# Patient Record
Sex: FEMALE | Race: WHITE | Employment: OTHER | URBAN - METROPOLITAN AREA
[De-identification: names, ages, dates, MRNs, and addresses within clinical notes are randomized per-mention and may not be internally consistent; named-entity substitution may affect disease eponyms.]

---

## 2017-03-08 ENCOUNTER — APPOINTMENT (OUTPATIENT)
Dept: CT IMAGING | Age: 82
DRG: 641 | End: 2017-03-08
Attending: EMERGENCY MEDICINE
Payer: MEDICARE

## 2017-03-08 ENCOUNTER — APPOINTMENT (OUTPATIENT)
Dept: CT IMAGING | Age: 82
DRG: 641 | End: 2017-03-08
Attending: FAMILY MEDICINE
Payer: MEDICARE

## 2017-03-08 ENCOUNTER — HOSPITAL ENCOUNTER (INPATIENT)
Age: 82
LOS: 2 days | Discharge: HOME HEALTH | DRG: 641 | End: 2017-03-10
Attending: EMERGENCY MEDICINE | Admitting: FAMILY MEDICINE
Payer: MEDICARE

## 2017-03-08 ENCOUNTER — APPOINTMENT (OUTPATIENT)
Dept: GENERAL RADIOLOGY | Age: 82
DRG: 641 | End: 2017-03-08
Attending: EMERGENCY MEDICINE
Payer: MEDICARE

## 2017-03-08 DIAGNOSIS — E83.42 HYPOMAGNESEMIA: Primary | ICD-10-CM

## 2017-03-08 DIAGNOSIS — E83.51 HYPOCALCEMIA: ICD-10-CM

## 2017-03-08 PROBLEM — R20.2 NUMBNESS AND TINGLING: Status: ACTIVE | Noted: 2017-03-08

## 2017-03-08 PROBLEM — K92.2 GI BLEED: Status: ACTIVE | Noted: 2017-03-08

## 2017-03-08 PROBLEM — G45.9 TIA (TRANSIENT ISCHEMIC ATTACK): Status: ACTIVE | Noted: 2017-03-08

## 2017-03-08 PROBLEM — R20.0 NUMBNESS AND TINGLING: Status: ACTIVE | Noted: 2017-03-08

## 2017-03-08 LAB
ALBUMIN SERPL BCP-MCNC: 3 G/DL (ref 3.5–5)
ALBUMIN/GLOB SERPL: 1.1 {RATIO} (ref 1.1–2.2)
ALP SERPL-CCNC: 81 U/L (ref 45–117)
ALT SERPL-CCNC: 15 U/L (ref 12–78)
AMMONIA PLAS-SCNC: <10 UMOL/L
ANION GAP BLD CALC-SCNC: 10 MMOL/L (ref 5–15)
APPEARANCE UR: ABNORMAL
AST SERPL W P-5'-P-CCNC: 17 U/L (ref 15–37)
BASOPHILS # BLD AUTO: 0 K/UL (ref 0–0.1)
BASOPHILS # BLD: 0 % (ref 0–1)
BILIRUB SERPL-MCNC: 0.9 MG/DL (ref 0.2–1)
BILIRUB UR QL CFM: NEGATIVE
BUN SERPL-MCNC: 16 MG/DL (ref 6–20)
BUN/CREAT SERPL: 16 (ref 12–20)
CALCIUM SERPL-MCNC: 6 MG/DL (ref 8.5–10.1)
CHLORIDE SERPL-SCNC: 103 MMOL/L (ref 97–108)
CHOLEST SERPL-MCNC: 182 MG/DL
CO2 SERPL-SCNC: 24 MMOL/L (ref 21–32)
COLOR UR: ABNORMAL
CREAT SERPL-MCNC: 0.99 MG/DL (ref 0.55–1.02)
EOSINOPHIL # BLD: 0.1 K/UL (ref 0–0.4)
EOSINOPHIL NFR BLD: 1 % (ref 0–7)
ERYTHROCYTE [DISTWIDTH] IN BLOOD BY AUTOMATED COUNT: 12.8 % (ref 11.5–14.5)
FOLATE SERPL-MCNC: 5.8 NG/ML (ref 5–21)
GLOBULIN SER CALC-MCNC: 2.7 G/DL (ref 2–4)
GLUCOSE SERPL-MCNC: 97 MG/DL (ref 65–100)
GLUCOSE UR STRIP.AUTO-MCNC: NEGATIVE MG/DL
HCT VFR BLD AUTO: 36.8 % (ref 35–47)
HDLC SERPL-MCNC: 64 MG/DL
HDLC SERPL: 2.8 {RATIO} (ref 0–5)
HGB BLD-MCNC: 12.3 G/DL (ref 11.5–16)
HGB UR QL STRIP: NEGATIVE
KETONES UR QL STRIP.AUTO: NEGATIVE MG/DL
LDLC SERPL CALC-MCNC: 91.2 MG/DL (ref 0–100)
LEUKOCYTE ESTERASE UR QL STRIP.AUTO: NEGATIVE
LIPID PROFILE,FLP: NORMAL
LYMPHOCYTES # BLD AUTO: 19 % (ref 12–49)
LYMPHOCYTES # BLD: 1.7 K/UL (ref 0.8–3.5)
MAGNESIUM SERPL-MCNC: <0.2 MG/DL (ref 1.6–2.4)
MCH RBC QN AUTO: 33.3 PG (ref 26–34)
MCHC RBC AUTO-ENTMCNC: 33.4 G/DL (ref 30–36.5)
MCV RBC AUTO: 99.7 FL (ref 80–99)
MONOCYTES # BLD: 0.7 K/UL (ref 0–1)
MONOCYTES NFR BLD AUTO: 8 % (ref 5–13)
NEUTS SEG # BLD: 6.4 K/UL (ref 1.8–8)
NEUTS SEG NFR BLD AUTO: 72 % (ref 32–75)
NITRITE UR QL STRIP.AUTO: NEGATIVE
PH UR STRIP: 6 [PH] (ref 5–8)
PHOSPHATE SERPL-MCNC: 3.6 MG/DL (ref 2.6–4.7)
PLATELET # BLD AUTO: 198 K/UL (ref 150–400)
POTASSIUM SERPL-SCNC: 3.9 MMOL/L (ref 3.5–5.1)
PROT SERPL-MCNC: 5.7 G/DL (ref 6.4–8.2)
PROT UR STRIP-MCNC: NEGATIVE MG/DL
RBC # BLD AUTO: 3.69 M/UL (ref 3.8–5.2)
SODIUM SERPL-SCNC: 137 MMOL/L (ref 136–145)
SP GR UR REFRACTOMETRY: 1.03 (ref 1–1.03)
TRIGL SERPL-MCNC: 134 MG/DL (ref ?–150)
UROBILINOGEN UR QL STRIP.AUTO: 0.2 EU/DL (ref 0.2–1)
VIT B12 SERPL-MCNC: NORMAL PG/ML (ref 211–911)
VLDLC SERPL CALC-MCNC: 26.8 MG/DL
WBC # BLD AUTO: 8.8 K/UL (ref 3.6–11)

## 2017-03-08 PROCEDURE — 74011000258 HC RX REV CODE- 258: Performed by: FAMILY MEDICINE

## 2017-03-08 PROCEDURE — 71020 XR CHEST PA LAT: CPT

## 2017-03-08 PROCEDURE — 74176 CT ABD & PELVIS W/O CONTRAST: CPT

## 2017-03-08 PROCEDURE — 99285 EMERGENCY DEPT VISIT HI MDM: CPT

## 2017-03-08 PROCEDURE — 84100 ASSAY OF PHOSPHORUS: CPT | Performed by: FAMILY MEDICINE

## 2017-03-08 PROCEDURE — 82140 ASSAY OF AMMONIA: CPT | Performed by: FAMILY MEDICINE

## 2017-03-08 PROCEDURE — 74011250636 HC RX REV CODE- 250/636: Performed by: FAMILY MEDICINE

## 2017-03-08 PROCEDURE — 70450 CT HEAD/BRAIN W/O DYE: CPT

## 2017-03-08 PROCEDURE — 74011000250 HC RX REV CODE- 250: Performed by: FAMILY MEDICINE

## 2017-03-08 PROCEDURE — 83735 ASSAY OF MAGNESIUM: CPT | Performed by: EMERGENCY MEDICINE

## 2017-03-08 PROCEDURE — 81003 URINALYSIS AUTO W/O SCOPE: CPT | Performed by: EMERGENCY MEDICINE

## 2017-03-08 PROCEDURE — 82746 ASSAY OF FOLIC ACID SERUM: CPT | Performed by: FAMILY MEDICINE

## 2017-03-08 PROCEDURE — 85025 COMPLETE CBC W/AUTO DIFF WBC: CPT | Performed by: EMERGENCY MEDICINE

## 2017-03-08 PROCEDURE — 65620000000 HC RM CCU GENERAL

## 2017-03-08 PROCEDURE — 96365 THER/PROPH/DIAG IV INF INIT: CPT

## 2017-03-08 PROCEDURE — 74011250636 HC RX REV CODE- 250/636: Performed by: EMERGENCY MEDICINE

## 2017-03-08 PROCEDURE — 83970 ASSAY OF PARATHORMONE: CPT | Performed by: EMERGENCY MEDICINE

## 2017-03-08 PROCEDURE — 93005 ELECTROCARDIOGRAM TRACING: CPT

## 2017-03-08 PROCEDURE — 82652 VIT D 1 25-DIHYDROXY: CPT | Performed by: EMERGENCY MEDICINE

## 2017-03-08 PROCEDURE — 80053 COMPREHEN METABOLIC PANEL: CPT | Performed by: EMERGENCY MEDICINE

## 2017-03-08 PROCEDURE — 82607 VITAMIN B-12: CPT | Performed by: FAMILY MEDICINE

## 2017-03-08 PROCEDURE — 80061 LIPID PANEL: CPT | Performed by: FAMILY MEDICINE

## 2017-03-08 PROCEDURE — 74011250637 HC RX REV CODE- 250/637: Performed by: FAMILY MEDICINE

## 2017-03-08 PROCEDURE — 36415 COLL VENOUS BLD VENIPUNCTURE: CPT | Performed by: EMERGENCY MEDICINE

## 2017-03-08 RX ORDER — ACETAMINOPHEN 325 MG/1
650 TABLET ORAL
Status: DISCONTINUED | OUTPATIENT
Start: 2017-03-08 | End: 2017-03-10 | Stop reason: HOSPADM

## 2017-03-08 RX ORDER — SODIUM CHLORIDE 0.9 % (FLUSH) 0.9 %
5-10 SYRINGE (ML) INJECTION EVERY 8 HOURS
Status: DISCONTINUED | OUTPATIENT
Start: 2017-03-08 | End: 2017-03-10 | Stop reason: HOSPADM

## 2017-03-08 RX ORDER — CALCIUM GLUCONATE 94 MG/ML
1 INJECTION, SOLUTION INTRAVENOUS ONCE
Status: DISCONTINUED | OUTPATIENT
Start: 2017-03-08 | End: 2017-03-08

## 2017-03-08 RX ORDER — MAGNESIUM SULFATE HEPTAHYDRATE 40 MG/ML
2 INJECTION, SOLUTION INTRAVENOUS ONCE
Status: COMPLETED | OUTPATIENT
Start: 2017-03-08 | End: 2017-03-08

## 2017-03-08 RX ORDER — CALCIUM CARBONATE 500(1250)
500 TABLET ORAL
Status: DISCONTINUED | OUTPATIENT
Start: 2017-03-09 | End: 2017-03-10

## 2017-03-08 RX ORDER — CLONAZEPAM 0.5 MG/1
0.5 TABLET ORAL
Status: DISCONTINUED | OUTPATIENT
Start: 2017-03-08 | End: 2017-03-10 | Stop reason: HOSPADM

## 2017-03-08 RX ORDER — GUAIFENESIN 100 MG/5ML
81 LIQUID (ML) ORAL DAILY
Status: DISCONTINUED | OUTPATIENT
Start: 2017-03-09 | End: 2017-03-10 | Stop reason: HOSPADM

## 2017-03-08 RX ORDER — OMEPRAZOLE 20 MG/1
20 CAPSULE, DELAYED RELEASE ORAL DAILY
Status: CANCELLED | OUTPATIENT
Start: 2017-03-09

## 2017-03-08 RX ORDER — PRAVASTATIN SODIUM 20 MG/1
20 TABLET ORAL DAILY
Status: DISCONTINUED | OUTPATIENT
Start: 2017-03-09 | End: 2017-03-10 | Stop reason: HOSPADM

## 2017-03-08 RX ORDER — POTASSIUM CHLORIDE 750 MG/1
20 TABLET, FILM COATED, EXTENDED RELEASE ORAL DAILY
Status: DISCONTINUED | OUTPATIENT
Start: 2017-03-09 | End: 2017-03-10 | Stop reason: HOSPADM

## 2017-03-08 RX ORDER — SODIUM CHLORIDE 0.9 % (FLUSH) 0.9 %
5-10 SYRINGE (ML) INJECTION AS NEEDED
Status: DISCONTINUED | OUTPATIENT
Start: 2017-03-08 | End: 2017-03-10 | Stop reason: HOSPADM

## 2017-03-08 RX ORDER — CALCIUM GLUCONATE 94 MG/ML
2 INJECTION, SOLUTION INTRAVENOUS
Status: DISCONTINUED | OUTPATIENT
Start: 2017-03-08 | End: 2017-03-08 | Stop reason: CLARIF

## 2017-03-08 RX ORDER — CARVEDILOL 6.25 MG/1
6.25 TABLET ORAL 2 TIMES DAILY WITH MEALS
Status: DISCONTINUED | OUTPATIENT
Start: 2017-03-09 | End: 2017-03-10 | Stop reason: HOSPADM

## 2017-03-08 RX ADMIN — CLONAZEPAM 0.5 MG: 0.5 TABLET ORAL at 23:11

## 2017-03-08 RX ADMIN — MAGNESIUM SULFATE HEPTAHYDRATE 2 G: 40 INJECTION, SOLUTION INTRAVENOUS at 20:11

## 2017-03-08 RX ADMIN — Medication 10 ML: at 22:23

## 2017-03-08 RX ADMIN — CALCIUM GLUCONATE 2 G: 94 INJECTION, SOLUTION INTRAVENOUS at 22:28

## 2017-03-08 RX ADMIN — FOLIC ACID: 5 INJECTION, SOLUTION INTRAMUSCULAR; INTRAVENOUS; SUBCUTANEOUS at 21:30

## 2017-03-08 RX ADMIN — CALCIUM GLUCONATE 2 G: 94 INJECTION, SOLUTION INTRAVENOUS at 21:21

## 2017-03-08 RX ADMIN — MAGNESIUM SULFATE HEPTAHYDRATE 2 G: 40 INJECTION, SOLUTION INTRAVENOUS at 19:16

## 2017-03-08 NOTE — ED TRIAGE NOTES
Pt c/o numbness to both hands for 1.5 months, pt had labs done and she stated two things were low, Ca 6.9 and Mg 0.5, pt c/o abd pain and shakiness, mid abd pain for a couple of months, denies fever , denies n/v, denies urinary symptoms, +diarrhea x 2 today

## 2017-03-08 NOTE — ED PROVIDER NOTES
HPI Comments: 80 y.o. female with past medical history significant for NSTEMI, numbness who presents accompanied by relative with chief complaint of evaluation for abnormal lab result. Patient who had blood work done by PCP this morning presents referred by PCP secondary to finding \"dangerously low\" Calcium and Magnesium levels. In ED, patient complains of \"shakiness\", b/l hand/foot numbness and intermittent mid-abdominal pain for ~6 weeks. She reports associated fatigue and loss of appetite. Patient denies any weight loss and notes she usually has adequate PO intake of fluids. Patient notes abdominal pain is exacerbated by ETOH consumption and claims she has recently tried to [employer]" because of this. Patient notes she has had low Potassium and Calcium levels in the past with associated b/l hand cramping for which she has had to be hospitalized. Patient notes she has not taken her diuretic in ~1 week per instruction but reports taking her \"Potassium pills and heart medication\" regularly. Patient notes she uses walker to ambulate. She denies any recent difficulty with gait. She denies any PO of vitamins or herbal remedies. Patient denies any SOB, fever, cough, vomiting, diarrhea. There are no other acute medical concerns at this time. Social hx: +ETOH use; 4 ounces scotch per night; recently stopped due to associated abdominal pain. PCP: Glendon Leventhal, MD    Note written by Cristina Lorenzo. Lobito Deleon, as dictated by Em Capone MD 5:50 PM      The history is provided by the patient. No  was used. Past Medical History:   Diagnosis Date    History of non-ST elevation myocardial infarction (NSTEMI) 11/28/2016       History reviewed. No pertinent surgical history. History reviewed. No pertinent family history.     Social History     Social History    Marital status:      Spouse name: N/A    Number of children: N/A    Years of education: N/A     Occupational History    Not on file. Social History Main Topics    Smoking status: Not on file    Smokeless tobacco: Not on file    Alcohol use Not on file    Drug use: Not on file    Sexual activity: Not on file     Other Topics Concern    Not on file     Social History Narrative         ALLERGIES: Codeine    Review of Systems   Constitutional: Positive for appetite change (loss of appetite) and fatigue. Negative for chills, fever and unexpected weight change. HENT: Negative for rhinorrhea, sore throat and trouble swallowing. Eyes: Negative for photophobia. Respiratory: Negative for cough and shortness of breath. Cardiovascular: Negative for chest pain and palpitations. Gastrointestinal: Positive for abdominal pain (intermittent). Negative for diarrhea, nausea and vomiting. Genitourinary: Negative for dysuria, frequency and hematuria. Musculoskeletal: Negative for arthralgias and gait problem. Neurological: Positive for numbness (hands, feet). Negative for dizziness, syncope and weakness. Psychiatric/Behavioral: Negative for behavioral problems. The patient is not nervous/anxious. All other systems reviewed and are negative. Vitals:    03/08/17 1729 03/08/17 1900   BP: 124/81 136/80   Pulse: 93 81   Resp: 16 14   Temp: 98 °F (36.7 °C) 98.8 °F (37.1 °C)   SpO2: 97% 99%   Height: 5' (1.524 m)             Physical Exam   Constitutional: She appears well-developed and well-nourished. HENT:   Head: Normocephalic and atraumatic. Mouth/Throat: Oropharynx is clear and moist.   Eyes: EOM are normal. Pupils are equal, round, and reactive to light. Neck: Normal range of motion. Neck supple. Cardiovascular: Normal rate, regular rhythm, normal heart sounds and intact distal pulses. Exam reveals no gallop and no friction rub. No murmur heard. Pulmonary/Chest: Effort normal. No respiratory distress. She has no wheezes. She has no rales. Abdominal: Soft. There is no tenderness.  There is no rebound. Musculoskeletal: Normal range of motion. She exhibits no tenderness. Neurological: She is alert. No cranial nerve deficit. Motor; symmetric   Skin: No erythema. Psychiatric: She has a normal mood and affect. Her behavior is normal.   Nursing note and vitals reviewed. Note written by Sera Powell. Dean Torre, as dictated by Chelsea Adkins MD 5:50 PM        [facility]  ED Course       Procedures    PROGRESS NOTE:  7:30 PM  Updated patient and relative on results and plan. They are in agreement. CONSULT NOTE:  7:44 PM Chelsea Adkins MD spoke with Dr. Jo Ann Garner, Consult for Hospitalist.  Discussed available diagnostic tests and clinical findings. He is in agreement with care plans as outlined. Dr. Jo Ann Garner will admit.

## 2017-03-08 NOTE — IP AVS SNAPSHOT
Current Discharge Medication List  
  
Take these medications at their scheduled times Dose & Instructions Dispensing Information Comments Morning Noon Evening Bedtime ACETAMINOPHEN EXTRA STRENGTH 500 mg tablet Generic drug:  acetaminophen Your next dose is: Today, Tomorrow Other:  ____________ Dose:  1000 mg Take 1,000 mg by mouth nightly. Refills:  0  
     
   
   
   
  
 aspirin 81 mg chewable tablet Your next dose is: Today, Tomorrow Other:  ____________ Dose:  81 mg Take 1 Tab by mouth daily. Quantity:  30 Tab Refills:  0  
     
   
   
   
  
 calcium carbonate 500 mg calcium (1,250 mg) tablet Commonly known as:  OS-DAVIAN Your next dose is: Today, Tomorrow Other:  ____________ Dose:  1 Tab Take 1 Tab by mouth three (3) times daily (with meals). Indications: hypocalcemia Quantity:  60 Tab Refills:  0  
     
   
   
   
  
 carvedilol 6.25 mg tablet Commonly known as:  Ozie Bitter Your next dose is: Today, Tomorrow Other:  ____________ Dose:  6.25 mg Take 1 Tab by mouth two (2) times daily (with meals). Quantity:  60 Tab Refills:  0  
     
   
   
   
  
 cholecalciferol 1,000 unit tablet Commonly known as:  VITAMIN D3 Your next dose is: Today, Tomorrow Other:  ____________ Dose:  5000 Units Take 5 Tabs by mouth daily. Quantity:  30 Tab Refills:  1  
     
   
   
   
  
 magnesium oxide 400 mg tablet Commonly known as:  MAG-OX Your next dose is: Today, Tomorrow Other:  ____________ Dose:  800 mg Take 2 Tabs by mouth two (2) times a day. Indications: HYPOMAGNESEMIA Quantity:  60 Tab Refills:  1  
     
   
   
   
  
 pravastatin 20 mg tablet Commonly known as:  PRAVACHOL Your next dose is: Today, Tomorrow Other:  ____________ Dose:  20 mg Take 20 mg by mouth daily. Refills:  0 PriLOSEC 20 mg capsule Generic drug:  omeprazole Your next dose is: Today, Tomorrow Other:  ____________ Dose:  20 mg Take 20 mg by mouth daily. Refills:  0 Take these medications as needed Dose & Instructions Dispensing Information Comments Morning Noon Evening Bedtime KlonoPIN 0.5 mg tablet Generic drug:  clonazePAM  
   
Your next dose is: Today, Tomorrow Other:  ____________ Dose:  0.5 mg Take 0.5 mg by mouth nightly as needed. Refills:  0  
     
   
   
   
  
 TYLENOL PM PO Your next dose is: Today, Tomorrow Other:  ____________ Dose:  1 Tab Take 1 Tab by mouth nightly as needed. Refills:  0 Where to Get Your Medications Information about where to get these medications is not yet available ! Ask your nurse or doctor about these medications  
  calcium carbonate 500 mg calcium (1,250 mg) tablet  
 cholecalciferol 1,000 unit tablet  
 magnesium oxide 400 mg tablet

## 2017-03-08 NOTE — IP AVS SNAPSHOT
[address] 
[phone] Patient: Adam Morales MRN: [id number] :10/17/1927 You are allergic to the following Allergen Reactions Codeine Unknown (comments) Recent Documentation Height Weight Breastfeeding? BMI OB Status 1.524 m 55.4 kg No 23.85 kg/m2 Postmenopausal   
  
Unresulted Labs Order Current Status VITAMIN D, 1, 25 DIHYDROXY In process VITAMIN D, 25 HYROXY PANEL In process Emergency Contacts Name Discharge Info Relation Home Work Mobile Divine Linn DISCHARGE CAREGIVER [3]  [phone] Abhishek(Grandson)Cameron DISCHARGE CAREGIVER [3] Other Relative [6] [phone] About your hospitalization You were admitted on:  2017 You last received care in the:  [address] You were discharged on:  March 10, 2017 Unit phone number:  [phone] Why you were hospitalized Your primary diagnosis was:  Hypocalcemia Your diagnoses also included:  Hypomagnesemia, Tia (Transient Ischemic Attack), Gi Bleed, Numbness And Tingling, 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, History Of Non-St Elevation Myocardial Infarction (Nstemi) Providers Seen During Your Hospitalizations Provider Role Specialty Primary office phone Chelsea Adkins MD Attending Provider Emergency Medicine [phone] Kamlesh Franco MD Attending Provider Hospitalist [phone] Francisca Kramer MD Attending Provider Internal Medicine [phone] Your Primary Care Physician (PCP) Primary Care Physician Office Phone Office Fax Kimberlyn Rodrigez [phone] Follow-up Information Follow up With Details Comments Contact Info [address] [facility] [address] [phone] Beba Stubbs MD   [address] [facility] [id number] [phone] Milton Sanderson MD On 3/21/2017 at 9:20 AM. You will see the Nurse Practitioner Ramin Barrow. [facility] [address] 
[phone] Milton Sanderson MD On 3/17/2017 Labs to be drawn on 3/17/2017. Walk in hours. [facility] [address] 
[phone] Current Discharge Medication List  
  
START taking these medications Dose & Instructions Dispensing Information Comments Morning Noon Evening Bedtime  
 calcium carbonate 500 mg calcium (1,250 mg) tablet Commonly known as:  OS-DAVIAN Your next dose is: Today, Tomorrow Other:  _________ Dose:  1 Tab Take 1 Tab by mouth three (3) times daily (with meals). Indications: hypocalcemia Quantity:  60 Tab Refills:  0  
     
   
   
   
  
 cholecalciferol 1,000 unit tablet Commonly known as:  VITAMIN D3 Your next dose is: Today, Tomorrow Other:  _________ Dose:  5000 Units Take 5 Tabs by mouth daily. Quantity:  30 Tab Refills:  1  
     
   
   
   
  
 magnesium oxide 400 mg tablet Commonly known as:  MAG-OX Your next dose is: Today, Tomorrow Other:  _________ Dose:  800 mg Take 2 Tabs by mouth two (2) times a day. Indications: HYPOMAGNESEMIA Quantity:  60 Tab Refills:  1 CONTINUE these medications which have NOT CHANGED Dose & Instructions Dispensing Information Comments Morning Noon Evening Bedtime ACETAMINOPHEN EXTRA STRENGTH 500 mg tablet Generic drug:  acetaminophen Your next dose is: Today, Tomorrow Other:  _________ Dose:  1000 mg Take 1,000 mg by mouth nightly. Refills:  0  
     
   
   
   
  
 aspirin 81 mg chewable tablet Your next dose is: Today, Tomorrow Other:  _________ Dose:  81 mg Take 1 Tab by mouth daily. Quantity:  30 Tab Refills:  0 carvedilol 6.25 mg tablet Commonly known as:  Jaimie Spry Your next dose is: Today, Tomorrow Other:  _________ Dose:  6.25 mg Take 1 Tab by mouth two (2) times daily (with meals). Quantity:  60 Tab Refills:  0 KlonoPIN 0.5 mg tablet Generic drug:  clonazePAM  
   
Your next dose is: Today, Tomorrow Other:  _________ Dose:  0.5 mg Take 0.5 mg by mouth nightly as needed. Refills:  0  
     
   
   
   
  
 pravastatin 20 mg tablet Commonly known as:  PRAVACHOL Your next dose is: Today, Tomorrow Other:  _________ Dose:  20 mg Take 20 mg by mouth daily. Refills:  0 PriLOSEC 20 mg capsule Generic drug:  omeprazole Your next dose is: Today, Tomorrow Other:  _________ Dose:  20 mg Take 20 mg by mouth daily. Refills:  0  
     
   
   
   
  
 TYLENOL PM PO Your next dose is: Today, Tomorrow Other:  _________ Dose:  1 Tab Take 1 Tab by mouth nightly as needed. Refills:  0 STOP taking these medications LASIX 20 mg tablet Generic drug:  furosemide  
   
  
 potassium chloride SA 10 mEq capsule Commonly known as:  MICRO-K  
   
  
 trimethoprim-sulfamethoxazole 160-800 mg per tablet Commonly known as:  BACTRIM DS Where to Get Your Medications Information on where to get these meds will be given to you by the nurse or doctor. ! Ask your nurse or doctor about these medications  
  calcium carbonate 500 mg calcium (1,250 mg) tablet  
 cholecalciferol 1,000 unit tablet  
 magnesium oxide 400 mg tablet Discharge Instructions Discharge Instructions PATIENT ID: Dana Marx MRN: [id number] YOB: 1927 DATE OF ADMISSION: 3/8/2017  5:32 PM   
DATE OF DISCHARGE: 3/10/2017 PRIMARY CARE PROVIDER: Glendon Leventhal, MD  
 
 
 DISCHARGING PHYSICIAN: Veronica James MD   
To contact this individual call [phone] and ask the  to page. If unavailable ask to be transferred the Adult Hospitalist Department. DISCHARGE DIAGNOSES : Bilateral Upper extremity tingling/Numbness, hypocalcemia, Hypomagnesemia, Alcohol abuse CONSULTATIONS: IP CONSULT TO HOSPITALIST 
IP CONSULT TO NEPHROLOGY PROCEDURES/SURGERIES: * No surgery found * PENDING TEST RESULTS:  
At the time of discharge the following test results are still pending: Vitamin D levels FOLLOW UP APPOINTMENTS:  See above. ADDITIONAL CARE RECOMMENDATIONS:  CMP, magnesium, (3/17/2017) DIET: Regular Diet ACTIVITY: Activity as tolerated WOUND CARE: none EQUIPMENT needed:  
 
 
  
 SNF/Inpatient Rehab/LTAC Independent/assisted living Hospice Other: CDMP Checked: Yes X Signed:  
Veronica James MD 
3/10/2017 
2:16 PM 
 
Discharge Orders None [facility] Announcement We are excited to announce that we are making your provider's discharge notes available to you in [email or web address].   You will see these notes when they are completed and signed by the physician that discharged you from your recent hospital stay. If you have any questions or concerns about any information you see in Sviral, please call the Health Information Department where you were seen or reach out to your Primary Care Provider for more information about your plan of care. Introducing \[facility] [place] [facility]\"" & HEALTH SERVICES! [employer] introduces Sviral patient portal. Now you can access parts of your medical record, email your doctor's office, and request medication refills online. 1. In your internet browser, go to [email or web address] 2. Click on the First Time User? Click Here link in the Sign In box. You will see the New Member Sign Up page. 3. Enter your Sviral Access Code exactly as it appears below. You will not need to use this code after youve completed the sign-up process. If you do not sign up before the expiration date, you must request a new code. · Sviral Access Code: [email or web address] Expires: 6/6/2017  6:31 PM 
 
4. Enter the last four digits of your Social Security Number (xxxx) and Date of Birth (mm/dd/yyyy) as indicated and click Submit. You will be taken to the next sign-up page. 5. Create a Sviral ID. This will be your Sviral login ID and cannot be changed, so think of one that is secure and easy to remember. 6. Create a Sviral password. You can change your password at any time. 7. Enter your Password Reset Question and Answer. This can be used at a later time if you forget your password. 8. Enter your e-mail address. You will receive e-mail notification when new information is available in [address] 9. Click Sign Up. You can now view and download portions of your medical record. 10. Click the Download Summary menu link to download a portable copy of your medical information. If you have questions, please visit the Frequently Asked Questions section of the Sviral website. Remember, Sviral is NOT to be used for urgent needs. For medical emergencies, dial 911. Now available from your iPhone and Android! General Information Please provide this summary of care documentation to your next provider. Patient Signature:  ____________________________________________________________ Date:  ____________________________________________________________  
  
Soheila Moulds Provider Signature:  ____________________________________________________________ Date:  ____________________________________________________________

## 2017-03-09 ENCOUNTER — HOME HEALTH ADMISSION (OUTPATIENT)
Dept: HOME HEALTH SERVICES | Facility: HOME HEALTH | Age: 82
End: 2017-03-09

## 2017-03-09 LAB
ANION GAP BLD CALC-SCNC: 9 MMOL/L (ref 5–15)
ATRIAL RATE: 79 BPM
BUN SERPL-MCNC: 15 MG/DL (ref 6–20)
BUN/CREAT SERPL: 17 (ref 12–20)
CALCIUM SERPL-MCNC: 5.9 MG/DL (ref 8.5–10.1)
CALCIUM SERPL-MCNC: 7.5 MG/DL (ref 8.5–10.1)
CALCULATED P AXIS, ECG09: 16 DEGREES
CALCULATED R AXIS, ECG10: -10 DEGREES
CALCULATED T AXIS, ECG11: 16 DEGREES
CHLORIDE SERPL-SCNC: 101 MMOL/L (ref 97–108)
CK SERPL-CCNC: 79 U/L (ref 26–192)
CO2 SERPL-SCNC: 26 MMOL/L (ref 21–32)
CREAT SERPL-MCNC: 0.87 MG/DL (ref 0.55–1.02)
DIAGNOSIS, 93000: NORMAL
EST. AVERAGE GLUCOSE BLD GHB EST-MCNC: NORMAL MG/DL
GLUCOSE SERPL-MCNC: 105 MG/DL (ref 65–100)
HBA1C MFR BLD: 4.7 % (ref 4.2–6.3)
LIPASE SERPL-CCNC: 245 U/L (ref 73–393)
MAGNESIUM SERPL-MCNC: 2.5 MG/DL (ref 1.6–2.4)
P-R INTERVAL, ECG05: 164 MS
POTASSIUM SERPL-SCNC: 3.5 MMOL/L (ref 3.5–5.1)
PTH-INTACT SERPL-MCNC: 42.2 PG/ML (ref 14–72)
Q-T INTERVAL, ECG07: 430 MS
QRS DURATION, ECG06: 112 MS
QTC CALCULATION (BEZET), ECG08: 493 MS
SODIUM SERPL-SCNC: 136 MMOL/L (ref 136–145)
TROPONIN I SERPL-MCNC: <0.04 NG/ML
TSH SERPL DL<=0.05 MIU/L-ACNC: 0.42 UIU/ML (ref 0.36–3.74)
VENTRICULAR RATE, ECG03: 79 BPM

## 2017-03-09 PROCEDURE — 97161 PT EVAL LOW COMPLEX 20 MIN: CPT

## 2017-03-09 PROCEDURE — 36415 COLL VENOUS BLD VENIPUNCTURE: CPT | Performed by: FAMILY MEDICINE

## 2017-03-09 PROCEDURE — 84484 ASSAY OF TROPONIN QUANT: CPT | Performed by: FAMILY MEDICINE

## 2017-03-09 PROCEDURE — 97116 GAIT TRAINING THERAPY: CPT

## 2017-03-09 PROCEDURE — 82550 ASSAY OF CK (CPK): CPT | Performed by: FAMILY MEDICINE

## 2017-03-09 PROCEDURE — 83036 HEMOGLOBIN GLYCOSYLATED A1C: CPT | Performed by: FAMILY MEDICINE

## 2017-03-09 PROCEDURE — 83735 ASSAY OF MAGNESIUM: CPT | Performed by: FAMILY MEDICINE

## 2017-03-09 PROCEDURE — 84443 ASSAY THYROID STIM HORMONE: CPT | Performed by: FAMILY MEDICINE

## 2017-03-09 PROCEDURE — 83690 ASSAY OF LIPASE: CPT | Performed by: FAMILY MEDICINE

## 2017-03-09 PROCEDURE — 82306 VITAMIN D 25 HYDROXY: CPT | Performed by: FAMILY MEDICINE

## 2017-03-09 PROCEDURE — 74011250637 HC RX REV CODE- 250/637: Performed by: FAMILY MEDICINE

## 2017-03-09 PROCEDURE — 74011000250 HC RX REV CODE- 250: Performed by: HOSPITALIST

## 2017-03-09 PROCEDURE — 74011250637 HC RX REV CODE- 250/637: Performed by: INTERNAL MEDICINE

## 2017-03-09 PROCEDURE — 74011000250 HC RX REV CODE- 250: Performed by: FAMILY MEDICINE

## 2017-03-09 PROCEDURE — 74011250636 HC RX REV CODE- 250/636: Performed by: FAMILY MEDICINE

## 2017-03-09 PROCEDURE — 65660000000 HC RM CCU STEPDOWN

## 2017-03-09 PROCEDURE — 80048 BASIC METABOLIC PNL TOTAL CA: CPT | Performed by: FAMILY MEDICINE

## 2017-03-09 RX ORDER — LANOLIN ALCOHOL/MO/W.PET/CERES
800 CREAM (GRAM) TOPICAL 2 TIMES DAILY
Status: DISCONTINUED | OUTPATIENT
Start: 2017-03-09 | End: 2017-03-10 | Stop reason: HOSPADM

## 2017-03-09 RX ORDER — MELATONIN
5000 DAILY
Status: DISCONTINUED | OUTPATIENT
Start: 2017-03-09 | End: 2017-03-10 | Stop reason: HOSPADM

## 2017-03-09 RX ORDER — POLYVINYL ALCOHOL 14 MG/ML
1 SOLUTION/ DROPS OPHTHALMIC AS NEEDED
Status: DISCONTINUED | OUTPATIENT
Start: 2017-03-09 | End: 2017-03-10 | Stop reason: HOSPADM

## 2017-03-09 RX ADMIN — PRAVASTATIN SODIUM 20 MG: 20 TABLET ORAL at 10:11

## 2017-03-09 RX ADMIN — CALCIUM CARBONATE 500 MG: 1250 TABLET ORAL at 17:30

## 2017-03-09 RX ADMIN — ACETAMINOPHEN 650 MG: 325 TABLET, FILM COATED ORAL at 21:09

## 2017-03-09 RX ADMIN — CARVEDILOL 6.25 MG: 6.25 TABLET, FILM COATED ORAL at 10:10

## 2017-03-09 RX ADMIN — Medication 800 MG: at 10:11

## 2017-03-09 RX ADMIN — FOLIC ACID: 5 INJECTION, SOLUTION INTRAMUSCULAR; INTRAVENOUS; SUBCUTANEOUS at 21:09

## 2017-03-09 RX ADMIN — POLYVINYL ALCOHOL 1 DROP: 14 SOLUTION/ DROPS OPHTHALMIC at 12:01

## 2017-03-09 RX ADMIN — POTASSIUM CHLORIDE 20 MEQ: 750 TABLET, FILM COATED, EXTENDED RELEASE ORAL at 10:10

## 2017-03-09 RX ADMIN — Medication 10 ML: at 21:10

## 2017-03-09 RX ADMIN — CALCIUM CARBONATE 500 MG: 1250 TABLET ORAL at 12:01

## 2017-03-09 RX ADMIN — CALCIUM CARBONATE 500 MG: 1250 TABLET ORAL at 10:16

## 2017-03-09 RX ADMIN — VITAMIN D, TAB 1000IU (100/BT) 5000 UNITS: 25 TAB at 10:11

## 2017-03-09 RX ADMIN — CLONAZEPAM 0.5 MG: 0.5 TABLET ORAL at 20:40

## 2017-03-09 RX ADMIN — CARVEDILOL 6.25 MG: 6.25 TABLET, FILM COATED ORAL at 17:30

## 2017-03-09 RX ADMIN — ASPIRIN 81 MG: 81 TABLET, CHEWABLE ORAL at 10:10

## 2017-03-09 NOTE — INTERDISCIPLINARY ROUNDS
IDR/SLIDR Summary          Patient: Elizabeth Shahid MRN: [id number]    Age: 80 y.o. YOB: 1927 Room/Bed: [address] [place] [address]   Admit Diagnosis: Hypocalcemia  Principal Diagnosis: Hypocalcemia   Goals: Normalize electrolytes  Readmission: NO  Quality Measure: Not applicable  VTE Prophylaxis: Mechanical  Influenza Vaccine screening completed? YES  Pneumococcal Vaccine screening completed? YES  Mobility needs: Yes   Nutrition plan:Yes  Consults: P. T and O.T. Financial concerns:No  Escalated to CM? NO  RRAT Score: 24   Interventions:H2H  Testing due for pt today?  NO  LOS: 1 days Expected length of stay 3 days  Discharge plan: tbd   PCP: Courtney Cardona MD  Transportation needs: Yes    Days before discharge:two or more days before discharge   Discharge disposition: Home    Signed:     Hung Horn RN  3/9/2017  7:26 AM

## 2017-03-09 NOTE — H&P
[address] [facility] [address]   HISTORY AND PHYSICAL       Name:  Julian Simons   MR#:  [id number]   :  10/17/1927   Account #:  [de-identified]        Date of Adm:  2017       CHIEF COMPLAINT: Numbness and abnormal labs. HISTORY OF PRESENT ILLNESS: The patient is an 69-year-old   female with past medical history significant for non-ST elevation MI,   history of coronary artery disease, history of alcohol abuse, history of   hypokalemia, hypomagnesemia and hypocalcemia, who presents to   the hospital with the above mentioned symptoms. The patient reports   that she has been experiencing some numbness in both her arms as   well as legs. The patient reports that she went to her primary care   physician this morning and was called because of significant   abnormality in her labs with low calcium and low magnesium. The   patient on arrival to the ED, had a magnesium that was not able to be   measured, and calcium of 6. The patient complains of shakiness in   bilateral hands with numbness associated with mid abdominal pain that   has been going on for 6 weeks. The patient reports that she has a loss   appetite and fatigue. The patient does report that she drinks 4 ounces   of scotch every night, but yesterday did not drink because she had   some abdominal pain associated with that. The patient reports that she   has been taking Lasix on a regular basis. The patient denies any other   complaints or problems. The patient reports that she has slight   headache associated with her symptoms. She also reports that she   had 2 loose stools this morning, but had no stools since then. The   patient also denies any recent history of antibiotic. The patient denies   any recent history of starting any new medications.  The patient also   denies any headache, blurry vision, sore throat, trouble swallowing,   trouble with speech, any chest pain, shortness of breath, cough, fever, chills, urinary symptoms, focal or generalized neurological weakness   besides the symptoms mentioned above, recent travels or sick   contacts. The patient does report that she has some twitching in her   arms and legs, but that is \"sporadic\". The patient is alert x2 at this point   of time. The patient also reports that she has had hemorrhoids and has   had rectal bleeding off and on which is \"just a few spots on the toilet   paper\". The patient denies any melena or hematemesis. PAST MEDICAL HISTORY: See above. HOME MEDICATIONS   Currently the patient is on:   1. Tylenol as needed. 2. Pravachol 20 mg daily. 3. Potassium chloride 2 capsules daily. 4. Aspirin 81 mg daily. 5. Carvedilol 6.25 mg b.i.d.   6. Clonazepam 0.5 mg nightly as needed. 7. Lasix 40 mg daily. 8. Prilosec 20 mg daily. SOCIAL HISTORY: The patient denies tobacco abuse. Reports drinking   4 ounces of alcohol every night. Denies IV drug abuse. Lives at home. REVIEW OF SYSTEMS: A 10-point review of systems done, which   was essentially negative except for the symptoms mentioned above. ALLERGIES: CODEINE. VITAL SIGNS: Temperature 98, pulse 72, respiratory rate 17, blood   pressure 155/76, pulse oximetry 98% room air. FAMILY HISTORY: Was discussed, was found to be noncontributory. PHYSICAL EXAMINATION   GENERAL: Alert x3, awake, no acute distress, resting in bed, pleasant   female, appears to be stated age. HEENT: Pupils equal and reactive to light. Dry mucous membranes. Tympanic membranes clear. NECK: Supple. CHEST: Decreased basal breath sounds. CORONARY: S1, S2 were heard. ABDOMEN: Soft, nontender, nondistended. Bowel sounds physiologic. EXTREMITIES: No clubbing, no cyanosis, no edema. NEUROPSYCHIATRIC: Pleasant mood and affect. Sensory grossly   within normal limits. DTR 2+/4. Strength 5/5 bilateral upper extremities.    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 Cranial   nerves 2-12 grossly intact. SKIN: Warm. LABORATORY DATA: White count 8.8, hemoglobin 12.2, hematocrit   36.8, platelets 848. Urine shows no signs of infection. Sodium 137,   potassium 3.9, chloride , bicarbonate 24, BUN 16, creatinine 0.99,   calcium 6, magnesium less than 0.2, bilirubin total 0.9, albumin 3, ALT   15, AST 17, alkaline phosphatase 81. PTH been ordered. CT abdomen   and pelvis shows fat-containing right anterior abdominal wall hernia;   hepatic cyst; right renal cyst, atherosclerotic abdominal aorta without   aneurysm; status post hysterectomy; diverticulosis; thoracolumbar   spondylolisthesis. EKG shows right bundle branch block with   nonspecific ST changes. ASSESSMENT AND PLAN   1. Profound hypomagnesemia. We will replace magnesium. I spoke   with Dr. Gabriela Kidd who reports to giving the patient magnesium   sulfate a total of 4 g intravenous, that has been ordered. Will monitor   the patient on neurovascular checks and telemetry monitoring. Further   intervention will be per hospital course. Reassess as needed. Check   magnesium every 8 hours. 2. Profound hypocalcemia, appears to be mildly symptomatic. Will   replace calcium with calcium gluconate; again, nephrology   recommendations have been incorporated and will give total of 4   grams intravenous at this point of time. We will check calcium every 8   hours. Neurovascular checks, telemetry monitoring and further   intervention will be per hospital course. Ionized calcium has been   ordered. 3. Numbness and tingling, most likely secondary to electrolyte   abnormalities, but rule out transient ischemic attack. Will get MRI of   the brain. Will continue the patient on aspirin and statin, get a lipid   profile, physical therapy consult and continue to monitor. The patient   will be on neurovascular checks. Further intervention will be per   hospital course.  Reassess as needed. Will get troponin levels and   hemoglobin A1c.   4. History of alcohol abuse. The patient will be on CIWA protocol. Will   replace vitamins. Further intervention will be per hospital course. Will   check B12 level. 5. History of coronary artery disease. Continue home medications,   stable. 6. Gastrointestinal and deep venous thrombosis prophylaxis. The   patient will be on sequential compression devices.         Garett Sanderson MD MM / MARILEE   D:  03/08/2017   20:48   T:  03/08/2017   23:42   Job #:  [id number]

## 2017-03-09 NOTE — PROGRESS NOTES
TRANSFER - IN REPORT:    Verbal report received from Jolanta(lilian) on John Freeze  being received from ED(unit) for routine progression of care      Report consisted of patients Situation, Background, Assessment and   Recommendations(SBAR). Information from the following report(s) SBAR, Kardex, ED Summary, Procedure Summary, Intake/Output, MAR, Accordion, Recent Results and Cardiac Rhythm NSR, BBB was reviewed with the receiving nurse. Opportunity for questions and clarification was provided. Assessment completed upon patients arrival to unit and care assumed.      Primary Nurse Carley De La O RN and Kellie Colon RN performed a dual skin assessment on this patient No impairment noted  Judson score is 18

## 2017-03-09 NOTE — PROGRESS NOTES
Care Management Interventions  PCP Verified by CM:  (Dr Castle President)  [facility] (Criteria: CHF and RRAT>21): No  Reason for No Palliative Care Consult:  (Not needed as of this time)  Mode of Transport at Discharge:  (car friend Keith Baugh phone [phone])  Transition of Care Consult (CM Consult): Home Health (possbile home healht follow up visit )  [address] Yes  Discharge Durable Medical Equipment: No  Health Maintenance Reviewed: Yes  Occupational Therapy Consult: No  Speech Therapy Consult: No  Current Support Network: Lives Alone (has a paid aide Jeannie who visits daily )  Confirm Follow Up Transport: Friends (Cesaer or Jeannie)  Plan discussed with Pt/Family/Caregiver: Yes (patient and friend Keith Baugh )  Discharge Location  Discharge Placement: Home with home health    introduced herself to patient and her friend and helper Jeannie phone [phone]. Patient states that she has managed to stay out of the hospital for a few months and does have a supportive social network. Patient uses [facility] and resides in [place].  She has a daughter who lives in [place], [place] and she communicates via e-mails and [email or web address]. Patient has a couple Jeannie and Cesear who help her with groceries, appts and housecleaning. In the past patient has used [facility] and would be in favor of having a nurse visit and review meds and assessment. I have spoken with hospitalist Dr Ankit Davis and sent referral via [facility]. Patient has orders to be transferred to [facility] when bed available. Pt does have a wheelchair at home and her friend has another one she keeps for patient. Patient does have an advanced directive inplace. Case management will follow for transitions of care needs. I spoke with PT Cortez and on occasion patient does use a rollator prn at home.

## 2017-03-09 NOTE — PROGRESS NOTES
[facility]   [address] [facility], [address] [place] [address], [facility]  Phone: [phone]   [phone]       Nephrology Progress Note  Ashley Kamara     10/17/1927     [id number]  Date of Admission : 3/8/2017  03/09/17    CC: Follow up for Electrolyte Disarray      Assessment and Plan   Chronic Hypomagnesemia :  -Multifactorial as outlined in consult note. Not suspecting Barters/ Gitelmans  - Corrected faster than expected. Repeat labs this afternoon   - start MgOxide 800 mg BID  - No Lasix, No PPI use for now    Hypocalcemia w/ Elevated PTH :  - 2/2 Vitamin D def + Hypomagnesemia   - Vit D level pending   - continue Tums and started Vit D3    Hypokalemia : 2/2 Hypomagnesemia + Loops   - off lasix. Replete PRN     Chronic alcoholism : CIWA protocol    Ventral Hernia : off PPI. Can use H2 blockers if needed     Interval History:  Seen and examined   Eating breakfast   parasthesias are chronic and persistent     Review of Systems: Pertinent items are noted in HPI. Current Medications:   Current Facility-Administered Medications   Medication Dose Route Frequency    magnesium oxide (MAG-OX) tablet 800 mg  800 mg Oral BID    cholecalciferol (VITAMIN D3) tablet 5,000 Units  5,000 Units Oral DAILY    aspirin chewable tablet 81 mg  81 mg Oral DAILY    carvedilol (COREG) tablet 6.25 mg  6.25 mg Oral BID WITH MEALS    clonazePAM (KlonoPIN) tablet 0.5 mg  0.5 mg Oral QHS PRN    pravastatin (PRAVACHOL) tablet 20 mg  20 mg Oral DAILY    potassium chloride SR (KLOR-CON 10) tablet 20 mEq  20 mEq Oral DAILY    sodium chloride (NS) flush 5-10 mL  5-10 mL IntraVENous Q8H    sodium chloride (NS) flush 5-10 mL  5-10 mL IntraVENous PRN    acetaminophen (TYLENOL) tablet 650 mg  650 mg Oral Q4H PRN    0.9% sodium chloride 0,232 mL with folic acid 1 mg, thiamine 100 mg, mvi, adult no. 4 with vit K 10 mL infusion   IntraVENous Q24H    calcium carbonate (OS-DAVIAN) tablet 500 mg [elemental]  500 mg Oral TID WITH MEALS      Allergies   Allergen Reactions    Codeine Unknown (comments)       Objective:  Vitals:    Vitals:    03/09/17 0400 03/09/17 0500 03/09/17 0600 03/09/17 0700   BP: 101/46 106/88 (!) 115/93 102/62   Pulse: 74 74 78 78   Resp: 14 13 23 19   Temp: 98.1 °F (36.7 °C)      SpO2: 97% 100% 93% 100%   Weight:  56.1 kg (123 lb 10.9 oz)     Height:         Intake and Output:     03/07 1901 - 03/09 0700  In: 712.5 [I.V.:712.5]  Out: 40 [Urine:40]    Physical Examination:  General:elderly   HEENT: no pallor   The sclerae without icterus. .   Neck:Supple,no mass palpable  Lungs : Clears to auscultation Bilaterally, Normal respiratory effort  CVS: RRR, S1 S2 normal, No rub, no LE edema  Abdomen: Soft, Non tender, No hepatosplenomegaly, bowel sounds present  Extremities: no edema   Skin: No rash or lesions. Lymph nodes: No palpable nodes  MS: OA in both hands and feet   Neurologic: non focal, AAO x 3    []    High complexity decision making was performed  []    Patient is at high-risk of decompensation with multiple organ involvement    Lab Data Personally Reviewed: I have reviewed all the pertinent labs, microbiology data and radiology studies during assessment.     Recent Labs      03/09/17   0123  03/08/17   1800   NA  136  137   K  3.5  3.9   CL  101  103   CO2  26  24   GLU  105*  97   BUN  15  16   CREA  0.87  0.99   CA  7.5*  5.9*  6.0*   MG  2.5*  <0.2*   PHOS   --   3.6   ALB   --   3.0*   SGOT   --   17   ALT   --   15     Recent Labs      03/08/17   1800   WBC  8.8   HGB  12.3   HCT  36.8   PLT  198     No results found for: SDES  No results found for: CULT  Recent Results (from the past 24 hour(s))   CBC WITH AUTOMATED DIFF    Collection Time: 03/08/17  6:00 PM   Result Value Ref Range    WBC 8.8 3.6 - 11.0 K/uL    RBC 3.69 (L) 3.80 - 5.20 M/uL    HGB 12.3 11.5 - 16.0 g/dL    HCT 36.8 35.0 - 47.0 %    MCV 99.7 (H) 80.0 - 99.0 FL    MCH 33.3 26.0 - 34.0 PG    MCHC 33.4 30.0 - 36.5 g/dL    RDW 12.8 11.5 - 14.5 %    PLATELET 230 586 - 934 K/uL    NEUTROPHILS 72 32 - 75 %    LYMPHOCYTES 19 12 - 49 %    MONOCYTES 8 5 - 13 %    EOSINOPHILS 1 0 - 7 %    BASOPHILS 0 0 - 1 %    ABS. NEUTROPHILS 6.4 1.8 - 8.0 K/UL    ABS. LYMPHOCYTES 1.7 0.8 - 3.5 K/UL    ABS. MONOCYTES 0.7 0.0 - 1.0 K/UL    ABS. EOSINOPHILS 0.1 0.0 - 0.4 K/UL    ABS. BASOPHILS 0.0 0.0 - 0.1 K/UL   METABOLIC PANEL, COMPREHENSIVE    Collection Time: 03/08/17  6:00 PM   Result Value Ref Range    Sodium 137 136 - 145 mmol/L    Potassium 3.9 3.5 - 5.1 mmol/L    Chloride 103 97 - 108 mmol/L    CO2 24 21 - 32 mmol/L    Anion gap 10 5 - 15 mmol/L    Glucose 97 65 - 100 mg/dL    BUN 16 6 - 20 MG/DL    Creatinine 0.99 0.55 - 1.02 MG/DL    BUN/Creatinine ratio 16 12 - 20      GFR est AA >60 >60 ml/min/1.73m2    GFR est non-AA 53 (L) >60 ml/min/1.73m2    Calcium 6.0 (LL) 8.5 - 10.1 MG/DL    Bilirubin, total 0.9 0.2 - 1.0 MG/DL    ALT (SGPT) 15 12 - 78 U/L    AST (SGOT) 17 15 - 37 U/L    Alk.  phosphatase 81 45 - 117 U/L    Protein, total 5.7 (L) 6.4 - 8.2 g/dL    Albumin 3.0 (L) 3.5 - 5.0 g/dL    Globulin 2.7 2.0 - 4.0 g/dL    A-G Ratio 1.1 1.1 - 2.2     MAGNESIUM    Collection Time: 03/08/17  6:00 PM   Result Value Ref Range    Magnesium <0.2 (LL) 1.6 - 2.4 mg/dL   PTH INTACT    Collection Time: 03/08/17  6:00 PM   Result Value Ref Range    Calcium 5.9 (LL) 8.5 - 10.1 MG/DL    PTH, Intact 42.2 14.0 - 72.0 pg/mL   PHOSPHORUS    Collection Time: 03/08/17  6:00 PM   Result Value Ref Range    Phosphorus 3.6 2.6 - 4.7 MG/DL   FOLATE    Collection Time: 03/08/17  6:00 PM   Result Value Ref Range    Folate 5.8 5.0 - 21.0 ng/mL   LIPID PANEL    Collection Time: 03/08/17  6:00 PM   Result Value Ref Range    LIPID PROFILE          Cholesterol, total 182 <200 MG/DL    Triglyceride 134 <150 MG/DL    HDL Cholesterol 64 MG/DL    LDL, calculated 91.2 0 - 100 MG/DL    VLDL, calculated 26.8 MG/DL    CHOL/HDL Ratio 2.8 0 - 5.0     VITAMIN B12    Collection Time: 03/08/17  6:00 PM   Result Value Ref Range    Vitamin B12 PLEASE DISREGARD RESULTS 211 - 911 pg/mL   URINALYSIS W/ RFLX MICROSCOPIC    Collection Time: 03/08/17  6:25 PM   Result Value Ref Range    Color DARK YELLOW      Appearance HAZY (A) CLEAR      Specific gravity 1.030 1.003 - 1.030      pH (UA) 6.0 5.0 - 8.0      Protein NEGATIVE  NEG mg/dL    Glucose NEGATIVE  NEG mg/dL    Ketone NEGATIVE  NEG mg/dL    Blood NEGATIVE  NEG      Urobilinogen 0.2 0.2 - 1.0 EU/dL    Nitrites NEGATIVE  NEG      Leukocyte Esterase NEGATIVE  NEG      Bilirubin UA, confirm NEGATIVE  NEG     EKG, 12 LEAD, INITIAL    Collection Time: 03/08/17  7:15 PM   Result Value Ref Range    Ventricular Rate 79 BPM    Atrial Rate 79 BPM    P-R Interval 164 ms    QRS Duration 112 ms    Q-T Interval 430 ms    QTC Calculation (Bezet) 493 ms    Calculated P Axis 16 degrees    Calculated R Axis -10 degrees    Calculated T Axis 16 degrees    Diagnosis       Sinus rhythm with premature atrial complexes  Low voltage QRS  Right bundle branch block  When compared with ECG of 20-NOV-2016 01:31,  Sinus rhythm has replaced Atrial fibrillation  QRS duration has decreased  T wave inversion no longer evident in Lateral leads  QT has shortened     AMMONIA    Collection Time: 03/08/17  9:32 PM   Result Value Ref Range    Ammonia <10 <94 UMOL/L   METABOLIC PANEL, BASIC    Collection Time: 03/09/17  1:23 AM   Result Value Ref Range    Sodium 136 136 - 145 mmol/L    Potassium 3.5 3.5 - 5.1 mmol/L    Chloride 101 97 - 108 mmol/L    CO2 26 21 - 32 mmol/L    Anion gap 9 5 - 15 mmol/L    Glucose 105 (H) 65 - 100 mg/dL    BUN 15 6 - 20 MG/DL    Creatinine 0.87 0.55 - 1.02 MG/DL    BUN/Creatinine ratio 17 12 - 20      GFR est AA >60 >60 ml/min/1.73m2    GFR est non-AA >60 >60 ml/min/1.73m2    Calcium 7.5 (L) 8.5 - 10.1 MG/DL   MAGNESIUM    Collection Time: 03/09/17  1:23 AM   Result Value Ref Range    Magnesium 2.5 (H) 1.6 - 2.4 mg/dL   HEMOGLOBIN A1C WITH EAG    Collection Time: 03/09/17  1:23 AM Result Value Ref Range    Hemoglobin A1c 4.7 4.2 - 6.3 %    Est. average glucose Cannot be calulated mg/dL   TSH 3RD GENERATION    Collection Time: 03/09/17  1:23 AM   Result Value Ref Range    TSH 0.42 0.36 - 3.74 uIU/mL   TROPONIN I    Collection Time: 03/09/17  1:23 AM   Result Value Ref Range    Troponin-I, Qt. <0.04 <0.05 ng/mL   CK    Collection Time: 03/09/17  1:23 AM   Result Value Ref Range    CK 79 26 - 192 U/L   LIPASE    Collection Time: 03/09/17  1:23 AM   Result Value Ref Range    Lipase 245 73 - 393 U/L           I have reviewed the flowsheets. Chart and Pertinent Notes have been reviewed. No change in PMH ,family and social history from Consult note.       Bev Dunne MD

## 2017-03-09 NOTE — CONSULTS
Pt seen in ED last night.  Please refer to consult note       Tara Ardon MD, [place] [facility].   Office :[phone]  Fax: [phone]

## 2017-03-09 NOTE — CONSULTS
[facility]   [address] [facility], [address] [place] [address], [facility]  Phone: [phone] NOTE     Patient: Chhaya Finley MRN: [id number]  PCP: Thomas Maguire MD   :     10/17/1927  Age:   80 y.o. Sex:  female      Referring physician: Jesus Mohamud MD  Reason for consultation: 80 y.o. female with Hypocalcemia complicated by electrolyte disarray   Admission Date: 3/8/2017  5:32 PM  LOS: 0 days      ASSESSMENT and PLAN :   Severe Hypomagnesemia :  - This is an interesting case !!  - This is likely combination of 3 things --- Chronic PPI use compounded by chronic alcoholism and use of Loop diuretics   - firmly believe her severe Hypomagnesemia is driving her Hypocalcemia and Hypokalemia problems   - She needs Magnesium correction first and others issues should get better with small replacement   - will get fractional excretion of Calcium and Magnesium   - Replete w/ 4 gm of IV mag . She needs to remain on telemetry   - stop PPI, lasix   - she will need Magnesium supplements for months     Hypocalcemia :symptomatic   - PTH elevated, Phos Normal , Vitamin D pending   - This is likely going to be combination of Vit D def and PTH resistance from severe hypomagnesemia   - stop Loop diuretics   - replete w/ 4 gm IV calcium gluconate     Hypokalemia :  - 2/2 Severe Hypomagnesemia + Loop diuretic use  - certainly chronic alcoholism may be playing a role in all three electrolyte problems   - continue replacement + stop loop  - EKG reviewed     Chronic Alcoholism     OA     __________________________________________  Thank you for asking me to see ms Tsering Steven   Will follow   D/w Dr Erinn Hdez, Dr Kayley Morgan and pt          Subjective:   HPI: Chhaya Finley is a 80 y.o.   female who has been admitted to the hospital for severe hypomagnesemia , hypocalcemia that were symptomatic  She had labs drawn by PCP which showed undetectable Mg and referred to ER immediately Her hypocalcemia has been a chronic dating back Nov in our records   Hypomagnesemia has been a chronic problem too and was 0.8 in Nov when she was here   She has been on Lasix per Dr headley and has been on Omeprazole 20 mg dose for several years   She drinks 2 glasses of scotch every day and occasionally vodka  Not been drinking for 2-3 weeks   Stopped lasix a week ago   Has been on potassium supplements until a week ago   Came in with severe parasthesias of both hands and feet -0 ongoing for months   Treated for UTI in Nov   No hx of osteoporosis / Bisphosphonate use   No chronic diarrhea  No thyroid / Parathyroid issues  Does not remember having her Vit D levels checked any time recently       Past Medical Hx:   Past Medical History:   Diagnosis Date    History of non-ST elevation myocardial infarction (NSTEMI) 11/28/2016        Past Surgical Hx:   History reviewed. No pertinent surgical history. Medications:  Prior to Admission medications    Medication Sig Start Date End Date Taking? Authorizing Provider   trimethoprim-sulfamethoxazole (BACTRIM DS) 160-800 mg per tablet Take 1 Tab by mouth two (2) times a day. 11/30/16   Belinda Mcgraw MD   acetaminophen (ACETAMINOPHEN EXTRA STRENGTH) 500 mg tablet Take 1,000 mg by mouth nightly. Historical Provider   ACETAMINOPHEN/DIPHENHYDRAMINE (TYLENOL PM PO) Take 1 Tab by mouth nightly as needed. Historical Provider   pravastatin (PRAVACHOL) 20 mg tablet Take 20 mg by mouth daily. 11/26/16   Belinda Mcgraw MD   potassium chloride SA (MICRO-K) 10 mEq capsule Take 2 Caps by mouth daily. 11/22/16   Bj Thomas NP   aspirin 81 mg chewable tablet Take 1 Tab by mouth daily. 11/22/16   Bj Thomas NP   carvedilol (COREG) 6.25 mg tablet Take 1 Tab by mouth two (2) times daily (with meals). 11/22/16   Bj Thomas NP   clonazePAM (KLONOPIN) 0.5 mg tablet Take 0.5 mg by mouth nightly as needed.     Historical Provider   furosemide (LASIX) 20 mg tablet Take 20 mg by mouth daily. Historical Provider   omeprazole (PRILOSEC) 20 mg capsule Take 20 mg by mouth daily. Historical Provider       Allergies   Allergen Reactions    Codeine Unknown (comments)       Social Hx:       History reviewed. No pertinent family history. Review of Systems:  A twelve point review of system was performed today. Pertinent positives and negatives are mentioned in the HPI. The reminder of the ROS is negative and noncontributory. Objective:    Vitals:    Vitals:    03/08/17 2045 03/08/17 2115 03/08/17 2130 03/08/17 2145   BP: 120/63 119/62 111/68 123/73   Pulse: 75 81 83 80   Resp: 17 18 21 15   Temp:       SpO2: 97% 100% 100% 100%   Weight:       Height:         I&O's:     Visit Vitals    /73    Pulse 80    Temp 98 °F (36.7 °C)    Resp 15    Ht 5' (1.524 m)    Wt 59 kg (130 lb)    SpO2 100%    BMI 25.39 kg/m2       Physical Exam:  General:elderly   HEENT: no pallor   The sclerae without icterus. .   Neck:Supple,no mass palpable  Lungs : Clears to auscultation Bilaterally, Normal respiratory effort  CVS: RRR, S1 S2 normal, No rub, no LE edema  Abdomen: Soft, Non tender, No hepatosplenomegaly, bowel sounds present  Extremities: no edema   Skin: No rash or lesions.   Lymph nodes: No palpable nodes  MS: OA in both hands and feet   Neurologic: non focal, AAO x 3  Psych: normal affect    Laboratory Results:    Recent Labs      03/08/17   1800   NA  137   K  3.9   CL  103   CO2  24   GLU  97   BUN  16   CREA  0.99   CA  5.9*  6.0*   MG  <0.2*   PHOS  3.6   ALB  3.0*   SGOT  17   ALT  15     Recent Labs      03/08/17   1800   WBC  8.8   HGB  12.3   HCT  36.8   PLT  198     No results found for: SDES  No results found for: CULT  Recent Results (from the past 24 hour(s))   CBC WITH AUTOMATED DIFF    Collection Time: 03/08/17  6:00 PM   Result Value Ref Range    WBC 8.8 3.6 - 11.0 K/uL    RBC 3.69 (L) 3.80 - 5.20 M/uL    HGB 12.3 11.5 - 16.0 g/dL    HCT 36.8 35.0 - 47.0 % MCV 99.7 (H) 80.0 - 99.0 FL    MCH 33.3 26.0 - 34.0 PG    MCHC 33.4 30.0 - 36.5 g/dL    RDW 12.8 11.5 - 14.5 %    PLATELET 020 536 - 527 K/uL    NEUTROPHILS 72 32 - 75 %    LYMPHOCYTES 19 12 - 49 %    MONOCYTES 8 5 - 13 %    EOSINOPHILS 1 0 - 7 %    BASOPHILS 0 0 - 1 %    ABS. NEUTROPHILS 6.4 1.8 - 8.0 K/UL    ABS. LYMPHOCYTES 1.7 0.8 - 3.5 K/UL    ABS. MONOCYTES 0.7 0.0 - 1.0 K/UL    ABS. EOSINOPHILS 0.1 0.0 - 0.4 K/UL    ABS. BASOPHILS 0.0 0.0 - 0.1 K/UL   METABOLIC PANEL, COMPREHENSIVE    Collection Time: 03/08/17  6:00 PM   Result Value Ref Range    Sodium 137 136 - 145 mmol/L    Potassium 3.9 3.5 - 5.1 mmol/L    Chloride 103 97 - 108 mmol/L    CO2 24 21 - 32 mmol/L    Anion gap 10 5 - 15 mmol/L    Glucose 97 65 - 100 mg/dL    BUN 16 6 - 20 MG/DL    Creatinine 0.99 0.55 - 1.02 MG/DL    BUN/Creatinine ratio 16 12 - 20      GFR est AA >60 >60 ml/min/1.73m2    GFR est non-AA 53 (L) >60 ml/min/1.73m2    Calcium 6.0 (LL) 8.5 - 10.1 MG/DL    Bilirubin, total 0.9 0.2 - 1.0 MG/DL    ALT (SGPT) 15 12 - 78 U/L    AST (SGOT) 17 15 - 37 U/L    Alk.  phosphatase 81 45 - 117 U/L    Protein, total 5.7 (L) 6.4 - 8.2 g/dL    Albumin 3.0 (L) 3.5 - 5.0 g/dL    Globulin 2.7 2.0 - 4.0 g/dL    A-G Ratio 1.1 1.1 - 2.2     MAGNESIUM    Collection Time: 03/08/17  6:00 PM   Result Value Ref Range    Magnesium <0.2 (LL) 1.6 - 2.4 mg/dL   PTH INTACT    Collection Time: 03/08/17  6:00 PM   Result Value Ref Range    Calcium 5.9 (LL) 8.5 - 10.1 MG/DL    PTH, Intact 42.2 14.0 - 72.0 pg/mL   PHOSPHORUS    Collection Time: 03/08/17  6:00 PM   Result Value Ref Range    Phosphorus 3.6 2.6 - 4.7 MG/DL   LIPID PANEL    Collection Time: 03/08/17  6:00 PM   Result Value Ref Range    LIPID PROFILE          Cholesterol, total 182 <200 MG/DL    Triglyceride 134 <150 MG/DL    HDL Cholesterol 64 MG/DL    LDL, calculated 91.2 0 - 100 MG/DL    VLDL, calculated 26.8 MG/DL    CHOL/HDL Ratio 2.8 0 - 5.0     URINALYSIS W/ RFLX MICROSCOPIC    Collection Time: 03/08/17  6:25 PM   Result Value Ref Range    Color DARK YELLOW      Appearance HAZY (A) CLEAR      Specific gravity 1.030 1.003 - 1.030      pH (UA) 6.0 5.0 - 8.0      Protein NEGATIVE  NEG mg/dL    Glucose NEGATIVE  NEG mg/dL    Ketone NEGATIVE  NEG mg/dL    Blood NEGATIVE  NEG      Urobilinogen 0.2 0.2 - 1.0 EU/dL    Nitrites NEGATIVE  NEG      Leukocyte Esterase NEGATIVE  NEG      Bilirubin UA, confirm NEGATIVE  NEG     EKG, 12 LEAD, INITIAL    Collection Time: 03/08/17  7:15 PM   Result Value Ref Range    Ventricular Rate 79 BPM    Atrial Rate 79 BPM    P-R Interval 164 ms    QRS Duration 112 ms    Q-T Interval 430 ms    QTC Calculation (Bezet) 493 ms    Calculated P Axis 16 degrees    Calculated R Axis -10 degrees    Calculated T Axis 16 degrees    Diagnosis       Sinus rhythm with premature atrial complexes  Low voltage QRS  Right bundle branch block  When compared with ECG of 20-NOV-2016 01:31,  Sinus rhythm has replaced Atrial fibrillation  QRS duration has decreased  T wave inversion no longer evident in Lateral leads  QT has shortened     AMMONIA    Collection Time: 03/08/17  9:32 PM   Result Value Ref Range    Ammonia <10 <32 UMOL/L           Urine dipstick:   Lab Results   Component Value Date/Time    Color DARK YELLOW 03/08/2017 06:25 PM    Appearance HAZY 03/08/2017 06:25 PM    Specific gravity 1.030 03/08/2017 06:25 PM    pH (UA) 6.0 03/08/2017 06:25 PM    Protein NEGATIVE  03/08/2017 06:25 PM    Glucose NEGATIVE  03/08/2017 06:25 PM    Ketone NEGATIVE  03/08/2017 06:25 PM    Bilirubin Negative 11/28/2016 12:00 AM    Urobilinogen 0.2 03/08/2017 06:25 PM    Nitrites NEGATIVE  03/08/2017 06:25 PM    Leukocyte Esterase NEGATIVE  03/08/2017 06:25 PM    Epithelial cells FEW 11/19/2016 11:54 PM    Bacteria Few 11/28/2016 12:00 AM    WBC >30 11/28/2016 12:00 AM    RBC 11-30 11/28/2016 12:00 AM     Lorraine Le MD, [facility].   Office :[phone]  Fax: [phone]

## 2017-03-09 NOTE — PROGRESS NOTES
Hospitalist Progress Note  Hamida Rich MD  Office: [phone]  Cell: [phone]      Date of Service:  3/9/2017  NAME:  Penny Colvin  :  10/17/1927  MRN:  [id number]      Admission Summary:     The patient is an 29-year-old female with past medical history significant for non-ST elevation MI,   history of coronary artery disease, history of alcohol abuse, history of   hypokalemia, hypomagnesemia and hypocalcemia who presented to the hospital secondary to  Numbness bilateral upper and lower extremities. Interval history / Subjective:       F/u for electrolytes abnormalities and numbness. States she feels a little better but still have numbness in her hands. She feels weak all over but denies any focal weakness. No nausea or vomiting. Assessment & Plan:     Numbness and tingling in extremities( paresthesias): Likely secondary to electrolyte  Derangement from hypocalcemia and hypomagnesemia. CT head was negative. Less likely due to CVA or TIA. No focal findings. Will check brain MRI if numbness persist      Hypomagnesemia: S/p magnesium sulfate. Now resolved. Suspect secondary to alcohol abuse. Continue  Magnesium oxide to replete stores. Hypocalcemia: S/p calcium gluconate. Continue calcium carbonate. Calcium today 7.5  Nephrology input appreciated. Serum vitamin d levels pending  continue cholecalciferol. Alcohol abuse: States last alcohol intake was 2 weeks ago  Monitor for Dt's. Not in withdrawal. Can D/c banana bag.       Code status: full  DVT prophylaxis: Start Lovenox    Care Plan discussed with: Patient/Family and Nurse  Disposition: TBD     Hospital Problems  Date Reviewed: 3/8/2017          Codes Class Noted POA    TIA (transient ischemic attack) ICD-10-CM: G45.9  ICD-9-CM: 435.9  3/8/2017 Unknown        GI bleed ICD-10-CM: K92.2  ICD-9-CM: 578.9  3/8/2017 Unknown        Numbness and tingling ICD-10-CM: R20.0, R20. 2  ICD-9-CM: 782.0  3/8/2017 Unknown        CAD in native artery ICD-10-CM: I25.10  ICD-9-CM: 414.01  11/28/2016 Yes        History of non-ST elevation myocardial infarction (NSTEMI) ICD-10-CM: I25.2  ICD-9-CM: 412  11/28/2016 Yes        * (Principal)Hypocalcemia ICD-10-CM: E83.51  ICD-9-CM: 275.41  11/22/2016 Unknown        Hypomagnesemia ICD-10-CM: L75.35  ICD-9-CM: 275.2  11/22/2016 Yes                Review of Systems:   Pertinent items are noted in HPI. Vital Signs:    Last 24hrs VS reviewed since prior progress note. Most recent are:  Visit Vitals    /58    Pulse 73    Temp 97 °F (36.1 °C)    Resp 15    Ht 5' (1.524 m)    Wt 56.1 kg (123 lb 10.9 oz)    SpO2 100%    Breastfeeding No    BMI 24.15 kg/m2         Intake/Output Summary (Last 24 hours) at 03/09/17 1726  Last data filed at 03/09/17 1610   Gross per 24 hour   Intake           1187.5 ml   Output              115 ml   Net           1072.5 ml        Physical Examination:             Constitutional:  No acute distress, cooperative, pleasant    ENT:  Oral mucous moist, oropharynx benign. Neck supple,    Resp:  CTA bilaterally. No wheezing/rhonchi/rales. No accessory muscle use   CV:  Regular rhythm, normal rate, no murmurs, gallops, rubs    GI:  Soft, non distended, non tender. normoactive bowel sounds, no hepatosplenomegaly     Musculoskeletal:  No edema, warm, 2+ pulses throughout    Neurologic:  Moves all extremities.   AAOx3, CN II-XII reviewed            Data Review:          Labs:     Recent Labs      03/08/17   1800   WBC  8.8   HGB  12.3   HCT  36.8   PLT  198     Recent Labs      03/09/17   0123  03/08/17   1800   NA  136  137   K  3.5  3.9   CL  101  103   CO2  26  24   BUN  15  16   CREA  0.87  0.99   GLU  105*  97   CA  7.5*  5.9*  6.0*   MG  2.5*  <0.2*   PHOS   --   3.6     Recent Labs      03/09/17   0123  03/08/17   1800   SGOT   --   17   ALT   --   15   AP   --   81   TBILI   --   0.9   TP   --   5.7*   ALB   -- 3.0*   GLOB   --   2.7   LPSE  245   --      No results for input(s): INR, PTP, APTT in the last 72 hours. No lab exists for component: INREXT   No results for input(s): FE, TIBC, PSAT, FERR in the last 72 hours. Lab Results   Component Value Date/Time    Folate 5.8 03/08/2017 06:00 PM      No results for input(s): PH, PCO2, PO2 in the last 72 hours.   Recent Labs      03/09/17   0123   CPK  79   TROIQ  <0.04     Lab Results   Component Value Date/Time    Cholesterol, total 182 03/08/2017 06:00 PM    HDL Cholesterol 64 03/08/2017 06:00 PM    LDL, calculated 91.2 03/08/2017 06:00 PM    Triglyceride 134 03/08/2017 06:00 PM    CHOL/HDL Ratio 2.8 03/08/2017 06:00 PM     Lab Results   Component Value Date/Time    Glucose (POC) 94 11/20/2016 07:00 AM     Lab Results   Component Value Date/Time    Color DARK YELLOW 03/08/2017 06:25 PM    Appearance HAZY 03/08/2017 06:25 PM    Specific gravity 1.030 03/08/2017 06:25 PM    pH (UA) 6.0 03/08/2017 06:25 PM    Protein NEGATIVE  03/08/2017 06:25 PM    Glucose NEGATIVE  03/08/2017 06:25 PM    Ketone NEGATIVE  03/08/2017 06:25 PM    Bilirubin Negative 11/28/2016 12:00 AM    Urobilinogen 0.2 03/08/2017 06:25 PM    Nitrites NEGATIVE  03/08/2017 06:25 PM    Leukocyte Esterase NEGATIVE  03/08/2017 06:25 PM    Epithelial cells FEW 11/19/2016 11:54 PM    Bacteria Few 11/28/2016 12:00 AM    WBC >30 11/28/2016 12:00 AM    RBC 11-30 11/28/2016 12:00 AM         Medications Reviewed:     Current Facility-Administered Medications   Medication Dose Route Frequency    magnesium oxide (MAG-OX) tablet 800 mg  800 mg Oral BID    cholecalciferol (VITAMIN D3) tablet 5,000 Units  5,000 Units Oral DAILY    polyvinyl alcohol (LIQUIFILM TEARS) 1.4 % ophthalmic solution 1 Drop  1 Drop Both Eyes PRN    aspirin chewable tablet 81 mg  81 mg Oral DAILY    carvedilol (COREG) tablet 6.25 mg  6.25 mg Oral BID WITH MEALS    clonazePAM (KlonoPIN) tablet 0.5 mg  0.5 mg Oral QHS PRN    pravastatin (PRAVACHOL) tablet 20 mg  20 mg Oral DAILY    potassium chloride SR (KLOR-CON 10) tablet 20 mEq  20 mEq Oral DAILY    sodium chloride (NS) flush 5-10 mL  5-10 mL IntraVENous Q8H    sodium chloride (NS) flush 5-10 mL  5-10 mL IntraVENous PRN    acetaminophen (TYLENOL) tablet 650 mg  650 mg Oral Q4H PRN    0.9% sodium chloride 9,021 mL with folic acid 1 mg, thiamine 100 mg, mvi, adult no. 4 with vit K 10 mL infusion   IntraVENous Q24H    calcium carbonate (OS-DAVIAN) tablet 500 mg [elemental]  500 mg Oral TID WITH MEALS     ______________________________________________________________________  EXPECTED LENGTH OF STAY: 2d 16h  ACTUAL LENGTH OF STAY:          1                 Benjamin Garcia MD

## 2017-03-09 NOTE — ROUTINE PROCESS
TRANSFER - OUT REPORT:    Verbal report given to Theo Dixon RN(name) on Cassius Yeung  being transferred to -01(unit) for routine progression of care       Report consisted of patients Situation, Background, Assessment and   Recommendations(SBAR). Information from the following report(s) SBAR, Kardex and MAR was reviewed with the receiving nurse. Lines:   Peripheral IV 03/08/17 Right Antecubital (Active)       Peripheral IV 03/08/17 Left Forearm (Active)   Site Assessment Clean, dry, & intact 3/8/2017  9:35 PM   Phlebitis Assessment 0 3/8/2017  9:35 PM   Infiltration Assessment 0 3/8/2017  9:35 PM   Dressing Type Transparent 3/8/2017  9:35 PM   Hub Color/Line Status Pink 3/8/2017  9:35 PM   Alcohol Cap Used Yes 3/8/2017  9:35 PM        Opportunity for questions and clarification was provided.       Patient transported with:   Registered Nurse

## 2017-03-09 NOTE — PROGRESS NOTES
Problem: Pressure Ulcer - Risk of  Goal: *Prevention of pressure ulcer  Outcome: Progressing Towards Goal  Turns Self

## 2017-03-09 NOTE — PROGRESS NOTES
Problem: Mobility Impaired (Adult and Pediatric)  Goal: *Acute Goals and Plan of Care (Insert Text)  Physical Therapy Goals  Initiated 3/9/2017  1. Patient will move from supine to sit and sit to supine in bed with independence within 7 day(s). 2. Patient will transfer from bed to chair and chair to bed with modified independence using the least restrictive device within 7 day(s). 3. Patient will perform sit to stand with modified independence within 7 day(s). 4. Patient will ambulate with modified independence for 150 feet with the least restrictive device within 7 day(s). PHYSICAL THERAPY EVALUATION  Patient: Lula Lynch (80 y.o. female)  Date: 3/9/2017  Primary Diagnosis: Hypocalcemia        Precautions:          ASSESSMENT :  Based on the objective data described below, the patient presents with generally decreased strength, endurance, poor balance, and limited insight into deficits. Prior to admission this patient lived alone and utilized her rollator for modified independent mobility. During eval, she demonstrated a flexed posture, mildly ataxic gait, and considerable inconsistency with bilateral step length. She is resistant to PT following discharge but concern for safe mobility based on current presentation. She has (paid) friends that assist her intermittently but no one at home. Recommend a rehab disposition with her current level of function. Patient will benefit from skilled intervention to address the above impairments.   Patients rehabilitation potential is considered to be Good  Factors which may influence rehabilitation potential include:   [ ]         None noted  [ ]         Mental ability/status  [ ]         Medical condition  [ ]         Home/family situation and support systems  [ ]         Safety awareness  [ ]         Pain tolerance/management  [X]         Other: ETOH dependence       PLAN :  Recommendations and Planned Interventions:  [X]           Bed Mobility Training [ ]    Neuromuscular Re-Education  [X]           Transfer Training                   [ ]    Orthotic/Prosthetic Training  [X]           Gait Training                         [ ]    Modalities  [X]           Therapeutic Exercises           [ ]    Edema Management/Control  [X]           Therapeutic Activities            [ ]    Patient and Family Training/Education  [ ]           Other (comment):     Frequency/Duration: Patient will be followed by physical therapy  5 times a week to address goals. Discharge Recommendations: Rehab  Further Equipment Recommendations for Discharge: to be determined          SUBJECTIVE:   Patient stated I don't go anywhere without my rollator.       OBJECTIVE DATA SUMMARY:   HISTORY:    Past Medical History:   Diagnosis Date    History of non-ST elevation myocardial infarction (NSTEMI) 11/28/2016   History reviewed. No pertinent surgical history. Prior Level of Function/Home Situation: modified independent with rollator  Personal factors and/or comorbidities impacting plan of care:      Home Situation  Home Environment: Private residence  # Steps to Enter: 0  Wheelchair Ramp: No (\"elevator\" for stairs per patient)  One/Two Story Residence: Two story, live on 1st floor  Living Alone: Yes  Support Systems: Friends \ neighbors  Patient Expects to be Discharged to[de-identified] Private residence  Current DME Used/Available at Home: Walker, rollator (transport chair)     EXAMINATION/PRESENTATION/DECISION MAKING:   Critical Behavior:  Neurologic State: Alert           Hearing:   Auditory  Auditory Impairment: None  Range Of Motion:  AROM: Generally decreased, functional                       Strength:    Strength: Generally decreased, functional                    Functional Mobility:  Bed Mobility:     Supine to Sit: Minimum assistance     Scooting: Minimum assistance;Contact guard assistance  Transfers:  Sit to Stand: Minimum assistance (poor safety, pulling from RW)  Stand to Sit: Stand-by asssistance (uncontrolled descent)                       Balance:   Sitting: Intact  Standing: Impaired  Standing - Static: Fair  Standing - Dynamic : Poor  Ambulation/Gait Training:  Distance (ft): 100 Feet (ft)  Assistive Device: Gait belt;Walker, rolling  Ambulation - Level of Assistance: Moderate assistance        Gait Abnormalities: Path deviations;Lurching        Base of Support: Narrowed           Therapeutic Exercises:         Functional Measure:  Tinetti test:      Sitting Balance: 1  Arises: 1  Attempts to Rise: 2  Immediate Standing Balance: 1  Standing Balance: 1  Nudged: 2  Eyes Closed: 1  Turn 360 Degrees - Continuous/Discontinuous: 1  Turn 360 Degrees - Steady/Unsteady: 0  Sitting Down: 0  Balance Score: 10  Indication of Gait: 1  R Step Length/Height: 1  L Step Length/Height: 1  R Foot Clearance: 1  L Foot Clearance: 1  Step Symmetry: 1  Step Continuity: 0  Path: 1  Trunk: 1  Walking Time: 1  Gait Score: 9  Total Score: 19         Tinetti Test and G-code impairment scale:  Percentage of Impairment CH     0%    CI     1-19% CJ     20-39% CK     40-59% CL     60-79% CM     80-99% CN      100%   Tinetti  Score 0-28 28 23-27 17-22 12-16 6-11 1-5 0          Tinetti Tool Score Risk of Falls  <19 = High Fall Risk  19-24 = Moderate Fall Risk  25-28 = Low Fall Risk  Tinetti ME. Performance-Oriented Assessment of Mobility Problems in Elderly Patients. Rodney 66; [id number]. (Scoring Description: PT Bulletin Feb. 10, 1993)     Older adults: Irma Garcia et al, 2009; n = 1000 [facility] elderly evaluated with ABC, JADEN, ADL, and IADL)  · Mean JADEN score for males aged 69-68 years = 26.21(3.40)  · Mean JADEN score for females age 69-68 years = 25.16(4.30)  · Mean JADEN score for males over 80 years = 23.29(6.02)  · Mean JADEN score for females over 80 years = 17.20(8.32)                    G codes:   In compliance with CMSs Claims Based Outcome Reporting, the following G-code set was chosen for this patient based on their primary functional limitation being treated: The outcome measure chosen to determine the severity of the functional limitation was the Tinetti with a score of 19/28 which was correlated with the impairment scale. · Mobility - Walking and Moving Around:               - CURRENT STATUS:    CJ - 20%-39% impaired, limited or restricted               - GOAL STATUS:           CI - 1%-19% impaired, limited or restricted               - D/C STATUS:                       ---------------To be determined---------------      Physical Therapy Evaluation Charge Determination   History Examination Presentation Decision-Making   MEDIUM  Complexity : 1-2 comorbidities / personal factors will impact the outcome/ POC  MEDIUM Complexity : 3 Standardized tests and measures addressing body structure, function, activity limitation and / or participation in recreation  LOW Complexity : Stable, uncomplicated  MEDIUM Complexity : FOTO score of 26-74      Based on the above components, the patient evaluation is determined to be of the following complexity level: LOW      Pain:  Pain Scale 1: Numeric (0 - 10)  Pain Intensity 1: 0              Activity Tolerance:   VSS per monitor  Please refer to the flowsheet for vital signs taken during this treatment. After treatment:   [X]         Patient left in no apparent distress sitting up in chair  [ ]         Patient left in no apparent distress in bed  [X]         Call bell left within reach  [X]         Nursing notified  [ ]         Caregiver present  [ ]         Bed alarm activated      COMMUNICATION/EDUCATION:   The patients plan of care was discussed with: Registered Nurse.  [X]         Fall prevention education was provided and the patient/caregiver indicated understanding. [X]         Patient/family have participated as able in goal setting and plan of care. [X]         Patient/family agree to work toward stated goals and plan of care.   [ ]         Patient understands intent and goals of therapy, but is neutral about his/her participation. [ ]         Patient is unable to participate in goal setting and plan of care.      Thank you for this referral.  Amena Suazo, PT, DPT   Time Calculation: 29 mins

## 2017-03-10 VITALS
RESPIRATION RATE: 20 BRPM | TEMPERATURE: 97.5 F | OXYGEN SATURATION: 99 % | DIASTOLIC BLOOD PRESSURE: 69 MMHG | SYSTOLIC BLOOD PRESSURE: 143 MMHG | BODY MASS INDEX: 23.98 KG/M2 | WEIGHT: 122.14 LBS | HEART RATE: 77 BPM | HEIGHT: 60 IN

## 2017-03-10 LAB
1,25(OH)2D3 SERPL-MCNC: 39.8 PG/ML (ref 19.9–79.3)
ANION GAP BLD CALC-SCNC: 6 MMOL/L (ref 5–15)
BUN SERPL-MCNC: 13 MG/DL (ref 6–20)
BUN/CREAT SERPL: 16 (ref 12–20)
CALCIUM SERPL-MCNC: 6.6 MG/DL (ref 8.5–10.1)
CHLORIDE SERPL-SCNC: 105 MMOL/L (ref 97–108)
CO2 SERPL-SCNC: 27 MMOL/L (ref 21–32)
CREAT SERPL-MCNC: 0.79 MG/DL (ref 0.55–1.02)
GLUCOSE SERPL-MCNC: 86 MG/DL (ref 65–100)
MAGNESIUM SERPL-MCNC: 1.8 MG/DL (ref 1.6–2.4)
POTASSIUM SERPL-SCNC: 3.8 MMOL/L (ref 3.5–5.1)
SODIUM SERPL-SCNC: 138 MMOL/L (ref 136–145)
TROPONIN I SERPL-MCNC: <0.04 NG/ML

## 2017-03-10 PROCEDURE — 83735 ASSAY OF MAGNESIUM: CPT | Performed by: FAMILY MEDICINE

## 2017-03-10 PROCEDURE — 74011000258 HC RX REV CODE- 258: Performed by: INTERNAL MEDICINE

## 2017-03-10 PROCEDURE — 36415 COLL VENOUS BLD VENIPUNCTURE: CPT | Performed by: FAMILY MEDICINE

## 2017-03-10 PROCEDURE — 97116 GAIT TRAINING THERAPY: CPT

## 2017-03-10 PROCEDURE — 84484 ASSAY OF TROPONIN QUANT: CPT | Performed by: FAMILY MEDICINE

## 2017-03-10 PROCEDURE — 80048 BASIC METABOLIC PNL TOTAL CA: CPT | Performed by: FAMILY MEDICINE

## 2017-03-10 PROCEDURE — 74011250637 HC RX REV CODE- 250/637: Performed by: INTERNAL MEDICINE

## 2017-03-10 PROCEDURE — 74011250636 HC RX REV CODE- 250/636: Performed by: INTERNAL MEDICINE

## 2017-03-10 PROCEDURE — 74011250637 HC RX REV CODE- 250/637: Performed by: FAMILY MEDICINE

## 2017-03-10 RX ORDER — LANOLIN ALCOHOL/MO/W.PET/CERES
800 CREAM (GRAM) TOPICAL 2 TIMES DAILY
Qty: 60 TAB | Refills: 1 | Status: SHIPPED | OUTPATIENT
Start: 2017-03-10 | End: 2017-03-10

## 2017-03-10 RX ORDER — LANOLIN ALCOHOL/MO/W.PET/CERES
800 CREAM (GRAM) TOPICAL 2 TIMES DAILY
Qty: 60 TAB | Refills: 1 | Status: SHIPPED | OUTPATIENT
Start: 2017-03-10 | End: 2017-09-14

## 2017-03-10 RX ORDER — MELATONIN
5000 DAILY
Qty: 30 TAB | Refills: 1 | Status: SHIPPED | OUTPATIENT
Start: 2017-03-10 | End: 2017-10-07

## 2017-03-10 RX ORDER — MELATONIN
5000 DAILY
Qty: 30 TAB | Refills: 1 | Status: SHIPPED | OUTPATIENT
Start: 2017-03-10 | End: 2017-03-10

## 2017-03-10 RX ORDER — CALCIUM CARBONATE 500(1250)
1000 TABLET ORAL
Status: DISCONTINUED | OUTPATIENT
Start: 2017-03-10 | End: 2017-03-10 | Stop reason: HOSPADM

## 2017-03-10 RX ORDER — CALCIUM CARBONATE 500(1250)
1 TABLET ORAL
Qty: 60 TAB | Refills: 0 | Status: SHIPPED | OUTPATIENT
Start: 2017-03-10 | End: 2017-10-07

## 2017-03-10 RX ADMIN — CARVEDILOL 6.25 MG: 6.25 TABLET, FILM COATED ORAL at 08:59

## 2017-03-10 RX ADMIN — VITAMIN D, TAB 1000IU (100/BT) 5000 UNITS: 25 TAB at 08:59

## 2017-03-10 RX ADMIN — Medication 800 MG: at 08:59

## 2017-03-10 RX ADMIN — Medication 10 ML: at 06:44

## 2017-03-10 RX ADMIN — CALCIUM CARBONATE 1000 MG: 1250 TABLET ORAL at 08:59

## 2017-03-10 RX ADMIN — PRAVASTATIN SODIUM 20 MG: 20 TABLET ORAL at 08:59

## 2017-03-10 RX ADMIN — CALCIUM GLUCONATE 2 G: 94 INJECTION, SOLUTION INTRAVENOUS at 06:44

## 2017-03-10 RX ADMIN — POTASSIUM CHLORIDE 20 MEQ: 750 TABLET, FILM COATED, EXTENDED RELEASE ORAL at 08:59

## 2017-03-10 RX ADMIN — ASPIRIN 81 MG: 81 TABLET, CHEWABLE ORAL at 08:59

## 2017-03-10 NOTE — PROGRESS NOTES
Problem: Mobility Impaired (Adult and Pediatric)  Goal: *Acute Goals and Plan of Care (Insert Text)  Physical Therapy Goals  Initiated 3/9/2017  1. Patient will move from supine to sit and sit to supine in bed with independence within 7 day(s). 2. Patient will transfer from bed to chair and chair to bed with modified independence using the least restrictive device within 7 day(s). 3. Patient will perform sit to stand with modified independence within 7 day(s). 4. Patient will ambulate with modified independence for 150 feet with the least restrictive device within 7 day(s). PHYSICAL THERAPY TREATMENT  Patient: Fabien Centeno (80 y.o. female)  Date: 3/10/2017  Diagnosis: Hypocalcemia Hypocalcemia       Precautions:        ASSESSMENT:  Patient agreeable to tx but continues to demonstrate impaired safety awareness and limited balance. She is mildly to non-receptive to education regarding safety and states that most of her gait deficits are related to the hospital's RW vs her rollator. Recommended that she have additional in home assistance from her friends and home with HHPT to progress activity tolerance and safety within the home. Progression toward goals:  [X]    Improving appropriately and progressing toward goals  [ ]    Improving slowly and progressing toward goals  [ ]    Not making progress toward goals and plan of care will be adjusted       PLAN:  Patient continues to benefit from skilled intervention to address the above impairments. Continue treatment per established plan of care. Discharge Recommendations:  Home Health  Further Equipment Recommendations for Discharge:  none       SUBJECTIVE:   Patient stated I'll do my best.  \"scratch my back! \"      OBJECTIVE DATA SUMMARY:   Critical Behavior:  Neurologic State: Alert  Orientation Level: Oriented X4  Cognition: Follows commands, Appropriate safety awareness, Appropriate for age attention/concentration, Appropriate decision making, Recognition of people/places     Functional Mobility Training:  Bed Mobility:     Supine to Sit: Contact guard assistance     Scooting: Contact guard assistance        Transfers:  Sit to Stand: Contact guard assistance cued for appropriate hand placement but patient pushed and pulled from walker anyway  Stand to Sit: Stand-by asssistance                             Balance:  Sitting: Intact  Standing: Impaired  Standing - Static: Fair  Standing - Dynamic : Poor  Ambulation/Gait Training:  Distance (ft): 130 Feet (ft)  Assistive Device: Gait belt;Walker, rolling  Ambulation - Level of Assistance:  Moderate assistance        Gait Abnormalities: Path deviations        Base of Support: Narrowed                 Flexed posture with anterior walker position, cues to stand erect and for safe hand placement with activity     Pain:  Pain Scale 1: Numeric (0 - 10)  Pain Intensity 1: 0              Activity Tolerance:      After treatment:   [ ]    Patient left in no apparent distress sitting up in chair  [X]    Patient left in no apparent distress in bed  [X]    Call bell left within reach  [X]    Nursing notified  [ ]    Caregiver present  [ ]    Bed alarm activated      COMMUNICATION/COLLABORATION:   The patients plan of care was discussed with: Registered Nurse     Amandeep Mazariegos PT, DPT   Time Calculation: 25 mins

## 2017-03-10 NOTE — PROGRESS NOTES
continues to monitor for transitions of care. Patient has transfer orders for telemetry and is set up with [facility] upon discharge.

## 2017-03-10 NOTE — INTERDISCIPLINARY ROUNDS
IDR/SLIDR Summary          Patient: Kathe Lorenzo MRN: [id number]    Age: 80 y.o. YOB: 1927 Room/Bed: [address] [place] [address]   Admit Diagnosis: Hypocalcemia  Principal Diagnosis: Hypocalcemia   Goals: normalize electrolytes  Readmission: NO  Quality Measure: CIWA  VTE Prophylaxis: Mechanical  Influenza Vaccine screening completed? YES  Pneumococcal Vaccine screening completed? YES  Mobility needs: Yes   Nutrition plan:Yes  Consults:P.T, O.T. and Case Management    Financial concerns:No  Escalated to CM? YES  RRAT Score: 24   Interventions:H2H  Testing due for pt today? NO  LOS: 2 days Expected length of stay ?  days  Discharge plan: tbd   PCP: Celso Rodríguez MD  Transportation needs: Yes    Days before discharge:two or more days before discharge   Discharge disposition: Home    Signed:     Angelica Eddy RN  3/10/2017  12:36 AM

## 2017-03-10 NOTE — PROGRESS NOTES
TRANSFER - IN REPORT:    Verbal report received from [address] (name) on Kimberly Humphrey  being received from CCU (unit) for routine progression of care      Report consisted of patients Situation, Background, Assessment and   Recommendations(SBAR). Information from the following report(s) SBAR was reviewed with the receiving nurse. Opportunity for questions and clarification was provided. Assessment completed upon patients arrival to unit and care assumed.

## 2017-03-10 NOTE — DISCHARGE SUMMARY
Discharge Summary       PATIENT ID: Brit Laurent  MRN: [id number]   YOB: 1927    DATE OF ADMISSION: 3/8/2017  5:32 PM    DATE OF DISCHARGE: 3/10/2017   PRIMARY CARE PROVIDER: Phoenix Schroeder MD       DISCHARGING PHYSICIAN: Willow Yang MD    To contact this individual call [phone] and ask the  to page. If unavailable ask to be transferred the Adult Hospitalist Department. CONSULTATIONS: IP CONSULT TO HOSPITALIST  IP CONSULT TO NEPHROLOGY    PROCEDURES/SURGERIES: * No surgery found *    ADMITTING DIAGNOSES & HOSPITAL COURSE:     The patient is an 80-year-old female with past medical history significant for non-ST elevation MI,   history of coronary artery disease, history of alcohol abuse, history of   hypokalemia, hypomagnesemia and hypocalcemia who presented to the hospital secondary to  Numbness bilateral upper and lower extremities    DISCHARGE DIAGNOSES / PLAN:      Numbness and tingling in extremities( paresthesias): Likely secondary to electrolyte  Derangement from hypocalcemia and hypomagnesemia. CT head was negative. Less likely due to CVA or TIA. No focal findings. MRI not done.         Hypomagnesemia: S/p magnesium sulfate. Now resolved. Suspect secondary to alcohol abuse. Continue Magnesium oxide to replete stores.     Hypocalcemia: S/p calcium gluconate. Continue calcium carbonate. Calcium today 7.5  Nephrology input appreciated. Serum vitamin d levels pending  continue cholecalciferol. Follow up with nephrology as an OP     Alcohol abuse: States last alcohol intake was 2 weeks ago  Monitor for Dt's. Not in withdrawal. Can D/c banana bag. Counseled on alcohol cessation. H/o CAD; No chest pain. Continue aspirin, coreg          Care Plan discussed with: Patient/Family and Nurse  Disposition: Home with [place]. Follow up with Dr Cheryle Hearn in 1 week.           PENDING TEST RESULTS:   At the time of discharge the following test results are still pending: Vitamin D levels    FOLLOW UP APPOINTMENTS:    Follow-up Information     Follow up With Details Comments [facility]  [facility] [address]  Ozzie Heller MD   [address] [place] [address]  [phone]             ADDITIONAL CARE RECOMMENDATIONS:     DIET: Regular Diet    ACTIVITY: Activity as tolerated    WOUND CARE: none    EQUIPMENT needed:       DISCHARGE MEDICATIONS:  [unfilled]      NOTIFY YOUR PHYSICIAN FOR ANY OF THE FOLLOWING:   Fever over 101 degrees for 24 hours. Chest pain, shortness of breath, fever, chills, nausea, vomiting, diarrhea, change in mentation, falling, weakness, bleeding. Severe pain or pain not relieved by medications. Or, any other signs or symptoms that you may have questions about.     DISPOSITION:   x Home With:   OT  PT  HHx  RN       Long term SNF/Inpatient Rehab    Independent/assisted living    Hospice    Other:       PATIENT CONDITION AT DISCHARGE:     Functional status    Poor    x Deconditioned     Independent      Cognition    x Lucid     Forgetful     Dementia      Catheters/lines (plus indication)    Burris     PICC     PEG    x None      Code status    x Full code     DNR      PHYSICAL EXAMINATION AT DISCHARGE:   Refer to Progress Note      CHRONIC MEDICAL DIAGNOSES:  Problem List as of 3/10/2017  Date Reviewed: 3/8/2017          Codes Class Noted - Resolved    TIA (transient ischemic attack) ICD-10-CM: G45.9  ICD-9-CM: 435.9  3/8/2017 - Present        GI bleed ICD-10-CM: K92.2  ICD-9-CM: 578.9  3/8/2017 - Present        Numbness and tingling ICD-10-CM: R20.0, R20.2  ICD-9-CM: 782.0  3/8/2017 - Present        CAD in native artery ICD-10-CM: I25.10  ICD-9-CM: 414.01  11/28/2016 - Present        History of non-ST elevation myocardial infarction (NSTEMI) ICD-10-CM: I25.2  ICD-9-CM: 649  11/28/2016 - Present        NSTEMI (non-ST elevated myocardial infarction) ([facility] [address] ICD-10-CM: I21.4  ICD-9-CM: 410.70  11/22/2016 - Present        Diarrhea ICD-10-CM: R19.7  ICD-9-CM: 787.91  11/22/2016 - Present        * (Principal)Hypocalcemia ICD-10-CM: E83.51  ICD-9-CM: 275.41  11/22/2016 - Present        Hypomagnesemia ICD-10-CM: A19.02  ICD-9-CM: 275.2  11/22/2016 - Present        Hypokalemia ICD-10-CM: E87.6  ICD-9-CM: 276.8  11/20/2016 - Present              Greater than 30 minutes were spent with the patient on counseling and coordination of care    Signed:   Sonali Singh MD  3/10/2017  2:11 PM

## 2017-03-10 NOTE — PROGRESS NOTES
notes that patient has been discharged home by hospitalist and she is calling her friend Jees Baptiste to provide transport home. Patient is now set up with [facility] for a skilled nursing eval and visit early next week.

## 2017-03-10 NOTE — DISCHARGE INSTRUCTIONS
Discharge Instructions       PATIENT ID: Kiera Sanchez  MRN: [id number]   YOB: 1927    DATE OF ADMISSION: 3/8/2017  5:32 PM    DATE OF DISCHARGE: 3/10/2017    PRIMARY CARE PROVIDER: Selene Rosas MD       DISCHARGING PHYSICIAN: Maximo Escobar MD    To contact this individual call [phone] and ask the  to page. If unavailable ask to be transferred the Adult Hospitalist Department. DISCHARGE DIAGNOSES : Bilateral Upper extremity tingling/Numbness, hypocalcemia, Hypomagnesemia, Alcohol abuse    CONSULTATIONS: IP CONSULT TO HOSPITALIST  IP CONSULT TO NEPHROLOGY    PROCEDURES/SURGERIES: * No surgery found *    PENDING TEST RESULTS:   At the time of discharge the following test results are still pending: Vitamin D levels    FOLLOW UP APPOINTMENTS:  See above. ADDITIONAL CARE RECOMMENDATIONS:  CMP, magnesium, (3/17/2017)    DIET: Regular Diet    ACTIVITY: Activity as tolerated    WOUND CARE: none    EQUIPMENT needed:       DISCHARGE MEDICATIONS:   See Medication Reconciliation Form    · It is important that you take the medication exactly as they are prescribed. · Keep your medication in the bottles provided by the pharmacist and keep a list of the medication names, dosages, and times to be taken in your wallet. · Do not take other medications without consulting your doctor. NOTIFY YOUR PHYSICIAN FOR ANY OF THE FOLLOWING:   Fever over 101 degrees for 24 hours. Chest pain, shortness of breath, fever, chills, nausea, vomiting, diarrhea, change in mentation, falling, weakness, bleeding. Severe pain or pain not relieved by medications. Or, any other signs or symptoms that you may have questions about. DISPOSITION:  x  Home With:   OT  PT  HHx  RN       SNF/Inpatient Rehab/LTAC    Independent/assisted living    Hospice    Other:     CDMP Checked:    Yes X       Signed:   Maximo Escobar MD  3/10/2017  2:16 PM

## 2017-03-10 NOTE — PROGRESS NOTES
1945: received report from [address]. SBAR, medication, assessment, orders, and plan of care discussed. Patient alert and oriented and comfortable with stable vital signs. 2230: patients R arm IV is red and around site, would like to get new IV in patient. tried to get IV in patient and was not able to.  2315: IV was placed by CCU RN Tristan Allen. 2325: patient in bed with lights dimmed to go to sleep. 2335: Gave verbal report to RN. SBAR, medications, assessment, orders and plan of care discussed.

## 2017-03-10 NOTE — PROGRESS NOTES
65:  Received handover from [facility], [facility] care. Patient resting in bed, VSS. Will continue to monitor. 0900:  Therese Pereira assisted patient to bedside commode. VSS. 1000:  Patient back to bed, no complaints at this time. 1115: Nathanael Hodge RN not available for handover. RN float will be receiving patient. Assisted patient to bedside commode. 1145:  Patient working with PT.      1200:  Room ready on IMCU - waiting for receiving RN. 1350:  TRANSFER - OUT REPORT:    Verbal report given to Orly(name) on [facility]  being transferred to [facility]) for routine progression of care       Report consisted of patients Situation, Background, Assessment and   Recommendations(SBAR). Information from the following report(s) SBAR, Kardex, ED Summary, Procedure Summary, Intake/Output, MAR, Recent Results, Med Rec Status and Cardiac Rhythm NSR was reviewed with the receiving nurse. Lines:   Peripheral IV 03/09/17 Left Arm (Active)   Site Assessment Clean, dry, & intact 3/10/2017  8:00 AM   Phlebitis Assessment 0 3/10/2017  8:00 AM   Infiltration Assessment 0 3/10/2017  8:00 AM   Dressing Status Clean, dry, & intact 3/10/2017  8:00 AM   Dressing Type Transparent 3/10/2017  8:00 AM   Hub Color/Line Status Blue;Capped 3/10/2017 12:01 PM   Action Taken Open ports on tubing capped 3/9/2017 11:00 PM   Alcohol Cap Used Yes 3/10/2017  8:00 AM        Opportunity for questions and clarification was provided. Patient transported with:   Registered Nurse  Tech    [phone]:  Dr. Bonilla Crawford at bedside, gave brief update. Hold transfer, will discharge patient.

## 2017-03-10 NOTE — PROGRESS NOTES
[facility]   [address] [facility], [address] [place] [address] [address], [facility]  Phone: [phone]   [phone]       Nephrology Progress Note  Brit Laurent     10/17/1927     [id number]  Date of Admission : 3/8/2017  03/10/17    CC: Follow up for Electrolyte Disarray      Assessment and Plan   Chronic Hypomagnesemia :  - 2/2 Combination of chronic PPI use, Chronic alcoholism and Loop diuretics   - D/C home on MgOxide 800 mg BID  - No Lasix, No PPI on d/c   - weekly labs in my office and f/u with me in 1 week    Hypocalcemia w/ Elevated PTH :  - 2/2 Vitamin D def + Hypomagnesemia (PsuedoHypoparathyroidism)  - Vit D level pending   - 2 gm Andi gluconate now  - Increase Calcium carbonate to 1000 mg TID    Hypokalemia : 2/2 Hypomagnesemia + Loops   - off lasix. Continue p.o Kcl    Chronic alcoholism : per primary team     Ventral Hernia : off PPI. Can use H2 blockers if needed     Interval History:  Seen and examined . No new events / Sx  Had good night sleep   We discussed d/c plans and follow up   parasthesias are chronic and persistent     Review of Systems: Pertinent items are noted in HPI.     Current Medications:   Current Facility-Administered Medications   Medication Dose Route Frequency    calcium gluconate 2 g in 0.9% sodium chloride 100 mL IVPB  2 g IntraVENous ONCE    calcium carbonate (OS-ANDI) tablet 1,000 mg [elemental]  1,000 mg Oral TID WITH MEALS    magnesium oxide (MAG-OX) tablet 800 mg  800 mg Oral BID    cholecalciferol (VITAMIN D3) tablet 5,000 Units  5,000 Units Oral DAILY    polyvinyl alcohol (LIQUIFILM TEARS) 1.4 % ophthalmic solution 1 Drop  1 Drop Both Eyes PRN    aspirin chewable tablet 81 mg  81 mg Oral DAILY    carvedilol (COREG) tablet 6.25 mg  6.25 mg Oral BID WITH MEALS    clonazePAM (KlonoPIN) tablet 0.5 mg  0.5 mg Oral QHS PRN    pravastatin (PRAVACHOL) tablet 20 mg  20 mg Oral DAILY    potassium chloride SR (KLOR-CON 10) tablet 20 mEq  20 mEq Oral DAILY    sodium chloride (NS) flush 5-10 mL  5-10 mL IntraVENous Q8H    sodium chloride (NS) flush 5-10 mL  5-10 mL IntraVENous PRN    acetaminophen (TYLENOL) tablet 650 mg  650 mg Oral Q4H PRN    0.9% sodium chloride 5,337 mL with folic acid 1 mg, thiamine 100 mg, mvi, adult no. 4 with vit K 10 mL infusion   IntraVENous Q24H      Allergies   Allergen Reactions    Codeine Unknown (comments)       Objective:  Vitals:    Vitals:    03/10/17 0300 03/10/17 0400 03/10/17 0500 03/10/17 0600   BP: 115/64 103/40 114/59 104/83   Pulse: 74 68 72 75   Resp: 18 13 19 23   Temp:  97.8 °F (36.6 °C)     SpO2: 95% 96% 98% 97%   Weight:       Height:         Intake and Output:  03/09 1901 - 03/10 0700  In: 1350 [I.V.:1350]  Out: 500 [Urine:500]  03/08 0701 - 03/09 1900  In: 1307.5 [P.O.:220; I.V.:1087.5]  Out: 315 [Urine:315]    Physical Examination:  General:elderly   HEENT: no pallor   The sclerae without icterus. .   Neck:Supple,no mass palpable  Lungs : Clears to auscultation Bilaterally, Normal respiratory effort  CVS: RRR, S1 S2 normal, No rub, no LE edema  Abdomen: Soft, Non tender, No hepatosplenomegaly, bowel sounds present  Extremities: no edema   Skin: No rash or lesions. Lymph nodes: No palpable nodes  MS: OA in both hands and feet   Neurologic: non focal, AAO x 3    []    High complexity decision making was performed  []    Patient is at high-risk of decompensation with multiple organ involvement    Lab Data Personally Reviewed: I have reviewed all the pertinent labs, microbiology data and radiology studies during assessment.     Recent Labs      03/10/17   0310  03/09/17   0123  03/08/17   1800   NA  138  136  137   K  3.8  3.5  3.9   CL  105  101  103   CO2  27  26  24   GLU  86  105*  97   BUN  13  15  16   CREA  0.79  0.87  0.99   CA  6.6*  7.5*  5.9*  6.0*   MG  1.8  2.5*  <0.2*   PHOS   --    --   3.6   ALB   --    --   3.0*   SGOT   --    --   17   ALT   --    --   15     Recent Labs      03/08/17   1800   WBC  8.8   HGB 12.3   HCT  36.8   PLT  198     No results found for: SDES  No results found for: CULT  Recent Results (from the past 24 hour(s))   METABOLIC PANEL, BASIC    Collection Time: 03/10/17  3:10 AM   Result Value Ref Range    Sodium 138 136 - 145 mmol/L    Potassium 3.8 3.5 - 5.1 mmol/L    Chloride 105 97 - 108 mmol/L    CO2 27 21 - 32 mmol/L    Anion gap 6 5 - 15 mmol/L    Glucose 86 65 - 100 mg/dL    BUN 13 6 - 20 MG/DL    Creatinine 0.79 0.55 - 1.02 MG/DL    BUN/Creatinine ratio 16 12 - 20      GFR est AA >60 >60 ml/min/1.73m2    GFR est non-AA >60 >60 ml/min/1.73m2    Calcium 6.6 (L) 8.5 - 10.1 MG/DL   MAGNESIUM    Collection Time: 03/10/17  3:10 AM   Result Value Ref Range    Magnesium 1.8 1.6 - 2.4 mg/dL   TROPONIN I    Collection Time: 03/10/17  3:10 AM   Result Value Ref Range    Troponin-I, Qt. <0.04 <0.05 ng/mL           I have reviewed the flowsheets. Chart and Pertinent Notes have been reviewed. No change in PMH ,family and social history from Consult note.       Debra Cheng MD

## 2017-03-10 NOTE — PROGRESS NOTES
Bedside and Verbal shift change report given to ABHAY (oncoming nurse) by Darcy Bunn (offgoing nurse). Report included the following information SBAR, Kardex, Intake/Output, MAR, Accordion and Recent Results. 0000 - assumed pt care. Pt resting comfortably, VSS, no c/o, will monitor. 0300 - pt up to [facility] w/ assistance. 0500 - pt resting comfortably, VSS, no c/o, will monitor. 0600 - Dr. Dee Figueroa in to see pt, new orders received, 2g calcium gluconate intiated per orders. Bedside and Verbal shift change report given to Eileen (oncoming nurse) by ABHAY (offgoing nurse). Report included the following information SBAR, Kardex, Intake/Output, MAR, Accordion and Recent Results.

## 2017-03-13 LAB
25(OH)D2 SERPL-MCNC: 32 NG/ML
25(OH)D3 SERPL-MCNC: 4.9 NG/ML
25(OH)D3+25(OH)D2 SERPL-MCNC: 37 NG/ML

## 2017-03-14 ENCOUNTER — HOME CARE VISIT (OUTPATIENT)
Dept: HOME HEALTH SERVICES | Facility: HOME HEALTH | Age: 82
End: 2017-03-14

## 2017-03-16 ENCOUNTER — HOME CARE VISIT (OUTPATIENT)
Dept: HOME HEALTH SERVICES | Facility: HOME HEALTH | Age: 82
End: 2017-03-16

## 2017-03-20 ENCOUNTER — HOME CARE VISIT (OUTPATIENT)
Dept: HOME HEALTH SERVICES | Facility: HOME HEALTH | Age: 82
End: 2017-03-20

## 2017-03-24 ENCOUNTER — HOME CARE VISIT (OUTPATIENT)
Dept: HOME HEALTH SERVICES | Facility: HOME HEALTH | Age: 82
End: 2017-03-24

## 2017-07-10 ENCOUNTER — HOSPITAL ENCOUNTER (EMERGENCY)
Age: 82
Discharge: HOME | End: 2017-07-10
Attending: EMERGENCY MEDICINE
Payer: MEDICARE

## 2017-07-10 VITALS
OXYGEN SATURATION: 96 % | HEIGHT: 60 IN | BODY MASS INDEX: 25.32 KG/M2 | TEMPERATURE: 97.4 F | WEIGHT: 129 LBS | HEART RATE: 77 BPM | RESPIRATION RATE: 17 BRPM | SYSTOLIC BLOOD PRESSURE: 122 MMHG | DIASTOLIC BLOOD PRESSURE: 65 MMHG

## 2017-07-10 DIAGNOSIS — K59.00 CONSTIPATION, UNSPECIFIED CONSTIPATION TYPE: Primary | ICD-10-CM

## 2017-07-10 DIAGNOSIS — K62.5 RECTAL BLEEDING: ICD-10-CM

## 2017-07-10 LAB
ABO + RH BLD: NORMAL
ALBUMIN SERPL BCP-MCNC: 2.7 G/DL (ref 3.5–5)
ALBUMIN/GLOB SERPL: 0.9 {RATIO} (ref 1.1–2.2)
ALP SERPL-CCNC: 107 U/L (ref 45–117)
ALT SERPL-CCNC: 13 U/L (ref 12–78)
ANION GAP BLD CALC-SCNC: 15 MMOL/L (ref 5–15)
APTT PPP: 24.9 SEC (ref 22.1–32.5)
AST SERPL W P-5'-P-CCNC: 18 U/L (ref 15–37)
ATRIAL RATE: 75 BPM
BASOPHILS # BLD AUTO: 0 K/UL (ref 0–0.1)
BASOPHILS # BLD: 0 % (ref 0–1)
BILIRUB SERPL-MCNC: 1.1 MG/DL (ref 0.2–1)
BLOOD GROUP ANTIBODIES SERPL: NORMAL
BUN SERPL-MCNC: 8 MG/DL (ref 6–20)
BUN/CREAT SERPL: 11 (ref 12–20)
CALCIUM SERPL-MCNC: 8 MG/DL (ref 8.5–10.1)
CALCULATED P AXIS, ECG09: 89 DEGREES
CALCULATED R AXIS, ECG10: -21 DEGREES
CALCULATED T AXIS, ECG11: -27 DEGREES
CHLORIDE SERPL-SCNC: 94 MMOL/L (ref 97–108)
CO2 SERPL-SCNC: 29 MMOL/L (ref 21–32)
CREAT SERPL-MCNC: 0.71 MG/DL (ref 0.55–1.02)
DIAGNOSIS, 93000: NORMAL
EOSINOPHIL # BLD: 0.1 K/UL (ref 0–0.4)
EOSINOPHIL NFR BLD: 1 % (ref 0–7)
ERYTHROCYTE [DISTWIDTH] IN BLOOD BY AUTOMATED COUNT: 13 % (ref 11.5–14.5)
GLOBULIN SER CALC-MCNC: 3 G/DL (ref 2–4)
GLUCOSE SERPL-MCNC: 84 MG/DL (ref 65–100)
HCT VFR BLD AUTO: 35 % (ref 35–47)
HEMOCCULT STL QL: NEGATIVE
HGB BLD-MCNC: 11.6 G/DL (ref 11.5–16)
INR PPP: 1 (ref 0.9–1.1)
LYMPHOCYTES # BLD AUTO: 20 % (ref 12–49)
LYMPHOCYTES # BLD: 1.4 K/UL (ref 0.8–3.5)
MCH RBC QN AUTO: 35 PG (ref 26–34)
MCHC RBC AUTO-ENTMCNC: 33.1 G/DL (ref 30–36.5)
MCV RBC AUTO: 105.7 FL (ref 80–99)
MONOCYTES # BLD: 0.4 K/UL (ref 0–1)
MONOCYTES NFR BLD AUTO: 6 % (ref 5–13)
NEUTS SEG # BLD: 5.3 K/UL (ref 1.8–8)
NEUTS SEG NFR BLD AUTO: 73 % (ref 32–75)
P-R INTERVAL, ECG05: 192 MS
PLATELET # BLD AUTO: 226 K/UL (ref 150–400)
POTASSIUM SERPL-SCNC: 2.7 MMOL/L (ref 3.5–5.1)
PROT SERPL-MCNC: 5.7 G/DL (ref 6.4–8.2)
PROTHROMBIN TIME: 10.6 SEC (ref 9–11.1)
Q-T INTERVAL, ECG07: 412 MS
QRS DURATION, ECG06: 126 MS
QTC CALCULATION (BEZET), ECG08: 460 MS
RBC # BLD AUTO: 3.31 M/UL (ref 3.8–5.2)
SODIUM SERPL-SCNC: 138 MMOL/L (ref 136–145)
SPECIMEN EXP DATE BLD: NORMAL
THERAPEUTIC RANGE,PTTT: NORMAL SECS (ref 58–77)
VENTRICULAR RATE, ECG03: 75 BPM
WBC # BLD AUTO: 7.2 K/UL (ref 3.6–11)

## 2017-07-10 PROCEDURE — 96374 THER/PROPH/DIAG INJ IV PUSH: CPT

## 2017-07-10 PROCEDURE — 82272 OCCULT BLD FECES 1-3 TESTS: CPT | Performed by: EMERGENCY MEDICINE

## 2017-07-10 PROCEDURE — 74011250637 HC RX REV CODE- 250/637: Performed by: EMERGENCY MEDICINE

## 2017-07-10 PROCEDURE — 86900 BLOOD TYPING SEROLOGIC ABO: CPT | Performed by: EMERGENCY MEDICINE

## 2017-07-10 PROCEDURE — 36415 COLL VENOUS BLD VENIPUNCTURE: CPT | Performed by: EMERGENCY MEDICINE

## 2017-07-10 PROCEDURE — 85730 THROMBOPLASTIN TIME PARTIAL: CPT | Performed by: EMERGENCY MEDICINE

## 2017-07-10 PROCEDURE — 74011250636 HC RX REV CODE- 250/636: Performed by: EMERGENCY MEDICINE

## 2017-07-10 PROCEDURE — 93005 ELECTROCARDIOGRAM TRACING: CPT

## 2017-07-10 PROCEDURE — 80053 COMPREHEN METABOLIC PANEL: CPT | Performed by: EMERGENCY MEDICINE

## 2017-07-10 PROCEDURE — 85610 PROTHROMBIN TIME: CPT | Performed by: EMERGENCY MEDICINE

## 2017-07-10 PROCEDURE — 85025 COMPLETE CBC W/AUTO DIFF WBC: CPT | Performed by: EMERGENCY MEDICINE

## 2017-07-10 PROCEDURE — 99284 EMERGENCY DEPT VISIT MOD MDM: CPT

## 2017-07-10 RX ORDER — DOCUSATE SODIUM 100 MG/1
100 CAPSULE, LIQUID FILLED ORAL 2 TIMES DAILY
Qty: 60 CAP | Refills: 2 | Status: SHIPPED | OUTPATIENT
Start: 2017-07-10 | End: 2017-10-07

## 2017-07-10 RX ORDER — POTASSIUM CHLORIDE 750 MG/1
40 TABLET, FILM COATED, EXTENDED RELEASE ORAL
Status: COMPLETED | OUTPATIENT
Start: 2017-07-10 | End: 2017-07-10

## 2017-07-10 RX ORDER — POLYETHYLENE GLYCOL 3350 17 G/17G
17 POWDER, FOR SOLUTION ORAL DAILY
Qty: 119 G | Refills: 0 | Status: SHIPPED | OUTPATIENT
Start: 2017-07-10 | End: 2017-10-07

## 2017-07-10 RX ORDER — MORPHINE SULFATE 2 MG/ML
2 INJECTION, SOLUTION INTRAMUSCULAR; INTRAVENOUS ONCE
Status: COMPLETED | OUTPATIENT
Start: 2017-07-10 | End: 2017-07-10

## 2017-07-10 RX ADMIN — POTASSIUM CHLORIDE 40 MEQ: 750 TABLET, FILM COATED, EXTENDED RELEASE ORAL at 06:23

## 2017-07-10 RX ADMIN — Medication 2 MG: at 05:54

## 2017-07-10 NOTE — ED NOTES
Patient was waiting for ride in the room and started with nausea, dizziness and upper abdominal pain. Patient placed back on monitor x 2 and notified Dr. Petra Ball.

## 2017-07-10 NOTE — DISCHARGE INSTRUCTIONS
Constipation: Care Instructions  Your Care Instructions  Constipation means that you have a hard time passing stools (bowel movements). People pass stools from 3 times a day to once every 3 days. What is normal for you may be different. Constipation may occur with pain in the rectum and cramping. The pain may get worse when you try to pass stools. Sometimes there are small amounts of bright red blood on toilet paper or the surface of stools. This is because of enlarged veins near the rectum (hemorrhoids). A few changes in your diet and lifestyle may help you avoid ongoing constipation. Your doctor may also prescribe medicine to help loosen your stool. Some medicines can cause constipation. These include pain medicines and antidepressants. Tell your doctor about all the medicines you take. Your doctor may want to make a medicine change to ease your symptoms. Follow-up care is a key part of your treatment and safety. Be sure to make and go to all appointments, and call your doctor if you are having problems. It's also a good idea to know your test results and keep a list of the medicines you take. How can you care for yourself at home? · Drink plenty of fluids, enough so that your urine is light yellow or clear like water. If you have kidney, heart, or liver disease and have to limit fluids, talk with your doctor before you increase the amount of fluids you drink. · Include high-fiber foods in your diet each day. These include fruits, vegetables, beans, and whole grains. · Get at least 30 minutes of exercise on most days of the week. Walking is a good choice. You also may want to do other activities, such as running, swimming, cycling, or playing tennis or team sports. · Take a fiber supplement, such as Citrucel or Metamucil, every day. Read and follow all instructions on the label. · Schedule time each day for a bowel movement. A daily routine may help.  Take your time having your bowel movement. · Support your feet with a small step stool when you sit on the toilet. This helps flex your hips and places your pelvis in a squatting position. · Your doctor may recommend an over-the-counter laxative to relieve your constipation. Examples are Milk of Magnesia and MiraLax. Read and follow all instructions on the label. Do not use laxatives on a long-term basis. When should you call for help? Call your doctor now or seek immediate medical care if:  · You have new or worse belly pain. · You have new or worse nausea or vomiting. · You have blood in your stools. Watch closely for changes in your health, and be sure to contact your doctor if:  · Your constipation is getting worse. · You do not get better as expected. Where can you learn more? Go to http://nicanor-alma.info/. Enter [phone] in the search box to learn more about \"Constipation: Care Instructions. \"  Current as of: March 20, 2017  Content Version: 11.3  © 1933-4773 [employer]. Care instructions adapted under license by [employer] (which disclaims liability or warranty for this information). If you have questions about a medical condition or this instruction, always ask your healthcare professional. [address] any warranty or liability for your use of this information. We hope that we have addressed all of your medical concerns. The examination and treatment you received in the Emergency Department were for an emergent problem and were not intended as complete care. It is important that you follow up with your healthcare provider(s) for ongoing care. If your symptoms worsen or do not improve as expected, and you are unable to reach your usual health care provider(s), you should return to the Emergency Department.       Today's healthcare is undergoing tremendous change, and patient satisfaction surveys are one of the many tools to assess the quality of medical care.  You may receive a survey from the [employer] regarding your experience in the Emergency Department. I hope that your experience has been completely positive, particularly the medical care that I provided. As such, please participate in the survey; anything less than excellent does not meet my expectations or intentions. [address] and [address] participate in nationally recognized quality of care measures. If your blood pressure is greater than 120/80, as reported below, we urge that you seek medical care to address the potential of high blood pressure, commonly known as hypertension. Hypertension can be hereditary or can be caused by certain medical conditions, pain, stress, or \"white coat syndrome. \"       Please make an appointment with your health care provider(s) for follow up of your Emergency Department visit. VITALS:   Patient Vitals for the past 8 hrs:   Temp Pulse Resp BP SpO2   07/10/17 0508 97.8 °F (36.6 °C) 88 22 (!) 116/92 95 %          Thank you for allowing us to provide you with medical care today. We realize that you have many choices for your emergency care needs. Please choose us in the future for any continued health care needs. Nicolas Montgomery MD    [facility]   Office: [phone]            Recent Results (from the past 24 hour(s))   CBC WITH AUTOMATED DIFF    Collection Time: 07/10/17  5:11 AM   Result Value Ref Range    WBC 7.2 3.6 - 11.0 K/uL    RBC 3.31 (L) 3.80 - 5.20 M/uL    HGB 11.6 11.5 - 16.0 g/dL    HCT 35.0 35.0 - 47.0 %    .7 (H) 80.0 - 99.0 FL    MCH 35.0 (H) 26.0 - 34.0 PG    MCHC 33.1 30.0 - 36.5 g/dL    RDW 13.0 11.5 - 14.5 %    PLATELET 439 319 - 448 K/uL    NEUTROPHILS 73 32 - 75 %    LYMPHOCYTES 20 12 - 49 %    MONOCYTES 6 5 - 13 %    EOSINOPHILS 1 0 - 7 %    BASOPHILS 0 0 - 1 %    ABS. NEUTROPHILS 5.3 1.8 - 8.0 K/UL    ABS.  LYMPHOCYTES 1.4 0.8 - 3.5 K/UL    ABS. MONOCYTES 0.4 0.0 - 1.0 K/UL    ABS. EOSINOPHILS 0.1 0.0 - 0.4 K/UL    ABS. BASOPHILS 0.0 0.0 - 0.1 K/UL   METABOLIC PANEL, COMPREHENSIVE    Collection Time: 07/10/17  5:11 AM   Result Value Ref Range    Sodium 138 136 - 145 mmol/L    Potassium 2.7 (LL) 3.5 - 5.1 mmol/L    Chloride 94 (L) 97 - 108 mmol/L    CO2 29 21 - 32 mmol/L    Anion gap 15 5 - 15 mmol/L    Glucose 84 65 - 100 mg/dL    BUN 8 6 - 20 MG/DL    Creatinine 0.71 0.55 - 1.02 MG/DL    BUN/Creatinine ratio 11 (L) 12 - 20      GFR est AA >60 >60 ml/min/1.73m2    GFR est non-AA >60 >60 ml/min/1.73m2    Calcium 8.0 (L) 8.5 - 10.1 MG/DL    Bilirubin, total 1.1 (H) 0.2 - 1.0 MG/DL    ALT (SGPT) 13 12 - 78 U/L    AST (SGOT) 18 15 - 37 U/L    Alk.  phosphatase 107 45 - 117 U/L    Protein, total 5.7 (L) 6.4 - 8.2 g/dL    Albumin 2.7 (L) 3.5 - 5.0 g/dL    Globulin 3.0 2.0 - 4.0 g/dL    A-G Ratio 0.9 (L) 1.1 - 2.2     TYPE & SCREEN    Collection Time: 07/10/17  5:11 AM   Result Value Ref Range    Crossmatch Expiration 07/13/2017     ABO/Rh(D) AB POSITIVE     Antibody screen NEG    PROTHROMBIN TIME + INR    Collection Time: 07/10/17  5:11 AM   Result Value Ref Range    INR 1.0 0.9 - 1.1      Prothrombin time 10.6 9.0 - 11.1 sec   PTT    Collection Time: 07/10/17  5:11 AM   Result Value Ref Range    aPTT 24.9 22.1 - 32.5 sec    aPTT, therapeutic range     58.0 - 77.0 SECS   EKG, 12 LEAD, INITIAL    Collection Time: 07/10/17  5:11 AM   Result Value Ref Range    Ventricular Rate 75 BPM    Atrial Rate 75 BPM    P-R Interval 192 ms    QRS Duration 126 ms    Q-T Interval 412 ms    QTC Calculation (Bezet) 460 ms    Calculated P Axis 89 degrees    Calculated R Axis -21 degrees    Calculated T Axis -27 degrees    Diagnosis       Normal sinus rhythm  Right bundle branch block  T wave abnormality, consider inferior ischemia  When compared with ECG of 08-MAR-2017 19:15,  premature atrial complexes are no longer present  T wave inversion more evident in Inferior leads  Nonspecific T wave abnormality now evident in Lateral leads     OCCULT BLOOD, STOOL    Collection Time: 07/10/17  5:38 AM   Result Value Ref Range    Occult blood, stool NEGATIVE  NEG         No results found.

## 2017-07-12 NOTE — SENIOR SERVICES NOTE
Home Care Face to Face Encounter    Patients Name: Reyes Doheny    YOB: 1927    Primary Diagnosis: rectal bleed, Constipation    Date of Face to Face:   07/10/2017                                  Face to Face Encounter findings are related to primary reason for home care:   yes. 1. I certify that the patient needs intermittent care as follows: skilled nursing care:  skilled observation/assessment, patient education  occupational therapy:  ADL safety (ie. cooking, bathing, dressing)   Aide  SW    2. I certify that this patient is homebound, that is: 1) patient requires the use of a  device, special transportation, or assistance of another to leave the home; or 2) patient's condition makes leaving the home medically contraindicated; and 3) patient has a normal inability to leave the home and leaving the home requires considerable and taxing effort. Patient may leave the home for infrequent and short duration for medical reasons, and occasional absences for non-medical reasons. Homebound status is due to the following functional limitations: requires assistance to leave home    3. I certify that this patient is under my care and that I, or a nurse practitioner or  [id number], or clinical nurse specialist, or certified nurse midwife, working with me, had a Face-to-Face Encounter that meets the physician Face-to-Face Encounter requirements.   The following are the clinical findings from the [address] [place] [address] encounter that support the need for skilled services and is a summary of the encounter: Patient lives alone and having difficulty with ADLs    See attached progess note      Fariha Orlando NP  7/12/2017

## 2017-07-12 NOTE — CALL BACK NOTE
spoke with patient and she reports that she is having too many BMs now. She is taking the Miralax daily. Instructed her to cut back to every other day. She states that she has a friend that comes in once/day to help out - bring food, cook food, clean patient up if needed. When asked if she was able to clean herself after BMs she reports that she is having some difficulty. Pt is open to [facility] coming out. I will initiate order with [facility]. PCP verified and is correct on chart.   Has appointment on 7/18/17

## 2017-07-14 NOTE — SENIOR SERVICES NOTE
HH order received on 7/12/17. Currently [facility] is on delay. Alternative HH agency located. Referral sent to [facility] on 7/12/17 and case accepted. CM placed call to  - attempted to introduced self , patient asked \"what do you need? \" Cm informed of f/u call regarding [facility] [place] set up. Patient verbalized \" I haven't heard from anyone and I need to talk w/ you another time. I have some one important about to visit. \" CM asked preferred time? Patient stated tomorrow - informed this writer to return on Monday. Patient verbalized \" they are coming in now I have to go. Thank you. \" Call ended. CM placed call to [facility] [phone] intake - informed agency had attempted on 7/12 and 7/13 spoke w/ friend/neighbor  Myesha Gresham (contact) who informed patient home is unsafe w/ renovations and plan is to wait for daughter to return back from [facility] to discuss long term plans. Requesting [facility] [place] in 2 weeks when daughter returns. Call placed to emergency contact Myesha TIWARI left w/ CM contact regarding f/u.

## 2017-07-17 NOTE — SENIOR SERVICES NOTE
Call placed to neighbor Alexander Doing - VM left. CM attempted patient - introduced self.  states she is feeling tired and has PCP appointment on Tuesday. Discussed HH ordered - patient verbalized willing to have service on Wednesday which will be \"possibly better day. \" Contact made w/ [facility] spoke w/ Marshall Robledo - agency will communicate w/ patient regarding Wednesday visit and inform CM of status. Fozia Giselle acknowledges contact previously w/ neighbor how verbalized home concern for safety and  is in need of repair. Daughter will return home from [facility] in 2 weeks to talk w/ patient about LTC options.

## 2017-07-19 NOTE — SENIOR SERVICES NOTE
Call received at [address] [facility], Intake - informed attempted patient today (no answer) spoke w/ neighbor Ms. Linn. Patient seen by PCP on 7/18/17 and recommendation to move forward w/ AL placement - [facility] service declined. Daughter/POA to arrive in [place] next week.

## 2017-09-12 ENCOUNTER — APPOINTMENT (OUTPATIENT)
Dept: CT IMAGING | Age: 82
DRG: 252 | End: 2017-09-12
Attending: STUDENT IN AN ORGANIZED HEALTH CARE EDUCATION/TRAINING PROGRAM
Payer: MEDICARE

## 2017-09-12 ENCOUNTER — APPOINTMENT (OUTPATIENT)
Dept: GENERAL RADIOLOGY | Age: 82
DRG: 252 | End: 2017-09-12
Attending: STUDENT IN AN ORGANIZED HEALTH CARE EDUCATION/TRAINING PROGRAM
Payer: MEDICARE

## 2017-09-12 ENCOUNTER — HOSPITAL ENCOUNTER (INPATIENT)
Age: 82
LOS: 2 days | Discharge: NURSING, REHAB OR LONG-TERM CARE | DRG: 252 | End: 2017-09-14
Attending: STUDENT IN AN ORGANIZED HEALTH CARE EDUCATION/TRAINING PROGRAM | Admitting: INTERNAL MEDICINE
Payer: MEDICARE

## 2017-09-12 DIAGNOSIS — I62.00 SUBDURAL HEMORRHAGE (HCC): Primary | ICD-10-CM

## 2017-09-12 PROBLEM — R07.9 CHEST PAIN: Status: ACTIVE | Noted: 2017-09-12

## 2017-09-12 LAB
ALBUMIN SERPL-MCNC: 2.3 G/DL (ref 3.5–5)
ALBUMIN/GLOB SERPL: 0.7 {RATIO} (ref 1.1–2.2)
ALP SERPL-CCNC: 182 U/L (ref 45–117)
ALT SERPL-CCNC: 10 U/L (ref 12–78)
ANION GAP SERPL CALC-SCNC: 8 MMOL/L (ref 5–15)
AST SERPL-CCNC: 20 U/L (ref 15–37)
BASOPHILS # BLD: 0 K/UL (ref 0–0.1)
BASOPHILS NFR BLD: 0 % (ref 0–1)
BILIRUB SERPL-MCNC: 0.4 MG/DL (ref 0.2–1)
BUN SERPL-MCNC: 9 MG/DL (ref 6–20)
BUN/CREAT SERPL: 11 (ref 12–20)
CALCIUM SERPL-MCNC: 8.5 MG/DL (ref 8.5–10.1)
CHLORIDE SERPL-SCNC: 106 MMOL/L (ref 97–108)
CK SERPL-CCNC: 31 U/L (ref 26–192)
CO2 SERPL-SCNC: 21 MMOL/L (ref 21–32)
CREAT SERPL-MCNC: 0.85 MG/DL (ref 0.55–1.02)
EOSINOPHIL # BLD: 0.2 K/UL (ref 0–0.4)
EOSINOPHIL NFR BLD: 2 % (ref 0–7)
ERYTHROCYTE [DISTWIDTH] IN BLOOD BY AUTOMATED COUNT: 15 % (ref 11.5–14.5)
GLOBULIN SER CALC-MCNC: 3.4 G/DL (ref 2–4)
GLUCOSE SERPL-MCNC: 88 MG/DL (ref 65–100)
HCT VFR BLD AUTO: 30.9 % (ref 35–47)
HGB BLD-MCNC: 10.2 G/DL (ref 11.5–16)
LYMPHOCYTES # BLD: 1.6 K/UL (ref 0.8–3.5)
LYMPHOCYTES NFR BLD: 18 % (ref 12–49)
MCH RBC QN AUTO: 34.7 PG (ref 26–34)
MCHC RBC AUTO-ENTMCNC: 33 G/DL (ref 30–36.5)
MCV RBC AUTO: 105.1 FL (ref 80–99)
MONOCYTES # BLD: 1.3 K/UL (ref 0–1)
MONOCYTES NFR BLD: 15 % (ref 5–13)
NEUTS SEG # BLD: 6 K/UL (ref 1.8–8)
NEUTS SEG NFR BLD: 65 % (ref 32–75)
PLATELET # BLD AUTO: 262 K/UL (ref 150–400)
POTASSIUM SERPL-SCNC: 4.7 MMOL/L (ref 3.5–5.1)
PROT SERPL-MCNC: 5.7 G/DL (ref 6.4–8.2)
RBC # BLD AUTO: 2.94 M/UL (ref 3.8–5.2)
SODIUM SERPL-SCNC: 135 MMOL/L (ref 136–145)
TROPONIN I SERPL-MCNC: <0.04 NG/ML
WBC # BLD AUTO: 9.2 K/UL (ref 3.6–11)

## 2017-09-12 PROCEDURE — 36415 COLL VENOUS BLD VENIPUNCTURE: CPT | Performed by: STUDENT IN AN ORGANIZED HEALTH CARE EDUCATION/TRAINING PROGRAM

## 2017-09-12 PROCEDURE — 93005 ELECTROCARDIOGRAM TRACING: CPT

## 2017-09-12 PROCEDURE — 70450 CT HEAD/BRAIN W/O DYE: CPT

## 2017-09-12 PROCEDURE — 80053 COMPREHEN METABOLIC PANEL: CPT | Performed by: STUDENT IN AN ORGANIZED HEALTH CARE EDUCATION/TRAINING PROGRAM

## 2017-09-12 PROCEDURE — 82550 ASSAY OF CK (CPK): CPT | Performed by: STUDENT IN AN ORGANIZED HEALTH CARE EDUCATION/TRAINING PROGRAM

## 2017-09-12 PROCEDURE — 74011000250 HC RX REV CODE- 250: Performed by: INTERNAL MEDICINE

## 2017-09-12 PROCEDURE — 74011250636 HC RX REV CODE- 250/636: Performed by: INTERNAL MEDICINE

## 2017-09-12 PROCEDURE — 85025 COMPLETE CBC W/AUTO DIFF WBC: CPT | Performed by: STUDENT IN AN ORGANIZED HEALTH CARE EDUCATION/TRAINING PROGRAM

## 2017-09-12 PROCEDURE — 84484 ASSAY OF TROPONIN QUANT: CPT | Performed by: STUDENT IN AN ORGANIZED HEALTH CARE EDUCATION/TRAINING PROGRAM

## 2017-09-12 PROCEDURE — 71010 XR CHEST PORT: CPT

## 2017-09-12 PROCEDURE — 65660000000 HC RM CCU STEPDOWN

## 2017-09-12 PROCEDURE — 99285 EMERGENCY DEPT VISIT HI MDM: CPT

## 2017-09-12 PROCEDURE — 74011250637 HC RX REV CODE- 250/637: Performed by: INTERNAL MEDICINE

## 2017-09-12 RX ORDER — ONDANSETRON 2 MG/ML
4 INJECTION INTRAMUSCULAR; INTRAVENOUS
Status: DISCONTINUED | OUTPATIENT
Start: 2017-09-12 | End: 2017-09-14 | Stop reason: HOSPADM

## 2017-09-12 RX ORDER — LATANOPROST 50 UG/ML
1 SOLUTION/ DROPS OPHTHALMIC
Status: DISCONTINUED | OUTPATIENT
Start: 2017-09-12 | End: 2017-09-14 | Stop reason: HOSPADM

## 2017-09-12 RX ORDER — ACETAMINOPHEN 325 MG/1
650 TABLET ORAL
Status: DISCONTINUED | OUTPATIENT
Start: 2017-09-12 | End: 2017-09-14 | Stop reason: HOSPADM

## 2017-09-12 RX ORDER — CLONAZEPAM 0.5 MG/1
0.5 TABLET ORAL
Status: DISCONTINUED | OUTPATIENT
Start: 2017-09-12 | End: 2017-09-14 | Stop reason: HOSPADM

## 2017-09-12 RX ORDER — SODIUM CHLORIDE 9 MG/ML
75 INJECTION, SOLUTION INTRAVENOUS CONTINUOUS
Status: DISCONTINUED | OUTPATIENT
Start: 2017-09-12 | End: 2017-09-14 | Stop reason: HOSPADM

## 2017-09-12 RX ORDER — FUROSEMIDE 20 MG/1
20 TABLET ORAL DAILY
COMMUNITY
End: 2017-09-14

## 2017-09-12 RX ORDER — CALCIUM CARBONATE 500(1250)
500 TABLET ORAL
Status: DISCONTINUED | OUTPATIENT
Start: 2017-09-13 | End: 2017-09-14 | Stop reason: HOSPADM

## 2017-09-12 RX ORDER — PRAVASTATIN SODIUM 20 MG/1
20 TABLET ORAL DAILY
Status: DISCONTINUED | OUTPATIENT
Start: 2017-09-13 | End: 2017-09-14 | Stop reason: HOSPADM

## 2017-09-12 RX ORDER — LATANOPROST 50 UG/ML
1 SOLUTION/ DROPS OPHTHALMIC
COMMUNITY

## 2017-09-12 RX ORDER — POLYETHYLENE GLYCOL 3350 17 G/17G
17 POWDER, FOR SOLUTION ORAL DAILY
Status: DISCONTINUED | OUTPATIENT
Start: 2017-09-13 | End: 2017-09-14 | Stop reason: HOSPADM

## 2017-09-12 RX ORDER — PANTOPRAZOLE SODIUM 40 MG/1
40 TABLET, DELAYED RELEASE ORAL DAILY
Status: DISCONTINUED | OUTPATIENT
Start: 2017-09-13 | End: 2017-09-14 | Stop reason: HOSPADM

## 2017-09-12 RX ORDER — DOCUSATE SODIUM 100 MG/1
100 CAPSULE, LIQUID FILLED ORAL 2 TIMES DAILY
Status: DISCONTINUED | OUTPATIENT
Start: 2017-09-12 | End: 2017-09-14 | Stop reason: HOSPADM

## 2017-09-12 RX ORDER — CARVEDILOL 6.25 MG/1
6.25 TABLET ORAL 2 TIMES DAILY WITH MEALS
Status: DISCONTINUED | OUTPATIENT
Start: 2017-09-13 | End: 2017-09-14 | Stop reason: HOSPADM

## 2017-09-12 RX ADMIN — DOCUSATE SODIUM 100 MG: 100 CAPSULE, LIQUID FILLED ORAL at 22:40

## 2017-09-12 RX ADMIN — LATANOPROST 1 DROP: 50 SOLUTION OPHTHALMIC at 22:43

## 2017-09-12 RX ADMIN — SODIUM CHLORIDE 75 ML/HR: 900 INJECTION, SOLUTION INTRAVENOUS at 22:40

## 2017-09-12 RX ADMIN — CLONAZEPAM 0.5 MG: 0.5 TABLET ORAL at 22:40

## 2017-09-12 NOTE — CONSULTS
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 This is not clinically significant and would not cause symptoms. If there is an acute component it is very tiny and entirely incidental.  Recommend medical management of chest pain. No neurosurgical intervention needed at this time.   thx

## 2017-09-12 NOTE — IP AVS SNAPSHOT
[address] [place] [address] 
[phone] Patient: Joseph Pollockr MRN: [id number] :10/17/1927 Current Discharge Medication List  
  
CONTINUE these medications which have NOT CHANGED Dose & Instructions Dispensing Information Comments Morning Noon Evening Bedtime ACETAMINOPHEN EXTRA STRENGTH 500 mg tablet Generic drug:  acetaminophen Your last dose was: Your next dose is:    
   
   
 Dose:  1000 mg Take 1,000 mg by mouth nightly. Refills:  0  
     
   
   
   
  
 calcium carbonate 500 mg calcium (1,250 mg) tablet Commonly known as:  OS-DAVIAN Your last dose was: Your next dose is:    
   
   
 Dose:  1 Tab Take 1 Tab by mouth three (3) times daily (with meals). Indications: hypocalcemia Quantity:  60 Tab Refills:  0  
     
   
   
   
  
 carvedilol 6.25 mg tablet Commonly known as:  William Diaz Your last dose was: Your next dose is:    
   
   
 Dose:  6.25 mg Take 1 Tab by mouth two (2) times daily (with meals). Quantity:  60 Tab Refills:  0  
     
   
   
   
  
 cholecalciferol 1,000 unit tablet Commonly known as:  VITAMIN D3 Your last dose was: Your next dose is:    
   
   
 Dose:  5000 Units Take 5 Tabs by mouth daily. Quantity:  30 Tab Refills:  1  
     
   
   
   
  
 docusate sodium 100 mg capsule Commonly known as:  [place] Your last dose was: Your next dose is:    
   
   
 Dose:  100 mg Take 1 Cap by mouth two (2) times a day for 90 days. Quantity:  60 Cap Refills:  2 KlonoPIN 0.5 mg tablet Generic drug:  clonazePAM  
   
Your last dose was: Your next dose is:    
   
   
 Dose:  0.5 mg Take 0.5 mg by mouth nightly as needed for Other (anxiety). Refills:  0  
     
   
   
   
  
 latanoprost 0.005 % ophthalmic solution Commonly known as:  Asim Scanlon Your last dose was: Your next dose is:    
   
   
 Dose:  1 Drop Administer 1 Drop to both eyes nightly. Refills:  0  
     
   
   
   
  
 polyethylene glycol 17 gram/dose powder Commonly known as:  [place] Your last dose was: Your next dose is:    
   
   
 Dose:  17 g Take 17 g by mouth daily. 1 tablespoon with 8 oz of water daily Quantity:  119 g Refills:  0  
     
   
   
   
  
 pravastatin 20 mg tablet Commonly known as:  PRAVACHOL Your last dose was: Your next dose is:    
   
   
 Dose:  20 mg Take 20 mg by mouth daily. Refills:  0 PriLOSEC 20 mg capsule Generic drug:  omeprazole Your last dose was: Your next dose is:    
   
   
 Dose:  20 mg Take 20 mg by mouth daily. Refills:  0 STOP taking these medications   
 aspirin 81 mg chewable tablet  
   
  
 furosemide 20 mg tablet Commonly known as:  LASIX  
   
  
 magnesium oxide 400 mg tablet Commonly known as:  MAG-OX

## 2017-09-12 NOTE — IP AVS SNAPSHOT
[address] 
[phone] Patient: Kathy Welch MRN: [id number] :10/17/1927 You are allergic to the following Allergen Reactions Codeine Unknown (comments) Prednisone Unknown (comments) Recent Documentation Height Weight BMI OB Status Smoking Status 1.524 m 55.2 kg 23.77 kg/m2 Postmenopausal Never Smoker Emergency Contacts Name Discharge Info Relation Home Work Mobile [place] CAREGIVER [3] Daughter [21] [phone] About your hospitalization You were admitted on:  2017 You last received care in the:  [facility] 4 IMCU You were discharged on:  2017 Unit phone number:  [phone] Why you were hospitalized Your primary diagnosis was:  Chest Pain Providers Seen During Your Hospitalizations Provider Role Specialty Primary office phone Carley Del Toro MD Attending Provider Emergency Medicine [phone] Wendy Calles MD Attending Provider Internal Medicine [phone] Jessy Horowitz MD Attending Provider Internal Medicine [phone] Aníbal Mei MD Attending Provider Internal Medicine [phone] Your Primary Care Physician (PCP) Primary Care Physician Office Phone Office Fax Katelynn Moya [phone] Follow-up Information Follow up With Details Comments Contact Info AT [address] [facility] and PT  [address] [facility] [address] [phone] Dk Charles MD   [address] [place] [address] 
[phone] Wally Dao MD In 2 weeks  [address] [place] [address] 
[phone] Current Discharge Medication List  
  
CONTINUE these medications which have NOT CHANGED Dose & Instructions Dispensing Information Comments Morning Noon Evening Bedtime ACETAMINOPHEN EXTRA STRENGTH 500 mg tablet Generic drug:  acetaminophen Your last dose was: Your next dose is:    
   
   
 Dose:  1000 mg Take 1,000 mg by mouth nightly. Refills:  0  
     
   
   
   
  
 calcium carbonate 500 mg calcium (1,250 mg) tablet Commonly known as:  OS-DAVIAN Your last dose was: Your next dose is:    
   
   
 Dose:  1 Tab Take 1 Tab by mouth three (3) times daily (with meals). Indications: hypocalcemia Quantity:  60 Tab Refills:  0  
     
   
   
   
  
 carvedilol 6.25 mg tablet Commonly known as:  Sathish Castillo Your last dose was: Your next dose is:    
   
   
 Dose:  6.25 mg Take 1 Tab by mouth two (2) times daily (with meals). Quantity:  60 Tab Refills:  0  
     
   
   
   
  
 cholecalciferol 1,000 unit tablet Commonly known as:  VITAMIN D3 Your last dose was: Your next dose is:    
   
   
 Dose:  5000 Units Take 5 Tabs by mouth daily. Quantity:  30 Tab Refills:  1  
     
   
   
   
  
 docusate sodium 100 mg capsule Commonly known as:  Jesus Hickey Your last dose was: Your next dose is:    
   
   
 Dose:  100 mg Take 1 Cap by mouth two (2) times a day for 90 days. Quantity:  60 Cap Refills:  2 KlonoPIN 0.5 mg tablet Generic drug:  clonazePAM  
   
Your last dose was: Your next dose is:    
   
   
 Dose:  0.5 mg Take 0.5 mg by mouth nightly as needed for Other (anxiety). Refills:  0  
     
   
   
   
  
 latanoprost 0.005 % ophthalmic solution Commonly known as:  Vicente Rase Your last dose was: Your next dose is:    
   
   
 Dose:  1 Drop Administer 1 Drop to both eyes nightly. Refills:  0  
     
   
   
   
  
 polyethylene glycol 17 gram/dose powder Commonly known as:  Nixon Desir Your last dose was: Your next dose is:    
   
   
 Dose:  17 g Take 17 g by mouth daily. 1 tablespoon with 8 oz of water daily Quantity:  119 g Refills:  0  
     
   
   
   
  
 pravastatin 20 mg tablet Commonly known as:  PRAVACHOL Your last dose was: Your next dose is:    
   
   
 Dose:  20 mg Take 20 mg by mouth daily. Refills:  0 PriLOSEC 20 mg capsule Generic drug:  omeprazole Your last dose was: Your next dose is:    
   
   
 Dose:  20 mg Take 20 mg by mouth daily. Refills:  0 STOP taking these medications   
 aspirin 81 mg chewable tablet  
   
  
 furosemide 20 mg tablet Commonly known as:  LASIX  
   
  
 magnesium oxide 400 mg tablet Commonly known as:  MAG-OX Discharge Instructions Discharge Instructions PATIENT ID: Roberto Trujillo MRN: [id number] YOB: 1927 DATE OF ADMISSION: 9/12/2017  4:45 PM   
DATE OF DISCHARGE: 9/14/2017 PRIMARY CARE PROVIDER: Lisa Hilario MD  
 
ATTENDING PHYSICIAN: Esvin Darby MD 
DISCHARGING PROVIDER: Esvin Darby MD   
To contact this individual call [phone] and ask the  to page. If unavailable ask to be transferred the Adult Hospitalist Department. DISCHARGE DIAGNOSES  
CP Tiny Subdural hematoma Acute DVT right leg CONSULTATIONS: IP CONSULT TO NEUROSURGERY 
IP CONSULT TO CARDIOLOGY PROCEDURES/SURGERIES: * No surgery found * PENDING TEST RESULTS:  
At the time of discharge the following test results are still pending: none FOLLOW UP APPOINTMENTS:  
Follow-up Information Follow up With Details Comments Contact Info AT [address] [place] [address] and PT  [address] [place], [place] [id number] 
[phone] Lisa Hilario MD   [address] [place] [address] 
[phone] ADDITIONAL CARE RECOMMENDATIONS:  
 
DIET: Cardiac Diet ACTIVITY: Activity as tolerated WOUND CARE: none EQUIPMENT needed: none DISCHARGE MEDICATIONS: 
 See Medication Reconciliation Form · It is important that you take the medication exactly as they are prescribed. · Keep your medication in the bottles provided by the pharmacist and keep a list of the medication names, dosages, and times to be taken in your wallet. · Do not take other medications without consulting your doctor. NOTIFY YOUR PHYSICIAN FOR ANY OF THE FOLLOWING:  
Fever over 101 degrees for 24 hours. Chest pain, shortness of breath, fever, chills, nausea, vomiting, diarrhea, change in mentation, falling, weakness, bleeding. Severe pain or pain not relieved by medications. Or, any other signs or symptoms that you may have questions about. DISPOSITION: 
x  Home With: 
 OT  PT x HH  RN  
  
 SNF/Inpatient Rehab/LTAC Independent/assisted living Hospice Other: CDMP Checked:  
Yes x PROBLEM LIST Updated: 
Yes x Signed:  
Linda Askew MD 
9/14/2017 
4:08 PM 
 
 
 Discharge SNF/Rehab Instructions/LTAC  
 
 
PATIENT ID: Greyson Goldsmith MRN: [id number] YOB: 1927 DATE OF ADMISSION: 9/12/2017  4:45 PM   
DATE OF DISCHARGE: 9/14/2017 PRIMARY CARE PROVIDER: Yoan Marcano MD  
 
 
ATTENDING PHYSICIAN: Linda Askew MD 
DISCHARGING PROVIDER: Linda Askew MD    
To contact this individual call [phone] and ask the  to page. If unavailable ask to be transferred the Adult Hospitalist Department. CONSULTATIONS: IP CONSULT TO NEUROSURGERY 
IP CONSULT TO CARDIOLOGY PROCEDURES/SURGERIES: * No surgery found * [address] COURSE:  
70-year-old woman with a past medical history significant for coronary artery disease, dyslipidemia, hypertension, glaucoma, anxiety disorder, who was in her usual state of health until the day of presentation at the emergency  
room when the patient developed chest pain.  Pt admitted to [facility] with tele monitoring. She was seen by cardio and no evidence for cardiac injury. Pt also had a tiny SDH on CT head. Seen by NSx and given small size, the pt did not require any intervention as per NSx. It is rec that Aspirin be held for 2 wks. The pt was found to have acute DVT RLE and since not a candidate for anticoag, pt had IVC filter placed. She is now hemodynamically stable for d/c today. DISCHARGE DIAGNOSES / PLAN:   
 
Chest pain, Non cardiac 
- trop x 2 negative 
-pt seen by cardio, no evidence for cardiac origin 
-had CST 11/16 whoch wa neg 
-as per cardio, cont asa(after 2 wks and when ok with PCP) and statin. No further w/u needed. F/u dr. Grace Magaña o/pt 
-does not appear to be GI related either. possibly musculoskeletal   
   
Subdural hemorrhage  
-CT head 9/12 very small L SDH 
-per NSx, appears to be chronic. If acute then very tiny component. No intervention needed. NSx  rec med mgmt only. NSx s/o as of 9/12 
-no asa for at least 2 weeks 
   
RLE Acute DVT 
-BLE U/S 9/13acute thrombosis popliteal, PT, peroneal 
-s/p IVCF placed 9/13 
-not candidate for a/c given small SDH 
   
Dyslipidemia 
-Pravastatin 
   
Hypertension.  
-Coreg 
   
Glaucoma. -cont eye drops 
   
Anxiety disorder.  
-cont prn klonopin 0/5mg qhs., may need to hold or even decr dose if having confusion at night PENDING TEST RESULTS:  
At the time of discharge the following test results are still pending: none FOLLOW UP APPOINTMENTS:   
Follow-up Information Follow up With Details Comments Contact Info AT [address] and PT  [address] [place] [address] [phone] Mack Judge MD   [address] [facility] [address] 
[phone] Anahi Vazquez MD In 2 weeks  [place] [address] [address] [place] [address] 
[phone] ADDITIONAL CARE RECOMMENDATIONS:  
  
DIET: Cardiac Diet 
  
  
ACTIVITY: Activity as tolerated 
  
WOUND CARE: none 
  
 EQUIPMENT needed: none DISCHARGE MEDICATIONS: 
 See Medication Reconciliation Form NOTIFY YOUR PHYSICIAN FOR ANY OF THE FOLLOWING:  
Fever over 101 degrees for 24 hours. Chest pain, shortness of breath, fever, chills, nausea, vomiting, diarrhea, change in mentation, falling, weakness, bleeding. Severe pain or pain not relieved by medications. Or, any other signs or symptoms that you may have questions about. DISPOSITION: 
  Home With: 
 OT  PT  HH  RN  
  
 SNF/Inpatient Rehab/LTAC  
x Independent/assisted living Hospice Other:  
 
 
PATIENT CONDITION AT DISCHARGE:  
 
Functional status Poor   
x Deconditioned Independent Cognition  
x  Lucid Forgetful Dementia Catheters/lines (plus indication) Burris PICC   
 PEG   
x None Code status  
x  Full code DNR   
 
PHYSICAL EXAMINATION AT DISCHARGE: 
 Refer to Progress Note CHRONIC MEDICAL DIAGNOSES: 
Problem List as of 9/14/2017  Date Reviewed: 9/12/2017 Codes Class Noted - Resolved * (Principal)Chest pain ICD-10-CM: R07.9 ICD-9-CM: 786.50  9/12/2017 - Present  
   
 TIA (transient ischemic attack) ICD-10-CM: G45.9 ICD-9-CM: 435.9  3/8/2017 - Present GI bleed ICD-10-CM: K92.2 ICD-9-CM: 578.9  3/8/2017 - Present Numbness and tingling ICD-10-CM: R20.0, R20.2 ICD-9-CM: 782.0  3/8/2017 - Present CAD in native artery ICD-10-CM: I25.10 ICD-9-CM: 414.01  11/28/2016 - Present History of non-ST elevation myocardial infarction (NSTEMI) ICD-10-CM: I25.2 ICD-9-CM: 976  11/28/2016 - Present NSTEMI (non-ST elevated myocardial infarction) ([facility] [place] [address] ICD-10-CM: I21.4 ICD-9-CM: 410.70  11/22/2016 - Present Diarrhea ICD-10-CM: R19.7 ICD-9-CM: 787.91  11/22/2016 - Present Hypocalcemia ICD-10-CM: E83.51 
ICD-9-CM: 275.41  11/22/2016 - Present Hypomagnesemia ICD-10-CM: V29.64 
ICD-9-CM: 275.2  11/22/2016 - Present Hypokalemia ICD-10-CM: E87.6 ICD-9-CM: 276.8  11/20/2016 - Present CDMP Checked:  
Yes x PROBLEM LIST Updated: 
Yes x Signed:  
Billy Soriano MD 
9/14/2017 4:19 PM 
 
  
 
 
Discharge Orders None [employer] Announcement We are excited to announce that we are making your provider's discharge notes available to you in [employer]. You will see these notes when they are completed and signed by the physician that discharged you from your recent hospital stay. If you have any questions or concerns about any information you see in [employer], please call the Health Information Department where you were seen or reach out to your Primary Care Provider for more information about your plan of care. Introducing [facility] [place] [facility]! [facility] introduces [employer] patient portal. Now you can access parts of your medical record, email your doctor's office, and request medication refills online. 1. In your internet browser, go to [email or web address] 2. Click on the First Time User? Click Here link in the Sign In box. You will see the New Member Sign Up page. 3. Enter your [employer] Access Code exactly as it appears below. You will not need to use this code after youve completed the sign-up process. If you do not sign up before the expiration date, you must request a new code. · [employer] Access Code: [email or web address] Expires: 10/8/2017  6:32 AM 
 
4. Enter the last four digits of your Social Security Number (xxxx) and Date of Birth (mm/dd/yyyy) as indicated and click Submit. You will be taken to the next sign-up page. 5. Create a [employer] ID. This will be your [employer] login ID and cannot be changed, so think of one that is secure and easy to remember. 6. Create a [employer] password. You can change your password at any time. 7. Enter your Password Reset Question and Answer. This can be used at a later time if you forget your password. 8. Enter your e-mail address. You will receive e-mail notification when new information is available in [address] 9. Click Sign Up. You can now view and download portions of your medical record. 10. Click the Download Summary menu link to download a portable copy of your medical information. If you have questions, please visit the Frequently Asked Questions section of the [email or web address] website. Remember, [email or web address] is NOT to be used for urgent needs. For medical emergencies, dial 911. Now available from your iPhone and Android! General Information Please provide this summary of care documentation to your next provider. Patient Signature:  ____________________________________________________________ Date:  ____________________________________________________________  
  
Yoly Pane Provider Signature:  ____________________________________________________________ Date:  ____________________________________________________________

## 2017-09-12 NOTE — ED PROVIDER NOTES
HPI Comments: 80 y.o. female with past medical history significant for NSTEMI who [place] from [facility] [place] accompanied by family with chief complaint of chest pain. Patient was moved into an assisted living facility 6 weeks ago as decided by a neighbor. Family says patient has been unhappy and nervouse since moving. This morning, patient texted her daughter saying she was [place] a heart attack. \" Daughter later got a text with patient complaining of palpitations. She went over to see the patient who was then complaining of left lateral rib pain. Patient was evaluated by the doctor at the facility who told daughter patient had irregular heart sounds and should go to the ED. Daughter thinks patient is just having anxiety. Patient denies any chest pain now. Upon examining the patient, she developed word jumble. Family says the patient was talking normally prior to MD entering the room. Family also denies any other recent complaints by patient. There are no other acute medical concerns at this time. Social hx: Never smoker. + Alcohol use. No illicit drug use. PCP: Nelsy Bonds MD    Note written by Markus Peña, as dictated by Prasanth Chun MD 5:45 PM      The history is provided by the patient and a relative (daughter and granddaughter). Past Medical History:   Diagnosis Date    Arthritis     History of non-ST elevation myocardial infarction (NSTEMI) 11/28/2016       Past Surgical History:   Procedure Laterality Date    HX CHOLECYSTECTOMY           History reviewed. No pertinent family history. Social History     Social History    Marital status:      Spouse name: N/A    Number of children: N/A    Years of education: N/A     Occupational History    Not on file.      Social History Main Topics    Smoking status: Never Smoker    Smokeless tobacco: Never Used    Alcohol use Yes      Comment: a couple of scotch drinks daily    Drug use: No    Sexual activity: Not on file     Other Topics Concern    Not on file     Social History Narrative         ALLERGIES: Codeine and Prednisone    Review of Systems   Constitutional: Negative for activity change, diaphoresis, fatigue and fever. HENT: Negative for congestion and sore throat. Eyes: Negative for photophobia and visual disturbance. Respiratory: Negative for chest tightness and shortness of breath. Cardiovascular: Positive for chest pain (resolved). Negative for palpitations and leg swelling. Gastrointestinal: Negative for abdominal pain, blood in stool, constipation, diarrhea, nausea and vomiting. Genitourinary: Negative for difficulty urinating, dysuria, flank pain, frequency and hematuria. Musculoskeletal: Negative for back pain. Neurological: Positive for speech difficulty (word jumble). Negative for dizziness, syncope, numbness and headaches. All other systems reviewed and are negative. Vitals:    09/12/17 1653 09/12/17 1700 09/12/17 1730 09/12/17 1800   BP: 131/65 116/69 106/66 108/66   Pulse: 82 81 83 80   Resp: 16 13 15 18   Temp: 97.8 °F (36.6 °C)      SpO2: 99% 98% 98% 97%   Weight: 56.9 kg (125 lb 7.1 oz)      Height: 5' (1.524 m)               Physical Exam   Constitutional: She appears well-developed and well-nourished. No distress. HENT:   Head: Normocephalic and atraumatic. Nose: Nose normal.   Mouth/Throat: Oropharynx is clear and moist. No oropharyngeal exudate. Eyes: Conjunctivae and EOM are normal. Right eye exhibits no discharge. Left eye exhibits no discharge. No scleral icterus. Neck: Normal range of motion. Neck supple. No JVD present. No tracheal deviation present. No thyromegaly present. Cardiovascular: Normal rate, regular rhythm, normal heart sounds and intact distal pulses. Exam reveals no gallop and no friction rub. No murmur heard. Pulmonary/Chest: Effort normal and breath sounds normal. No stridor. No respiratory distress. She has no wheezes. She has no rales. She exhibits no tenderness. Abdominal: Bowel sounds are normal. She exhibits no distension and no mass. There is no tenderness. There is no rebound. Musculoskeletal: Normal range of motion. She exhibits no edema or tenderness. Lymphadenopathy:     She has no cervical adenopathy. Neurological: She is alert. No cranial nerve deficit. Coordination normal.   Word salad when asked about place. Skin: Skin is warm and dry. No rash noted. She is not diaphoretic. No erythema. No pallor. Psychiatric: She has a normal mood and affect. Her behavior is normal. Judgment and thought content normal.      Note written by Markus Diez, as dictated by Faviola Baltazar MD 5:45 PM    MDM  Number of Diagnoses or Management Options  Subdural hemorrhage [facility]):   Diagnosis management comments: ACS, chest pain, TIA, CVA, depression. Denies ear O. female presenting to the emergency department for chest pain patient states that \"she'll have a blockage\". More concerning on exam this patient has word salad on trying to explain where she is daughter and granddaughter her bedside and stated this is new for her sister she's recently had a fall a few weeks ago that she was seen at outside hospital unclear if imaging was done at that time. Plan: We'll obtain CBC, CMP, PT, PTT, EKG, CT head without contrast.    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        Amount and/or Complexity of Data Reviewed  Clinical lab tests: ordered and reviewed  Tests in the radiology section of CPT®: ordered and reviewed  Obtain history from someone other than the patient: yes  Review and summarize past medical records: yes  Discuss the patient with other providers: yes  Independent visualization of images, tracings, or specimens: yes    Risk of Complications, Morbidity, and/or Mortality  Presenting problems: high  Diagnostic procedures: moderate  Management options: moderate    Critical Care  Total time providing critical care: 30-74 minutes (Total critical care time spend exclusive of procedures:  35 min)    Patient Progress  Patient progress: stable    ED Course       Procedures    Total critical care time spend exclusive of procedures:  35 minutes. CONSULT NOTE:  7:08 PM Kirit Drew MD spoke with Dr. Ben River, Consult for Hospitalist.  Discussed available diagnostic tests and clinical findings. He is in agreement with care plans as outlined. Dr. Ben River will see and admit. CONSULT NOTE:  7:16 PM Kirit Drew MD spoke with Dr. Diallo Liu, Consult for Neurology. Discussed available diagnostic tests and clinical findings. He is in agreement with care plans as outlined. Dr. Diallo Liu will see the patient in the morning. Recent Results (from the past 24 hour(s))   EKG, 12 LEAD, INITIAL    Collection Time: 09/12/17  4:54 PM   Result Value Ref Range    Ventricular Rate 79 BPM    Atrial Rate 79 BPM    P-R Interval 176 ms    QRS Duration 108 ms    Q-T Interval 424 ms    QTC Calculation (Bezet) 486 ms    Calculated P Axis 25 degrees    Calculated R Axis -14 degrees    Calculated T Axis -13 degrees    Diagnosis       ** Poor data quality, interpretation may be adversely affected  Normal sinus rhythm with sinus arrhythmia  Right bundle branch block  When compared with ECG of 10-JUL-2017 05:11,  Questionable change in QRS duration     CBC WITH AUTOMATED DIFF    Collection Time: 09/12/17  4:55 PM   Result Value Ref Range    WBC 9.2 3.6 - 11.0 K/uL    RBC 2.94 (L) 3.80 - 5.20 M/uL    HGB 10.2 (L) 11.5 - 16.0 g/dL    HCT 30.9 (L) 35.0 - 47.0 %    .1 (H) 80.0 - 99.0 FL    MCH 34.7 (H) 26.0 - 34.0 PG    MCHC 33.0 30.0 - 36.5 g/dL    RDW 15.0 (H) 11.5 - 14.5 %    PLATELET 156 825 - 689 K/uL    NEUTROPHILS 65 32 - 75 %    LYMPHOCYTES 18 12 - 49 %    MONOCYTES 15 (H) 5 - 13 %    EOSINOPHILS 2 0 - 7 %    BASOPHILS 0 0 - 1 %    ABS. NEUTROPHILS 6.0 1.8 - 8.0 K/UL    ABS. LYMPHOCYTES 1.6 0.8 - 3.5 K/UL    ABS. MONOCYTES 1.3 (H) 0.0 - 1.0 K/UL    ABS. EOSINOPHILS 0.2 0.0 - 0.4 K/UL    ABS. BASOPHILS 0.0 0.0 - 0.1 K/UL   METABOLIC PANEL, COMPREHENSIVE    Collection Time: 09/12/17  4:55 PM   Result Value Ref Range    Sodium 135 (L) 136 - 145 mmol/L    Potassium 4.7 3.5 - 5.1 mmol/L    Chloride 106 97 - 108 mmol/L    CO2 21 21 - 32 mmol/L    Anion gap 8 5 - 15 mmol/L    Glucose 88 65 - 100 mg/dL    BUN 9 6 - 20 MG/DL    Creatinine 0.85 0.55 - 1.02 MG/DL    BUN/Creatinine ratio 11 (L) 12 - 20      GFR est AA >60 >60 ml/min/1.73m2    GFR est non-AA >60 >60 ml/min/1.73m2    Calcium 8.5 8.5 - 10.1 MG/DL    Bilirubin, total 0.4 0.2 - 1.0 MG/DL    ALT (SGPT) 10 (L) 12 - 78 U/L    AST (SGOT) 20 15 - 37 U/L    Alk. phosphatase 182 (H) 45 - 117 U/L    Protein, total 5.7 (L) 6.4 - 8.2 g/dL    Albumin 2.3 (L) 3.5 - 5.0 g/dL    Globulin 3.4 2.0 - 4.0 g/dL    A-G Ratio 0.7 (L) 1.1 - 2.2     TROPONIN I    Collection Time: 09/12/17  4:55 PM   Result Value Ref Range    Troponin-I, Qt. <0.04 <0.05 ng/mL   CK W/ REFLX CKMB    Collection Time: 09/12/17  4:55 PM   Result Value Ref Range    CK 31 26 - 192 U/L       Ct Head Wo Cont    Result Date: 9/12/2017  INDICATION: verbal per dr. Klarissa Hurt. Study chest pain. Exam: Noncontrast CT of the brain is performed with 5 mm collimation. CT dose reduction was achieved with the use of the standardized protocol tailored for this examination and automatic exposure control for dose modulation. Adaptive statistical iterative reconstruction (ASIR) was utilized. Direct comparison is made to prior CT dated March 2017. FINDINGS: There is left convexity extra-axial subdural fluid which is attenuation, but predominantly hypointense; left convexity subdural fluid is increased and the hyperdense component is new. Left convexity extra-axial fluid measures 8 mm in greatest transverse dimension.  There is age-appropriate diffuse cortical atrophy. No intracranial mass is visualized. Ventricular system is normal. There is no herniation. There are stable periventricular white matter hypodensities consistent with chronic microvascular ischemic changes. There is no evidence of acute territorial infarct. The mastoid air cells are well pneumatized. The visualized paranasal sinuses are normal.     IMPRESSION: Very small acute on chronic left convexity subdural hemorrhage. Xr Chest Port    Result Date: 9/12/2017  INDICATION: Left-sided chest pain. Portable AP semiupright view of the chest. Direct comparison made to prior chest x-ray dated March 8, 2017. Cardiomediastinal silhouette is stable. Lungs are clear bilaterally. Pleural spaces are normal. Osseous structures are diffusely demineralized, but intact. IMPRESSION: No acute cardiopulmonary disease. 11:43 AM  Patient is being admitted to the hospital.  The results of their tests and reasons for their admission have been discussed with them and/or available family. They convey agreement and understanding for the need to be admitted and for their admission diagnosis. Consultation has been made with the inpatient physician specialist for hospitalization. LABORATORY TESTS:  Recent Results (from the past 12 hour(s))   METABOLIC PANEL, COMPREHENSIVE    Collection Time: 09/13/17  6:20 AM   Result Value Ref Range    Sodium 139 136 - 145 mmol/L    Potassium 4.5 3.5 - 5.1 mmol/L    Chloride 108 97 - 108 mmol/L    CO2 22 21 - 32 mmol/L    Anion gap 9 5 - 15 mmol/L    Glucose 83 65 - 100 mg/dL    BUN 9 6 - 20 MG/DL    Creatinine 0.75 0.55 - 1.02 MG/DL    BUN/Creatinine ratio 12 12 - 20      GFR est AA >60 >60 ml/min/1.73m2    GFR est non-AA >60 >60 ml/min/1.73m2    Calcium 8.5 8.5 - 10.1 MG/DL    Bilirubin, total 0.7 0.2 - 1.0 MG/DL    ALT (SGPT) 10 (L) 12 - 78 U/L    AST (SGOT) 12 (L) 15 - 37 U/L    Alk.  phosphatase 177 (H) 45 - 117 U/L    Protein, total 4.9 (L) 6.4 - 8.2 g/dL    Albumin 2.2 (L) 3.5 - 5.0 g/dL    Globulin 2.7 2.0 - 4.0 g/dL    A-G Ratio 0.8 (L) 1.1 - 2.2     CBC WITH AUTOMATED DIFF    Collection Time: 09/13/17  6:20 AM   Result Value Ref Range    WBC 6.1 3.6 - 11.0 K/uL    RBC 2.95 (L) 3.80 - 5.20 M/uL    HGB 10.0 (L) 11.5 - 16.0 g/dL    HCT 31.0 (L) 35.0 - 47.0 %    .1 (H) 80.0 - 99.0 FL    MCH 33.9 26.0 - 34.0 PG    MCHC 32.3 30.0 - 36.5 g/dL    RDW 15.0 (H) 11.5 - 14.5 %    PLATELET 532 885 - 372 K/uL    NEUTROPHILS 63 32 - 75 %    LYMPHOCYTES 19 12 - 49 %    MONOCYTES 15 (H) 5 - 13 %    EOSINOPHILS 3 0 - 7 %    BASOPHILS 0 0 - 1 %    ABS. NEUTROPHILS 3.9 1.8 - 8.0 K/UL    ABS. LYMPHOCYTES 1.2 0.8 - 3.5 K/UL    ABS. MONOCYTES 0.9 0.0 - 1.0 K/UL    ABS. EOSINOPHILS 0.2 0.0 - 0.4 K/UL    ABS. BASOPHILS 0.0 0.0 - 0.1 K/UL   PHOSPHORUS    Collection Time: 09/13/17  6:20 AM   Result Value Ref Range    Phosphorus 3.8 2.6 - 4.7 MG/DL   MAGNESIUM    Collection Time: 09/13/17  6:20 AM   Result Value Ref Range    Magnesium 1.7 1.6 - 2.4 mg/dL   TSH 3RD GENERATION    Collection Time: 09/13/17  6:20 AM   Result Value Ref Range    TSH 0.42 0.36 - 3.74 uIU/mL   AMYLASE    Collection Time: 09/13/17  6:20 AM   Result Value Ref Range    Amylase 19 (L) 25 - 115 U/L   LIPASE    Collection Time: 09/13/17  6:20 AM   Result Value Ref Range    Lipase 111 73 - 393 U/L   TROPONIN I    Collection Time: 09/13/17  6:20 AM   Result Value Ref Range    Troponin-I, Qt. <0.04 <0.05 ng/mL       IMAGING RESULTS:  DUPLEX LOWER EXT VENOUS RIGHT         CT HEAD WO CONT   Final Result      XR CHEST PORT   Final Result      IR PLC IVC FILTER    (Results Pending)     Ct Head Wo Cont    Result Date: 9/12/2017  INDICATION: verbal per dr. Ingris Coyle. Study chest pain. Exam: Noncontrast CT of the brain is performed with 5 mm collimation. CT dose reduction was achieved with the use of the standardized protocol tailored for this examination and automatic exposure control for dose modulation.  Adaptive statistical iterative reconstruction (ASIR) was utilized. Direct comparison is made to prior CT dated March 2017. FINDINGS: There is left convexity extra-axial subdural fluid which is attenuation, but predominantly hypointense; left convexity subdural fluid is increased and the hyperdense component is new. Left convexity extra-axial fluid measures 8 mm in greatest transverse dimension. There is age-appropriate diffuse cortical atrophy. No intracranial mass is visualized. Ventricular system is normal. There is no herniation. There are stable periventricular white matter hypodensities consistent with chronic microvascular ischemic changes. There is no evidence of acute territorial infarct. The mastoid air cells are well pneumatized. The visualized paranasal sinuses are normal.     IMPRESSION: Very small acute on chronic left convexity subdural hemorrhage. Xr Chest Port    Result Date: 9/12/2017  INDICATION: Left-sided chest pain. Portable AP semiupright view of the chest. Direct comparison made to prior chest x-ray dated March 8, 2017. Cardiomediastinal silhouette is stable. Lungs are clear bilaterally. Pleural spaces are normal. Osseous structures are diffusely demineralized, but intact. IMPRESSION: No acute cardiopulmonary disease.        MEDICATIONS GIVEN:  Medications   acetaminophen (TYLENOL) tablet 650 mg (650 mg Oral Given 9/13/17 0856)   calcium carbonate (OS-DAVIAN) tablet 500 mg [elemental] (500 mg Oral Missed 9/13/17 1200)   carvedilol (COREG) tablet 6.25 mg (6.25 mg Oral Given 9/13/17 0856)   clonazePAM (KlonoPIN) tablet 0.5 mg (0.5 mg Oral Given 9/12/17 2240)   docusate sodium (COLACE) capsule 100 mg (100 mg Oral Given 9/13/17 0856)   latanoprost (XALATAN) 0.005 % ophthalmic solution 1 Drop (1 Drop Both Eyes Given 9/12/17 2243)   pantoprazole (PROTONIX) tablet 40 mg (40 mg Oral Given 9/13/17 0857)   polyethylene glycol (MIRALAX) packet 17 g (17 g Oral Given 9/13/17 0855)   pravastatin (PRAVACHOL) tablet 20 mg (20 mg Oral Given 9/13/17 0856)   0.9% sodium chloride infusion (75 mL/hr IntraVENous Rate Verify 9/13/17 0857)   ondansetron (ZOFRAN) injection 4 mg (not administered)   lidocaine (XYLOCAINE) 20 mg/mL (2 %) injection 400 mg (not administered)   iopamidol (ISOVUE 300) 61 % contrast injection 50 mL (not administered)       IMPRESSION:  1. Subdural hemorrhage (HCC)        PLAN:  1.  Admit to Hospitalist    Total critical care time spent exclusive of procedures:  35 minutes      Eric Velazquez MD

## 2017-09-12 NOTE — ED NOTES
Bedside and Verbal shift change report given to [employer] (oncoming nurse) by Madison Gan RN (offgoing nurse). Report included the following information SBAR, ED Summary, MAR and Recent Results.

## 2017-09-12 NOTE — PROGRESS NOTES
Admission Medication Reconciliation:    Information obtained from: [facility], Rx Query    Significant PMH/Disease States:   Past Medical History:   Diagnosis Date    Arthritis     History of non-ST elevation myocardial infarction (NSTEMI) 11/28/2016       Chief Complaint for this Admission:  chest pain    Allergies:  Codeine and Prednisone    Prior to Admission Medications:   Prior to Admission Medications   Prescriptions Last Dose Informant Patient Reported? Taking?   acetaminophen (ACETAMINOPHEN EXTRA STRENGTH) 500 mg tablet 9/12/2017  Yes Yes   Sig: Take 1,000 mg by mouth nightly. aspirin 81 mg chewable tablet 9/12/2017  No Yes   Sig: Take 1 Tab by mouth daily. calcium carbonate (OS-DAVIAN) 500 mg calcium (1,250 mg) tablet 9/12/2017  No Yes   Sig: Take 1 Tab by mouth three (3) times daily (with meals). Indications: hypocalcemia   carvedilol (COREG) 6.25 mg tablet 9/12/2017  No Yes   Sig: Take 1 Tab by mouth two (2) times daily (with meals). cholecalciferol (VITAMIN D3) 1,000 unit tablet 9/12/2017  No Yes   Sig: Take 5 Tabs by mouth daily. clonazePAM (KLONOPIN) 0.5 mg tablet 9/12/2017  Yes Yes   Sig: Take 0.5 mg by mouth nightly as needed for Other (anxiety). docusate sodium (COLACE) 100 mg capsule 9/12/2017  No Yes   Sig: Take 1 Cap by mouth two (2) times a day for 90 days. furosemide (LASIX) 20 mg tablet 9/12/2017  Yes Yes   Sig: Take 20 mg by mouth daily. latanoprost (XALATAN) 0.005 % ophthalmic solution   Yes Yes   Sig: Administer 1 Drop to both eyes nightly.   magnesium oxide (MAG-OX) 400 mg tablet 9/12/2017  No Yes   Sig: Take 2 Tabs by mouth two (2) times a day. Indications: HYPOMAGNESEMIA   omeprazole (PRILOSEC) 20 mg capsule 9/12/2017  Yes Yes   Sig: Take 20 mg by mouth daily. polyethylene glycol (MIRALAX) 17 gram/dose powder 9/12/2017  No Yes   Sig: Take 17 g by mouth daily.  1 tablespoon with 8 oz of water daily   pravastatin (PRAVACHOL) 20 mg tablet 9/12/2017  Yes Yes Sig: Take 20 mg by mouth daily. Facility-Administered Medications: None         Comments/Recommendations: Added furosemide to the above medication list as well as latanoprost. [facility] manages medications. Patient unaware of what she takes.

## 2017-09-12 NOTE — ED TRIAGE NOTES
Pt arrives via EMS from [facility] c/o left sided chest pain onset yesterday which has since resolved. Denies sob, N/V, diaphoresis.

## 2017-09-13 ENCOUNTER — APPOINTMENT (OUTPATIENT)
Dept: INTERVENTIONAL RADIOLOGY/VASCULAR | Age: 82
DRG: 252 | End: 2017-09-13
Attending: HOSPITALIST
Payer: MEDICARE

## 2017-09-13 LAB
ALBUMIN SERPL-MCNC: 2.2 G/DL (ref 3.5–5)
ALBUMIN/GLOB SERPL: 0.8 {RATIO} (ref 1.1–2.2)
ALP SERPL-CCNC: 177 U/L (ref 45–117)
ALT SERPL-CCNC: 10 U/L (ref 12–78)
AMYLASE SERPL-CCNC: 19 U/L (ref 25–115)
ANION GAP SERPL CALC-SCNC: 9 MMOL/L (ref 5–15)
AST SERPL-CCNC: 12 U/L (ref 15–37)
ATRIAL RATE: 79 BPM
BASOPHILS # BLD: 0 K/UL (ref 0–0.1)
BASOPHILS NFR BLD: 0 % (ref 0–1)
BILIRUB SERPL-MCNC: 0.7 MG/DL (ref 0.2–1)
BUN SERPL-MCNC: 9 MG/DL (ref 6–20)
BUN/CREAT SERPL: 12 (ref 12–20)
CALCIUM SERPL-MCNC: 8.5 MG/DL (ref 8.5–10.1)
CALCULATED P AXIS, ECG09: 25 DEGREES
CALCULATED R AXIS, ECG10: -14 DEGREES
CALCULATED T AXIS, ECG11: -13 DEGREES
CHLORIDE SERPL-SCNC: 108 MMOL/L (ref 97–108)
CO2 SERPL-SCNC: 22 MMOL/L (ref 21–32)
CREAT SERPL-MCNC: 0.75 MG/DL (ref 0.55–1.02)
DIAGNOSIS, 93000: NORMAL
EOSINOPHIL # BLD: 0.2 K/UL (ref 0–0.4)
EOSINOPHIL NFR BLD: 3 % (ref 0–7)
ERYTHROCYTE [DISTWIDTH] IN BLOOD BY AUTOMATED COUNT: 15 % (ref 11.5–14.5)
GLOBULIN SER CALC-MCNC: 2.7 G/DL (ref 2–4)
GLUCOSE SERPL-MCNC: 83 MG/DL (ref 65–100)
HCT VFR BLD AUTO: 31 % (ref 35–47)
HGB BLD-MCNC: 10 G/DL (ref 11.5–16)
LIPASE SERPL-CCNC: 111 U/L (ref 73–393)
LYMPHOCYTES # BLD: 1.2 K/UL (ref 0.8–3.5)
LYMPHOCYTES NFR BLD: 19 % (ref 12–49)
MAGNESIUM SERPL-MCNC: 1.7 MG/DL (ref 1.6–2.4)
MCH RBC QN AUTO: 33.9 PG (ref 26–34)
MCHC RBC AUTO-ENTMCNC: 32.3 G/DL (ref 30–36.5)
MCV RBC AUTO: 105.1 FL (ref 80–99)
MONOCYTES # BLD: 0.9 K/UL (ref 0–1)
MONOCYTES NFR BLD: 15 % (ref 5–13)
NEUTS SEG # BLD: 3.9 K/UL (ref 1.8–8)
NEUTS SEG NFR BLD: 63 % (ref 32–75)
P-R INTERVAL, ECG05: 176 MS
PHOSPHATE SERPL-MCNC: 3.8 MG/DL (ref 2.6–4.7)
PLATELET # BLD AUTO: 243 K/UL (ref 150–400)
POTASSIUM SERPL-SCNC: 4.5 MMOL/L (ref 3.5–5.1)
PROT SERPL-MCNC: 4.9 G/DL (ref 6.4–8.2)
Q-T INTERVAL, ECG07: 424 MS
QRS DURATION, ECG06: 108 MS
QTC CALCULATION (BEZET), ECG08: 486 MS
RBC # BLD AUTO: 2.95 M/UL (ref 3.8–5.2)
SODIUM SERPL-SCNC: 139 MMOL/L (ref 136–145)
TROPONIN I SERPL-MCNC: <0.04 NG/ML
TSH SERPL DL<=0.05 MIU/L-ACNC: 0.42 UIU/ML (ref 0.36–3.74)
VENTRICULAR RATE, ECG03: 79 BPM
WBC # BLD AUTO: 6.1 K/UL (ref 3.6–11)

## 2017-09-13 PROCEDURE — 85025 COMPLETE CBC W/AUTO DIFF WBC: CPT | Performed by: INTERNAL MEDICINE

## 2017-09-13 PROCEDURE — 84484 ASSAY OF TROPONIN QUANT: CPT | Performed by: INTERNAL MEDICINE

## 2017-09-13 PROCEDURE — 84100 ASSAY OF PHOSPHORUS: CPT | Performed by: INTERNAL MEDICINE

## 2017-09-13 PROCEDURE — C1892 INTRO/SHEATH,FIXED,PEEL-AWAY: HCPCS

## 2017-09-13 PROCEDURE — 36415 COLL VENOUS BLD VENIPUNCTURE: CPT | Performed by: INTERNAL MEDICINE

## 2017-09-13 PROCEDURE — 80053 COMPREHEN METABOLIC PANEL: CPT | Performed by: INTERNAL MEDICINE

## 2017-09-13 PROCEDURE — 74011000250 HC RX REV CODE- 250: Performed by: RADIOLOGY

## 2017-09-13 PROCEDURE — C1769 GUIDE WIRE: HCPCS

## 2017-09-13 PROCEDURE — 83735 ASSAY OF MAGNESIUM: CPT | Performed by: INTERNAL MEDICINE

## 2017-09-13 PROCEDURE — 77030011229 HC DIL VESL COON COOK -A

## 2017-09-13 PROCEDURE — C1880 VENA CAVA FILTER: HCPCS

## 2017-09-13 PROCEDURE — 93971 EXTREMITY STUDY: CPT

## 2017-09-13 PROCEDURE — 82150 ASSAY OF AMYLASE: CPT | Performed by: INTERNAL MEDICINE

## 2017-09-13 PROCEDURE — 65660000000 HC RM CCU STEPDOWN

## 2017-09-13 PROCEDURE — 83690 ASSAY OF LIPASE: CPT | Performed by: INTERNAL MEDICINE

## 2017-09-13 PROCEDURE — 06H03DZ INSERTION OF INTRALUMINAL DEVICE INTO INFERIOR VENA CAVA, PERCUTANEOUS APPROACH: ICD-10-PCS | Performed by: RADIOLOGY

## 2017-09-13 PROCEDURE — 74011636320 HC RX REV CODE- 636/320: Performed by: RADIOLOGY

## 2017-09-13 PROCEDURE — 84443 ASSAY THYROID STIM HORMONE: CPT | Performed by: INTERNAL MEDICINE

## 2017-09-13 PROCEDURE — 74011250637 HC RX REV CODE- 250/637: Performed by: INTERNAL MEDICINE

## 2017-09-13 PROCEDURE — 74011250636 HC RX REV CODE- 250/636: Performed by: INTERNAL MEDICINE

## 2017-09-13 RX ORDER — LIDOCAINE HYDROCHLORIDE 20 MG/ML
20 INJECTION, SOLUTION INFILTRATION; PERINEURAL
Status: COMPLETED | OUTPATIENT
Start: 2017-09-13 | End: 2017-09-13

## 2017-09-13 RX ADMIN — CARVEDILOL 6.25 MG: 6.25 TABLET, FILM COATED ORAL at 08:56

## 2017-09-13 RX ADMIN — PANTOPRAZOLE SODIUM 40 MG: 40 TABLET, DELAYED RELEASE ORAL at 08:57

## 2017-09-13 RX ADMIN — IOPAMIDOL 20 ML: 612 INJECTION, SOLUTION INTRAVENOUS at 12:45

## 2017-09-13 RX ADMIN — ACETAMINOPHEN 650 MG: 325 TABLET, FILM COATED ORAL at 08:56

## 2017-09-13 RX ADMIN — CALCIUM 500 MG: 500 TABLET ORAL at 16:42

## 2017-09-13 RX ADMIN — LIDOCAINE HYDROCHLORIDE 10 ML: 20 INJECTION, SOLUTION INFILTRATION; PERINEURAL at 12:38

## 2017-09-13 RX ADMIN — CLONAZEPAM 0.5 MG: 0.5 TABLET ORAL at 22:00

## 2017-09-13 RX ADMIN — CARVEDILOL 6.25 MG: 6.25 TABLET, FILM COATED ORAL at 16:41

## 2017-09-13 RX ADMIN — SODIUM CHLORIDE 75 ML/HR: 900 INJECTION, SOLUTION INTRAVENOUS at 21:52

## 2017-09-13 RX ADMIN — ACETAMINOPHEN 650 MG: 325 TABLET, FILM COATED ORAL at 19:21

## 2017-09-13 RX ADMIN — CALCIUM 500 MG: 500 TABLET ORAL at 08:56

## 2017-09-13 RX ADMIN — PRAVASTATIN SODIUM 20 MG: 20 TABLET ORAL at 08:56

## 2017-09-13 RX ADMIN — DOCUSATE SODIUM 100 MG: 100 CAPSULE, LIQUID FILLED ORAL at 08:56

## 2017-09-13 RX ADMIN — POLYETHYLENE GLYCOL 3350 17 G: 17 POWDER, FOR SOLUTION ORAL at 08:55

## 2017-09-13 RX ADMIN — LATANOPROST 1 DROP: 50 SOLUTION OPHTHALMIC at 21:47

## 2017-09-13 NOTE — H&P
[address] [facility], [address]   HISTORY AND PHYSICAL       Name:  Cathy Huston   MR#:  [id number]   :  10/17/1927   Account #:  [de-identified]        Date of Adm:  2017       PRIMARY CARE PHYSICIAN: Maureen Juarez MD    SOURCE OF INFORMATION: The patient. CHIEF COMPLAINT: Chest pain. HISTORY OF PRESENT ILLNESS: This is an 66-year-old woman with   a past medical history significant for coronary artery disease,   dyslipidemia, hypertension, glaucoma, anxiety disorder, who was in   her usual state of health until the day of presentation at the emergency   room when the patient developed chest pain. The chest pain is located   at the left side of the chest without radiation. The chest pain   is intermittent, 6/10 in severity. Chest pain was prescribed as a sharp   pain associated with palpitations, but no shortness of breath. No fever,   no rigors, and no chills. The patient was evaluated by the doctor at   [facility]. It was stated that the patient has irregular   sounds and the patient was sent to the emergency room for further   evaluation. The patient was moving to the assisted living facility about   6 weeks ago. According to the patient's daughter, the patient is not   happy about this move. The daughter thought that the chest pain is   due to anxiety and the fact that the patient is not happy at the assisted   living facility. When the patient arrived at the emergency room, it was   reported that the patient has jumbled words and because of this CT   scan of the head was obtained. The CT scan shows a small acute on   chronic subdural hemorrhage. According to the patient, she fell in a   closet shortly after moving to the assisted living facility about a month   ago. She stated that she sustained an injury to the back of her head. She also stated that she was taken to the hospital and a CAT scan of   the head was obtained.  This was negative. The emergency room   physician consulted the neurosurgeon. The patient was seen by the   neurosurgeon while she was still in the emergency room. Conservative   therapy was advised for the subdural hemorrhage. The patient was   subsequently referred to the hospitalist service for evaluation for   admission. PAST MEDICAL HISTORY: Coronary artery disease, dyslipidemia,   hypertension, glaucoma, and anxiety disorder. ALLERGIES: THE PATIENT IS ALLERGIC TO:    1. PREDNISONE. 2. CODEINE. MEDICATIONS:     1. Tylenol Extra Strength 1000 mg daily at bedtime. 2. Aspirin 81 mg daily. 3. Calcium carbonate 1 tablet 3 times daily. 4. Coreg 6.25 mg twice daily. 5. Klonopin 0.5 mg daily at bedtime as needed for anxiety. 6. Colace 100 mg twice daily. 7. Lasix 20 mg daily. 8. Xalatan 0.005% ophthalmic solution 1 drop into each eye daily at   bedtime. 9. Magnesium oxide 400 mg 2 tablets twice daily. 10. Prilosec 20 mg daily. 11. MiraLax 17 grams daily. 12. Pravachol 20 mg daily. FAMILY HISTORY: This was reviewed, is not pertinent to the present   illness. PAST SURGICAL HISTORY: This is significant for cholecystectomy. SOCIAL HISTORY: No history of tobacco abuse. The patient admits to   social consumption of the alcohol. REVIEW OF SYSTEMS   HEAD, EYES, EARS, NOSE AND THROAT: No headache, no   dizziness, no blurring of vision, no photophobia. RESPIRATORY: No cough, no shortness of breath, no hemoptysis. CARDIOVASCULAR: This is positive for chest pain, palpitations. No   orthopnea. GASTROINTESTINAL: No nausea, vomiting, no diarrhea, no   constipation. GENITOURINARY: No dysuria, no urgency, no frequency. All other systems are reviewed and they are negative. PHYSICAL EXAMINATION   GENERAL APPEARANCE: The patient appeared ill, in moderate   distress. VITAL SIGNS: On arrival at the emergency room, temperature 97.8,   pulse rate 82, respiratory rate 16.  Blood pressure 131/65, oxygen   saturation 99% on room air. HEAD: Normocephalic, atraumatic. EYES: Normal eye movement. No redness, no drainage, no discharge. EARS: Normal external ears with no obvious drainage. NOSE: No deformity, no drainage. MOUTH AND THROAT: No visible oral lesion. Dry oral mucosa. NECK: Supple. No JVD. No thyromegaly. CHEST: Clear breath sounds. No wheezing, no crackles. HEART: Normal S1 and S2, regular. No clinically appreciable murmur. ABDOMEN: Soft, nontender, normal bowel sounds. CENTRAL NERVOUS SYSTEM: Alert, oriented x3. No gross focal   neurological deficits. EXTREMITIES: No edema. Pulses 2+ bilaterally. MUSCULOSKELETAL: No obvious joint deformity. Right leg swelling   noted. SKIN: No active skin lesions seen in the exposed parts of the body. PSYCHIATRY: Normal mood and affect. LYMPHATIC: No cervical lymphadenopathy. DIAGNOSTIC DATA: CHEST X-RAY: No acute pathology. EKG shows sinus rhythm, right bundle branch block and nonspecific   ST and T-wave abnormalities. CT scan of the head shows a very small   acute on chronic left subdural hemorrhage. LABORATORY DATA: Chemistry -Sodium 135, potassium 4.7,   chloride 106, CO2 of 21, glucose 88, BUN 9, creatinine 0.85, calcium   8.5, bilirubin total 0.4, ALT 10, AST 20, alkaline phosphatase 182, total   protein 5.7, albumin level 2.3, globulin 3.4. Cardiac profile: Troponin   less than 0.04. Hematology: WBC 9.2, hemoglobin 10.2, hematocrit   30.9, platelet at 724. ASSESSMENT:   1. Chest pain. 2. Subdural hemorrhage. 3. Dyslipidemia. 4. Hypertension. 5. Glaucoma. 6. Anxiety disorder. 7. Right Leg swelling. PLAN:   1. Chest pain. Will admit the patient for further evaluation and   treatment. We will  hold aspirin because of the suspected subdural   hemorrhage. Will check cardiac markers to rule out acute myocardial   infarction as a possible cause of the chest pain.  Will also evaluate the patient for other atypical causes of chest pain by checking an amylase   and lipase level. Will also check D-dimer to evaluate the patient for   thromboembolism as a possible cause of her chest pain. We will   monitor the patient closely. 2. Subdural hemorrhage. The patient has already been seen by the   neurosurgeon and will continue conservative therapy and await further   recommendation from the neurosurgeon. 3. Dyslipidemia. We will resume home medications. 4. Hypertension. Will continue with her preadmission medication. 5. Glaucoma. We will resume home medications. 6. Anxiety disorder. Will continue with home medications and monitor. 7. Right leg swelling. We will obtain an ultrasound of the right leg for   deep vein thrombosis. OTHER ISSUES:    CODE STATUS - THE PATIENT IS A FULL CODE. Will request SCDs   for DVT prophylaxis.          MD GREY Hammond / Chely Mchugh   D:  09/12/2017   20:19   T:  09/12/2017   21:33   Job #:  [id number]

## 2017-09-13 NOTE — PROGRESS NOTES
Problem: Falls - Risk of  Goal: *Absence of Falls  Document Marli Fall Risk and appropriate interventions in the flowsheet.    Fall Risk Interventions:  Mobility Interventions: PT Consult for mobility concerns, Patient to call before getting OOB     Mentation Interventions: Bed/chair exit alarm, More frequent rounding, Room close to nurse's station, Reorient patient, Self-releasing belt     Medication Interventions: Teach patient to arise slowly, Patient to call before getting OOB, Evaluate medications/consider consulting pharmacy     Elimination Interventions: Call light in reach, Toilet paper/wipes in reach, Toileting schedule/hourly rounds     History of Falls Interventions: Room close to nurse's station, Evaluate medications/consider consulting pharmacy, Door open when patient unattended, Investigate reason for fall

## 2017-09-13 NOTE — PROGRESS NOTES
Primary Nurse Lito Bowens RN and Sabina Cary RN performed a dual skin assessment on this patient Impairment noted- see wound doc flow sheet    Skin tear to right knee with steristrips, scattered ecchymosis on arms and legs

## 2017-09-13 NOTE — PROGRESS NOTES
Hospitalist Progress Note  Harrison Virk MD  Office: [phone]        Date of Service:  2017  NAME:  Jeannie Castaneda  :  10/17/1927  MRN:  [id number]      Admission Summary: This is an 80-year-old woman with a past medical history significant for coronary artery disease,   dyslipidemia, hypertension, glaucoma, anxiety disorder, who was in   her usual state of health until the day of presentation at the emergency   room when the patient developed chest pain. Interval history / Subjective:   Doing well , non cardiac point chest tenderness on left 5-6 intercostal space      Assessment & Plan:     1. Chest pain. Non cardiac CP , trop x 2 negative, does not pint towards Epigastric region either possibly musculoskeletal      2. Subdural hemorrhage small and NS signed off - no asa for at least 2 weeks . The patient continue conservative therapy    3. Dyslipidemia. We will resume home medications. 4. Hypertension. Will continue with her preadmission medication. 5. Glaucoma. We will resume home medications. 6. Anxiety disorder. Will continue with home medications and monitor. 7. Right leg swelling. -POSITIVE FOR DVT she has a SDH so need an IVC filter    Code status: Full  DVT prophylaxis: SCD    Care Plan discussed with: Patient/Family and Nurse  Disposition: TBD     Hospital Problems  Date Reviewed: 2017          Codes Class Noted POA    * (Principal)Chest pain ICD-10-CM: R07.9  ICD-9-CM: 786.50  2017 Yes                Review of Systems:   A comprehensive review of systems was negative. Vital Signs:    Last 24hrs VS reviewed since prior progress note.  Most recent are:  Visit Vitals    BP (!) 135/93 (BP 1 Location: Left arm, BP Patient Position: At rest)    Pulse 99    Temp 98.6 °F (37 °C)    Resp 19    Ht 5' (1.524 m)    Wt 55.2 kg (121 lb 11.1 oz)    SpO2 100%    BMI 23.77 kg/m2         Intake/Output Summary (Last 24 hours) at 09/13/17 1046  Last data filed at 09/13/17 0538   Gross per 24 hour   Intake                0 ml   Output                1 ml   Net               -1 ml        Physical Examination:             Constitutional:  No acute distress, cooperative, pleasant    ENT:  Oral mucous moist, oropharynx benign. Neck supple,    Resp:  CTA bilaterally. No wheezing/rhonchi/rales. No accessory muscle use   CV:  Regular rhythm, normal rate, no murmurs, gallops, rubs    GI:  Soft, non distended, non tender. normoactive bowel sounds, no hepatosplenomegaly     Musculoskeletal:  No edema, warm, 2+ pulses throughout    Neurologic:  Moves all extremities. AAOx3, CN II-XII reviewed     Psych:  Good insight, Not anxious nor agitated. Data Review:    I personally reviewed  Image and LABS      Labs:     Recent Labs      09/13/17 0620 09/12/17   1655   WBC  6.1  9.2   HGB  10.0*  10.2*   HCT  31.0*  30.9*   PLT  243  262     Recent Labs      09/13/17 0620 09/12/17   1655   NA  139  135*   K  4.5  4.7   CL  108  106   CO2  22  21   BUN  9  9   CREA  0.75  0.85   GLU  83  88   CA  8.5  8.5   MG  1.7   --    PHOS  3.8   --      Recent Labs      09/13/17 0620 09/12/17   1655   SGOT  12*  20   ALT  10*  10*   AP  177*  182*   TBILI  0.7  0.4   TP  4.9*  5.7*   ALB  2.2*  2.3*   GLOB  2.7  3.4   AML  19*   --    LPSE  111   --      No results for input(s): INR, PTP, APTT in the last 72 hours. No lab exists for component: INREXT   No results for input(s): FE, TIBC, PSAT, FERR in the last 72 hours. Lab Results   Component Value Date/Time    Folate 5.8 03/08/2017 06:00 PM      No results for input(s): PH, PCO2, PO2 in the last 72 hours.   Recent Labs      09/13/17 0620 09/12/17   1655   TROIQ  <0.04  <0.04     Lab Results   Component Value Date/Time    Cholesterol, total 182 03/08/2017 06:00 PM    HDL Cholesterol 64 03/08/2017 06:00 PM    LDL, calculated 91.2 03/08/2017 06:00 PM    Triglyceride 134 03/08/2017 06:00 PM    CHOL/HDL Ratio 2.8 03/08/2017 06:00 PM     Lab Results   Component Value Date/Time    Glucose (POC) 94 11/20/2016 07:00 AM     Lab Results   Component Value Date/Time    Color DARK YELLOW 03/08/2017 06:25 PM    Appearance HAZY 03/08/2017 06:25 PM    Specific gravity 1.030 03/08/2017 06:25 PM    pH (UA) 6.0 03/08/2017 06:25 PM    Protein NEGATIVE  03/08/2017 06:25 PM    Glucose NEGATIVE  03/08/2017 06:25 PM    Ketone NEGATIVE  03/08/2017 06:25 PM    Bilirubin Negative 11/28/2016 12:00 AM    Urobilinogen 0.2 03/08/2017 06:25 PM    Nitrites NEGATIVE  03/08/2017 06:25 PM    Leukocyte Esterase NEGATIVE  03/08/2017 06:25 PM    Epithelial cells FEW 11/19/2016 11:54 PM    Bacteria Few 11/28/2016 12:00 AM    WBC >30 11/28/2016 12:00 AM    RBC 11-30 11/28/2016 12:00 AM         Medications Reviewed:     Current Facility-Administered Medications   Medication Dose Route Frequency    acetaminophen (TYLENOL) tablet 650 mg  650 mg Oral Q4H PRN    calcium carbonate (OS-DAVIAN) tablet 500 mg [elemental]  500 mg Oral TID WITH MEALS    carvedilol (COREG) tablet 6.25 mg  6.25 mg Oral BID WITH MEALS    clonazePAM (KlonoPIN) tablet 0.5 mg  0.5 mg Oral QHS PRN    docusate sodium (COLACE) capsule 100 mg  100 mg Oral BID    latanoprost (XALATAN) 0.005 % ophthalmic solution 1 Drop  1 Drop Both Eyes QHS    pantoprazole (PROTONIX) tablet 40 mg  40 mg Oral DAILY    polyethylene glycol (MIRALAX) packet 17 g  17 g Oral DAILY    pravastatin (PRAVACHOL) tablet 20 mg  20 mg Oral DAILY    0.9% sodium chloride infusion  75 mL/hr IntraVENous CONTINUOUS    ondansetron (ZOFRAN) injection 4 mg  4 mg IntraVENous Q4H PRN     ______________________________________________________________________  EXPECTED LENGTH OF STAY: 2d 7h  ACTUAL LENGTH OF STAY:          1                 Jay Pearl MD

## 2017-09-13 NOTE — PROGRESS NOTES
Bedside, Verbal and Written shift change report given to Pito Terry (oncoming nurse) by Cherelle Vang (offgoing nurse). Report included the following information SBAR, Kardex, Accordion, Recent Results and Alarm Parameters    Cherelle Vang.

## 2017-09-13 NOTE — PROGRESS NOTES
1155: pt to angio holding for ivc filter placement. Received verbal telephone report from Zain Guerrero 429  [phone] 14: procedure start  1249: procedure complete. Pt tolerated well- see implant log for filter  1255: TRANSFER - OUT REPORT:    Verbal report given to Zain Guerrero 429 on Joseph [place]  being transferred to [facility] for routine progression of care       Report consisted of patients Situation, Background, Assessment and   Recommendations(SBAR). Information from the following report(s) SBAR and Procedure Summary was reviewed with the receiving nurse. Lines:   Peripheral IV 09/13/17 Right Forearm (Active)   Site Assessment Clean, dry, & intact 9/13/2017 11:00 AM   Phlebitis Assessment 0 9/13/2017 11:00 AM   Infiltration Assessment 0 9/13/2017 11:00 AM   Dressing Status Clean, dry, & intact 9/13/2017 11:00 AM   Dressing Type Transparent 9/13/2017 11:00 AM   Hub Color/Line Status Capped 9/13/2017 12:00 PM   Action Taken Open ports on tubing capped 9/13/2017 11:00 AM   Alcohol Cap Used Yes 9/13/2017 11:00 AM        Opportunity for questions and clarification was provided.       Patient transported with:   [employer]

## 2017-09-13 NOTE — PROGRESS NOTES
Bedside, Verbal and Written shift change report given to Mickey Garcia (oncoming nurse) by Jarett Bryant (offgoing nurse). Report included the following information SBAR, Kardex, [facility], Med Rec Status and Alarm Parameters    Jarett Bryant.

## 2017-09-13 NOTE — CONSULTS
Cardiology Consult Note      Patient Name: Balbina Avelar  : 10/17/1927 MRN: [id number]  Date: 2017  Time: 10:00 AM    Admit Diagnosis: Chest pain    Primary Cardiologist: Dr. Alicia Mathew MD    Consulting Cardiologist: Sherrell Millan III, M.D. Collier Cora for Consult:  Chest pain  Requesting MD:Dr. Cristal Guzman,     HPI:  Balbina Avelar is a 80 y.o. female admitted on 2017  for Chest pain. She has PMH significant for CAD, dyslipidemia, hypertension, glaucoma, anxiety disorder. Developed left sided chest pain at home, no radiation. Intermittent, 6/10 in severity. Sharp pain, associated with palpitations, no SOB. No fever, no rigors, and no chills. The patient was evaluated by the doctor at assisted living facility. It was stated that the patient has irregular sounds and the patient was sent to the emergency room for further evaluation. The patient was moving to the assisted living facility about 6 weeks ago. According to the patient's daughter, the patient is not happy about this move. Daughter thought chest pain is due to anxiety and the fact that the patient is not happy at the assisted living facility. When the patient arrived at the emergency room, it was reported that the patient has jumbled words and because of this CT scan of the head was obtained and found to have chronic SDH. Apparently she fell in a   closet shortly after moving to the assisted living facility about a month ago. She stated that she sustained an injury to the back of her head. She also stated that she was taken to the hospital and a CAT scan of the head was negative. . Conservative   therapy was advised for the subdural hemorrhage. Cardiology consulted for chest pain evaluation      Subjective: States she developed sharp, left sided chest pain yesterday- isn't sure what she was doing when it developed. Had associated SOB and palpitations.   States pain has been intermittent since yesterday. Walking did not make it worse. No identifiable alleviating factors. States pain is worse with deep inspiration. Normally walks around  On her own without chest pain or SOB. No nausea, vomiting, fevers, chills, cough. Denies any recent chest trauma and states that she did not fall on chest when she fell last month at [facility]. States she has notice RLE edema for years. Assessment and Plan     1. Chest pain:  Troponin <0.04.  12 lead EKG: NSR with sinus arrhythmia- no change from previous EKG. 11/2016 Nuc stress: No ischemia. + left chest wall ttp. EF nL on 11/2017 on TTE  -Do no suspect cardiac origin of chest pain. No further troponins or testing needed. 2. Hx of suspected CAD:  NSTEMI 11/2017 . Lexiscan stress test with no ischemia at that time. - medical management only. -Continue ASA, statin  -Follows with Dr. Megan Sapp.    3. RLE edema:  US RLE pending. Pt with Left sided chest pain, worse with inspiration and left chest wall ttp. Cardiac etiology not suspected. No further testing needed. Cardiology attending: seen and examined. Agree with assess and plan  Pain and symptoms do not appear to be cardiac at this point. Cardiac testing  TTE 09/19/19: LV Systolic function NL.  OD56 % to 60 %. No RWMA.    11/2016: Nile Fogo with no ischemia    Review of Symptoms:  Pertinent items are noted in HPI. and subjective    Previous treatment/evaluation includes echocardiogram and stress test .  Cardiac risk factors: sedentary life style, post-menopausal.    Past Medical History:   Diagnosis Date    Arthritis     History of non-ST elevation myocardial infarction (NSTEMI) 11/28/2016     Past Surgical History:   Procedure Laterality Date    HX CHOLECYSTECTOMY       Current Facility-Administered Medications   Medication Dose Route Frequency    acetaminophen (TYLENOL) tablet 650 mg  650 mg Oral Q4H PRN    calcium carbonate (OS-DAVIAN) tablet 500 mg [elemental]  500 mg Oral TID WITH MEALS    carvedilol (COREG) tablet 6.25 mg  6.25 mg Oral BID WITH MEALS    clonazePAM (KlonoPIN) tablet 0.5 mg  0.5 mg Oral QHS PRN    docusate sodium (COLACE) capsule 100 mg  100 mg Oral BID    latanoprost (XALATAN) 0.005 % ophthalmic solution 1 Drop  1 Drop Both Eyes QHS    pantoprazole (PROTONIX) tablet 40 mg  40 mg Oral DAILY    polyethylene glycol (MIRALAX) packet 17 g  17 g Oral DAILY    pravastatin (PRAVACHOL) tablet 20 mg  20 mg Oral DAILY    0.9% sodium chloride infusion  75 mL/hr IntraVENous CONTINUOUS    ondansetron (ZOFRAN) injection 4 mg  4 mg IntraVENous Q4H PRN       Allergies   Allergen Reactions    Codeine Unknown (comments)    Prednisone Unknown (comments)      History reviewed. No pertinent family history. Social History     Social History    Marital status:      Spouse name: N/A    Number of children: N/A    Years of education: N/A     Social History Main Topics    Smoking status: Never Smoker    Smokeless tobacco: Never Used    Alcohol use Yes      Comment: a couple of scotch drinks daily    Drug use: No    Sexual activity: Not Asked     Other Topics Concern    None     Social History Narrative       Objective:    Physical Exam    Vitals:   Vitals:    09/12/17 2115 09/12/17 2322 09/13/17 0332 09/13/17 0714   BP: 139/71 126/74 131/63 (!) 135/93   Pulse: 86 81 98 99   Resp:  18 18 19   Temp: 97.7 °F (36.5 °C) 97.4 °F (36.3 °C) 98.2 °F (36.8 °C) 98.6 °F (37 °C)   SpO2: 100% 96% 99% 100%   Weight:   55.2 kg (121 lb 11.1 oz)    Height:           General:    Alert, cooperative, no distress, appears stated age. Neck:   Supple, no carotid bruit and no JVD. Back:     Symmetric   Lungs:     Clear to auscultation bilaterally. Heart[de-identified]    Regular rate and rhythm, S1, S2 normal, no murmur, click, rub or gallop. Chest wall:  Left chest wall TTP     Abdomen:     Soft, non-tender. Bowel sounds normal. No masses,  No      organomegaly.    Extremities:   RLE edema Vascular:   Pulses - 2+   Skin:   Skin color normal. No rashes or lesions   Neurologic:   [facility]IS[facility]. Telemetry: NSR    ECG: Normal sinus rhythm with sinus arrhythmia   Right bundle branch block   When compared with ECG of 10-JUL-2017 05:11,   No significant change   Confirmed by Corrine Robertson M.D., Bryce Harrison ([id number]) on 9/13/2017 8:33:53 AM     Data Review:     Radiology:       Recent Labs      09/13/17 0620 09/12/17   1655   TROIQ  <0.04  <0.04     Recent Labs      09/13/17   0620  09/12/17   1655   NA  139  135*   K  4.5  4.7   CL  108  106   CO2  22  21   BUN  9  9   CREA  0.75  0.85   GLU  83  88   PHOS  3.8   --    CA  8.5  8.5     Recent Labs      09/13/17 0620 09/12/17   1655   WBC  6.1  9.2   HGB  10.0*  10.2*   HCT  31.0*  30.9*   PLT  243  262     Recent Labs      09/13/17 0620 09/12/17   1655   SGOT  12*  20   AP  177*  182*     No results for input(s): CHOL, LDLC in the last 72 hours. No lab exists for component: TGL, HDLC,  HBA1C  Recent Labs      09/13/17   0620   TSH  0.42       Thank you very much for this referral. I appreciate the opportunity to participate in this patient's care. I will follow along with above stated plan. Britt German.  GRACY Spann MD           [facility] [address] [place] [address] [address] [facility] [facility] [address]     [facility]     [phone]    Justo Osorio MD

## 2017-09-13 NOTE — ROUTINE PROCESS
TRANSFER - OUT REPORT:    Verbal report given to RN (name) on Kathy Welch  being transferred to  (unit) for routine progression of care       Report consisted of patients Situation, Background, Assessment and   Recommendations(SBAR). Information from the following report(s) SBAR, ED Summary, [facility] and Recent Results was reviewed with the receiving nurse. Lines:   Peripheral IV 09/12/17 Left Antecubital (Active)   Site Assessment Clean, dry, & intact 9/12/2017  4:59 PM   Phlebitis Assessment 0 9/12/2017  4:59 PM   Infiltration Assessment 0 9/12/2017  4:59 PM   Dressing Status Clean, dry, & intact 9/12/2017  4:59 PM   Dressing Type Transparent 9/12/2017  4:59 PM   Hub Color/Line Status Pink;Flushed;Patent 9/12/2017  4:59 PM   Action Taken Blood drawn 9/12/2017  4:59 PM        Opportunity for questions and clarification was provided.       Patient transported with:   Monitor

## 2017-09-13 NOTE — PROCEDURES
[facility]  *** FINAL REPORT ***    Name: Lyla Naqvi  MRN: [id number]    Inpatient  : 17 Oct 1927  HIS Order #: [id number]  [id number] [facility] Visit #: [id number]  Date: 13 Sep 2017    TYPE OF TEST: Peripheral Venous Testing    REASON FOR TEST  Pain in limb, Limb swelling    Right Leg:-  Deep venous thrombosis:           Yes  Proximal extent of thrombus:      Popliteal Above The Knee  Superficial venous thrombosis:    No  Deep venous insufficiency:        Not examined  Superficial venous insufficiency: Not examined      INTERPRETATION/FINDINGS  PROCEDURE:  Color duplex ultrasound imaging of lower extremity veins. FINDINGS:       Right: Consistent with thrombosis involving the popliteal,  posterior tibial and peroneal veins. as demonstrated by vein  non-compressibility, and by a narrowing or occlusion of the flow  channel on color Doppler imaging. The common femoral, deep femoral,  femoral and great saphenous are patent and without evidence of  thrombus; each is is fully compressible and there is no narrowing of  the flow channel on color Doppler imaging. Phasic flow is observed in   the common femoral vein. Left:   The common femoral vein is patent and without evidence of   thrombus. Phasic flow is observed. This extremity was not otherwise   evaluated. IMPRESSION: There is evidence of vein thrombosis, as described above. The ultrasound appearance is more consistent with an acute than a  chronic process. ADDITIONAL COMMENTS    I have personally reviewed the data relevant to the interpretation of  this  study. TECHNOLOGIST: Alayna Lucas  Signed: 2017 10:27 AM    PHYSICIAN: Rosamaria Mayes MD  Signed: 2017 06:57 AM

## 2017-09-13 NOTE — PROGRESS NOTES
Pt family left shortly after admission, pt made several repeated simple requests and it almost seemed like she forgot I had responded multiple times. After family left pt starting responding in [address] and had a very difficult time answering questions for admission. I would suggest admission questions wait for adult daughter to be at bedside, unsure if pt can articulate what she is trying to say or if she is a poor historian.   Pari Fontanez

## 2017-09-14 VITALS
WEIGHT: 121.69 LBS | TEMPERATURE: 98.1 F | OXYGEN SATURATION: 99 % | HEIGHT: 60 IN | SYSTOLIC BLOOD PRESSURE: 129 MMHG | BODY MASS INDEX: 23.89 KG/M2 | RESPIRATION RATE: 18 BRPM | DIASTOLIC BLOOD PRESSURE: 66 MMHG | HEART RATE: 94 BPM

## 2017-09-14 PROCEDURE — 97165 OT EVAL LOW COMPLEX 30 MIN: CPT | Performed by: OCCUPATIONAL THERAPIST

## 2017-09-14 PROCEDURE — 97535 SELF CARE MNGMENT TRAINING: CPT | Performed by: OCCUPATIONAL THERAPIST

## 2017-09-14 PROCEDURE — 74011250637 HC RX REV CODE- 250/637: Performed by: INTERNAL MEDICINE

## 2017-09-14 PROCEDURE — 97161 PT EVAL LOW COMPLEX 20 MIN: CPT

## 2017-09-14 PROCEDURE — 97116 GAIT TRAINING THERAPY: CPT

## 2017-09-14 RX ADMIN — POLYETHYLENE GLYCOL 3350 17 G: 17 POWDER, FOR SOLUTION ORAL at 09:06

## 2017-09-14 RX ADMIN — PRAVASTATIN SODIUM 20 MG: 20 TABLET ORAL at 09:06

## 2017-09-14 RX ADMIN — CALCIUM 500 MG: 500 TABLET ORAL at 16:47

## 2017-09-14 RX ADMIN — CARVEDILOL 6.25 MG: 6.25 TABLET, FILM COATED ORAL at 09:06

## 2017-09-14 RX ADMIN — CARVEDILOL 6.25 MG: 6.25 TABLET, FILM COATED ORAL at 16:47

## 2017-09-14 RX ADMIN — CALCIUM 500 MG: 500 TABLET ORAL at 09:06

## 2017-09-14 RX ADMIN — PANTOPRAZOLE SODIUM 40 MG: 40 TABLET, DELAYED RELEASE ORAL at 09:06

## 2017-09-14 RX ADMIN — DOCUSATE SODIUM 100 MG: 100 CAPSULE, LIQUID FILLED ORAL at 09:06

## 2017-09-14 RX ADMIN — CALCIUM 500 MG: 500 TABLET ORAL at 12:00

## 2017-09-14 NOTE — PROGRESS NOTES
In report as well as in my own assessment pt is AO4 during the day while family is present, in the evening speech is garbled, pt has difficulty choosing the right words and has word salad as well. Seems to be a significant difference between patients ability during the day verses night.    Evi Odell

## 2017-09-14 NOTE — DISCHARGE INSTRUCTIONS
Discharge Instructions       PATIENT ID: Oksana Sr  MRN: [id number]   YOB: 1927    DATE OF ADMISSION: 9/12/2017  4:45 PM    DATE OF DISCHARGE: 9/14/2017    PRIMARY CARE PROVIDER: Ej Cowart MD     ATTENDING PHYSICIAN: Johanny Courtney MD  DISCHARGING PROVIDER: Johanny Courtney MD    To contact this individual call [phone] and ask the  to page. If unavailable ask to be transferred the Adult Hospitalist Department. DISCHARGE DIAGNOSES   CP  Tiny Subdural hematoma  Acute DVT right leg    CONSULTATIONS: IP CONSULT TO NEUROSURGERY  IP CONSULT TO CARDIOLOGY    PROCEDURES/SURGERIES: * No surgery found *    PENDING TEST RESULTS:   At the time of discharge the following test results are still pending: none    FOLLOW UP APPOINTMENTS:   Follow-up Information     Follow up With Details Comments Contact Info    AT [address] [place] [address] and PT  [address] [facility] [address] [id number] Perez Khan MD   [address] [place] [address]  [phone]             ADDITIONAL CARE RECOMMENDATIONS:     DIET: Cardiac Diet      ACTIVITY: Activity as tolerated    WOUND CARE: none    EQUIPMENT needed: none      DISCHARGE MEDICATIONS:   See Medication Reconciliation Form    · It is important that you take the medication exactly as they are prescribed. · Keep your medication in the bottles provided by the pharmacist and keep a list of the medication names, dosages, and times to be taken in your wallet. · Do not take other medications without consulting your doctor. NOTIFY YOUR PHYSICIAN FOR ANY OF THE FOLLOWING:   Fever over 101 degrees for 24 hours. Chest pain, shortness of breath, fever, chills, nausea, vomiting, diarrhea, change in mentation, falling, weakness, bleeding. Severe pain or pain not relieved by medications. Or, any other signs or symptoms that you may have questions about.       DISPOSITION:  x  Home With:   OT  PT x HH  RN SNF/Inpatient Rehab/LTAC    Independent/assisted living    Hospice    Other:     CDMP Checked:   Yes x     PROBLEM LIST Updated:  Yes x       Signed:   Charles Jha MD  9/14/2017  4:08 PM       Discharge SNF/Rehab Instructions/LTAC       PATIENT ID: Vince Mederos  MRN: [id number]   YOB: 1927    DATE OF ADMISSION: 9/12/2017  4:45 PM    DATE OF DISCHARGE: 9/14/2017    PRIMARY CARE PROVIDER: Yue Maguire MD       ATTENDING PHYSICIAN: Charles Jha MD  DISCHARGING PROVIDER: Charles Jha MD     To contact this individual call [phone] and ask the  to page. If unavailable ask to be transferred the Adult Hospitalist Department. CONSULTATIONS: IP CONSULT TO NEUROSURGERY  IP CONSULT TO CARDIOLOGY    PROCEDURES/SURGERIES: * No surgery found *    ADMITTING DIAGNOSES & HOSPITAL COURSE:   80-year-old woman with a past medical history significant for coronary artery disease, dyslipidemia, hypertension, glaucoma, anxiety disorder, who was in her usual state of health until the day of presentation at the emergency   room when the patient developed chest pain. Pt admitted to [facility] with tele monitoring. She was seen by cardio and no evidence for cardiac injury. Pt also had a tiny SDH on CT head. Seen by NSx and given small size, the pt did not require any intervention as per NSx. It is rec that Aspirin be held for 2 wks. The pt was found to have acute DVT RLE and since not a candidate for anticoag, pt had IVC filter placed. She is now hemodynamically stable for d/c today. DISCHARGE DIAGNOSES / PLAN:      Chest pain, Non cardiac  - trop x 2 negative  -pt seen by cardio, no evidence for cardiac origin  -had CST 11/16 whoch wa neg  -as per cardio, cont asa(after 2 wks and when ok with PCP) and statin. No further w/u needed.  F/u dr. Beryle Sprout o/pt  -does not appear to be GI related either. possibly musculoskeletal        Subdural hemorrhage   -CT head 9/12 very small L SDH  -per NSx, appears to be chronic. If acute then very tiny component. No intervention needed. NSx  rec med mgmt only. NSx s/o as of 9/12  -no asa for at least 2 weeks      RLE Acute DVT  -BLE U/S 9/13acute thrombosis popliteal, PT, peroneal  -s/p IVCF placed 9/13  -not candidate for a/c given small SDH      Dyslipidemia  -Pravastatin      Hypertension.   -Coreg      Glaucoma. -cont eye drops      Anxiety disorder.   -cont prn klonopin 0/5mg qhs., may need to hold or even decr dose if having confusion at night         PENDING TEST RESULTS:   At the time of discharge the following test results are still pending: none    FOLLOW UP APPOINTMENTS:    Follow-up Information     Follow up With Details Comments Contact Info    AT HOME CARE  Skilled nursing and PT  [address] [place] [address] [address], MD   [address] [place] [address]  [phone]      Navi Silverman MD In 2 weeks  [place] [address]  [address] [place] [address] [phone]                ADDITIONAL CARE RECOMMENDATIONS:      DIET: Cardiac Diet        ACTIVITY: Activity as tolerated     WOUND CARE: none     EQUIPMENT needed: none        DISCHARGE MEDICATIONS:   See Medication Reconciliation Form      NOTIFY YOUR PHYSICIAN FOR ANY OF THE FOLLOWING:   Fever over 101 degrees for 24 hours. Chest pain, shortness of breath, fever, chills, nausea, vomiting, diarrhea, change in mentation, falling, weakness, bleeding. Severe pain or pain not relieved by medications. Or, any other signs or symptoms that you may have questions about.     DISPOSITION:    Home With:   OT  PT  HH  RN       SNF/Inpatient Rehab/LTAC   x Independent/assisted living    Hospice    Other:       PATIENT CONDITION AT DISCHARGE:     Functional status    Poor    x Deconditioned     Independent      Cognition   x  Lucid     Forgetful     Dementia      Catheters/lines (plus indication)    Burris     PICC     PEG    x None      Code status x  Full code     DNR      PHYSICAL EXAMINATION AT DISCHARGE:   Refer to Progress Note      CHRONIC MEDICAL DIAGNOSES:  Problem List as of 9/14/2017  Date Reviewed: 9/12/2017          Codes Class Noted - Resolved    * (Principal)Chest pain ICD-10-CM: R07.9  ICD-9-CM: 786.50  9/12/2017 - Present        TIA (transient ischemic attack) ICD-10-CM: G45.9  ICD-9-CM: 435.9  3/8/2017 - Present        GI bleed ICD-10-CM: K92.2  ICD-9-CM: 578.9  3/8/2017 - Present        Numbness and tingling ICD-10-CM: R20.0, R20.2  ICD-9-CM: 782.0  3/8/2017 - Present        CAD in native artery ICD-10-CM: I25.10  ICD-9-CM: 414.01  11/28/2016 - Present        History of non-ST elevation myocardial infarction (NSTEMI) ICD-10-CM: I25.2  ICD-9-CM: 047  11/28/2016 - Present        NSTEMI (non-ST elevated myocardial infarction) ([facility] [address] ICD-10-CM: I21.4  ICD-9-CM: 410.70  11/22/2016 - Present        Diarrhea ICD-10-CM: R19.7  ICD-9-CM: 787.91  11/22/2016 - Present        Hypocalcemia ICD-10-CM: E83.51  ICD-9-CM: 275.41  11/22/2016 - Present        Hypomagnesemia ICD-10-CM: B04.76  ICD-9-CM: 275.2  11/22/2016 - Present        Hypokalemia ICD-10-CM: E87.6  ICD-9-CM: 276.8  11/20/2016 - Present                CDMP Checked:   Yes x     PROBLEM LIST Updated:  Yes x         Signed:   Jay Jerez MD  9/14/2017  4:19 PM

## 2017-09-14 NOTE — PROGRESS NOTES
Problem: Mobility Impaired (Adult and Pediatric)  Goal: *Acute Goals and Plan of Care (Insert Text)  Physical Therapy Goals  Initiated 9/14/2017  1. Patient will move from supine to sit and sit to supine , scoot up and down and roll side to side in bed with independence within 7 day(s). 2. Patient will transfer from bed to chair and chair to bed with modified independence using the least restrictive device within 7 day(s). 3. Patient will perform sit to stand with modified independence within 7 day(s). 4. Patient will ambulate with modified independence for 150 feet with the least restrictive device within 7 day(s). 5. Patient will verbalize and demonstrate rollator safety by locking brakes prior to standing and sitting within 7 days. PHYSICAL THERAPY EVALUATION  Patient: Roberto Trujillo (80 y.o. female)  Date: 9/14/2017  Primary Diagnosis: Chest pain        Precautions:   Fall, Skin, Other (comment) (confusion)      ASSESSMENT :  Based on the objective data described below, the patient presents with independence with mobility near her baseline level of function prior to admission. She is limited at this time by mild balance impairments, impulsivity, and decreased safety awareness. She has a history of falls and dense brusing noted on R knee from most recent. She uses a rollator at baseline and did so during eval. Able to stand, ambulate and return to bed with supervision and verbal cues for safety. Patient would benefit from HHPT for safety upon returning to [facility]. Patient family report she has PT services there before and did quite well. Patient will benefit from skilled intervention to address the above impairments.   Patients rehabilitation potential is considered to be Good  Factors which may influence rehabilitation potential include:   [ ]         None noted  [X]         Mental ability/status  [ ]         Medical condition  [X]         Home/family situation and support systems  [X]         Safety awareness  [ ]         Pain tolerance/management  [ ]         Other:        PLAN :  Recommendations and Planned Interventions:  [X]           Bed Mobility Training             [ ]    Neuromuscular Re-Education  [X]           Transfer Training                   [ ]    Orthotic/Prosthetic Training  [X]           Gait Training                         [ ]    Modalities  [X]           Therapeutic Exercises           [ ]    Edema Management/Control  [X]           Therapeutic Activities            [ ]    Patient and Family Training/Education  [ ]           Other (comment):     Frequency/Duration: Patient will be followed by physical therapy  3 times a week to address goals. Discharge Recommendations: Home Health  Further Equipment Recommendations for Discharge: has rollator       SUBJECTIVE:   Patient stated MOVE.      OBJECTIVE DATA SUMMARY:   HISTORY:    Past Medical History:   Diagnosis Date    Arthritis      History of non-ST elevation myocardial infarction (NSTEMI) 11/28/2016     Past Surgical History:   Procedure Laterality Date    HX CHOLECYSTECTOMY         Prior Level of Function/Home Situation: Assisted living. History of falls. Personal factors and/or comorbidities impacting plan of care:      Home Situation  Home Environment: Assisted living  Living Alone: No  Support Systems: Home care staff, Family member(s)  Patient Expects to be Discharged to[de-identified] Assisted living  Current DME Used/Available at Home: odell Fernandes     EXAMINATION/PRESENTATION/DECISION MAKING:   Critical Behavior:  Neurologic State: Alert  Orientation Level: Disoriented to time, Disoriented to situation, Disoriented to place, Oriented to person  Cognition: Follows commands, Appropriate for age attention/concentration, Memory loss  Safety/Judgement: Awareness of environment, Fall prevention, Home safety, Decreased insight into deficits  Hearing:   Auditory  Auditory Impairment: None     Range Of Motion:  AROM: Within functional limits Strength:    Strength: Generally decreased, functional                    Tone & Sensation:                                  Coordination:     Vision:   Corrective Lenses: Glasses  Functional Mobility:  Bed Mobility:  Rolling: Supervision  Supine to Sit: Supervision  Sit to Supine: Supervision  Scooting: Supervision  Transfers:  Sit to Stand: Supervision (cues for rollator safety)  Stand to Sit: Supervision        Bed to Chair: Minimum assistance; Other (comment) (per RN with rollator)              Balance:   Sitting: Intact  Standing: Intact; With support  Ambulation/Gait Training:  Distance (ft): 100 Feet (ft)  Assistive Device: Gait belt;Walker, rollator  Ambulation - Level of Assistance: Supervision     Gait Description (WDL): Exceptions to WDL  Gait Abnormalities: Path deviations (forward flexed posture, leans on rollator)        Base of Support: Narrowed     Speed/Oralia: Accelerated                 Functional Measure:  Tinetti test:      Sitting Balance: 1  Arises: 2  Attempts to Rise: 2  Immediate Standing Balance: 1  Standing Balance: 1  Nudged: 1  Eyes Closed: 1  Turn 360 Degrees - Continuous/Discontinuous: 1  Turn 360 Degrees - Steady/Unsteady: 1  Sitting Down: 1  Balance Score: 12  Indication of Gait: 1  R Step Length/Height: 1  L Step Length/Height: 1  R Foot Clearance: 1  L Foot Clearance: 1  Step Symmetry: 1  Step Continuity: 1  Path: 1  Trunk: 0  Walking Time: 1  Gait Score: 9  Total Score: 21         Tinetti Test and G-code impairment scale:  Percentage of Impairment CH     0%    CI     1-19% CJ     20-39% CK     40-59% CL     60-79% CM     80-99% CN      100%   Tinetti  Score 0-28 28 23-27 17-22 12-16 6-11 1-5 0          Tinetti Tool Score Risk of Falls  <19 = High Fall Risk  19-24 = Moderate Fall Risk  25-28 = Low Fall Risk  Tinetti ME. Performance-Oriented Assessment of Mobility Problems in Elderly Patients. Rodney 66; [id number].  (Scoring Description: PT Bulletin Feb. 10, 1993)     Older adults: (Jose Roberto et al, 2009; n = 1000 [facility] elderly evaluated with ABC, JADEN, ADL, and IADL)  · Mean JADEN score for males aged 69-68 years = 26.21(3.40)  · Mean JADEN score for females age 69-68 years = 25.16(4.30)  · Mean JADEN score for males over 80 years = 23.29(6.02)  · Mean JADEN score for females over 80 years = 17.20(8.32)            G codes: In compliance with CMSs Claims Based Outcome Reporting, the following G-code set was chosen for this patient based on their primary functional limitation being treated: The outcome measure chosen to determine the severity of the functional limitation was the Tinetti with a score of 21/28 which was correlated with the impairment scale. · Mobility - Walking and Moving Around:               - CURRENT STATUS:    CJ - 20%-39% impaired, limited or restricted               - GOAL STATUS:           CI - 1%-19% impaired, limited or restricted               - D/C STATUS:                       ---------------To be determined---------------      Physical Therapy Evaluation Charge Determination   History Examination Presentation Decision-Making   HIGH Complexity :3+ comorbidities / personal factors will impact the outcome/ POC  LOW Complexity : 1-2 Standardized tests and measures addressing body structure, function, activity limitation and / or participation in recreation  LOW Complexity : Stable, uncomplicated  Other outcome measures Tinetti  LOW       Based on the above components, the patient evaluation is determined to be of the following complexity level: LOW      Pain:  Pain Scale 1: Numeric (0 - 10)  Pain Intensity 1: 0              Activity Tolerance:   No apparent distress  Please refer to the flowsheet for vital signs taken during this treatment.   After treatment:   [ ]         Patient left in no apparent distress sitting up in chair  [X]         Patient left in no apparent distress in bed  [X]         Call bell left within reach  [X] Nursing notified  [X]         Caregiver present  [ ]         Bed alarm activated      COMMUNICATION/EDUCATION:   The patients plan of care was discussed with: Occupational Therapist and Registered Nurse.  [X]         Fall prevention education was provided and the patient/caregiver indicated understanding. [X]         Patient/family have participated as able in goal setting and plan of care. [X]         Patient/family agree to work toward stated goals and plan of care. [ ]         Patient understands intent and goals of therapy, but is neutral about his/her participation. [ ]         Patient is unable to participate in goal setting and plan of care.      Thank you for this referral.  Pedro Gooden, PT , DPT   Time Calculation: 18 mins

## 2017-09-14 NOTE — PROGRESS NOTES
Met with patient, emmanuel John Rodriguez [phone] at bedside to introduce role and to assess for any needs/concerns regarding possible discharge today. Patient admitted post a fall at the assisted living facility. Found to have a DVT and now is post insertion of IVC filter. Patient open to [facility] for skilled nursing and PT. Daughter will transport back to facility once discharged. CM will send resumption or home health services to agency. Care Management Interventions  PCP Verified by CM: Yes (Dr. Medina Ba)  [facility] (Criteria: CHF and RRAT>21): No  Reason for No Palliative Care Consult:  (Family declines at this time)  Mode of Transport at Discharge:  (Family car)  Transition of Care Consult (CM Consult): Discharge Planning, [facility] [address] [address]: No  Reason Outside [place]: Patient already serviced by other home care/hospice agency (Patient open to [facility])  MyChart Signup: Yes  Physical Therapy Consult: Yes  Current Support Network: Assisted Living  Confirm Follow Up Transport: Family (Spoke with daughter and granddaughter and they will transport patient to facility at discharge.)  Plan discussed with Pt/Family/Caregiver: Yes  Freedom of Choice Offered: Yes  Discharge Location  Discharge Placement: [facility] [address] [place], RN CRM      1532-Spoke to staff at [facility] [phone] that  Patient may be discharged back to the facility today. Emmanuel John Rodriguez [phone] will transport. Nursing aware to call report to facility. Nursing to also send copy of AVS, MARs and Kardex in envelope provided.

## 2017-09-14 NOTE — DISCHARGE SUMMARY
Discharge Summary       PATIENT ID: Dottie Dyer  MRN: [id number]   YOB: 1927    DATE OF ADMISSION: 9/12/2017  4:45 PM    DATE OF DISCHARGE: 9/14/2017   PRIMARY CARE PROVIDER: Nelsy Bonds MD     ATTENDING PHYSICIAN: Lily Berger MD  DISCHARGING PROVIDER: Lily Berger MD    To contact this individual call [phone] and ask the  to page. If unavailable ask to be transferred the Adult Hospitalist Department. CONSULTATIONS: IP CONSULT TO NEUROSURGERY  IP CONSULT TO CARDIOLOGY    PROCEDURES/SURGERIES: * No surgery found *    ADMITTING DIAGNOSES & HOSPITAL COURSE:   40-year-old woman with a past medical history significant for coronary artery disease, dyslipidemia, hypertension, glaucoma, anxiety disorder, who was in her usual state of health until the day of presentation at the emergency   room when the patient developed chest pain. Pt admitted to [facility] with tele monitoring. She was seen by cardio and no evidence for cardiac injury. Pt also had a tiny SDH on CT head. Seen by NSx and given small size, the pt did not require any intervention as per NSx. It is rec that Aspirin be held for 2 wks. The pt was found to have acute DVT RLE and since not a candidate for anticoag, pt had IVC filter placed. She is now hemodynamically stable for d/c today. DISCHARGE DIAGNOSES / PLAN:      Chest pain, Non cardiac  - trop x 2 negative  -pt seen by cardio, no evidence for cardiac origin  -had CST 11/16 whoch wa neg  -as per cardio, cont asa(after 2 wks and when ok with PCP) and statin. No further w/u needed. F/u dr. Georgette Burns o/pt  -does not appear to be GI related either. possibly musculoskeletal       Subdural hemorrhage   -CT head 9/12 very small L SDH  -per NSx, appears to be chronic. If acute then very tiny component. No intervention needed. NSx  rec med mgmt only.  NSx s/o as of 9/12  -no asa for at least 2 weeks     RLE Acute DVT  -BLE U/S 9/13acute thrombosis popliteal, PT, peroneal  -s/p IVCF placed 9/13  -not candidate for a/c given small SDH     Dyslipidemia  -Pravastatin     Hypertension.   -Coreg     Glaucoma. -cont eye drops     Anxiety disorder.   -cont prn klonopin 0/5mg qhs., may need to hold or even decr dose if having confusion at night  -can also consider poss prn seroquel if any issues with agitation          PENDING TEST RESULTS:   At the time of discharge the following test results are still pending: none    FOLLOW UP APPOINTMENTS:    Follow-up Information     Follow up With Details Comments Contact Info    AT [address] and PT  [address] [facility] [place] [id number] Perez Khan MD   [address] [facility]  [phone]           ADDITIONAL CARE RECOMMENDATIONS:     DIET: Cardiac Diet      ACTIVITY: Activity as tolerated    WOUND CARE: none    EQUIPMENT needed: none        DISCHARGE MEDICATIONS:  Current Discharge Medication List      CONTINUE these medications which have NOT CHANGED    Details   latanoprost (XALATAN) 0.005 % ophthalmic solution Administer 1 Drop to both eyes nightly. polyethylene glycol (MIRALAX) 17 gram/dose powder Take 17 g by mouth daily. 1 tablespoon with 8 oz of water daily  Qty: 119 g, Refills: 0      docusate sodium (COLACE) 100 mg capsule Take 1 Cap by mouth two (2) times a day for 90 days. Qty: 60 Cap, Refills: 2      calcium carbonate (OS-DAVIAN) 500 mg calcium (1,250 mg) tablet Take 1 Tab by mouth three (3) times daily (with meals). Indications: hypocalcemia  Qty: 60 Tab, Refills: 0      cholecalciferol (VITAMIN D3) 1,000 unit tablet Take 5 Tabs by mouth daily. Qty: 30 Tab, Refills: 1      acetaminophen (ACETAMINOPHEN EXTRA STRENGTH) 500 mg tablet Take 1,000 mg by mouth nightly. pravastatin (PRAVACHOL) 20 mg tablet Take 20 mg by mouth daily. carvedilol (COREG) 6.25 mg tablet Take 1 Tab by mouth two (2) times daily (with meals).   Qty: 60 Tab, Refills: 0 clonazePAM (KLONOPIN) 0.5 mg tablet Take 0.5 mg by mouth nightly as needed for Other (anxiety). omeprazole (PRILOSEC) 20 mg capsule Take 20 mg by mouth daily. STOP taking these medications       furosemide (LASIX) 20 mg tablet Comments:   Reason for Stopping:         magnesium oxide (MAG-OX) 400 mg tablet Comments:   Reason for Stopping:         aspirin 81 mg chewable tablet Comments:   Reason for Stopping:                 NOTIFY YOUR PHYSICIAN FOR ANY OF THE FOLLOWING:   Fever over 101 degrees for 24 hours. Chest pain, shortness of breath, fever, chills, nausea, vomiting, diarrhea, change in mentation, falling, weakness, bleeding. Severe pain or pain not relieved by medications. Or, any other signs or symptoms that you may have questions about.     DISPOSITION:  x  Home With:   OT  PT  HH  RN       Long term SNF/Inpatient Rehab    Independent/assisted living    Hospice    Other: assisted living       PATIENT CONDITION AT DISCHARGE:     Functional status    Poor    x Deconditioned     Independent      Cognition   x  Lucid     Forgetful     Dementia      Catheters/lines (plus indication)    Burris     PICC     PEG    x None      Code status   x  Full code     DNR      PHYSICAL EXAMINATION AT DISCHARGE:   Refer to Progress Note      CHRONIC MEDICAL DIAGNOSES:  Problem List as of 9/14/2017  Date Reviewed: 9/12/2017          Codes Class Noted - Resolved    * (Principal)Chest pain ICD-10-CM: R07.9  ICD-9-CM: 786.50  9/12/2017 - Present        TIA (transient ischemic attack) ICD-10-CM: G45.9  ICD-9-CM: 435.9  3/8/2017 - Present        GI bleed ICD-10-CM: K92.2  ICD-9-CM: 578.9  3/8/2017 - Present        Numbness and tingling ICD-10-CM: R20.0, R20.2  ICD-9-CM: 782.0  3/8/2017 - Present        CAD in native artery ICD-10-CM: I25.10  ICD-9-CM: 414.01  11/28/2016 - Present        History of non-ST elevation myocardial infarction (NSTEMI) ICD-10-CM: I25.2  ICD-9-CM: 438  11/28/2016 - Present        NSTEMI (non-ST elevated myocardial infarction) [facility]) ICD-10-CM: I21.4  ICD-9-CM: 410.70  11/22/2016 - Present        Diarrhea ICD-10-CM: R19.7  ICD-9-CM: 787.91  11/22/2016 - Present        Hypocalcemia ICD-10-CM: E83.51  ICD-9-CM: 275.41  11/22/2016 - Present        Hypomagnesemia ICD-10-CM: W51.32  ICD-9-CM: 275.2  11/22/2016 - Present        Hypokalemia ICD-10-CM: E87.6  ICD-9-CM: 276.8  11/20/2016 - Present              Greater than 35 minutes were spent with the patient on counseling and coordination of care    Signed:   Esvin Darby MD  9/14/2017  4:10 PM

## 2017-09-14 NOTE — PROGRESS NOTES
Problem: Patient Education: Go to Patient Education Activity  Goal: Patient/Family Education  Occupational Therapy Goals  Initiated 9/14/2017  1. Patient will perform grooming and sponge bathing with supervision/set-up standing at the sink within 7 day(s). 2. Patient will perform upper body dressing and lower body dressing with modified independence within 7 day(s). 3. Patient will perform toilet transfers with modified independence within 7 day(s). 4. Patient will perform all aspects of toileting with modified independence within 7 day(s). 5. Patient will perform pursed lip breathing prn with 1-2 cues within 7 day(s). OCCUPATIONAL THERAPY EVALUATION  Patient: Junior Streeter (80 y.o. female)  Date: 9/14/2017  Primary Diagnosis: Chest pain        Precautions:   Fall, Skin, Other (comment) (confusion)      ASSESSMENT :  Based on the objective data described below, the patient presents with decreased motivation for out of bed, mod encouragement to sit edge of bed and refused to attempt to stand. Bed mobility and scooting to head of bed seated at edge with mod I. RN reporting she has ambulated with her to and from the bathroom using rollator in room. Patient with continued complaint of chest pain, noted negative workup. Patient incontinent bladder x's 2, however declines problem with incontinence. Will follow, likely will not need follow up OT. Next visit recommend toilet transfer to bathroom with rollator,   MOCA or further cognitive assessment given small left subdural hematoma 9/12. Recommend with nursing patient to complete as able in order to maintain strength, endurance and independence: ADLs with supervision/setup, OOB to chair 3x/day and mobilizing to the bathroom for toileting with CGA assist with rollator. Thank you for your assistance. Patient will benefit from skilled intervention to address the above impairments.   Patients rehabilitation potential is considered to be Fair  Factors which may influence rehabilitation potential include:   [ ]             None noted  [X]             Mental ability/status  [ ]             Medical condition  [ ]             Home/family situation and support systems  [ ]             Safety awareness  [ ]             Pain tolerance/management  [ ]             Other:        PLAN :  Recommendations and Planned Interventions:  [X]               Self Care Training                  [X]        Therapeutic Activities  [X]               Functional Mobility Training    [X]        Cognitive Retraining  [X]               Therapeutic Exercises           [X]        Endurance Activities  [X]               Balance Training                   [ ]        Neuromuscular Re-Education  [ ]               Visual/Perceptual Training     [X]   Home Safety Training  [X]               Patient Education                 [X]        Family Training/Education  [ ]               Other (comment):     Frequency/Duration: Patient will be followed by occupational therapy 3 times a week to address goals. Discharge Recommendations: None  Further Equipment Recommendations for Discharge: none       SUBJECTIVE:   Patient stated No, I'm not standing.       OBJECTIVE DATA SUMMARY:   HISTORY:   Past Medical History:   Diagnosis Date    Arthritis      History of non-ST elevation myocardial infarction (NSTEMI) 11/28/2016     Past Surgical History:   Procedure Laterality Date    HX CHOLECYSTECTOMY            Prior Level of Function/Home Situation: lives in assisted living, per patient ambulates with rollator to and from dining uriostegui at home and dresses self, ?  If assist with bathing, likely has assist  Expanded or extensive additional review of patient history:      Home Situation  Home Environment: Assisted living  Living Alone: No  Support Systems: Home care staff, Family member(s)  Patient Expects to be Discharged to[de-identified] Assisted living  Current DME Used/Available at Home: odell Oreilly  [ ]  Right hand dominant   [ ]  Left hand dominant     EXAMINATION OF PERFORMANCE DEFICITS:  Cognitive/Behavioral Status:  Neurologic State: Alert  Orientation Level: Disoriented to time;Disoriented to situation;Disoriented to place;Oriented to person  Cognition: Follows commands; Appropriate for age attention/concentration;Memory loss  Perception: Appears intact  Perseveration: No perseveration noted  Safety/Judgement: Awareness of environment; Fall prevention;Home safety;Decreased insight into deficits     Skin: noted bruise right knee and 2nd toe on right foot     Edema: none     Hearing: Auditory  Auditory Impairment: None     Vision/Perceptual:                                Corrective Lenses: Glasses     Range of Motion:  AROM: Generally decreased, functional                          Strength:  Strength: Generally decreased, functional     Balance:  Sitting: Intact; Without support  Standing:  (unable to assess, refused)     Functional Mobility and Transfers for ADLs:  Bed Mobility:  Rolling: Independent  Supine to Sit: Modified independent  Sit to Supine: Modified independent  Scooting: Modified independent     Transfers:  Sit to Stand: Other (comment) (refused)  Bed to Chair: Minimum assistance; Other (comment) (per RN with rollator)  Toilet Transfer : Minimum assistance; Other (comment) (per RN with rollator)     ADL Assessment:  Feeding: Modified independent     Oral Facial Hygiene/Grooming: Setup     Bathing: Minimum assistance     Upper Body Dressing: Setup     Lower Body Dressing: Minimum assistance     Toileting: Maximum assistance; Other (comment) (incontinent bladder)                 ADL Intervention and task modifications:     Educated on role of OT, need to call for assist to go to bathroom and benefit of increased mobility/out of bed, instructed on use of call bell     Patient instructed and indicated understanding the benefits of maintaining activity tolerance, functional mobility, and independence with self care tasks during acute stay  to ensure safe return home and to baseline. Encouraged patient to increase frequency and duration OOB, be out of bed for all meals, perform daily ADLs (as approved by RN/MD regarding bathing etc), and performing functional mobility to/from bathroom. Cognitive Retraining  Safety/Judgement: Awareness of environment; Fall prevention;Home safety;Decreased insight into deficits        Functional Measure:  Barthel Index:      Bathin  Bladder: 0  Bowels: 10  Groomin  Dressin  Feeding: 10  Mobility: 0  Stairs: 0  Toilet Use: 5  Transfer (Bed to Chair and Back): 10  Total: 45         Barthel and G-code impairment scale:  Percentage of impairment CH  0% CI  1-19% CJ  20-39% CK  40-59% CL  60-79% CM  80-99% CN  100%   Barthel Score 0-100 100 99-80 79-60 59-40 20-39 1-19    0   Barthel Score 0-20 20 17-19 13-16 9-12 5-8 1-4 0      The Barthel ADL Index: Guidelines  1. The index should be used as a record of what a patient does, not as a record of what a patient could do. 2. The main aim is to establish degree of independence from any help, physical or verbal, however minor and for whatever reason. 3. The need for supervision renders the patient not independent. 4. A patient's performance should be established using the best available evidence. Asking the patient, friends/relatives and nurses are the usual sources, but direct observation and common sense are also important. However direct testing is not needed. 5. Usually the patient's performance over the preceding 24-48 hours is important, but occasionally longer periods will be relevant. 6. Middle categories imply that the patient supplies over 50 per cent of the effort. 7. Use of aids to be independent is allowed. Kandi Rachel., Barthel, D.W. ([id number]). Functional evaluation: the Barthel Index. [address] [facility] (14)2. Lorie Roche danny IRISH Bain, Rose Hurst.Mikaela.[place], [address] ().  Measuring the change indisability after inpatient rehabilitation; comparison of the responsiveness of the Barthel Index and Functional [place] Measure. Journal of Neurology, Neurosurgery, and Psychiatry, 66(4), 151-264. ALEJANDRO Saenz.TATYANA, MELIA Post, & Binu Hicks M.A. (2004.) Assessment of post-stroke quality of life in cost-effectiveness studies: The usefulness of the Barthel Index and the EuroQoL-5D. Quality of Life Research, 13, 407-31            G codes: In compliance with CMSs Claims Based Outcome Reporting, the following G-code set was chosen for this patient based on their primary functional limitation being treated: The outcome measure chosen to determine the severity of the functional limitation was the Barthel Index with a score of 45/100 which was correlated with the impairment scale. · Self Care:               - CURRENT STATUS:    CK - 40%-59% impaired, limited or restricted               - GOAL STATUS:           CJ - 20%-39% impaired, limited or restricted               - D/C STATUS:                       ---------------To be determined---------------      Occupational Therapy Evaluation Charge Determination   History Examination Decision-Making   LOW Complexity : Brief history review  LOW Complexity : 1-3 performance deficits relating to physical, cognitive , or psychosocial skils that result in activity limitations and / or participation restrictions  LOW Complexity : No comorbidities that affect functional and no verbal or physical assistance needed to complete eval tasks       Based on the above components, the patient evaluation is determined to be of the following complexity level: LOW   Pain:   no complaint of pain     Activity Tolerance:   Poor, refusing to stand or transfer to chair  Please refer to the flowsheet for vital signs taken during this treatment.   After treatment:   [ ] Patient left in no apparent distress sitting up in chair  [X] Patient left in no apparent distress in bed  [X] Call bell left within reach  [X] Nursing notified  [ ] Caregiver present  [ ] Bed alarm activated      COMMUNICATION/EDUCATION:   The patients plan of care was discussed with: Registered Nurse. [ ] Home safety education was provided and the patient/caregiver indicated understanding. [ ] Patient/family have participated as able in goal setting and plan of care. [ ] Patient/family agree to work toward stated goals and plan of care. [ ] Patient understands intent and goals of therapy, but is neutral about his/her participation. [X] Patient is unable to participate in goal setting and plan of care. This patients plan of care is appropriate for delegation to \[facility]\"".      Thank you for this referral.  Darshana Hernandez OTR/L  Time Calculation: 20 mins

## 2017-09-14 NOTE — PROGRESS NOTES
Hospitalist Progress Note  Raymond Sheriff MD  Office: [phone]        Date of Service:  2017  NAME:  Starr Covert  :  10/17/1927  MRN:  [id number]      Admission Summary: This is an 80-year-old woman with a past medical history significant for coronary artery disease,   dyslipidemia, hypertension, glaucoma, anxiety disorder, who was in   her usual state of health until the day of presentation at the emergency   room when the patient developed chest pain. Interval history / Subjective:   Doing ok. Still with some left sided rib discomfort. Denies actualCP. Assessment & Plan:     Chest pain, Non cardiac  - trop x 2 negative  -pt seen by cardio, no evidence for cardiac origin  -had CST  whoch wa neg  -as per cardio, cont asa and statin. No further w/u needed. F/u dr. Rubi Robertson o/pt  -does not appear to be GI related either. possibly musculoskeletal      Subdural hemorrhage   -CT head  very small L SDH  -per NSx, appears to be chronic. If acute then very tiny component. No intervention needed. NSx  rec med mgmt only. NSx s/o as of   -no asa for at least 2 weeks    RLE Acute DVT  -BLE U/S acute thrombosis popliteal, PT, peroneal  -s/p IVCF placed   -not candidate for a/c given small SDH    Dyslipidemia  -Pravastatin    Hypertension.   -Coreg    Glaucoma.    -cont eye drops    Anxiety disorder.   -cont prn klonopin 0/5mg qhs., may need to hold or even decr dose if having confusion at night  -can also consider poss prn seroquel if any issues with agitation    PT,OT  Then d/c planning thereafter    Code status: Full  DVT prophylaxis: SCD    Care Plan discussed with: Patient/Family and Nurse  Disposition: TBD     Hospital Problems  Date Reviewed: 2017          Codes Class Noted POA    * (Principal)Chest pain ICD-10-CM: R07.9  ICD-9-CM: 786.50  2017 Yes                Review of Systems:   A comprehensive review of systems was negative. Vital Signs:    Last 24hrs VS reviewed since prior progress note. Most recent are:  Visit Vitals    /64 (BP 1 Location: Left arm, BP Patient Position: At rest)    Pulse 93    Temp 97.4 °F (36.3 °C)    Resp 18    Ht 5' (1.524 m)    Wt 55.2 kg (121 lb 11.1 oz)    SpO2 100%    BMI 23.77 kg/m2         Intake/Output Summary (Last 24 hours) at 09/14/17 1499  Last data filed at 09/14/17 0448   Gross per 24 hour   Intake           1847.5 ml   Output                2 ml   Net           1845.5 ml        Physical Examination:             Constitutional:  No acute distress, cooperative, pleasant    ENT:  Oral mucous moist, oropharynx benign. Neck supple,    Resp:  CTA bilaterally. No wheezing/rhonchi/rales. No accessory muscle use   CV:  Regular rhythm, normal rate, no murmurs, gallops, rubs    GI:  Soft, non distended, non tender. normoactive bowel sounds, no hepatosplenomegaly     Musculoskeletal:  No edema, warm, 2+ pulses throughout    Neurologic:  Moves all extremities. AAOx3, CN II-XII reviewed     Psych:  Good insight, Not anxious nor agitated. Data Review:    I personally reviewed  Image and LABS      Labs:     Recent Labs      09/13/17   0620  09/12/17   1655   WBC  6.1  9.2   HGB  10.0*  10.2*   HCT  31.0*  30.9*   PLT  243  262     Recent Labs      09/13/17   0620  09/12/17   1655   NA  139  135*   K  4.5  4.7   CL  108  106   CO2  22  21   BUN  9  9   CREA  0.75  0.85   GLU  83  88   CA  8.5  8.5   MG  1.7   --    PHOS  3.8   --      Recent Labs      09/13/17   0620  09/12/17   1655   SGOT  12*  20   ALT  10*  10*   AP  177*  182*   TBILI  0.7  0.4   TP  4.9*  5.7*   ALB  2.2*  2.3*   GLOB  2.7  3.4   AML  19*   --    LPSE  111   --      No results for input(s): INR, PTP, APTT in the last 72 hours. No lab exists for component: INREXT, INREXT   No results for input(s): FE, TIBC, PSAT, FERR in the last 72 hours.    Lab Results   Component Value Date/Time Folate 5.8 03/08/2017 06:00 PM      No results for input(s): PH, PCO2, PO2 in the last 72 hours.   Recent Labs      09/13/17   0620  09/12/17   1655   TROIQ  <0.04  <0.04     Lab Results   Component Value Date/Time    Cholesterol, total 182 03/08/2017 06:00 PM    HDL Cholesterol 64 03/08/2017 06:00 PM    LDL, calculated 91.2 03/08/2017 06:00 PM    Triglyceride 134 03/08/2017 06:00 PM    CHOL/HDL Ratio 2.8 03/08/2017 06:00 PM     Lab Results   Component Value Date/Time    Glucose (POC) 94 11/20/2016 07:00 AM     Lab Results   Component Value Date/Time    Color DARK YELLOW 03/08/2017 06:25 PM    Appearance HAZY 03/08/2017 06:25 PM    Specific gravity 1.030 03/08/2017 06:25 PM    pH (UA) 6.0 03/08/2017 06:25 PM    Protein NEGATIVE  03/08/2017 06:25 PM    Glucose NEGATIVE  03/08/2017 06:25 PM    Ketone NEGATIVE  03/08/2017 06:25 PM    Bilirubin Negative 11/28/2016 12:00 AM    Urobilinogen 0.2 03/08/2017 06:25 PM    Nitrites NEGATIVE  03/08/2017 06:25 PM    Leukocyte Esterase NEGATIVE  03/08/2017 06:25 PM    Epithelial cells FEW 11/19/2016 11:54 PM    Bacteria Few 11/28/2016 12:00 AM    WBC >30 11/28/2016 12:00 AM    RBC 11-30 11/28/2016 12:00 AM         Medications Reviewed:     Current Facility-Administered Medications   Medication Dose Route Frequency    acetaminophen (TYLENOL) tablet 650 mg  650 mg Oral Q4H PRN    calcium carbonate (OS-DAVIAN) tablet 500 mg [elemental]  500 mg Oral TID WITH MEALS    carvedilol (COREG) tablet 6.25 mg  6.25 mg Oral BID WITH MEALS    clonazePAM (KlonoPIN) tablet 0.5 mg  0.5 mg Oral QHS PRN    docusate sodium (COLACE) capsule 100 mg  100 mg Oral BID    latanoprost (XALATAN) 0.005 % ophthalmic solution 1 Drop  1 Drop Both Eyes QHS    pantoprazole (PROTONIX) tablet 40 mg  40 mg Oral DAILY    polyethylene glycol (MIRALAX) packet 17 g  17 g Oral DAILY    pravastatin (PRAVACHOL) tablet 20 mg  20 mg Oral DAILY    0.9% sodium chloride infusion  75 mL/hr IntraVENous CONTINUOUS    ondansetron (ZOFRAN) injection 4 mg  4 mg IntraVENous Q4H PRN     ______________________________________________________________________  EXPECTED LENGTH OF STAY: 2d 7h  ACTUAL LENGTH OF STAY:          2                 Linda Askew MD

## 2017-09-20 ENCOUNTER — PATIENT OUTREACH (OUTPATIENT)
Dept: CARDIOLOGY CLINIC | Age: 82
End: 2017-09-20

## 2017-09-20 NOTE — PROGRESS NOTES
NN Note:    Patient was admitted to [facility] [place] 9/12-9/14 with chest pain. Penny Charlton is a 80 y.o. female   This patient was received as a referral from inpatient admission   Medium Risk            19       Total Score        4 IP Visits Last 12 Months (1-3=4, 4=9, >4=11)    15 Charlson Comorbidity Score (Age + Comorbid Conditions)        Criteria that do not apply:    Has Seen PCP in Last 6 Months (Yes=3, No=0)    . Living with Significant Other. Assisted Living. LTAC. SNF. or   Rehab    Patient Length of Stay (>5 days = 3)    Pt. Coverage (Medicare=5 , Medicaid, or Self-Pay=4)      Patient lives at [facility]. Spoke to patient's daughter Nazanin Bennett; patient is doing well since being discharged. She is followed by Dr. Ramirez at [facility] who saw her the day after she was discharged and will see her as needed. Reviewed all medications with Nazanin Bennett and they are up-to-date. Patient is being followed by [facility] and PT. Per Nazanin Bennett since the cardiologist in the hospital seemed to think her CP was non-cardiac related she does not want to follow up with cardiology at this time. She explained it takes a lot to take her mother out and she doesn't want to if she doesn't absolutely have to. She is comfortable with Dr. Ramirez following for now and agrees if she needs to be seen by cardiology she will schedule her. Gisela Degroots my contact information and informed her I would be checking in with her and her mom weekly over the next couple of weeks which she agreed to. Will continue to follow.      Medication Management:  good adherence and good understanding  Advance Care Planning:      Does patient have an Advance Directive:  yes        Community Services, Referrals, and Durable Medical Equipment: [facility]  and PT; walker    Follow up appointments:  Dr. Ramirez- seen 9/15/17  Current Outpatient Prescriptions   Medication Sig    latanoprost (XALATAN) 0.005 % ophthalmic solution Administer 1 Drop to both eyes nightly.  polyethylene glycol (MIRALAX) 17 gram/dose powder Take 17 g by mouth daily. 1 tablespoon with 8 oz of water daily    docusate sodium (COLACE) 100 mg capsule Take 1 Cap by mouth two (2) times a day for 90 days.  calcium carbonate (OS-DAVIAN) 500 mg calcium (1,250 mg) tablet Take 1 Tab by mouth three (3) times daily (with meals). Indications: hypocalcemia    cholecalciferol (VITAMIN D3) 1,000 unit tablet Take 5 Tabs by mouth daily.  acetaminophen (ACETAMINOPHEN EXTRA STRENGTH) 500 mg tablet Take 1,000 mg by mouth nightly.  pravastatin (PRAVACHOL) 20 mg tablet Take 20 mg by mouth daily.  carvedilol (COREG) 6.25 mg tablet Take 1 Tab by mouth two (2) times daily (with meals).  clonazePAM (KLONOPIN) 0.5 mg tablet Take 0.5 mg by mouth nightly as needed for Other (anxiety).  omeprazole (PRILOSEC) 20 mg capsule Take 20 mg by mouth daily. No current facility-administered medications for this visit.

## 2017-09-27 ENCOUNTER — PATIENT OUTREACH (OUTPATIENT)
Dept: CARDIOLOGY CLINIC | Age: 82
End: 2017-09-27

## 2017-09-27 NOTE — PROGRESS NOTES
NN Note:    Reached out to patient's daughter and she was with patient at time of call- was able to speak to both on speaker phone. Patient states \"I am doing just fine! \" Confirmed with patient and daughter she is still being followed by [facility] and PT. Patient is also still being followed by Dr. Sadaf Matthews and will continue to be seen by him as long as [facility]. Patient has no complaints at time of call. Gave the patient an opportunity for questions which she also had none. Advised the patient and daughter to call me with any concerns or assistance needed. Will continue to follow.

## 2017-10-04 ENCOUNTER — PATIENT OUTREACH (OUTPATIENT)
Dept: CARDIOLOGY CLINIC | Age: 82
End: 2017-10-04

## 2017-10-07 ENCOUNTER — APPOINTMENT (OUTPATIENT)
Dept: CT IMAGING | Age: 82
End: 2017-10-07
Attending: EMERGENCY MEDICINE
Payer: MEDICARE

## 2017-10-07 ENCOUNTER — HOSPITAL ENCOUNTER (EMERGENCY)
Age: 82
Discharge: HOME | End: 2017-10-07
Attending: EMERGENCY MEDICINE
Payer: MEDICARE

## 2017-10-07 ENCOUNTER — APPOINTMENT (OUTPATIENT)
Dept: GENERAL RADIOLOGY | Age: 82
End: 2017-10-07
Attending: EMERGENCY MEDICINE
Payer: MEDICARE

## 2017-10-07 VITALS
HEIGHT: 60 IN | OXYGEN SATURATION: 99 % | SYSTOLIC BLOOD PRESSURE: 116 MMHG | DIASTOLIC BLOOD PRESSURE: 88 MMHG | BODY MASS INDEX: 23.75 KG/M2 | WEIGHT: 121 LBS | TEMPERATURE: 97.8 F

## 2017-10-07 DIAGNOSIS — I82.401 ACUTE DEEP VEIN THROMBOSIS (DVT) OF RIGHT LOWER EXTREMITY, UNSPECIFIED VEIN (HCC): Primary | ICD-10-CM

## 2017-10-07 LAB
ALBUMIN SERPL-MCNC: 2.6 G/DL (ref 3.5–5)
ALBUMIN/GLOB SERPL: 0.8 {RATIO} (ref 1.1–2.2)
ALP SERPL-CCNC: 184 U/L (ref 45–117)
ALT SERPL-CCNC: 10 U/L (ref 12–78)
ANION GAP SERPL CALC-SCNC: 11 MMOL/L (ref 5–15)
APTT PPP: 28.5 SEC (ref 22.1–32.5)
AST SERPL-CCNC: 14 U/L (ref 15–37)
BASOPHILS # BLD: 0 K/UL (ref 0–0.1)
BASOPHILS NFR BLD: 0 % (ref 0–1)
BILIRUB SERPL-MCNC: 1.1 MG/DL (ref 0.2–1)
BUN SERPL-MCNC: 18 MG/DL (ref 6–20)
BUN/CREAT SERPL: 15 (ref 12–20)
CALCIUM SERPL-MCNC: 7.8 MG/DL (ref 8.5–10.1)
CHLORIDE SERPL-SCNC: 103 MMOL/L (ref 97–108)
CO2 SERPL-SCNC: 17 MMOL/L (ref 21–32)
CREAT SERPL-MCNC: 1.23 MG/DL (ref 0.55–1.02)
CRP SERPL-MCNC: 3.36 MG/DL (ref 0–0.6)
EOSINOPHIL # BLD: 0 K/UL (ref 0–0.4)
EOSINOPHIL NFR BLD: 0 % (ref 0–7)
ERYTHROCYTE [DISTWIDTH] IN BLOOD BY AUTOMATED COUNT: 13.9 % (ref 11.5–14.5)
GLOBULIN SER CALC-MCNC: 3.4 G/DL (ref 2–4)
GLUCOSE SERPL-MCNC: 100 MG/DL (ref 65–100)
HCT VFR BLD AUTO: 35 % (ref 35–47)
HGB BLD-MCNC: 11.7 G/DL (ref 11.5–16)
INR PPP: 1.1 (ref 0.9–1.1)
LYMPHOCYTES # BLD: 1.2 K/UL (ref 0.8–3.5)
LYMPHOCYTES NFR BLD: 10 % (ref 12–49)
MCH RBC QN AUTO: 33.3 PG (ref 26–34)
MCHC RBC AUTO-ENTMCNC: 33.4 G/DL (ref 30–36.5)
MCV RBC AUTO: 99.7 FL (ref 80–99)
MONOCYTES # BLD: 1 K/UL (ref 0–1)
MONOCYTES NFR BLD: 8 % (ref 5–13)
NEUTS SEG # BLD: 9.3 K/UL (ref 1.8–8)
NEUTS SEG NFR BLD: 82 % (ref 32–75)
PLATELET # BLD AUTO: 172 K/UL (ref 150–400)
POTASSIUM SERPL-SCNC: 5 MMOL/L (ref 3.5–5.1)
PROT SERPL-MCNC: 6 G/DL (ref 6.4–8.2)
PROTHROMBIN TIME: 11.5 SEC (ref 9–11.1)
RBC # BLD AUTO: 3.51 M/UL (ref 3.8–5.2)
SODIUM SERPL-SCNC: 131 MMOL/L (ref 136–145)
THERAPEUTIC RANGE,PTTT: NORMAL SECS (ref 58–77)
WBC # BLD AUTO: 11.5 K/UL (ref 3.6–11)

## 2017-10-07 PROCEDURE — 93971 EXTREMITY STUDY: CPT

## 2017-10-07 PROCEDURE — 70450 CT HEAD/BRAIN W/O DYE: CPT

## 2017-10-07 PROCEDURE — 80053 COMPREHEN METABOLIC PANEL: CPT | Performed by: EMERGENCY MEDICINE

## 2017-10-07 PROCEDURE — 85730 THROMBOPLASTIN TIME PARTIAL: CPT | Performed by: EMERGENCY MEDICINE

## 2017-10-07 PROCEDURE — 96372 THER/PROPH/DIAG INJ SC/IM: CPT

## 2017-10-07 PROCEDURE — 73610 X-RAY EXAM OF ANKLE: CPT

## 2017-10-07 PROCEDURE — 36415 COLL VENOUS BLD VENIPUNCTURE: CPT | Performed by: EMERGENCY MEDICINE

## 2017-10-07 PROCEDURE — 73590 X-RAY EXAM OF LOWER LEG: CPT

## 2017-10-07 PROCEDURE — 99284 EMERGENCY DEPT VISIT MOD MDM: CPT

## 2017-10-07 PROCEDURE — 74011250636 HC RX REV CODE- 250/636: Performed by: EMERGENCY MEDICINE

## 2017-10-07 PROCEDURE — 85610 PROTHROMBIN TIME: CPT | Performed by: EMERGENCY MEDICINE

## 2017-10-07 PROCEDURE — 85025 COMPLETE CBC W/AUTO DIFF WBC: CPT | Performed by: EMERGENCY MEDICINE

## 2017-10-07 PROCEDURE — 86140 C-REACTIVE PROTEIN: CPT | Performed by: EMERGENCY MEDICINE

## 2017-10-07 PROCEDURE — 73562 X-RAY EXAM OF KNEE 3: CPT

## 2017-10-07 RX ORDER — ENOXAPARIN SODIUM 100 MG/ML
60 INJECTION SUBCUTANEOUS EVERY 12 HOURS
Qty: 60 SYRINGE | Refills: 0 | Status: ON HOLD | OUTPATIENT
Start: 2017-10-07 | End: 2018-11-02

## 2017-10-07 RX ORDER — POTASSIUM CHLORIDE 1500 MG/1
20 TABLET, FILM COATED, EXTENDED RELEASE ORAL
Status: ON HOLD | COMMUNITY
End: 2018-11-02

## 2017-10-07 RX ORDER — ENOXAPARIN SODIUM 100 MG/ML
60 INJECTION SUBCUTANEOUS
Status: COMPLETED | OUTPATIENT
Start: 2017-10-07 | End: 2017-10-07

## 2017-10-07 RX ORDER — ENOXAPARIN SODIUM 100 MG/ML
60 INJECTION SUBCUTANEOUS EVERY 12 HOURS
Qty: 1 SYRINGE | Refills: 0 | Status: SHIPPED | OUTPATIENT
Start: 2017-10-07 | End: 2017-10-07

## 2017-10-07 RX ORDER — GUAIFENESIN 100 MG/5ML
81 LIQUID (ML) ORAL DAILY
Status: ON HOLD | COMMUNITY
End: 2018-11-02

## 2017-10-07 RX ORDER — ENOXAPARIN SODIUM 100 MG/ML
1 INJECTION SUBCUTANEOUS
Status: DISCONTINUED | OUTPATIENT
Start: 2017-10-07 | End: 2017-10-07

## 2017-10-07 RX ORDER — DOCUSATE SODIUM 100 MG/1
100 CAPSULE, LIQUID FILLED ORAL
COMMUNITY
End: 2018-11-07

## 2017-10-07 RX ORDER — OXYBUTYNIN CHLORIDE 10 MG/1
10 TABLET, EXTENDED RELEASE ORAL DAILY
Status: ON HOLD | COMMUNITY
End: 2018-11-02 | Stop reason: DRUGHIGH

## 2017-10-07 RX ORDER — MELATONIN
1000 DAILY
COMMUNITY

## 2017-10-07 RX ORDER — ACETAMINOPHEN 500 MG
500 TABLET ORAL EVERY 8 HOURS
Status: ON HOLD | COMMUNITY
End: 2018-11-02 | Stop reason: DRUGHIGH

## 2017-10-07 RX ORDER — CLONAZEPAM 0.5 MG/1
0.25 TABLET ORAL
Status: ON HOLD | COMMUNITY
End: 2018-11-02

## 2017-10-07 RX ADMIN — SODIUM CHLORIDE 1000 ML: 900 INJECTION, SOLUTION INTRAVENOUS at 18:39

## 2017-10-07 RX ADMIN — ENOXAPARIN SODIUM 60 MG: 60 INJECTION SUBCUTANEOUS at 18:48

## 2017-10-07 NOTE — PROCEDURES
[facility]  *** FINAL REPORT ***    Name: Chance Morrow  MRN: [id number]  : 17 Oct 1927  HIS Order #: [id number]  [address] [facility] Visit #: [id number]  Date: 07 Oct 2017    TYPE OF TEST: Peripheral Venous Testing    REASON FOR TEST  Pain in limb, Limb swelling, RLE DVT with IVC filter placement  2017. Right Leg:-  Deep venous thrombosis:           Yes  Proximal extent of thrombus:      Common Femoral  Superficial venous thrombosis:    Yes  Deep venous insufficiency:        Not examined  Superficial venous insufficiency: Not examined      INTERPRETATION/FINDINGS  PROCEDURE:  Color duplex ultrasound imaging of lower extremity veins. FINDINGS:       Right:  Consistent with thrombosis involving the common femoral,  femoral, deep femoral, popliteal, gastroc, posterior tibial and  greater saphenous as demonstrated by vein non-compressibility, and by  a narrowing or occlusion of the flow channel on color Doppler imaging. The peroneal vein was not visualized. Left:   The common femoral vein is patent and without evidence of   thrombus. Phasic flow is observed. This extremity was not otherwise   evaluated. IMPRESSION:  Evidence of extensive right lower extremity thrombosis,  as described above. In comparison to the previous exam on 2017,   thrombus has now extended to the groin. ADDITIONAL COMMENTS    I have personally reviewed the data relevant to the interpretation of  this  study.     TECHNOLOGIST: Clyde Garza RVT  Signed: 10/07/2017 01:48 PM    PHYSICIAN: Megan Kenney MD  Signed: 10/09/2017 07:55 AM

## 2017-10-07 NOTE — ED NOTES
Discharge instructions discussed with patient and family. Pt and family able to restate dc instructions. All questions answered. Pt stable for discharge.

## 2017-10-07 NOTE — ED PROVIDER NOTES
HPI Comments: 80 y.o. female with past medical history significant for history of non-ST elevation myocardial infarction and arthritis who presents from [facility] with chief complaint of leg swelling. Per pt, she experienced a GLF on October 4th, 2017 which resulted in moderate pain to her RLE with ecchymosis and abrasion over her knee. Since, the pt states she has experienced ongoing pain and increased swelling to her RLE. She reports that staff at [facility] suggested she be seen in the ED today due to her worsening LE swelling. Per pt, she ambulates per usual with a walker, which has been unchanged since her fall on the fourth of October. When asked if the pt is currently on blood thinning medication, the pt states that she is not. Currently while in the ED, the pt rates her LE pain 8/10. She adds, that she has experienced some nausea along with her swelling/pain however has not experienced any vomiting. The pt denies fever, chills, diarrhea, CP, SOB, abd pain, dizziness, headache and urinary symptoms. There are no other acute medical concerns at this time. Social hx: Non-smoker, Current ETOH use  PCP: Cecy Herndon MD    Note written by Markus Marin, as dictated by Rolando Musa MD 1:10 PM          The history is provided by the patient. Past Medical History:   Diagnosis Date    Arthritis     History of non-ST elevation myocardial infarction (NSTEMI) 11/28/2016       Past Surgical History:   Procedure Laterality Date    HX CHOLECYSTECTOMY           No family history on file. Social History     Social History    Marital status:      Spouse name: N/A    Number of children: N/A    Years of education: N/A     Occupational History    Not on file.      Social History Main Topics    Smoking status: Never Smoker    Smokeless tobacco: Never Used    Alcohol use Yes      Comment: a couple of scotch drinks daily    Drug use: No    Sexual activity: Not on file     Other Topics Concern    Not on file     Social History Narrative         ALLERGIES: Codeine and Prednisone    Review of Systems   Constitutional: Negative. Negative for chills and fever. HENT: Negative. Negative for congestion, rhinorrhea and sore throat. Eyes: Negative. Negative for photophobia and visual disturbance. Respiratory: Negative. Negative for cough and shortness of breath. Cardiovascular: Positive for leg swelling (increase in RLE swelling since suffering a GLF on October 4th, 2017 ). Negative for chest pain. Gastrointestinal: Positive for diarrhea and nausea (mild). Negative for abdominal pain and vomiting. Endocrine: Negative. Genitourinary: Negative. Negative for difficulty urinating, dysuria, frequency and hematuria. Musculoskeletal: Positive for arthralgias (pain to right knee since suffering GLF on October 4th, 2017) and myalgias ( pain to RLE since October 4th, 2017). Skin: Positive for color change (ecchymosis present over right knee and throughout RLE). Allergic/Immunologic: Negative. Neurological: Negative. Negative for dizziness, weakness, light-headedness, numbness and headaches. Hematological: Negative. Psychiatric/Behavioral: Negative. All other systems reviewed and are negative. There were no vitals filed for this visit. Physical Exam   Nursing note and vitals reviewed. CONSTITUTIONAL: Well-appearing; well-nourished; in no apparent distress  HEAD: Normocephalic; atraumatic  EYES: PERRL; EOM intact; conjunctiva and sclera are clear bilaterally. ENT: No rhinorrhea; normal pharynx with no tonsillar hypertrophy; mucous membranes pink/moist, no erythema, no exudate. NECK: Supple; non-tender; no cervical lymphadenopathy  CARD: Normal S1, S2; no murmurs, rubs, or gallops. Regular rate and rhythm. RESP: Normal respiratory effort; breath sounds clear and equal bilaterally; no wheezes, rhonchi, or rales.   ABD: Normal bowel sounds; non-distended; non-tender; no palpable organomegaly, no masses, no bruits. Back Exam: Normal inspection; no vertebral point tenderness, no CVA tenderness. Normal range of motion. EXT: asymmetrical swelling after right knee greater than the left knee. Normal range of motion; tenderness on palpation; no deformity; neurovascular intact. SKIN: Warm; dry; ecchymosis with an abrasion to the right knee; swelling and tenderness on palpation. NEURO:Alert and oriented x 3, coherent, RICHARDSON-XII grossly intact, sensory and motor are non-focal.        MDM  Number of Diagnoses or Management Options  Acute deep vein thrombosis (DVT) of right lower extremity, unspecified vein [facility]):   Diagnosis management comments: Assessment: 45-year-old female, status post fall with right leg injury is swelling and tenderness. The patient has a previous history of head injury with subdural hematoma and DVT of the right leg. She was not a candidate for anticoagulation, but was treated with [place] filter. . She appears hemodynamically stable. The patient will need evaluation status post fall or head injury/ low extremity and is rule out fracture/ DVT      Plan: CT scan of the head/ x-ray of the right knee, tib-fib, and ankle/ Doppler of the right leg/ Monitor and Reevaluate.          Amount and/or Complexity of Data Reviewed  Clinical lab tests: ordered and reviewed  Tests in the radiology section of CPT®: reviewed and ordered  Tests in the medicine section of CPT®: ordered and reviewed  Discussion of test results with the performing providers: yes  Decide to obtain previous medical records or to obtain history from someone other than the patient: yes  Obtain history from someone other than the patient: yes  Review and summarize past medical records: yes  Discuss the patient with other providers: yes  Independent visualization of images, tracings, or specimens: yes      ED Course       Procedures     XRAY INTERPRETATION (ED MD)  Xray of Right knee shows no fracture. No subluxation/dislocation. No bony abnormality. Tiffanie Lam MD 2:47 PM    XRAY INTERPRETATION (ED MD)  Xray of Right ankle shows no fracture. No subluxation/dislocation. No bony abnormality. Tiffanie Lam MD 03:00 PM        XRAY INTERPRETATION (ED MD)  Xray of Tib/Fib shows no fracture. No subluxation/dislocation. No bony abnormality. Tiffanie Lam MD 2:47 PM    CONSULT NOTE:  Tiffanie Lam MD spoke with Dr. Renetta Kennedy of Neurosurgery. Discussed patient's presentation, history, physical assessment, and available diagnostic results. Recommend CT scan of the head  And if negative patient can be anticoagulated. 04:00 PM    CONSULT NOTE:  Tiffanie Lam MD spoke with Dr. Marian Ace of vascular surgery. Discussed patient's presentation, history, physical assessment, and available diagnostic results. Discussed risks and benefits of anticoagulation and states that the only medication and procedure available to the patient is anticoagulation at this time. The patient is not a candidate for surgical intervention. 04:00 PM    Progress Note:   Pt has been reexamined by Tiffanie Lam MD. Pt is feeling slightly better. Symptoms have improved. All available results have been reviewed with pt and any available family. The patient had worsening thrombosis of her right leg. Neurosurgery and vascular surgery were consulted to discuss risks of anticoagulation and treatment. Pt and family understands sx, dx, and tx in ED. Discussed with the family the pros and cons of anticoagulation and the increased risk of bleeding. Family/ Pt would like to proceed with anticoagulation. Care plan has been outlined and questions have been answered. Pt is ready to go home. Will send home on contusion of the right leg/ DVT right leg. Prescription of Lovenox. Outpatient referral with PCP as needed. Written by Tiffanie Lam MD,2:47 PM    .   .

## 2017-10-07 NOTE — DISCHARGE INSTRUCTIONS
We hope that we have addressed all of your medical concerns. The examination and treatment you received in the Emergency Department were for an emergent problem and were not intended as complete care. It is important that you follow up with your healthcare provider(s) for ongoing care. If your symptoms worsen or do not improve as expected, and you are unable to reach your usual health care provider(s), you should return to the Emergency Department. Today's healthcare is undergoing tremendous change, and patient satisfaction surveys are one of the many tools to assess the quality of medical care. You may receive a survey from the [employer] regarding your experience in the Emergency Department. I hope that your experience has been completely positive, particularly the medical care that I provided. As such, please participate in the survey; anything less than excellent does not meet my expectations or intentions. [address] [place] [address] and [address] [place] [address] participate in nationally recognized quality of care measures. If your blood pressure is greater than 120/80, as reported below, we urge that you seek medical care to address the potential of high blood pressure, commonly known as hypertension. Hypertension can be hereditary or can be caused by certain medical conditions, pain, stress, or \"white coat syndrome. \"       Please make an appointment with your health care provider(s) for follow up of your Emergency Department visit. VITALS:   Patient Vitals for the past 8 hrs:   Temp BP SpO2   10/07/17 1315 - 105/54 90 %   10/07/17 1302 97.8 °F (36.6 °C) - -          Thank you for allowing us to provide you with medical care today. We realize that you have many choices for your emergency care needs. Please choose us in the future for any continued health care needs. Tmi Kahn MD    [facility]. Office: [phone]            Recent Results (from the past 24 hour(s))   C REACTIVE PROTEIN, QT    Collection Time: 10/07/17  1:16 PM   Result Value Ref Range    C-Reactive protein 3.36 (H) 0.00 - 0.60 mg/dL   CBC WITH AUTOMATED DIFF    Collection Time: 10/07/17  1:16 PM   Result Value Ref Range    WBC 11.5 (H) 3.6 - 11.0 K/uL    RBC 3.51 (L) 3.80 - 5.20 M/uL    HGB 11.7 11.5 - 16.0 g/dL    HCT 35.0 35.0 - 47.0 %    MCV 99.7 (H) 80.0 - 99.0 FL    MCH 33.3 26.0 - 34.0 PG    MCHC 33.4 30.0 - 36.5 g/dL    RDW 13.9 11.5 - 14.5 %    PLATELET 000 902 - 611 K/uL    NEUTROPHILS 82 (H) 32 - 75 %    LYMPHOCYTES 10 (L) 12 - 49 %    MONOCYTES 8 5 - 13 %    EOSINOPHILS 0 0 - 7 %    BASOPHILS 0 0 - 1 %    ABS. NEUTROPHILS 9.3 (H) 1.8 - 8.0 K/UL    ABS. LYMPHOCYTES 1.2 0.8 - 3.5 K/UL    ABS. MONOCYTES 1.0 0.0 - 1.0 K/UL    ABS. EOSINOPHILS 0.0 0.0 - 0.4 K/UL    ABS. BASOPHILS 0.0 0.0 - 0.1 K/UL   METABOLIC PANEL, COMPREHENSIVE    Collection Time: 10/07/17  1:16 PM   Result Value Ref Range    Sodium 131 (L) 136 - 145 mmol/L    Potassium 5.0 3.5 - 5.1 mmol/L    Chloride 103 97 - 108 mmol/L    CO2 17 (L) 21 - 32 mmol/L    Anion gap 11 5 - 15 mmol/L    Glucose 100 65 - 100 mg/dL    BUN 18 6 - 20 MG/DL    Creatinine 1.23 (H) 0.55 - 1.02 MG/DL    BUN/Creatinine ratio 15 12 - 20      GFR est AA 50 (L) >60 ml/min/1.73m2    GFR est non-AA 41 (L) >60 ml/min/1.73m2    Calcium 7.8 (L) 8.5 - 10.1 MG/DL    Bilirubin, total 1.1 (H) 0.2 - 1.0 MG/DL    ALT (SGPT) 10 (L) 12 - 78 U/L    AST (SGOT) 14 (L) 15 - 37 U/L    Alk.  phosphatase 184 (H) 45 - 117 U/L    Protein, total 6.0 (L) 6.4 - 8.2 g/dL    Albumin 2.6 (L) 3.5 - 5.0 g/dL    Globulin 3.4 2.0 - 4.0 g/dL    A-G Ratio 0.8 (L) 1.1 - 2.2     PROTHROMBIN TIME + INR    Collection Time: 10/07/17  1:16 PM   Result Value Ref Range    INR 1.1 0.9 - 1.1      Prothrombin time 11.5 (H) 9.0 - 11.1 sec   PTT    Collection Time: 10/07/17  1:16 PM   Result Value Ref Range    aPTT 28.5 22.1 - 32.5 sec aPTT, therapeutic range     58.0 - 77.0 SECS       Xr Tib/fib Lt    Result Date: 10/7/2017  EXAM:  XR TIB/FIB LT INDICATION: Right leg bruising, swelling, and pain after a fall on 10/4/2017. Associated abrasion. COMPARISON: None. FINDINGS: AP and lateral  views of the left tibia and fibula demonstrate no displaced fracture. There is diffuse osteopenia. Soft tissue swelling is predominantly anterior. IMPRESSION: Osteopenic right lower extremity. No fracture seen. If there are symptoms referable to the ankle or knee, dedicated radiographs of those the locations would be recommended. Xr Ankle Rt Min 3 V    Result Date: 10/7/2017  EXAM:  XR ANKLE RT MIN 3 V INDICATION:  Right ankle pain following trauma. COMPARISON: None. FINDINGS: Three views of the right ankle demonstrate no fracture or disruption of the ankle mortise. The lateral view is rotated. There are degenerative changes of the subtalar joint. There is no other acute osseous or articular abnormality. There is soft tissue swelling. The bones are very osteopenic. IMPRESSION: No fracture or acute osseous abnormality identified. Ct Head Wo Cont    Addendum Date: 10/7/2017    Addendum: There is no acute intracranial hemorrhage. There is a tiny low-attenuation left frontal extra-axial collection visible only on the coronal images. Result Date: 10/7/2017  EXAM:  CT HEAD WITHOUT CONTRAST INDICATION: Fall. COMPARISON: 9/12/2017. CONTRAST: None. TECHNIQUE: Unenhanced CT of the head was performed using 5 mm images. Brain and bone windows were generated. Sagittal and coronal reformations were generated. CT dose reduction was achieved through use of a standardized protocol tailored for this examination and automatic exposure control for dose modulation. CT dose reduction was achieved through use of a standardized protocol tailored for this examination and automatic exposure control for dose modulation.  Adaptive statistical iterative reconstruction (ASIR) was utilized for this examination. FINDINGS: The ventricles and sulci are enlarged in a generalized fashion consistent with volume loss. There is atherosclerotic calcification of the carotid arteries with decreased attenuation in the periventricular white matter which is nonspecific but consistent with small vessel disease and unchanged. There is no intracranial hemorrhage. There is no extra-axial collection, mass, mass effect or midline shift. The basilar cisterns are open. No acute infarct is identified. The bone windows demonstrate no abnormalities. The visualized portions of the paranasal sinuses and mastoid air cells are clear. IMPRESSION: No acute intracranial abnormality. Atherosclerosis with microvascular disease and age-related volume loss unchanged compared with several weeks ago. Xr Knee Rt 3 V    Result Date: 10/7/2017  EXAM:  XR KNEE RT 3 V INDICATION: Right knee pain after fall COMPARISON: None. FINDINGS: Three views of the right knee demonstrate tricompartmental osteoarthritis, with the joint space narrowing most pronounced in the lateral compartment. There is a moderate joint effusion. Vascular calcification is seen. IMPRESSION:  Moderate joint effusion. Tricompartmental osteoarthritis. No fracture identified. Learning About Deep Vein Thrombosis  What is deep vein thrombosis? A deep vein thrombosis (DVT) is a blood clot in certain veins of the legs, pelvis, or arms. The clot is usually in the legs. DVT may damage the vein and cause the area to ache, swell, and change color. DVT also can lead to sores. DVT in these veins needs to be treated because the clots can get bigger, break loose, and travel through the bloodstream to the lungs. A blood clot in a lung can cause death. Blood clots can form in the veins when you are not active for a long period of time.  For example, they can form if you need to stay in bed because of a health problem or must sit for a long time on an airplane or in a car. Surgery or an injury can damage your blood vessels and cause a clot to form. Cancer also can cause DVT. And some people have blood that clots too easily, which is a problem that may run in families. A risk factor is something that makes you more likely to develop a disease. Here are some major risk factors for DVT:  · You have surgery. · You have to stay in bed for more than 3 days (such as in the hospital). · Your blood is likely to clot because of an injury, cancer, or inherited condition. Here are some minor risk factors for DVT:  · You take birth control hormones. · You are pregnant. · You are in a car or airplane for a long trip. What are the symptoms? Symptoms of DVT may include:  · Swelling in the affected area. · Redness and warmth in the affected area. · Pain or tenderness. You may have pain only when you touch the affected area or when you stand or walk. If your doctor thinks you may have DVT, you will probably have an ultrasound test. You may have other tests as well. How can you prevent DVT? · Exercise your lower leg muscles to help blood flow in your legs. Point your toes up toward your head so the calves of your legs are stretched, then relax and repeat. This is a good exercise to do when you are sitting for long periods of time. · Get out of bed as soon as you can after an illness or surgery. If you need to stay in bed, do the leg exercise noted above every hour when you are awake. · Use special stockings called compression stockings. These stockings are tight at the feet with a gradually looser fit on the leg. Many doctors recommend that you wear compression stockings during a journey longer than 8 hours. · Take breaks when you are on long trips. Stop the car and walk around. On long airplane flights, walk up and down the aisle hourly, flex and point your feet every 20 minutes while sitting, and drink plenty of water.   · Take blood-thinning medicines after some types of surgery if your doctor recommends it. Blood thinners also may be used if you are likely to develop clots. How is DVT treated? Treatment for DVT usually involves taking blood thinners. These medicines are given through a vein (intravenously, or IV) or as a pill. Talk with your doctor about which medicine is right for you. Your doctor also may suggest that you prop up or elevate your leg when possible, take walks, and wear compression stockings. These measures may help reduce the pain and swelling that can happen with DVT. Follow-up care is a key part of your treatment and safety. Be sure to make and go to all appointments, and call your doctor if you are having problems. It's also a good idea to know your test results and keep a list of the medicines you take. Where can you learn more? Go to http://nicanor-alma.info/. Enter F485 in the search box to learn more about \"Learning About Deep Vein Thrombosis. \"  Current as of: March 20, 2017  Content Version: 11.3  © 7282-0562 [employer]. Care instructions adapted under license by [employer] (which disclaims liability or warranty for this information). If you have questions about a medical condition or this instruction, always ask your healthcare professional. [address] any warranty or liability for your use of this information.

## 2017-10-11 ENCOUNTER — PATIENT OUTREACH (OUTPATIENT)
Dept: CARDIOLOGY CLINIC | Age: 82
End: 2017-10-11

## 2017-10-11 NOTE — PROGRESS NOTES
NN Note:      Called and spoke to patient's daughter, Ms. Charla Jiménez. Per Ms. Ontiveros, patient was seen in the ED on 10/7. 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 Patient had IVCF placed. Since then, the DVT had moved up into her groin causing leg swelling which sent her to the ER. The patient is seen by Dr. Valeri Osgood at [facility] and he is watching her closely per the patient's daughter. She stated that the patient has been having diarhea since being started on Lovenox and her leg is still very swollen. Dr. Valeri Osgood saw patient today (10/11) and is going to monitor the leg for a couple more days hoping for improvement. Per patient's daughter if it does not improve they will send her back to [facility] [place] [facility]. Daughter has my contact information and I will continue to follow.

## 2017-10-18 ENCOUNTER — PATIENT OUTREACH (OUTPATIENT)
Dept: CARDIOLOGY CLINIC | Age: 82
End: 2017-10-18

## 2017-10-18 NOTE — PROGRESS NOTES
NN Note:    Reached out to patient's daughter, Ms. Gaurav Estrada. Per Ms. Ontiveros her mom's leg has improved. It is still slightly swollen but she stated it is much better compared to last week. Dr. Aidan Ward is still following her closely. Patient is still receiving Lovenox. Ms. Gaurav Estrada had no questions/concerns at time of call and she has my contact information for any needs.

## 2018-03-08 ENCOUNTER — OFFICE VISIT (OUTPATIENT)
Dept: DERMATOLOGY | Facility: AMBULATORY SURGERY CENTER | Age: 83
End: 2018-03-08

## 2018-03-08 ENCOUNTER — HOSPITAL ENCOUNTER (OUTPATIENT)
Dept: LAB | Age: 83
Discharge: HOME | End: 2018-03-08

## 2018-03-08 VITALS
OXYGEN SATURATION: 97 % | WEIGHT: 121 LBS | SYSTOLIC BLOOD PRESSURE: 118 MMHG | HEIGHT: 60 IN | HEART RATE: 71 BPM | RESPIRATION RATE: 16 BRPM | TEMPERATURE: 97.9 F | BODY MASS INDEX: 23.75 KG/M2 | DIASTOLIC BLOOD PRESSURE: 60 MMHG

## 2018-03-08 DIAGNOSIS — L73.8 SEBACEOUS HYPERPLASIA OF FACE: ICD-10-CM

## 2018-03-08 DIAGNOSIS — L82.1 OTHER SEBORRHEIC KERATOSIS: ICD-10-CM

## 2018-03-08 DIAGNOSIS — D48.5 NEOPLASM OF UNCERTAIN BEHAVIOR OF SKIN OF SHOULDER: Primary | ICD-10-CM

## 2018-03-08 RX ORDER — ZINC GLUCONATE 10 MG
LOZENGE ORAL
Status: ON HOLD | COMMUNITY
End: 2018-11-02

## 2018-03-08 RX ORDER — CARVEDILOL 3.12 MG/1
3.12 TABLET ORAL 2 TIMES DAILY WITH MEALS
COMMUNITY

## 2018-03-08 RX ORDER — LANOLIN ALCOHOL/MO/W.PET/CERES
100 CREAM (GRAM) TOPICAL DAILY
COMMUNITY

## 2018-03-08 RX ORDER — MIRTAZAPINE 7.5 MG/1
30 TABLET, FILM COATED ORAL
Status: ON HOLD | COMMUNITY
End: 2018-11-02 | Stop reason: DRUGHIGH

## 2018-03-08 NOTE — PROGRESS NOTES
Written by Brandon Sanchez, as dictated by Niall Christina, [address]. Name: Deidra Red       Age: 80 y.o. Date: 3/8/2018    Chief Complaint:   Chief Complaint   Patient presents with    Skin Exam     Right shoulder       Subjective:    HPI:  Ms.. Deidra Red is a 80 y.o. female who presents for the evaluation of a lesion on the right shoulder. The patient was referred by Dr. Edison Garcia for this evaluation. She states that the lesion appeared 10 years ago. The patient has not had prior treatment for this lesion. Associated symptoms include itching, scabbing, bleeding, and crusting. ROS: Consitutional: Negative  Dermatological : positive for - skin lesion changes      Social History     Social History    Marital status:      Spouse name: N/A    Number of children: N/A    Years of education: N/A     Occupational History    Not on file. Social History Main Topics    Smoking status: Never Smoker    Smokeless tobacco: Never Used    Alcohol use Yes      Comment: a couple of scotch drinks daily    Drug use: No    Sexual activity: Not on file     Other Topics Concern    Not on file     Social History Narrative       History reviewed. No pertinent family history. Past Medical History:   Diagnosis Date    Arthritis     History of non-ST elevation myocardial infarction (NSTEMI) 11/28/2016       Past Surgical History:   Procedure Laterality Date    HX CHOLECYSTECTOMY         Current Outpatient Prescriptions   Medication Sig Dispense Refill    carvedilol (COREG) 3.125 mg tablet Take  by mouth two (2) times daily (with meals).  Bifidobacterium Infantis (ALIGN) 4 mg cap Take  by mouth.  carbamide peroxide (DEBROX) 6.5 % otic solution Administer 5 Drops into each ear two (2) times a day.  magnesium 250 mg tab Take  by mouth.  mirtazapine (REMERON) 7.5 mg tablet Take 7.5 mg by mouth nightly.  thiamine (B-1) 100 mg tablet Take  by mouth daily.       acetaminophen (TYLENOL) 500 mg tablet Take 500 mg by mouth every eight (8) hours.  clonazePAM (KLONOPIN) 0.5 mg tablet Take 0.25 mg by mouth nightly as needed for Other (ANXIETY).  latanoprost (XALATAN) 0.005 % ophthalmic solution Administer 1 Drop to both eyes nightly.  cholecalciferol (VITAMIN D3) 1,000 unit tablet Take 1,000 Units by mouth daily.  docusate sodium (COLACE) 100 mg capsule Take 100 mg by mouth two (2) times daily as needed for Constipation.  potassium chloride SR (K-TAB) 20 mEq tablet Take 20 mEq by mouth daily (with lunch).  oxybutynin chloride XL (DITROPAN XL) 10 mg CR tablet Take 10 mg by mouth daily.  aspirin 81 mg chewable tablet Take 81 mg by mouth daily.  enoxaparin (LOVENOX) 60 mg/0.6 mL injection 60 mg by SubCUTAneous route every twelve (12) hours. Indications: deep venous thrombosis 60 Syringe 0    carvedilol (COREG) 6.25 mg tablet Take 1 Tab by mouth two (2) times daily (with meals). 60 Tab 0    omeprazole (PRILOSEC) 20 mg capsule Take 20 mg by mouth daily. Allergies   Allergen Reactions    Codeine Unknown (comments)    Prednisone Unknown (comments)         Objective:    Visit Vitals    /60 (BP 1 Location: Right arm, BP Patient Position: Sitting)    Pulse 71    Temp 97.9 °F (36.6 °C) (Oral)    Resp 16    Ht 5' (1.524 m)    Wt 121 lb (54.9 kg)    SpO2 97%    BMI 23.63 kg/m2       Lexii Cardenas is a 80 y.o. female who appears well and in no distress. She is awake, alert, and oriented. There is no preauricular, submandibular, or cervical lymphadenopathy. A limited skin examination was completed including her right shoulder and face. She has a 14 x 9 mm hemorrhagic crusted papule on her right shoulder concerning for basal cell carcinoma. She has pink/yellow papules on her face consistent with sebaceous hyperplasia. She has seborrheic keratoses. Assessment/Plan:    1.  Neoplasm of Uncertain Behavior, right shoulder, favor BCC. The differential diagnoses were discussed. A shave removal followed by curettage was advised to address this lesion with malignant destruction. The procedure was reviewed and verbal and written consent were obtained. The risks of pain, bleeding, infection, and scar and recurrence were discussed. I performed the procedure. The site was cleansed and anesthetized with 1% lidocaine with epinephrine 1:100,000. A shave removal was performed to remove the lesion in its clinical entirety followed by curettage of the base and 2 mm margin, resulting in a post curettage defect size of 18 x 13 mm. Drysol was used for hemostasis. The wound was bandaged and care reviewed. The specimen was sent to pathology. I will contact the patient with the results and any further treatment that may be necessary. 2. Sebaceous Hyperplasia, face. The diagnosis was discussed as well as the benign nature of this condition. The patient was reassured that no treatment is needed at this time. 3. Seborrheic keratoses. The diagnosis was reviewed and the patient was reassured that no treatment is needed for these benign lesions. This plan was reviewed with the patient and patient agrees. All questions were answered. This scribe documentation was reviewed by me and accurately reflects the examination and decisions made by me. [facility]   OFFICE PROCEDURE PROGRESS NOTE   Chart reviewed for the following:   I, Jimmey Kehr Donley Hazard, have reviewed the History, Physical and updated the Allergic reactions for Mateus Muckle. TIME OUT performed immediately prior to start of procedure:   IRosa, have performed the following reviews on Mateus Muckle   prior to the start of the procedure:     * Patient was identified by name and date of birth   * Agreement on procedure being performed was verified   * Risks and Benefits explained to the patient   * Procedure site verified and marked as necessary   * Patient was positioned for comfort   * Verbal consent was obtained    Time: 11:30 AM   Date of procedure: 3/8/2018  Procedure performed by: Dedra Mortimer.  Rosalino Foss, [address]  Provider assisted by: Smitha Cole MA    Patient assisted by: self   How tolerated by patient: tolerated the procedure well with no complications   Comments: none

## 2018-03-08 NOTE — MR AVS SNAPSHOT
[address] [facility] [address] 
[phone] Patient: Leonor Boucher MRN: [id number] :10/17/1927 Visit Information Date & Time Provider Department Dept. Phone Encounter #  
 3/8/2018 11:00 AM GRACY Chambers [phone] [id number] Upcoming Health Maintenance Date Due ZOSTER VACCINE AGE 60> 1987 GLAUCOMA SCREENING Q2Y 10/17/1992 Bone Densitometry (Dexa) Screening 10/17/1992 Pneumococcal 65+ Low/Medium Risk (1 of 2 - PCV13) 10/17/1992 Influenza Age 5 to Adult 2017 DTaP/Tdap/Td series (2 - Td) 2026 Allergies as of 3/8/2018  Review Complete On: 3/8/2018 By: Dottie Foreman LPN Severity Noted Reaction Type Reactions Codeine  2016    Unknown (comments) Prednisone  2017    Unknown (comments) Current Immunizations  Reviewed on 3/9/2017 Name Date Influenza Vaccine (Quad) PF 2016 10:04 AM  
 Tdap 2016 12:07 AM  
  
 Not reviewed this visit Vitals BP Pulse Temp Resp Height(growth percentile) Weight(growth percentile)  
 118/60 (BP 1 Location: Right arm, BP Patient Position: Sitting) 71 97.9 °F (36.6 °C) (Oral) 16 5' (1.524 m) 121 lb (54.9 kg) SpO2 BMI OB Status Smoking Status 97% 23.63 kg/m2 Postmenopausal Never Smoker BMI and BSA Data Body Mass Index Body Surface Area  
 23.63 kg/m 2 1.52 m 2 Preferred Pharmacy Pharmacy Name Phone [id number] [place] [address] [phone] Your Updated Medication List  
  
   
This list is accurate as of 3/8/18 11:19 AM.  Always use your most recent med list.  
  
  
  
  
 acetaminophen 500 mg tablet Commonly known as:  TYLENOL Take 500 mg by mouth every eight (8) hours. ALIGN 4 mg Cap Generic drug:  Bifidobacterium Infantis Take  by mouth. aspirin 81 mg chewable tablet Take 81 mg by mouth daily. * carvedilol 3.125 mg tablet Commonly known as:  Tory Loretta Take  by mouth two (2) times daily (with meals). * carvedilol 6.25 mg tablet Commonly known as:  Tory Loretta Take 1 Tab by mouth two (2) times daily (with meals). cholecalciferol 1,000 unit tablet Commonly known as:  VITAMIN D3 Take 1,000 Units by mouth daily. clonazePAM 0.5 mg tablet Commonly known as:  Suann Dallas Take 0.25 mg by mouth nightly as needed for Other (ANXIETY). DEBROX 6.5 % otic solution Generic drug:  carbamide peroxide Administer 5 Drops into each ear two (2) times a day. docusate sodium 100 mg capsule Commonly known as:  Elyssa Lynch Take 100 mg by mouth two (2) times daily as needed for Constipation. enoxaparin 60 mg/0.6 mL injection Commonly known as:  LOVENOX  
60 mg by SubCUTAneous route every twelve (12) hours. Indications: deep venous thrombosis  
  
 latanoprost 0.005 % ophthalmic solution Commonly known as:  Bonne Santana Administer 1 Drop to both eyes nightly.  
  
 magnesium 250 mg Tab Take  by mouth.  
  
 mirtazapine 7.5 mg tablet Commonly known as:  [place]way Dye Take 7.5 mg by mouth nightly. oxybutynin chloride XL 10 mg CR tablet Commonly known as:  DITROPAN XL Take 10 mg by mouth daily. potassium chloride SR 20 mEq tablet Commonly known as:  K-TAB Take 20 mEq by mouth daily (with lunch). PriLOSEC 20 mg capsule Generic drug:  omeprazole Take 20 mg by mouth daily. thiamine 100 mg tablet Commonly known as:  B-1 Take  by mouth daily. * Notice: This list has 2 medication(s) that are the same as other medications prescribed for you. Read the directions carefully, and ask your doctor or other care provider to review them with you. Patient Instructions Self Skin Exam and Sunscreens Early detection and treatment is essential in the treatment of all forms of skin cancer. If caught early, all forms of skin cancer are curable. In addition to your regular visits, you should perform a monthly skin examination. Over time, you become familiar with what is normally found on your skin and can identify new or suspicious spots. One of the screening tools you can use to assess your skin is to follow the ABCDEs: 
 
A= Asymmetry (One half is unlike the other half) B= Border (An irregular, scalloped or poorly defined edge) C= Color (Is varied from one area to another, has shades of tan, brown/ black,       white, red or blue) D= Diameter (Spots larger than 6mm or a pencil eraser) E= Evolving (New spots or one that is changing in size, shape, or color) A follow- up interval will be customized based on your history of skin cancer or level of skin damage and risk factors. In any case, if you notice a suspicious or new spot, an appointment should be arranged between regular visits. Everyone should use sunscreen and sun-safe practices, which is especially important for those with a personal or family history of skin cancer. Suggestions for this include: 1. Use daily moisturizers containing SPF 30 or higher. 2. Wear long sleeve clothing with UPF ratings and a broad-brimmed hat. 3. Apply sunscreen with SPF 30 or higher to all sun exposed areas if you are going to be in the sun. A broad spectrum UVA/ UVB sunscreen is best.  Dont forget to REAPPLY every two hours or more often if swimming or sweating! 4. Avoid outside activities during peak sun hours, especially in the summer (10am- 2pm). 5. DO NOT use tanning beds. Using sunscreen and sun-safe practices can help reduce the likelihood of developing skin cancer or additional skin cancers in those previously diagnosed. Introducing [address]!    
 [employer] introduces [employer] patient portal. Now you can access parts of your medical record, email your doctor's office, and request medication refills online. 1. In your internet browser, go to [email or web address] 2. Click on the First Time User? Click Here link in the Sign In box. You will see the New Member Sign Up page. 3. Enter your "[employer] Access Code exactly as it appears below. You will not need to use this code after youve completed the sign-up process. If you do not sign up before the expiration date, you must request a new code. · "[employer] Access Code: [email or web address] Expires: 6/6/2018 11:19 AM 
 
4. Enter the last four digits of your Social Security Number (xxxx) and Date of Birth (mm/dd/yyyy) as indicated and click Submit. You will be taken to the next sign-up page. 5. Create a "[employer] ID. This will be your "[employer] login ID and cannot be changed, so think of one that is secure and easy to remember. 6. Create a "[employer] password. You can change your password at any time. 7. Enter your Password Reset Question and Answer. This can be used at a later time if you forget your password. 8. Enter your e-mail address. You will receive e-mail notification when new information is available in [address] 9. Click Sign Up. You can now view and download portions of your medical record. 10. Click the Download Summary menu link to download a portable copy of your medical information. If you have questions, please visit the Frequently Asked Questions section of the "[employer] website. Remember, "[employer] is NOT to be used for urgent needs. For medical emergencies, dial 911. Now available from your iPhone and Android! Please provide this summary of care documentation to your next provider. Your primary care clinician is listed as [place] Severe. If you have any questions after today's visit, please call [phone].

## 2018-03-12 NOTE — PROGRESS NOTES
I spoke with the patient and she states the area is doing well. She understands the results and that this was curetted at the visit. Follow up in 6 months suggested.

## 2018-10-31 ENCOUNTER — HOSPITAL ENCOUNTER (INPATIENT)
Age: 83
LOS: 7 days | Discharge: NURSING, REHAB OR LONG-TERM CARE | DRG: 469 | End: 2018-11-07
Attending: EMERGENCY MEDICINE | Admitting: FAMILY MEDICINE
Payer: MEDICARE

## 2018-10-31 ENCOUNTER — APPOINTMENT (OUTPATIENT)
Dept: GENERAL RADIOLOGY | Age: 83
DRG: 469 | End: 2018-10-31
Attending: EMERGENCY MEDICINE
Payer: MEDICARE
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
## 2018-10-31 DIAGNOSIS — R93.89 ABNORMAL CHEST X-RAY: ICD-10-CM

## 2018-10-31 DIAGNOSIS — S72.8X1A OTHER FRACTURE OF RIGHT FEMUR, INITIAL ENCOUNTER FOR CLOSED FRACTURE (HCC): ICD-10-CM

## 2018-10-31 DIAGNOSIS — S72.91XA CLOSED FRACTURE OF RIGHT FEMUR, UNSPECIFIED FRACTURE MORPHOLOGY, UNSPECIFIED PORTION OF FEMUR, INITIAL ENCOUNTER (HCC): Primary | ICD-10-CM

## 2018-10-31 PROBLEM — J18.9 PNEUMONIA: Status: ACTIVE | Noted: 2018-10-31

## 2018-10-31 LAB
ABO + RH BLD: NORMAL
ALBUMIN SERPL-MCNC: 3.6 G/DL (ref 3.5–5)
ALBUMIN/GLOB SERPL: 1.2 {RATIO} (ref 1.1–2.2)
ALP SERPL-CCNC: 127 U/L (ref 45–117)
ALT SERPL-CCNC: 20 U/L (ref 12–78)
ANION GAP SERPL CALC-SCNC: 11 MMOL/L (ref 5–15)
APPEARANCE UR: ABNORMAL
APTT PPP: 23.6 SEC (ref 22.1–32)
AST SERPL-CCNC: 17 U/L (ref 15–37)
ATRIAL RATE: 95 BPM
BACTERIA URNS QL MICRO: NEGATIVE /HPF
BASOPHILS # BLD: 0.1 K/UL (ref 0–0.1)
BASOPHILS NFR BLD: 1 % (ref 0–1)
BILIRUB SERPL-MCNC: 0.3 MG/DL (ref 0.2–1)
BILIRUB UR QL: NEGATIVE
BLOOD GROUP ANTIBODIES SERPL: NORMAL
BUN SERPL-MCNC: 13 MG/DL (ref 6–20)
BUN/CREAT SERPL: 14 (ref 12–20)
CALCIUM SERPL-MCNC: 8.6 MG/DL (ref 8.5–10.1)
CALCULATED P AXIS, ECG09: 33 DEGREES
CALCULATED R AXIS, ECG10: -26 DEGREES
CALCULATED T AXIS, ECG11: -8 DEGREES
CHLORIDE SERPL-SCNC: 107 MMOL/L (ref 97–108)
CO2 SERPL-SCNC: 24 MMOL/L (ref 21–32)
COLOR UR: ABNORMAL
COMMENT, HOLDF: NORMAL
CREAT SERPL-MCNC: 0.94 MG/DL (ref 0.55–1.02)
DIAGNOSIS, 93000: NORMAL
DIFFERENTIAL METHOD BLD: ABNORMAL
EOSINOPHIL # BLD: 0.2 K/UL (ref 0–0.4)
EOSINOPHIL NFR BLD: 3 % (ref 0–7)
EPITH CASTS URNS QL MICRO: ABNORMAL /LPF
ERYTHROCYTE [DISTWIDTH] IN BLOOD BY AUTOMATED COUNT: 12.5 % (ref 11.5–14.5)
GLOBULIN SER CALC-MCNC: 2.9 G/DL (ref 2–4)
GLUCOSE SERPL-MCNC: 95 MG/DL (ref 65–100)
GLUCOSE UR STRIP.AUTO-MCNC: NEGATIVE MG/DL
HCT VFR BLD AUTO: 38.8 % (ref 35–47)
HGB BLD-MCNC: 12.7 G/DL (ref 11.5–16)
HGB UR QL STRIP: NEGATIVE
IMM GRANULOCYTES # BLD: 0.1 K/UL (ref 0–0.04)
IMM GRANULOCYTES NFR BLD AUTO: 1 % (ref 0–0.5)
INR PPP: 1 (ref 0.9–1.1)
KETONES UR QL STRIP.AUTO: NEGATIVE MG/DL
LACTATE BLD-SCNC: 1.3 MMOL/L (ref 0.4–2)
LEUKOCYTE ESTERASE UR QL STRIP.AUTO: ABNORMAL
LYMPHOCYTES # BLD: 1.9 K/UL (ref 0.8–3.5)
LYMPHOCYTES NFR BLD: 30 % (ref 12–49)
MCH RBC QN AUTO: 34 PG (ref 26–34)
MCHC RBC AUTO-ENTMCNC: 32.7 G/DL (ref 30–36.5)
MCV RBC AUTO: 104 FL (ref 80–99)
MONOCYTES # BLD: 0.5 K/UL (ref 0–1)
MONOCYTES NFR BLD: 8 % (ref 5–13)
NEUTS SEG # BLD: 3.6 K/UL (ref 1.8–8)
NEUTS SEG NFR BLD: 58 % (ref 32–75)
NITRITE UR QL STRIP.AUTO: NEGATIVE
NRBC # BLD: 0 K/UL (ref 0–0.01)
NRBC BLD-RTO: 0 PER 100 WBC
P-R INTERVAL, ECG05: 208 MS
PH UR STRIP: 5.5 [PH] (ref 5–8)
PLATELET # BLD AUTO: 135 K/UL (ref 150–400)
PMV BLD AUTO: 9.4 FL (ref 8.9–12.9)
POTASSIUM SERPL-SCNC: 3.5 MMOL/L (ref 3.5–5.1)
PROT SERPL-MCNC: 6.5 G/DL (ref 6.4–8.2)
PROT UR STRIP-MCNC: ABNORMAL MG/DL
PROTHROMBIN TIME: 9.8 SEC (ref 9–11.1)
Q-T INTERVAL, ECG07: 402 MS
QRS DURATION, ECG06: 130 MS
QTC CALCULATION (BEZET), ECG08: 505 MS
RBC # BLD AUTO: 3.73 M/UL (ref 3.8–5.2)
RBC #/AREA URNS HPF: ABNORMAL /HPF (ref 0–5)
SAMPLES BEING HELD,HOLD: NORMAL
SODIUM SERPL-SCNC: 142 MMOL/L (ref 136–145)
SP GR UR REFRACTOMETRY: 1.01 (ref 1–1.03)
SPECIMEN EXP DATE BLD: NORMAL
THERAPEUTIC RANGE,PTTT: NORMAL SECS (ref 58–77)
UR CULT HOLD, URHOLD: NORMAL
UROBILINOGEN UR QL STRIP.AUTO: 0.2 EU/DL (ref 0.2–1)
VENTRICULAR RATE, ECG03: 95 BPM
WBC # BLD AUTO: 6.3 K/UL (ref 3.6–11)
WBC URNS QL MICRO: >100 /HPF (ref 0–4)

## 2018-10-31 PROCEDURE — 87186 SC STD MICRODIL/AGAR DIL: CPT | Performed by: NURSE PRACTITIONER

## 2018-10-31 PROCEDURE — 74011250636 HC RX REV CODE- 250/636: Performed by: INTERNAL MEDICINE

## 2018-10-31 PROCEDURE — 71045 X-RAY EXAM CHEST 1 VIEW: CPT

## 2018-10-31 PROCEDURE — 36415 COLL VENOUS BLD VENIPUNCTURE: CPT | Performed by: EMERGENCY MEDICINE

## 2018-10-31 PROCEDURE — 73502 X-RAY EXAM HIP UNI 2-3 VIEWS: CPT

## 2018-10-31 PROCEDURE — 85730 THROMBOPLASTIN TIME PARTIAL: CPT | Performed by: EMERGENCY MEDICINE

## 2018-10-31 PROCEDURE — 74011250636 HC RX REV CODE- 250/636: Performed by: EMERGENCY MEDICINE

## 2018-10-31 PROCEDURE — 99285 EMERGENCY DEPT VISIT HI MDM: CPT

## 2018-10-31 PROCEDURE — 85610 PROTHROMBIN TIME: CPT | Performed by: EMERGENCY MEDICINE

## 2018-10-31 PROCEDURE — 87086 URINE CULTURE/COLONY COUNT: CPT | Performed by: NURSE PRACTITIONER

## 2018-10-31 PROCEDURE — 74011000258 HC RX REV CODE- 258: Performed by: EMERGENCY MEDICINE

## 2018-10-31 PROCEDURE — 83605 ASSAY OF LACTIC ACID: CPT

## 2018-10-31 PROCEDURE — 87077 CULTURE AEROBIC IDENTIFY: CPT | Performed by: NURSE PRACTITIONER

## 2018-10-31 PROCEDURE — 51702 INSERT TEMP BLADDER CATH: CPT

## 2018-10-31 PROCEDURE — 74011250636 HC RX REV CODE- 250/636: Performed by: FAMILY MEDICINE

## 2018-10-31 PROCEDURE — 77030034848

## 2018-10-31 PROCEDURE — 93005 ELECTROCARDIOGRAM TRACING: CPT

## 2018-10-31 PROCEDURE — 86900 BLOOD TYPING SEROLOGIC ABO: CPT | Performed by: EMERGENCY MEDICINE

## 2018-10-31 PROCEDURE — 85025 COMPLETE CBC W/AUTO DIFF WBC: CPT | Performed by: EMERGENCY MEDICINE

## 2018-10-31 PROCEDURE — 65660000000 HC RM CCU STEPDOWN

## 2018-10-31 PROCEDURE — 81001 URINALYSIS AUTO W/SCOPE: CPT | Performed by: EMERGENCY MEDICINE

## 2018-10-31 PROCEDURE — 96374 THER/PROPH/DIAG INJ IV PUSH: CPT

## 2018-10-31 PROCEDURE — 96375 TX/PRO/DX INJ NEW DRUG ADDON: CPT

## 2018-10-31 PROCEDURE — 74011250637 HC RX REV CODE- 250/637: Performed by: INTERNAL MEDICINE

## 2018-10-31 PROCEDURE — 80053 COMPREHEN METABOLIC PANEL: CPT | Performed by: EMERGENCY MEDICINE

## 2018-10-31 PROCEDURE — 87040 BLOOD CULTURE FOR BACTERIA: CPT | Performed by: EMERGENCY MEDICINE

## 2018-10-31 PROCEDURE — 73620 X-RAY EXAM OF FOOT: CPT

## 2018-10-31 PROCEDURE — 74011000250 HC RX REV CODE- 250: Performed by: FAMILY MEDICINE

## 2018-10-31 RX ORDER — LATANOPROST 50 UG/ML
1 SOLUTION/ DROPS OPHTHALMIC
Status: DISCONTINUED | OUTPATIENT
Start: 2018-10-31 | End: 2018-11-07 | Stop reason: HOSPADM

## 2018-10-31 RX ORDER — HYDRALAZINE HYDROCHLORIDE 20 MG/ML
10 INJECTION INTRAMUSCULAR; INTRAVENOUS
Status: DISCONTINUED | OUTPATIENT
Start: 2018-10-31 | End: 2018-11-07 | Stop reason: HOSPADM

## 2018-10-31 RX ORDER — CLONAZEPAM 0.5 MG/1
0.25 TABLET ORAL
Status: DISCONTINUED | OUTPATIENT
Start: 2018-10-31 | End: 2018-11-02

## 2018-10-31 RX ORDER — FENTANYL CITRATE 50 UG/ML
50 INJECTION, SOLUTION INTRAMUSCULAR; INTRAVENOUS
Status: COMPLETED | OUTPATIENT
Start: 2018-10-31 | End: 2018-10-31

## 2018-10-31 RX ORDER — CARVEDILOL 6.25 MG/1
6.25 TABLET ORAL 2 TIMES DAILY WITH MEALS
Status: DISCONTINUED | OUTPATIENT
Start: 2018-10-31 | End: 2018-11-02

## 2018-10-31 RX ORDER — ONDANSETRON 2 MG/ML
4 INJECTION INTRAMUSCULAR; INTRAVENOUS
Status: DISCONTINUED | OUTPATIENT
Start: 2018-10-31 | End: 2018-11-07 | Stop reason: HOSPADM

## 2018-10-31 RX ORDER — SODIUM CHLORIDE 0.9 % (FLUSH) 0.9 %
5-10 SYRINGE (ML) INJECTION AS NEEDED
Status: DISCONTINUED | OUTPATIENT
Start: 2018-10-31 | End: 2018-11-07 | Stop reason: HOSPADM

## 2018-10-31 RX ORDER — NYSTATIN 100000 [USP'U]/G
POWDER TOPICAL 4 TIMES DAILY
COMMUNITY
End: 2019-01-28

## 2018-10-31 RX ORDER — SODIUM CHLORIDE 0.9 % (FLUSH) 0.9 %
5-10 SYRINGE (ML) INJECTION EVERY 8 HOURS
Status: DISCONTINUED | OUTPATIENT
Start: 2018-10-31 | End: 2018-11-07 | Stop reason: HOSPADM

## 2018-10-31 RX ORDER — LORAZEPAM 2 MG/ML
1 INJECTION INTRAMUSCULAR
Status: DISCONTINUED | OUTPATIENT
Start: 2018-10-31 | End: 2018-11-01

## 2018-10-31 RX ORDER — LORAZEPAM 2 MG/ML
2 INJECTION INTRAMUSCULAR
Status: DISCONTINUED | OUTPATIENT
Start: 2018-10-31 | End: 2018-11-01

## 2018-10-31 RX ORDER — ONDANSETRON 2 MG/ML
4 INJECTION INTRAMUSCULAR; INTRAVENOUS
Status: COMPLETED | OUTPATIENT
Start: 2018-10-31 | End: 2018-10-31

## 2018-10-31 RX ORDER — MORPHINE SULFATE 2 MG/ML
4 INJECTION, SOLUTION INTRAMUSCULAR; INTRAVENOUS ONCE
Status: COMPLETED | OUTPATIENT
Start: 2018-10-31 | End: 2018-10-31

## 2018-10-31 RX ORDER — SODIUM CHLORIDE 9 MG/ML
75 INJECTION, SOLUTION INTRAVENOUS CONTINUOUS
Status: DISCONTINUED | OUTPATIENT
Start: 2018-10-31 | End: 2018-10-31

## 2018-10-31 RX ORDER — POTASSIUM CHLORIDE 750 MG/1
20 TABLET, FILM COATED, EXTENDED RELEASE ORAL
Status: COMPLETED | OUTPATIENT
Start: 2018-10-31 | End: 2018-10-31

## 2018-10-31 RX ORDER — DIPHENHYDRAMINE HCL 25 MG
25 TABLET ORAL
COMMUNITY
End: 2018-11-07

## 2018-10-31 RX ORDER — FENTANYL CITRATE 50 UG/ML
25 INJECTION, SOLUTION INTRAMUSCULAR; INTRAVENOUS
Status: DISCONTINUED | OUTPATIENT
Start: 2018-10-31 | End: 2018-11-01

## 2018-10-31 RX ADMIN — CARVEDILOL 6.25 MG: 6.25 TABLET, FILM COATED ORAL at 16:02

## 2018-10-31 RX ADMIN — Medication 10 ML: at 14:00

## 2018-10-31 RX ADMIN — POTASSIUM CHLORIDE 20 MEQ: 750 TABLET, FILM COATED, EXTENDED RELEASE ORAL at 12:51

## 2018-10-31 RX ADMIN — Medication 10 ML: at 21:04

## 2018-10-31 RX ADMIN — FENTANYL CITRATE 50 MCG: 50 INJECTION, SOLUTION INTRAMUSCULAR; INTRAVENOUS at 02:25

## 2018-10-31 RX ADMIN — ONDANSETRON HYDROCHLORIDE 4 MG: 2 INJECTION INTRAMUSCULAR; INTRAVENOUS at 02:25

## 2018-10-31 RX ADMIN — Medication 10 ML: at 06:40

## 2018-10-31 RX ADMIN — MORPHINE SULFATE 4 MG: 2 INJECTION, SOLUTION INTRAMUSCULAR; INTRAVENOUS at 04:02

## 2018-10-31 RX ADMIN — AZITHROMYCIN MONOHYDRATE 500 MG: 500 INJECTION, POWDER, LYOPHILIZED, FOR SOLUTION INTRAVENOUS at 05:19

## 2018-10-31 RX ADMIN — FENTANYL CITRATE 25 MCG: 50 INJECTION, SOLUTION INTRAMUSCULAR; INTRAVENOUS at 19:49

## 2018-10-31 RX ADMIN — FENTANYL CITRATE 25 MCG: 50 INJECTION, SOLUTION INTRAMUSCULAR; INTRAVENOUS at 15:52

## 2018-10-31 RX ADMIN — CEFTRIAXONE 1 G: 1 INJECTION, POWDER, FOR SOLUTION INTRAMUSCULAR; INTRAVENOUS at 08:08

## 2018-10-31 RX ADMIN — LATANOPROST 1 DROP: 50 SOLUTION OPHTHALMIC at 21:55

## 2018-10-31 RX ADMIN — HYDRALAZINE HYDROCHLORIDE 10 MG: 20 INJECTION INTRAMUSCULAR; INTRAVENOUS at 12:50

## 2018-10-31 RX ADMIN — FENTANYL CITRATE 25 MCG: 50 INJECTION, SOLUTION INTRAMUSCULAR; INTRAVENOUS at 07:14

## 2018-10-31 RX ADMIN — SODIUM CHLORIDE 75 ML/HR: 900 INJECTION, SOLUTION INTRAVENOUS at 05:19

## 2018-10-31 RX ADMIN — ONDANSETRON HYDROCHLORIDE 4 MG: 2 INJECTION INTRAMUSCULAR; INTRAVENOUS at 14:22

## 2018-10-31 RX ADMIN — FENTANYL CITRATE 25 MCG: 50 INJECTION, SOLUTION INTRAMUSCULAR; INTRAVENOUS at 10:55

## 2018-10-31 NOTE — PROGRESS NOTES
Patient admitted this morning by Dr Jackie Zuniga for closed right hip fracture s/p fall. Patient seen and examined; reports her pain is not well controlled. Seen by orthopedics and plan for surgery (bipolar hip arthoplasty) tomorrow. CXR- showed right basilar consolidation, but patient denies cough/SOB or fever. Started on ceftriaxone and Azithromycin. Patient c/o urinary urgency and frequency, but no dysuria. Has hx of recurrent UTI, and repeat UA howed WBC >100 and large leukocyte esterase. F/u Ucx and continue current Abxs EKG reviewed, NSR, RBBB compared to old EKG no acute change noted. BP on the higher side, resume home medication/carvedilol and add hydralazine IV PRN For detain please see H&P dictated today.

## 2018-10-31 NOTE — ED PROVIDER NOTES
80 y.o. female with past medical history significant for NSTEMI, arthritis who presents via EMS with chief complaint of evaluation post GLF. Pt reports that she was walking to the bathroom with her walker and tripped and fell and now c/o R hip, buttocks and foot pain. Pt was unable to get up by herself and has been unable to bear weight since the fall. Pt denies hx of ortho surgery. Pt did not hit her head. She is unsure if she is on anticoagulants. There are no other acute medical concerns at this time. Social hx: denies tobacco use, +EtOH consumption PCP: Maura Ascencio MD 
 
Note written by [place] Markus Monte, as dictated by Lamar Dominguez MD 2:00 AM 
 
 
 
The history is provided by the patient and the EMS personnel. No  was used. Past Medical History:  
Diagnosis Date  Arthritis  History of non-ST elevation myocardial infarction (NSTEMI) 11/28/2016 Past Surgical History:  
Procedure Laterality Date  HX CHOLECYSTECTOMY No family history on file. Social History Socioeconomic History  Marital status:  Spouse name: Not on file  Number of children: Not on file  Years of education: Not on file  Highest education level: Not on file Social Needs  Financial resource strain: Not on file  Food insecurity - worry: Not on file  Food insecurity - inability: Not on file  Transportation needs - medical: Not on file  Transportation needs - non-medical: Not on file Occupational History  Not on file Tobacco Use  Smoking status: Never Smoker  Smokeless tobacco: Never Used Substance and Sexual Activity  Alcohol use: Yes Comment: a couple of scotch drinks daily  Drug use: No  
 Sexual activity: Not on file Other Topics Concern  Not on file Social History Narrative  Not on file ALLERGIES: Codeine and Prednisone Review of Systems Constitutional: Negative. Negative for chills, fatigue and fever. HENT: Negative. Negative for congestion, ear pain, facial swelling, rhinorrhea, sneezing and sore throat. Eyes: Negative for pain, discharge and itching. Respiratory: Negative for cough, chest tightness and shortness of breath. Cardiovascular: Negative. Negative for chest pain and leg swelling. Gastrointestinal: Negative. Negative for abdominal distention, abdominal pain, constipation, diarrhea, nausea and vomiting. Genitourinary: Negative for difficulty urinating, frequency and urgency. Musculoskeletal: Positive for arthralgias and gait problem. Negative for back pain, joint swelling, neck pain and neck stiffness. Skin: Negative for color change and rash. Neurological: Negative for dizziness, syncope, numbness and headaches. Psychiatric/Behavioral: Negative for confusion and decreased concentration. All other systems reviewed and are negative. There were no vitals filed for this visit. Physical Exam  
Constitutional: She is oriented to person, place, and time. She appears well-developed and well-nourished. HENT:  
Head: Normocephalic and atraumatic. Mouth/Throat: Oropharynx is clear and moist.  
Eyes: Conjunctivae are normal.  
Neck: Normal range of motion. Neck supple. Cardiovascular: Normal rate, regular rhythm and normal heart sounds. Pulmonary/Chest: Effort normal and breath sounds normal.  
Abdominal: Soft. Bowel sounds are normal. There is no tenderness. Musculoskeletal: She exhibits no edema. Pain with ROM of R hip. Neurological: She is alert and oriented to person, place, and time. Skin: Skin is warm and dry. Psychiatric: She has a normal mood and affect. Her behavior is normal.  
Nursing note and vitals reviewed. Note written by Markus Dixon, as dictated by Nat Carrero MD 2:18 AM 
 
 
 
MDM Procedures CONSULT NOTE: 
 3:54 Brie Saha MD spoke with Dr. Kala Hassan, Consult for Hospitalist.  Discussed available diagnostic tests and clinical findings. Dr. Kala Hassan will see and admit the pt. CONSULT NOTE: 
3:56 AM Gagan Mayfield MD spoke with Dr. Agueda Solorzano and DARCY Negrete, Consult for Orthopedics. Discussed available diagnostic tests and clinical findings. Dr. Agueda Solorzano will see the pt in the AM and wants the pt to be kept NPO. ED EKG interpretation: 
Rhythm: normal sinus rhythm; and regular . Rate (approx.): 95; Axis: normal; P wave: normal; QRS interval: RBBB; ST/T wave: non-specific changes; This EKG was interpreted by Victorina Tomlinson MD,ED Provider. A/P: 
1. R femur fx - NPO. Ortho to seen in AM. 2. Abnormal CXR - antibiotics given. Lungs clear. Afebrile. Denies cough.  
3. Fall - mechanical

## 2018-10-31 NOTE — CONSULTS
ORTHO CONSULT NOTE    Date of Consultation:  October 31, 2018  Referring Physician:  Carlton Martinez  CC: right hip pain    HPI:  Maryam Dhillon is a 80 y.o. female PMH DJD knees who is being seen for right hip fracture s/p fall overnight. She fell going to restroom at home landing on right side with resulting right hip pain, leg deformity, and inability to ambulate. She denies open wound and denies head trauma. Denies cough, CP, SOB. At baseline, she ambulates with walker and resides in assisted living. She denies below history of MI. She denies ever seeing an orthopedic surgeon. She reports chronic swelling in right leg compared to left. Patient Active Problem List    Diagnosis Date Noted    Pneumonia 10/31/2018    Other fracture of right femur, initial encounter for closed fracture ([facility] 10/31/2018    Chest pain 09/12/2017    TIA (transient ischemic attack) 03/08/2017    GI bleed 03/08/2017    Numbness and tingling 03/08/2017    CAD in native artery 11/28/2016    History of non-ST elevation myocardial infarction (NSTEMI) 11/28/2016    NSTEMI (non-ST elevated myocardial infarction) ([facility] 11/22/2016    Diarrhea 11/22/2016    Hypocalcemia 11/22/2016    Hypomagnesemia 11/22/2016    Hypokalemia 11/20/2016     No family history on file. Social History     Tobacco Use    Smoking status: Never Smoker    Smokeless tobacco: Never Used   Substance Use Topics    Alcohol use: Yes     Comment: a couple of scotch drinks daily     Past Medical History:   Diagnosis Date    Arthritis     History of non-ST elevation myocardial infarction (NSTEMI) 11/28/2016      Past Surgical History:   Procedure Laterality Date    HX CHOLECYSTECTOMY        Prior to Admission medications    Medication Sig Start Date End Date Taking? Authorizing Provider   carvedilol (COREG) 3.125 mg tablet Take  by mouth two (2) times daily (with meals). Provider, Historical   Bifidobacterium Infantis (ALIGN) 4 mg cap Take  by mouth. Provider, Historical   carbamide peroxide (DEBROX) 6.5 % otic solution Administer 5 Drops into each ear two (2) times a day. Provider, Historical   magnesium 250 mg tab Take  by mouth. Provider, Historical   mirtazapine (REMERON) 7.5 mg tablet Take 7.5 mg by mouth nightly. Provider, Historical   thiamine (B-1) 100 mg tablet Take  by mouth daily. Provider, Historical   acetaminophen (TYLENOL) 500 mg tablet Take 500 mg by mouth every eight (8) hours. Provider, Historical   cholecalciferol (VITAMIN D3) 1,000 unit tablet Take 1,000 Units by mouth daily. Provider, Historical   clonazePAM (KLONOPIN) 0.5 mg tablet Take 0.25 mg by mouth nightly as needed for Other (ANXIETY). Provider, Historical   docusate sodium (COLACE) 100 mg capsule Take 100 mg by mouth two (2) times daily as needed for Constipation. Provider, Historical   potassium chloride SR (K-TAB) 20 mEq tablet Take 20 mEq by mouth daily (with lunch). Provider, Historical   oxybutynin chloride XL (DITROPAN XL) 10 mg CR tablet Take 10 mg by mouth daily. Provider, Historical   aspirin 81 mg chewable tablet Take 81 mg by mouth daily. Provider, Historical   enoxaparin (LOVENOX) 60 mg/0.6 mL injection 60 mg by SubCUTAneous route every twelve (12) hours. Indications: deep venous thrombosis 10/7/17   Nini Tejada MD   latanoprost (XALATAN) 0.005 % ophthalmic solution Administer 1 Drop to both eyes nightly. Provider, Historical   carvedilol (COREG) 6.25 mg tablet Take 1 Tab by mouth two (2) times daily (with meals). 11/22/16   Delmis Salas NP   omeprazole (PRILOSEC) 20 mg capsule Take 20 mg by mouth daily.     Provider, Historical     Current Facility-Administered Medications   Medication Dose Route Frequency    cefTRIAXone (ROCEPHIN) 1 g in 0.9% sodium chloride (MBP/ADV) 50 mL  1 g IntraVENous NOW    latanoprost (XALATAN) 0.005 % ophthalmic solution 1 Drop  1 Drop Both Eyes QHS    sodium chloride (NS) flush 5-10 mL  5-10 mL IntraVENous Q8H    sodium chloride (NS) flush 5-10 mL  5-10 mL IntraVENous PRN    0.9% sodium chloride infusion  75 mL/hr IntraVENous CONTINUOUS    fentaNYL citrate (PF) injection 25 mcg  25 mcg IntraVENous Q4H PRN      Allergies   Allergen Reactions    Codeine Unknown (comments)    Prednisone Unknown (comments)        Review of Systems:  Per HPI. Objective:     Patient Vitals for the past 8 hrs:   BP Temp Pulse Resp SpO2 Weight   10/31/18 0631 164/67 97.4 °F (36.3 °C) 92 14 100 % 64.4 kg (141 lb 15.6 oz)   10/31/18 0530 138/82  99 24 97 %    10/31/18 0515 153/73 97.5 °F (36.4 °C) 98 21 99 %    10/31/18 0500 134/71  97 18 99 %    10/31/18 0300 152/62    96 %    10/31/18 0158 171/70 97.7 °F (36.5 °C) 80 18 94 %      Temp (24hrs), Av.5 °F (36.4 °C), Min:97.4 °F (36.3 °C), Max:97.7 °F (36.5 °C)      EXAM:   Mild acute distress. Lying in bed. Answers questions appropriately. Moves bilat UE well. Right LE externally rotated and shortened. Skin intact. Valgus right knee with effusion but non tender. Moves bilat ankle well. Nl sens bilat foot. Strong pulses left foot. Difficult to palpate pulses right foot. Mild swelling right ankle. Right toes cooler to touch than left. Imaging Review:   Results from Hospital Encounter encounter on 10/31/18   XR FOOT RT AP/LAT    Narrative EXAM:  XR FOOT RT AP/LAT    INDICATION:   fall pain. Right foot pain    COMPARISON:  None. FINDINGS:  Two views of the right foot demonstrate no fracture. There is a  hallux valgus. There are inferior and posterior calcaneal spurs. There is soft  tissue swelling. There are vascular calcifications. Impression IMPRESSION:  No fracture. Results from [place] encounter on 10/07/17   CT HEAD WO CONT    Addendum Addendum:   There is no acute intracranial hemorrhage. There is a tiny low-attenuation  left frontal extra-axial collection visible only on the coronal images.       Aba Sella, MD 10/7/2017  5:46 PM          Narrative EXAM:  CT HEAD WITHOUT CONTRAST  INDICATION: Fall. COMPARISON: 9/12/2017. CONTRAST: None. TECHNIQUE: Unenhanced CT of the head was performed using 5 mm images. Brain and  bone windows were generated. Sagittal and coronal reformations were generated. CT dose reduction was achieved through use of a standardized protocol tailored  for this examination and automatic exposure control for dose modulation. CT dose  reduction was achieved through use of a standardized protocol tailored for this  examination and automatic exposure control for dose modulation. Adaptive  statistical iterative reconstruction (ASIR) was utilized for this examination. FINDINGS:  The ventricles and sulci are enlarged in a generalized fashion consistent with  volume loss. There is atherosclerotic calcification of the carotid arteries  with decreased attenuation in the periventricular white matter which is  nonspecific but consistent with small vessel disease and unchanged. There is no  intracranial hemorrhage. There is no extra-axial collection, mass, mass effect  or midline shift. The basilar cisterns are open. No acute infarct is  identified. The bone windows demonstrate no abnormalities. The visualized  portions of the paranasal sinuses and mastoid air cells are clear. Impression IMPRESSION: No acute intracranial abnormality. Atherosclerosis with  microvascular disease and age-related volume loss unchanged compared with  several weeks ago. Labs:   Recent Results (from the past 24 hour(s))   SAMPLES BEING HELD    Collection Time: 10/31/18  2:05 AM   Result Value Ref Range    SAMPLES BEING HELD 1LAV 1BLUE 1PST 1RED     COMMENT        Add-on orders for these samples will be processed based on acceptable specimen integrity and analyte stability, which may vary by analyte.    CBC WITH AUTOMATED DIFF    Collection Time: 10/31/18  2:05 AM   Result Value Ref Range    WBC 6.3 3.6 - 11.0 K/uL    RBC 3.73 (L) 3.80 - 5.20 M/uL    HGB 12.7 11.5 - 16.0 g/dL    HCT 38.8 35.0 - 47.0 %    .0 (H) 80.0 - 99.0 FL    MCH 34.0 26.0 - 34.0 PG    MCHC 32.7 30.0 - 36.5 g/dL    RDW 12.5 11.5 - 14.5 %    PLATELET 953 (L) 176 - 400 K/uL    MPV 9.4 8.9 - 12.9 FL    NRBC 0.0 0  WBC    ABSOLUTE NRBC 0.00 0.00 - 0.01 K/uL    NEUTROPHILS 58 32 - 75 %    LYMPHOCYTES 30 12 - 49 %    MONOCYTES 8 5 - 13 %    EOSINOPHILS 3 0 - 7 %    BASOPHILS 1 0 - 1 %    IMMATURE GRANULOCYTES 1 (H) 0.0 - 0.5 %    ABS. NEUTROPHILS 3.6 1.8 - 8.0 K/UL    ABS. LYMPHOCYTES 1.9 0.8 - 3.5 K/UL    ABS. MONOCYTES 0.5 0.0 - 1.0 K/UL    ABS. EOSINOPHILS 0.2 0.0 - 0.4 K/UL    ABS. BASOPHILS 0.1 0.0 - 0.1 K/UL    ABS. IMM. GRANS. 0.1 (H) 0.00 - 0.04 K/UL    DF AUTOMATED     METABOLIC PANEL, COMPREHENSIVE    Collection Time: 10/31/18  2:05 AM   Result Value Ref Range    Sodium 142 136 - 145 mmol/L    Potassium 3.5 3.5 - 5.1 mmol/L    Chloride 107 97 - 108 mmol/L    CO2 24 21 - 32 mmol/L    Anion gap 11 5 - 15 mmol/L    Glucose 95 65 - 100 mg/dL    BUN 13 6 - 20 MG/DL    Creatinine 0.94 0.55 - 1.02 MG/DL    BUN/Creatinine ratio 14 12 - 20      GFR est AA >60 >60 ml/min/1.73m2    GFR est non-AA 56 (L) >60 ml/min/1.73m2    Calcium 8.6 8.5 - 10.1 MG/DL    Bilirubin, total 0.3 0.2 - 1.0 MG/DL    ALT (SGPT) 20 12 - 78 U/L    AST (SGOT) 17 15 - 37 U/L    Alk.  phosphatase 127 (H) 45 - 117 U/L    Protein, total 6.5 6.4 - 8.2 g/dL    Albumin 3.6 3.5 - 5.0 g/dL    Globulin 2.9 2.0 - 4.0 g/dL    A-G Ratio 1.2 1.1 - 2.2     PTT    Collection Time: 10/31/18  2:05 AM   Result Value Ref Range    aPTT 23.6 22.1 - 32.0 sec    aPTT, therapeutic range     58.0 - 77.0 SECS   PROTHROMBIN TIME + INR    Collection Time: 10/31/18  2:05 AM   Result Value Ref Range    INR 1.0 0.9 - 1.1      Prothrombin time 9.8 9.0 - 11.1 sec   TYPE & SCREEN    Collection Time: 10/31/18  2:05 AM   Result Value Ref Range    Crossmatch Expiration 11/03/2018     ABO/Rh(D) AB POSITIVE Antibody screen NEG    POC LACTIC ACID    Collection Time: 10/31/18  4:49 AM   Result Value Ref Range    Lactic Acid (POC) 1.30 0.40 - 2.00 mmol/L   URINALYSIS W/MICROSCOPIC    Collection Time: 10/31/18  4:55 AM   Result Value Ref Range    Color YELLOW/STRAW      Appearance CLOUDY (A) CLEAR      Specific gravity 1.014 1.003 - 1.030      pH (UA) 5.5 5.0 - 8.0      Protein TRACE (A) NEG mg/dL    Glucose NEGATIVE  NEG mg/dL    Ketone NEGATIVE  NEG mg/dL    Bilirubin NEGATIVE  NEG      Blood NEGATIVE  NEG      Urobilinogen 0.2 0.2 - 1.0 EU/dL    Nitrites NEGATIVE  NEG      Leukocyte Esterase LARGE (A) NEG      WBC >100 (H) 0 - 4 /hpf    RBC 0-5 0 - 5 /hpf    Epithelial cells FEW FEW /lpf    Bacteria NEGATIVE  NEG /hpf   URINE CULTURE HOLD SAMPLE    Collection Time: 10/31/18  4:55 AM   Result Value Ref Range    Urine culture hold        URINE ON HOLD IN MICROBIOLOGY DEPT FOR 3 DAYS. IF UNPRESERVED URINE IS SUBMITTED, IT CANNOT BE USED FOR ADDITIONAL TESTING AFTER 24 HRS, RECOLLECTION WILL BE REQUIRED. Impression:     Patient Active Problem List    Diagnosis Date Noted    Pneumonia 10/31/2018    Other fracture of right femur, initial encounter for closed fracture ([facility] 10/31/2018    Chest pain 09/12/2017    TIA (transient ischemic attack) 03/08/2017    GI bleed 03/08/2017    Numbness and tingling 03/08/2017    CAD in native artery 11/28/2016    History of non-ST elevation myocardial infarction (NSTEMI) 11/28/2016    NSTEMI (non-ST elevated myocardial infarction) ([facility] 11/22/2016    Diarrhea 11/22/2016    Hypocalcemia 11/22/2016    Hypomagnesemia 11/22/2016    Hypokalemia 11/20/2016     Principal Problem:    Other fracture of right femur, initial encounter for closed fracture ([facility] (10/31/2018)    Active Problems:    Pneumonia (10/31/2018)        Plan:   Explained to patient recommended bipolar hip arthroplasty. Explained potential risks/benefits/alternatives. May eat today.  Anticipate surgery 11/1/18. No further testing today for suspected PVD right lower leg as this would simply cause too much pain and benefits don't out weigh risks. D/W Dr. Sabrina Townsend who agrees with plan.       DARCY Mccabe

## 2018-10-31 NOTE — PROGRESS NOTES
Problem: Falls - Risk of 
Goal: *Absence of Falls Document Genevive Camera Fall Risk and appropriate interventions in the flowsheet. Outcome: Progressing Towards Goal 
Fall Risk Interventions: 
Mobility Interventions: Communicate number of staff needed for ambulation/transfer, OT consult for ADLs, Patient to call before getting OOB, PT Consult for mobility concerns, PT Consult for assist device competence, Strengthening exercises (ROM-active/passive), Utilize walker, cane, or other assistive device Medication Interventions: Assess postural VS orthostatic hypotension, Evaluate medications/consider consulting pharmacy, Patient to call before getting OOB, Teach patient to arise slowly Elimination Interventions: Call light in reach, Patient to call for help with toileting needs, Toilet paper/wipes in reach, Toileting schedule/hourly rounds History of Falls Interventions: Consult care management for discharge planning, Door open when patient unattended, Evaluate medications/consider consulting pharmacy, Room close to nurse's station Problem: Pressure Injury - Risk of 
Goal: *Prevention of pressure injury Document Judson Scale and appropriate interventions in the flowsheet. Outcome: Progressing Towards Goal 
Pressure Injury Interventions: Activity Interventions: Increase time out of bed, Pressure redistribution bed/mattress(bed type), PT/OT evaluation Mobility Interventions: Assess need for specialty bed, HOB 30 degrees or less, Pressure redistribution bed/mattress (bed type), PT/OT evaluation Nutrition Interventions: Document food/fluid/supplement intake Problem: Pain Goal: *Control of Pain Outcome: Progressing Towards Goal 
Pt pain in R hip. Administering fentanyl Q4 PRN and assisting with position changes. Will continue to monitor Bedside shift change report given to Alexis Aguilera RN (oncoming nurse) by Freddie Caballero RN (offgoing nurse). Report included the following information SBAR, Kardex, Intake/Output, MAR, Recent Results and Cardiac Rhythm NSR.

## 2018-10-31 NOTE — ED NOTES
Reassessed patient at this time. She reports that her pain is improved after the Fentanyl, but she is worried about any type of movement and the ensuing pain. Otherwise patient is stable at this time. Tolerating at this time. CM x 2. Call bell within reach of patient at this time.

## 2018-10-31 NOTE — ED TRIAGE NOTES
Patient comes in via EMS from [facility]. States she fell trying to get to the bathroom. Denies hitting head, Denies LOC, denies dizziness. States she has pain in her RIGHT hip and foot.

## 2018-10-31 NOTE — H&P
[address] HISTORY AND PHYSICAL Joel Fontanez 
MR#: [id number] : 10/17/1927 ACCOUNT #: [de-identified] ADMIT DATE: 10/31/2018 CHIEF COMPLAINT:  Right hip pain. HISTORY OF PRESENT ILLNESS:  A 70-year-old white female with past medical history coronary artery disease, non-ST elevation myocardial infarction, hypertension, hyperlipidemia, glaucoma, anxiety, chest pain, right lower extremity edema, arthritis, presented to the emergency department from [facility] assisted living [facility], chief complaint of pain in right hip after a fall. The patient reportedly was trying to walk to the bathroom using her walker but tripped and fell, landing onto her right side. She complained of subsequent pain to the right hip, buttock, and foot area according to ER reports. She was unable to bear weight after the event secondary to severity. The pain was 10/10, aggravated with any movement, radiating from right hip to right thigh, without specific alleviating factors. She denies hitting her head. She denies any loss of consciousness. On arrival to the emergency department, initial recorded vital signs, blood pressure 171/70, heart rate of 80, respiratory rate 18, O2 saturation 94% on room air. Workup included right hip 2 view x-ray shows subcapital right proximal femur fracture. Orthopedic surgery service was consulted. Patient is now seen for admission to the hospitalist service. Chest x-ray portable had been performed that showed right basilar consolidation. Blood cultures were sent. The patient was started on ceftriaxone 1 gram IV, Zithromax 500 mg IV. She was initially given fentanyl 50 mcg IV followed by morphine 4 mg IV as her pain was intractable. She was given Zofran 4 mg IV.   She is not complaining of any current dizziness, lightheadedness, focal weakness, new onset numbness, paresthesias, slurred speech, facial droop, headache, neck pain, chest pain, shortness of breath, cough, congestion, abdominal pain, nausea, vomiting, diarrhea, melena, dysuria, hematuria, calf swelling, fever, chills, or rash. PAST MEDICAL HISTORY:   
1. Coronary artery disease. 2.  NSTEMI. 3.  Hypertension. 4.  Hyperlipidemia. 5.  Glaucoma. 6.  Anxiety. 7.  Chest pain. 8.  Right lower extremity edema. PAST SURGICAL HISTORY:  Cholecystectomy. MEDICATIONS:  Complete medication list reviewed and noted on chart records. acetaminophen (TYLENOL) 500 mg tablet    --  --  Provider, Historical   
 Take 500 mg by mouth every eight (8) hours. aspirin 81 mg chewable tablet    --  --  Provider, Historical  
 Take 81 mg by mouth daily. Bifidobacterium Infantis (ALIGN) 4 mg cap    --  --  Provider, Historical  
 Take  by mouth.  
 carbamide peroxide (DEBROX) 6.5 % otic solution    --  --  Provider, Historical  
 Administer 5 Drops into each ear two (2) times a day. carvedilol (COREG) 3.125 mg tablet    --  --  Provider, Historical  
 Take  by mouth two (2) times daily (with meals). carvedilol (COREG) 6.25 mg tablet    11/22/16  -- Magaly Gordillo NP Take 1 Tab by mouth two (2) times daily (with meals). cholecalciferol (VITAMIN D3) 1,000 unit tablet    --  --  Provider, Historical  
 Take 1,000 Units by mouth daily. clonazePAM (KLONOPIN) 0.5 mg tablet    --  --  Provider, Historical  
 Take 0.25 mg by mouth nightly as needed for Other (ANXIETY). docusate sodium (COLACE) 100 mg capsule    --  --  Provider, Historical  
 Take 100 mg by mouth two (2) times daily as needed for Constipation. enoxaparin (LOVENOX) 60 mg/0.6 mL injection    10/07/17  -- Hilario Conway MD  
 60 mg by SubCUTAneous route every twelve (12) hours. Indications: deep venous thrombosis  
 latanoprost (XALATAN) 0.005 % ophthalmic solution    --  --  Provider, Historical  
 Administer 1 Drop to both eyes nightly. magnesium 250 mg tab    --  --  Provider, Historical  
 Take  by mouth.  
 mirtazapine (REMERON) 7.5 mg tablet    --  --  Provider, Historical  
 Take 7.5 mg by mouth nightly. omeprazole (PRILOSEC) 20 mg capsule    --  --  Provider, Historical  
 Take 20 mg by mouth daily. oxybutynin chloride XL (DITROPAN XL) 10 mg CR tablet    --  --  Provider, Historical  
 Take 10 mg by mouth daily. potassium chloride SR (K-TAB) 20 mEq tablet    --  --  Provider, Historical  
 Take 20 mEq by mouth daily (with lunch). thiamine (B-1) 100 mg tablet    --  --  Provider, Historical  
 Take  by mouth daily. ALLERGIES:  CODEINE AND PREDNISONE.   
 
SOCIAL HISTORY:  Negative for smoking. Positive drinking 2 glasses of alcohol per day. Lives at assisted living facility. Uses the walker. FAMILY HISTORY:  Unknown in regards to heart attacks or strokes. REVIEW OF SYSTEMS:  Pertinent positives as noted in the HPI. All other systems were reviewed and negative. PHYSICAL EXAMINATION:   
VITAL SIGNS:  Temperature is 97.7 degrees Fahrenheit, blood pressure 153/73, heart rate of 99, respiratory rate of 14, O2 saturation 98% on room air. GENERAL:  Elderly female in no acute respiratory distress. PSYCHIATRIC:  Patient is awake, alert, and oriented x3. NEUROLOGIC:  GCS of 15. Moves extremities x4. Sensation grossly intact. No slurred speech, no facial droop. HEENT:  Normocephalic, atraumatic. PERRLA. EOMs intact. Sclerae are anicteric. Conjunctivae are clear. Nares are patent. Oropharynx is clear. Tongue is midline, nonedematous. NECK:  Supple, without lymphadenopathy, JVD, or carotid bruits. No thyromegaly. LYMPHATIC:  Negative for cervical or supraclavicular adenopathy. RESPIRATORY:  Lungs clear to auscultation bilaterally. CARDIOVASCULAR:  Heart regular rate and rhythm, normal S1, S2, without murmurs, rubs, or gallops. GASTROINTESTINAL:  Abdomen soft, nontender, nondistended. Normoactive bowel sounds. Normoactive bowel sounds. No rebound, guarding, rigidity. No auscultated abdominal bruits or palpable mass. BACK:  Tenderness on palpation in the right lower lumbar area. MUSCULOSKELETAL:  Tenderness on palpation of right hip with external rotation of the right lower extremity. Negative for calf tenderness. VASCULAR:  2+ radial and 1+ dorsalis pedis pulse without cyanosis, clubbing, trace of lower extremity nonpitting edema. SKIN:  Warm and dry. LABORATORY DATA:  I reviewed as follows. Sodium 142, potassium 3.5, chloride 107, CO2 of 24, BUN of 13, creatinine of 0.94, glucose of 95, anion gap of 11, calcium 8.6, GFR 56, total bilirubin 0.3, total protein 6.5, albumin is 3.6, ALT 28, AST of 17, alkaline phosphatase of 127, WBC of 6.3, hemoglobin 12.7, hematocrit of 38.8 and platelets are 029, neutrophils are 58%. Urinalysis:  Leukocyte esterase large, nitrites negative, urobilinogen 0.2, bilirubin negative, blood negative, ketones negative, glucose negative, protein trace, pH 5.5, specific gravity 1.014, WBC greater than 100, RBCs 0-5, bacteria negative. INR 1.0, PT of 9.8, PTT of 23.6. IMAGING:  Chest x-ray portable:  Right basilar consolidation. Right foot AP and lateral x-ray:  No fracture. Right hip 2-view x-ray results as noted in the HPI. IMPRESSION AND PLAN:   
1. Closed right femur fracture, initial encounter. Admit patient to telemetry. The patient will be on pain management with fentanyl 25 mcg IV q.4 hours p.r.n. Consult with orthopedic surgery service for further evaluation. Keep n.p.o. for any pending plans for surgery today. 2.  Unable to walk. Place on bed rest.     
3.  Right hip pain. Provide pain management as mentioned above. 4.  Coronary artery disease. Order 12-lead EKG. Continue telemetry monitoring. 5.  Hypertension. Monitor blood pressure closely and provide antihypertensive meds for the same. 6.  Pneumonia -- possible with noted right basilar consolidation on chest x-ray. The patient has been started on ceftriaxone and Zithromax. May continue on the same for now. 7.  Status post fall. Plan as noted. Will need eventual physical and occupational therapy. 8.  Longterm anticoagulation had been noted on prior medication list.  Will consult with pharmacy. See if an updated list may be available. 9.  Venous thromboembolism prophylaxis. Sequential compression devices to bilateral lower extremities. CODE STATUS:  I DISCUSSED CODE STATUS WITH THE PATIENT, WHO IS AWAKE, ALERT, ORIENTED X3 AND HAS THE MEDICAL CAPACITY TO MAKE HER OWN DECISIONS. THE PATIENT NOTES THAT SHE WOULD LIKE TO HAVE DNR AS HER CODE DIRECTIVE AND CODE STATUS. SHE SIGNED A DURABLE DNR (DO NOT RESUSCITATE) FORM. MD DYLAN Shannon/ 
D: 10/31/2018 06:42    
T: 10/31/2018 07:52 JOB #: [id number]

## 2018-10-31 NOTE — ED NOTES
Patient given Morphine for pain to assist with transitioning patient onto a hospital bed, placing a sen catheter, obtaining EKG and blood cultures. CM x 3. Call bell within reach of patient.

## 2018-10-31 NOTE — ROUTINE PROCESS
TRANSFER - OUT REPORT: 
 
Verbal report given to Marilyn RN(name) on Sharyle Sander  being transferred to [facility]) for routine progression of care Report consisted of patients Situation, Background, Assessment and  
Recommendations(SBAR). Information from the following report(s) SBAR, ED Summary, Intake/Output, MAR, Recent Results and Cardiac Rhythm NSR was reviewed with the receiving nurse. Lines:  
Peripheral IV 10/31/18 Left Antecubital (Active) Peripheral IV 10/31/18 Right Antecubital (Active) Peripheral IV 10/31/18 Left Forearm (Active) Opportunity for questions and clarification was provided. Patient transported with: 
 Monitor Registered Nurse

## 2018-10-31 NOTE — PROGRESS NOTES
Bedside shift change report given to Sebastian Bloom (oncoming nurse) by Adi Mcgrath (offgoing nurse). Report included the following information SBAR, Kardex, ED Summary, Intake/Output, Accordion, Recent Results and Cardiac Rhythm NSR. Problem: Falls - Risk of 
Goal: *Absence of Falls Document Dannial Shadow Fall Risk and appropriate interventions in the flowsheet. Outcome: Progressing Towards Goal 
Fall Risk Interventions: 
Mobility Interventions: Patient to call before getting OOB, PT Consult for mobility concerns, PT Consult for assist device competence, Assess mobility with egress test 
 
  
 
Medication Interventions: Assess postural VS orthostatic hypotension, Evaluate medications/consider consulting pharmacy, Patient to call before getting OOB, Teach patient to arise slowly Elimination Interventions: Call light in reach, Patient to call for help with toileting needs, Toilet paper/wipes in reach, Toileting schedule/hourly rounds History of Falls Interventions: Door open when patient unattended, Investigate reason for fall, Evaluate medications/consider consulting pharmacy, Room close to nurse's station Problem: Pressure Injury - Risk of 
Goal: *Prevention of pressure injury Document Judson Scale and appropriate interventions in the flowsheet. Outcome: Progressing Towards Goal 
Pressure Injury Interventions: Activity Interventions: Assess need for specialty bed, Increase time out of bed, Pressure redistribution bed/mattress(bed type), PT/OT evaluation Mobility Interventions: Assess need for specialty bed, HOB 30 degrees or less, Pressure redistribution bed/mattress (bed type), PT/OT evaluation Nutrition Interventions: Document food/fluid/supplement intake, Offer support with meals,snacks and hydration Problem: Pain Goal: *Control of Pain Outcome: Progressing Towards Goal 
Patient complains of pain 10 on 0-10 scale, q4hr prn  Fentanyl IV given Primary Nurse Shahla Benoit and Ranjan Gonzalez RN performed a dual skin assessment on this patient impairment noted some redness under breast  
Judson score is 18 TRANSFER - IN REPORT: 
 
Verbal report received from [facility] (name) on [facility]  being received from ED(unit) for routine progression of care Report consisted of patients Situation, Background, Assessment and  
Recommendations(SBAR). Information from the following report(s) SBAR, Kardex, ED Summary, Intake/Output, Recent Results, Med Rec Status and Cardiac Rhythm NSR was reviewed with the receiving nurse. Opportunity for questions and clarification was provided. Assessment completed upon patients arrival to unit and care assumed.

## 2018-11-01 ENCOUNTER — ANESTHESIA EVENT (OUTPATIENT)
Dept: SURGERY | Age: 83
DRG: 469 | End: 2018-11-01
Payer: MEDICARE

## 2018-11-01 ENCOUNTER — ANESTHESIA (OUTPATIENT)
Dept: SURGERY | Age: 83
DRG: 469 | End: 2018-11-01
Payer: MEDICARE

## 2018-11-01 ENCOUNTER — APPOINTMENT (OUTPATIENT)
Dept: GENERAL RADIOLOGY | Age: 83
DRG: 469 | End: 2018-11-01
Attending: ORTHOPAEDIC SURGERY
Payer: MEDICARE

## 2018-11-01 LAB
ANION GAP SERPL CALC-SCNC: 11 MMOL/L (ref 5–15)
BASOPHILS # BLD: 0.1 K/UL (ref 0–0.1)
BASOPHILS NFR BLD: 1 % (ref 0–1)
BUN SERPL-MCNC: 10 MG/DL (ref 6–20)
BUN/CREAT SERPL: 13 (ref 12–20)
CALCIUM SERPL-MCNC: 7.8 MG/DL (ref 8.5–10.1)
CHLORIDE SERPL-SCNC: 111 MMOL/L (ref 97–108)
CO2 SERPL-SCNC: 19 MMOL/L (ref 21–32)
CREAT SERPL-MCNC: 0.79 MG/DL (ref 0.55–1.02)
DIFFERENTIAL METHOD BLD: ABNORMAL
EOSINOPHIL # BLD: 0.1 K/UL (ref 0–0.4)
EOSINOPHIL NFR BLD: 1 % (ref 0–7)
ERYTHROCYTE [DISTWIDTH] IN BLOOD BY AUTOMATED COUNT: 12.4 % (ref 11.5–14.5)
GLUCOSE SERPL-MCNC: 104 MG/DL (ref 65–100)
HCT VFR BLD AUTO: 37.6 % (ref 35–47)
HGB BLD-MCNC: 12 G/DL (ref 11.5–16)
IMM GRANULOCYTES # BLD: 0.1 K/UL (ref 0–0.04)
IMM GRANULOCYTES NFR BLD AUTO: 1 % (ref 0–0.5)
LYMPHOCYTES # BLD: 1.2 K/UL (ref 0.8–3.5)
LYMPHOCYTES NFR BLD: 12 % (ref 12–49)
MCH RBC QN AUTO: 34.3 PG (ref 26–34)
MCHC RBC AUTO-ENTMCNC: 31.9 G/DL (ref 30–36.5)
MCV RBC AUTO: 107.4 FL (ref 80–99)
MONOCYTES # BLD: 1.3 K/UL (ref 0–1)
MONOCYTES NFR BLD: 13 % (ref 5–13)
NEUTS SEG # BLD: 7.6 K/UL (ref 1.8–8)
NEUTS SEG NFR BLD: 74 % (ref 32–75)
NRBC # BLD: 0 K/UL (ref 0–0.01)
NRBC BLD-RTO: 0 PER 100 WBC
PLATELET # BLD AUTO: 124 K/UL (ref 150–400)
PMV BLD AUTO: 9.1 FL (ref 8.9–12.9)
POTASSIUM SERPL-SCNC: 3.5 MMOL/L (ref 3.5–5.1)
RBC # BLD AUTO: 3.5 M/UL (ref 3.8–5.2)
SODIUM SERPL-SCNC: 141 MMOL/L (ref 136–145)
WBC # BLD AUTO: 10.3 K/UL (ref 3.6–11)

## 2018-11-01 PROCEDURE — 85025 COMPLETE CBC W/AUTO DIFF WBC: CPT | Performed by: FAMILY MEDICINE

## 2018-11-01 PROCEDURE — 74011000250 HC RX REV CODE- 250

## 2018-11-01 PROCEDURE — 77030034696 HC CATH URETH FOL 2W BARD -A: Performed by: ORTHOPAEDIC SURGERY

## 2018-11-01 PROCEDURE — 74011250637 HC RX REV CODE- 250/637: Performed by: INTERNAL MEDICINE

## 2018-11-01 PROCEDURE — 74011000250 HC RX REV CODE- 250: Performed by: FAMILY MEDICINE

## 2018-11-01 PROCEDURE — 77030016379 HC TIP IRR IM ZIMM -D: Performed by: ORTHOPAEDIC SURGERY

## 2018-11-01 PROCEDURE — 74011250636 HC RX REV CODE- 250/636: Performed by: ANESTHESIOLOGY

## 2018-11-01 PROCEDURE — 65270000029 HC RM PRIVATE

## 2018-11-01 PROCEDURE — 77030006783 HC BLD SAW OSC MCRA -A: Performed by: ORTHOPAEDIC SURGERY

## 2018-11-01 PROCEDURE — 77030031139 HC SUT VCRL2 J&J -A: Performed by: ORTHOPAEDIC SURGERY

## 2018-11-01 PROCEDURE — 77030013079 HC BLNKT BAIR HGGR 3M -A: Performed by: ANESTHESIOLOGY

## 2018-11-01 PROCEDURE — 74011250636 HC RX REV CODE- 250/636

## 2018-11-01 PROCEDURE — 77030008684 HC TU ET CUF COVD -B: Performed by: ANESTHESIOLOGY

## 2018-11-01 PROCEDURE — 36415 COLL VENOUS BLD VENIPUNCTURE: CPT | Performed by: FAMILY MEDICINE

## 2018-11-01 PROCEDURE — 0SRR0JA REPLACEMENT OF RIGHT HIP JOINT, FEMORAL SURFACE WITH SYNTHETIC SUBSTITUTE, UNCEMENTED, OPEN APPROACH: ICD-10-PCS | Performed by: ORTHOPAEDIC SURGERY

## 2018-11-01 PROCEDURE — 77030018547 HC SUT ETHBND1 J&J -B: Performed by: ORTHOPAEDIC SURGERY

## 2018-11-01 PROCEDURE — 77030008467 HC STPLR SKN COVD -B: Performed by: ORTHOPAEDIC SURGERY

## 2018-11-01 PROCEDURE — 74011250636 HC RX REV CODE- 250/636: Performed by: INTERNAL MEDICINE

## 2018-11-01 PROCEDURE — 80048 BASIC METABOLIC PNL TOTAL CA: CPT | Performed by: FAMILY MEDICINE

## 2018-11-01 PROCEDURE — 74011250636 HC RX REV CODE- 250/636: Performed by: FAMILY MEDICINE

## 2018-11-01 PROCEDURE — 76060000035 HC ANESTHESIA 2 TO 2.5 HR: Performed by: ORTHOPAEDIC SURGERY

## 2018-11-01 PROCEDURE — 77030011640 HC PAD GRND REM COVD -A: Performed by: ORTHOPAEDIC SURGERY

## 2018-11-01 PROCEDURE — 76210000016 HC OR PH I REC 1 TO 1.5 HR: Performed by: ORTHOPAEDIC SURGERY

## 2018-11-01 PROCEDURE — 77030012935 HC DRSG AQUACEL BMS -B: Performed by: ORTHOPAEDIC SURGERY

## 2018-11-01 PROCEDURE — 77030002933 HC SUT MCRYL J&J -A: Performed by: ORTHOPAEDIC SURGERY

## 2018-11-01 PROCEDURE — 77030018836 HC SOL IRR NACL ICUM -A: Performed by: ORTHOPAEDIC SURGERY

## 2018-11-01 PROCEDURE — 77030018846 HC SOL IRR STRL H20 ICUM -A: Performed by: ORTHOPAEDIC SURGERY

## 2018-11-01 PROCEDURE — C1776 JOINT DEVICE (IMPLANTABLE): HCPCS | Performed by: ORTHOPAEDIC SURGERY

## 2018-11-01 PROCEDURE — 77030018074 HC RTVR SUT ARTH4 S&N -B: Performed by: ORTHOPAEDIC SURGERY

## 2018-11-01 PROCEDURE — 72170 X-RAY EXAM OF PELVIS: CPT

## 2018-11-01 PROCEDURE — 77030020788: Performed by: ORTHOPAEDIC SURGERY

## 2018-11-01 PROCEDURE — 77030033067 HC SUT PDO STRATFX SPIR J&J -B: Performed by: ORTHOPAEDIC SURGERY

## 2018-11-01 PROCEDURE — 76010000171 HC OR TIME 2 TO 2.5 HR INTENSV-TIER 1: Performed by: ORTHOPAEDIC SURGERY

## 2018-11-01 PROCEDURE — 74011250636 HC RX REV CODE- 250/636: Performed by: ORTHOPAEDIC SURGERY

## 2018-11-01 PROCEDURE — 76010000131 HC OR TIME 2 TO 2.5 HR: Performed by: ORTHOPAEDIC SURGERY

## 2018-11-01 PROCEDURE — 77030020782 HC GWN BAIR PAWS FLX 3M -B

## 2018-11-01 PROCEDURE — 74011000250 HC RX REV CODE- 250: Performed by: ORTHOPAEDIC SURGERY

## 2018-11-01 PROCEDURE — 74011000258 HC RX REV CODE- 258: Performed by: INTERNAL MEDICINE

## 2018-11-01 PROCEDURE — 74011250636 HC RX REV CODE- 250/636: Performed by: NURSE PRACTITIONER

## 2018-11-01 PROCEDURE — 94760 N-INVAS EAR/PLS OXIMETRY 1: CPT

## 2018-11-01 DEVICE — LFIT V40 FEMORAL HEAD
Type: IMPLANTABLE DEVICE | Site: HIP | Status: FUNCTIONAL
Brand: V40 HEAD

## 2018-11-01 DEVICE — BIPOLAR COMPONENT
Type: IMPLANTABLE DEVICE | Site: HIP | Status: FUNCTIONAL
Brand: UHR

## 2018-11-01 DEVICE — STEM FEM PRSS FT HIP UNI/BI PLR: Type: IMPLANTABLE DEVICE | Status: FUNCTIONAL

## 2018-11-01 DEVICE — 127 DEGREE NECK ANGLE HIP STEM
Type: IMPLANTABLE DEVICE | Site: HIP | Status: FUNCTIONAL
Brand: ACCOLADE

## 2018-11-01 RX ORDER — OXYCODONE HYDROCHLORIDE 5 MG/1
5 TABLET ORAL AS NEEDED
Status: DISCONTINUED | OUTPATIENT
Start: 2018-11-01 | End: 2018-11-01 | Stop reason: HOSPADM

## 2018-11-01 RX ORDER — NALOXONE HYDROCHLORIDE 0.4 MG/ML
0.4 INJECTION, SOLUTION INTRAMUSCULAR; INTRAVENOUS; SUBCUTANEOUS AS NEEDED
Status: CANCELLED | OUTPATIENT
Start: 2018-11-01

## 2018-11-01 RX ORDER — SODIUM CHLORIDE, SODIUM LACTATE, POTASSIUM CHLORIDE, CALCIUM CHLORIDE 600; 310; 30; 20 MG/100ML; MG/100ML; MG/100ML; MG/100ML
25 INJECTION, SOLUTION INTRAVENOUS CONTINUOUS
Status: DISCONTINUED | OUTPATIENT
Start: 2018-11-01 | End: 2018-11-01 | Stop reason: HOSPADM

## 2018-11-01 RX ORDER — SODIUM CHLORIDE 0.9 % (FLUSH) 0.9 %
5-10 SYRINGE (ML) INJECTION EVERY 8 HOURS
Status: CANCELLED | OUTPATIENT
Start: 2018-11-02

## 2018-11-01 RX ORDER — ENOXAPARIN SODIUM 100 MG/ML
40 INJECTION SUBCUTANEOUS DAILY
Status: CANCELLED | OUTPATIENT
Start: 2018-11-02

## 2018-11-01 RX ORDER — MIDAZOLAM HYDROCHLORIDE 1 MG/ML
1 INJECTION, SOLUTION INTRAMUSCULAR; INTRAVENOUS AS NEEDED
Status: DISCONTINUED | OUTPATIENT
Start: 2018-11-01 | End: 2018-11-01 | Stop reason: HOSPADM

## 2018-11-01 RX ORDER — SODIUM CHLORIDE 0.9 % (FLUSH) 0.9 %
5-10 SYRINGE (ML) INJECTION AS NEEDED
Status: DISCONTINUED | OUTPATIENT
Start: 2018-11-01 | End: 2018-11-01 | Stop reason: HOSPADM

## 2018-11-01 RX ORDER — AMOXICILLIN 250 MG
1 CAPSULE ORAL 2 TIMES DAILY
Status: CANCELLED | OUTPATIENT
Start: 2018-11-01

## 2018-11-01 RX ORDER — HYDROMORPHONE HYDROCHLORIDE 2 MG/ML
INJECTION, SOLUTION INTRAMUSCULAR; INTRAVENOUS; SUBCUTANEOUS AS NEEDED
Status: DISCONTINUED | OUTPATIENT
Start: 2018-11-01 | End: 2018-11-01 | Stop reason: HOSPADM

## 2018-11-01 RX ORDER — SODIUM CHLORIDE 0.9 % (FLUSH) 0.9 %
5-10 SYRINGE (ML) INJECTION EVERY 8 HOURS
Status: DISCONTINUED | OUTPATIENT
Start: 2018-11-01 | End: 2018-11-01 | Stop reason: HOSPADM

## 2018-11-01 RX ORDER — MIDAZOLAM HYDROCHLORIDE 1 MG/ML
0.5 INJECTION, SOLUTION INTRAMUSCULAR; INTRAVENOUS
Status: DISCONTINUED | OUTPATIENT
Start: 2018-11-01 | End: 2018-11-01 | Stop reason: HOSPADM

## 2018-11-01 RX ORDER — SODIUM CHLORIDE, SODIUM LACTATE, POTASSIUM CHLORIDE, CALCIUM CHLORIDE 600; 310; 30; 20 MG/100ML; MG/100ML; MG/100ML; MG/100ML
INJECTION, SOLUTION INTRAVENOUS
Status: DISCONTINUED | OUTPATIENT
Start: 2018-11-01 | End: 2018-11-01 | Stop reason: HOSPADM

## 2018-11-01 RX ORDER — SODIUM CHLORIDE 0.9 % (FLUSH) 0.9 %
5-10 SYRINGE (ML) INJECTION AS NEEDED
Status: CANCELLED | OUTPATIENT
Start: 2018-11-01

## 2018-11-01 RX ORDER — ROCURONIUM BROMIDE 10 MG/ML
INJECTION, SOLUTION INTRAVENOUS AS NEEDED
Status: DISCONTINUED | OUTPATIENT
Start: 2018-11-01 | End: 2018-11-01 | Stop reason: HOSPADM

## 2018-11-01 RX ORDER — LIDOCAINE HYDROCHLORIDE 10 MG/ML
0.1 INJECTION, SOLUTION EPIDURAL; INFILTRATION; INTRACAUDAL; PERINEURAL AS NEEDED
Status: DISCONTINUED | OUTPATIENT
Start: 2018-11-01 | End: 2018-11-01 | Stop reason: HOSPADM

## 2018-11-01 RX ORDER — PHENYLEPHRINE HCL IN 0.9% NACL 0.4MG/10ML
SYRINGE (ML) INTRAVENOUS AS NEEDED
Status: DISCONTINUED | OUTPATIENT
Start: 2018-11-01 | End: 2018-11-01 | Stop reason: HOSPADM

## 2018-11-01 RX ORDER — SODIUM CHLORIDE 9 MG/ML
25 INJECTION, SOLUTION INTRAVENOUS CONTINUOUS
Status: DISCONTINUED | OUTPATIENT
Start: 2018-11-01 | End: 2018-11-01 | Stop reason: HOSPADM

## 2018-11-01 RX ORDER — ONDANSETRON 2 MG/ML
INJECTION INTRAMUSCULAR; INTRAVENOUS AS NEEDED
Status: DISCONTINUED | OUTPATIENT
Start: 2018-11-01 | End: 2018-11-01 | Stop reason: HOSPADM

## 2018-11-01 RX ORDER — FENTANYL CITRATE 50 UG/ML
50 INJECTION, SOLUTION INTRAMUSCULAR; INTRAVENOUS AS NEEDED
Status: DISCONTINUED | OUTPATIENT
Start: 2018-11-01 | End: 2018-11-01 | Stop reason: HOSPADM

## 2018-11-01 RX ORDER — SODIUM CHLORIDE, SODIUM LACTATE, POTASSIUM CHLORIDE, CALCIUM CHLORIDE 600; 310; 30; 20 MG/100ML; MG/100ML; MG/100ML; MG/100ML
100 INJECTION, SOLUTION INTRAVENOUS CONTINUOUS
Status: DISCONTINUED | OUTPATIENT
Start: 2018-11-01 | End: 2018-11-01 | Stop reason: HOSPADM

## 2018-11-01 RX ORDER — FENTANYL CITRATE 50 UG/ML
25 INJECTION, SOLUTION INTRAMUSCULAR; INTRAVENOUS
Status: DISCONTINUED | OUTPATIENT
Start: 2018-11-01 | End: 2018-11-01 | Stop reason: HOSPADM

## 2018-11-01 RX ORDER — FACIAL-BODY WIPES
10 EACH TOPICAL DAILY PRN
Status: CANCELLED | OUTPATIENT
Start: 2018-11-03

## 2018-11-01 RX ORDER — ROPIVACAINE HYDROCHLORIDE 5 MG/ML
30 INJECTION, SOLUTION EPIDURAL; INFILTRATION; PERINEURAL AS NEEDED
Status: DISCONTINUED | OUTPATIENT
Start: 2018-11-01 | End: 2018-11-01 | Stop reason: HOSPADM

## 2018-11-01 RX ORDER — LIDOCAINE HYDROCHLORIDE 20 MG/ML
INJECTION, SOLUTION EPIDURAL; INFILTRATION; INTRACAUDAL; PERINEURAL AS NEEDED
Status: DISCONTINUED | OUTPATIENT
Start: 2018-11-01 | End: 2018-11-01 | Stop reason: HOSPADM

## 2018-11-01 RX ORDER — GLYCOPYRROLATE 0.2 MG/ML
INJECTION INTRAMUSCULAR; INTRAVENOUS AS NEEDED
Status: DISCONTINUED | OUTPATIENT
Start: 2018-11-01 | End: 2018-11-01 | Stop reason: HOSPADM

## 2018-11-01 RX ORDER — MORPHINE SULFATE 10 MG/ML
2 INJECTION, SOLUTION INTRAMUSCULAR; INTRAVENOUS
Status: DISCONTINUED | OUTPATIENT
Start: 2018-11-01 | End: 2018-11-01 | Stop reason: HOSPADM

## 2018-11-01 RX ORDER — POLYETHYLENE GLYCOL 3350 17 G/17G
17 POWDER, FOR SOLUTION ORAL DAILY
Status: CANCELLED | OUTPATIENT
Start: 2018-11-02

## 2018-11-01 RX ORDER — HYDROMORPHONE HYDROCHLORIDE 2 MG/ML
0.5 INJECTION, SOLUTION INTRAMUSCULAR; INTRAVENOUS; SUBCUTANEOUS
Status: DISCONTINUED | OUTPATIENT
Start: 2018-11-01 | End: 2018-11-02

## 2018-11-01 RX ORDER — SODIUM CHLORIDE 9 MG/ML
125 INJECTION, SOLUTION INTRAVENOUS CONTINUOUS
Status: DISPENSED | OUTPATIENT
Start: 2018-11-01 | End: 2018-11-03

## 2018-11-01 RX ORDER — ONDANSETRON 2 MG/ML
4 INJECTION INTRAMUSCULAR; INTRAVENOUS AS NEEDED
Status: DISCONTINUED | OUTPATIENT
Start: 2018-11-01 | End: 2018-11-01 | Stop reason: HOSPADM

## 2018-11-01 RX ORDER — FENTANYL CITRATE 50 UG/ML
INJECTION, SOLUTION INTRAMUSCULAR; INTRAVENOUS AS NEEDED
Status: DISCONTINUED | OUTPATIENT
Start: 2018-11-01 | End: 2018-11-01 | Stop reason: HOSPADM

## 2018-11-01 RX ORDER — NEOSTIGMINE METHYLSULFATE 1 MG/ML
INJECTION INTRAVENOUS AS NEEDED
Status: DISCONTINUED | OUTPATIENT
Start: 2018-11-01 | End: 2018-11-01 | Stop reason: HOSPADM

## 2018-11-01 RX ORDER — PROPOFOL 10 MG/ML
INJECTION, EMULSION INTRAVENOUS AS NEEDED
Status: DISCONTINUED | OUTPATIENT
Start: 2018-11-01 | End: 2018-11-01 | Stop reason: HOSPADM

## 2018-11-01 RX ADMIN — SODIUM CHLORIDE 125 ML/HR: 900 INJECTION, SOLUTION INTRAVENOUS at 19:33

## 2018-11-01 RX ADMIN — CLONAZEPAM 0.25 MG: 0.5 TABLET ORAL at 22:27

## 2018-11-01 RX ADMIN — Medication 80 MCG: at 14:26

## 2018-11-01 RX ADMIN — ROCURONIUM BROMIDE 30 MG: 10 INJECTION, SOLUTION INTRAVENOUS at 14:27

## 2018-11-01 RX ADMIN — FENTANYL CITRATE 25 MCG: 50 INJECTION, SOLUTION INTRAMUSCULAR; INTRAVENOUS at 07:19

## 2018-11-01 RX ADMIN — Medication 80 MCG: at 14:35

## 2018-11-01 RX ADMIN — NEOSTIGMINE METHYLSULFATE 1.5 MG: 1 INJECTION INTRAVENOUS at 15:42

## 2018-11-01 RX ADMIN — GLYCOPYRROLATE 0.3 MG: 0.2 INJECTION INTRAMUSCULAR; INTRAVENOUS at 15:42

## 2018-11-01 RX ADMIN — FENTANYL CITRATE 50 MCG: 50 INJECTION, SOLUTION INTRAMUSCULAR; INTRAVENOUS at 14:15

## 2018-11-01 RX ADMIN — SODIUM CHLORIDE, SODIUM LACTATE, POTASSIUM CHLORIDE, CALCIUM CHLORIDE: 600; 310; 30; 20 INJECTION, SOLUTION INTRAVENOUS at 14:15

## 2018-11-01 RX ADMIN — FOLIC ACID: 5 INJECTION, SOLUTION INTRAMUSCULAR; INTRAVENOUS; SUBCUTANEOUS at 00:10

## 2018-11-01 RX ADMIN — CEFTRIAXONE 1 G: 1 INJECTION, POWDER, FOR SOLUTION INTRAMUSCULAR; INTRAVENOUS at 08:28

## 2018-11-01 RX ADMIN — HYDROMORPHONE HYDROCHLORIDE 0.5 MG: 2 INJECTION INTRAMUSCULAR; INTRAVENOUS; SUBCUTANEOUS at 22:27

## 2018-11-01 RX ADMIN — ONDANSETRON HYDROCHLORIDE 4 MG: 2 INJECTION INTRAMUSCULAR; INTRAVENOUS at 03:16

## 2018-11-01 RX ADMIN — HYDROMORPHONE HYDROCHLORIDE 0.5 MG: 2 INJECTION, SOLUTION INTRAMUSCULAR; INTRAVENOUS; SUBCUTANEOUS at 15:46

## 2018-11-01 RX ADMIN — LIDOCAINE HYDROCHLORIDE 60 MG: 20 INJECTION, SOLUTION EPIDURAL; INFILTRATION; INTRACAUDAL; PERINEURAL at 14:26

## 2018-11-01 RX ADMIN — SODIUM CHLORIDE, SODIUM LACTATE, POTASSIUM CHLORIDE, AND CALCIUM CHLORIDE 25 ML/HR: 600; 310; 30; 20 INJECTION, SOLUTION INTRAVENOUS at 14:11

## 2018-11-01 RX ADMIN — Medication 10 ML: at 22:00

## 2018-11-01 RX ADMIN — AZITHROMYCIN MONOHYDRATE 500 MG: 500 INJECTION, POWDER, LYOPHILIZED, FOR SOLUTION INTRAVENOUS at 05:31

## 2018-11-01 RX ADMIN — Medication 10 ML: at 05:35

## 2018-11-01 RX ADMIN — HYDROMORPHONE HYDROCHLORIDE 0.5 MG: 2 INJECTION INTRAMUSCULAR; INTRAVENOUS; SUBCUTANEOUS at 11:47

## 2018-11-01 RX ADMIN — Medication 80 MCG: at 14:32

## 2018-11-01 RX ADMIN — PROPOFOL 20 MG: 10 INJECTION, EMULSION INTRAVENOUS at 14:15

## 2018-11-01 RX ADMIN — FENTANYL CITRATE 25 MCG: 50 INJECTION, SOLUTION INTRAMUSCULAR; INTRAVENOUS at 00:09

## 2018-11-01 RX ADMIN — FENTANYL CITRATE 25 MCG: 50 INJECTION, SOLUTION INTRAMUSCULAR; INTRAVENOUS at 03:16

## 2018-11-01 RX ADMIN — ONDANSETRON 4 MG: 2 INJECTION INTRAMUSCULAR; INTRAVENOUS at 16:12

## 2018-11-01 RX ADMIN — CARVEDILOL 6.25 MG: 6.25 TABLET, FILM COATED ORAL at 08:28

## 2018-11-01 NOTE — PROGRESS NOTES
Problem: Falls - Risk of 
Goal: *Absence of Falls Document Eugenio Cabral Fall Risk and appropriate interventions in the flowsheet. Outcome: Progressing Towards Goal 
Fall Risk Interventions: 
Mobility Interventions: Communicate number of staff needed for ambulation/transfer, Patient to call before getting OOB Medication Interventions: Evaluate medications/consider consulting pharmacy, Patient to call before getting OOB, Teach patient to arise slowly Elimination Interventions: Call light in reach, Patient to call for help with toileting needs History of Falls Interventions: Evaluate medications/consider consulting pharmacy, Door open when patient unattended, Investigate reason for fall Problem: Pressure Injury - Risk of 
Goal: *Prevention of pressure injury Document Judson Scale and appropriate interventions in the flowsheet. Outcome: Progressing Towards Goal 
Pressure Injury Interventions: Activity Interventions: Increase time out of bed, Pressure redistribution bed/mattress(bed type) Mobility Interventions: Float heels, HOB 30 degrees or less, Pressure redistribution bed/mattress (bed type), Turn and reposition approx. every two hours(pillow and wedges) Nutrition Interventions: Document food/fluid/supplement intake, Offer support with meals,snacks and hydration

## 2018-11-01 NOTE — PROGRESS NOTES
TRANSFER - IN REPORT: 
 
Verbal report received from Chad Mann RN(name) on Alex Fercho  being received from [facility]) for ordered procedure Report consisted of patients Situation, Background, Assessment and  
Recommendations(SBAR). Information from the following report(s) SBAR was reviewed with the receiving nurse. Opportunity for questions and clarification was provided. Assessment completed upon patients arrival to unit and care assumed.

## 2018-11-01 NOTE — PROGRESS NOTES
Ortho brief note: 
Right femoral neck fracture for kade-arthroplasty this afternoon. Patient c/o right groin pain. She wants to move forward with surgery. D/W medicine team.  They prefer post-op on ortho [address] (if bed avail) with remote tele. They also notified of prev DVT 2017. Afebrile. Hgb 12 WBC 10.3 Patient is NPO. Has abx coverage for urine. To OR this afternoon.

## 2018-11-01 NOTE — PROGRESS NOTES
Problem: Pain Goal: *Control of Pain Outcome: Progressing Towards Goal 
Assessed pain charecteristics Q4 using numeric scale, monitored for change in patient's condition, pain relief reassessment for patient's stated pain goal.

## 2018-11-01 NOTE — PROGRESS NOTES
Problem: Falls - Risk of 
Goal: *Absence of Falls Document April Carl Fall Risk and appropriate interventions in the flowsheet. Outcome: Progressing Towards Goal 
Fall Risk Interventions: 
Mobility Interventions: Communicate number of staff needed for ambulation/transfer, OT consult for ADLs, Patient to call before getting OOB, PT Consult for mobility concerns, PT Consult for assist device competence, Strengthening exercises (ROM-active/passive), Utilize walker, cane, or other assistive device Medication Interventions: Assess postural VS orthostatic hypotension, Evaluate medications/consider consulting pharmacy, Patient to call before getting OOB, Teach patient to arise slowly Elimination Interventions: Call light in reach, Elevated toilet seat, Patient to call for help with toileting needs, Toilet paper/wipes in reach, Toileting schedule/hourly rounds History of Falls Interventions: Consult care management for discharge planning, Door open when patient unattended, Evaluate medications/consider consulting pharmacy, Investigate reason for fall, Room close to nurse's station Problem: Pressure Injury - Risk of 
Goal: *Prevention of pressure injury Document Judson Scale and appropriate interventions in the flowsheet. Outcome: Progressing Towards Goal 
Pressure Injury Interventions: Activity Interventions: Assess need for specialty bed, Increase time out of bed, Pressure redistribution bed/mattress(bed type), PT/OT evaluation Mobility Interventions: Assess need for specialty bed, HOB 30 degrees or less, Pressure redistribution bed/mattress (bed type), PT/OT evaluation Nutrition Interventions: Document food/fluid/supplement intake Friction and Shear Interventions: HOB 30 degrees or less, Lift sheet, Transfer aides:transfer board/Pati lift/ceiling lift, Minimize layers Problem: Pain Goal: *Control of Pain Outcome: Progressing Towards Goal 
 Pt complains of pain in R hip. Pain medicine was switched to Dilaudid Q4PRN. Assisting with position changes as tolerated. Will continue to monitor.

## 2018-11-01 NOTE — PROGRESS NOTES
TRANSFER - OUT REPORT: 
 
Verbal report given to Shwetha Lamar RN (name) on Padmini Salinas  being transferred to OR(unit) for ordered procedure. Report consisted of patients Situation, Background, Assessment and  
Recommendations(SBAR). Information from the following report(s) SBAR, Kardex, Intake/Output, Recent Results and Cardiac Rhythm NSR/ST was reviewed with the receiving nurse. Opportunity for questions and clarification was provided.

## 2018-11-01 NOTE — OP NOTES
OPERATIVE REPORT HEMIARTHROPLASTY    DATE OF PROCEDURE: 11/1/2018 4:30PM    PREOPERATIVE DIAGNOSIS:  displaced right femoral neck fracture.     POSTOPERATIVE DIAGNOSIS:  displaced right femoral neck fracture.     PROCEDURE PERFORMED:  Right hip hemiarthroplasty.     SURGEON:  Amos Balderas MD     ASSISTANT:  tech     COMPONENTS IMPLANTED: Rosalva LFIT V40 Femoral head 0+ offset, Size 45 UHR Bipolar component, Size 3 Accolade  degree femoral stem with 102mm stem length     ANESTHESIA:  General     COMPLICATIONS:  None.     ESTIMATED BLOOD LOSS:  250 mL.     SPECIMENS REMOVED:  None.        INDICATIONS:  The patient is an 80year-old female who is a ambulator, uses a walker. Patient suffered mechanical ground level fall resulting in severe right hip pain. Radiographs demonstrate a displaced and impacted femoral neck fracture. Procedure is elected in order to maximize early mobility. I have recommended hemiarthroplasty to allow full weightbearing for transfers and ambulation. Risks, benefits, alternatives were discussed with the patient and her daughter in detail and they desired to proceed.     PROCEDURE IN DETAIL:      The patient was taken to the operating room and the anesthesia team started general anesthesia after being moved to the operating table in supine position. Preoperative IV antibiotics were administered. Patient was turned to the  lateral decubitus position on a Bean bag. All bony prominences were well padded and an axillary roll was placed. The right hip and thigh were prepped and draped in the usual sterile fashion. Through a Postero-lateral hip incision, I performed a posterior approach to the hip. TFL was incised sharply with 10 blade knife for later repair. Short rotators and posterior capsule reflected posteriorly. A Ethibond suture was used to tag the piriformis and capsule.  The hip was flexed and internally rotated and fresh femoral neck osteotomy was made. The femoral head was removed with a corkscrew and a portion of very thick anterior superior capsule was excised to prevent external impingement. A 45 mm head trial produced the best suction fit in the acetabulum. The femur was prepared with Entry conical reamers and broaches up to a size 3. This was filling the canal distally and proximally with good rotational stability. I elected to use a Accolade II stem. Based on native anatomy, hip was reduced with a increased offset neck trial and +0 head trial produced a reasonable leg length. The hip was quite tight in extension and external rotation, no instability, stable to straight hyperflexion and with the hip flexed 90 degrees at neutral abduction, there is greater than 60-70 degrees of internal rotation. Trials were removed and the accolade stem was placed in the femur in impacted appropriately. Canal was copiously irrigated and dried before the stem was impacted in place. After this a bipolar head once once again trialed and felt to be adequate. The real head was then placed and the hip was reduced. Same leg length and stability were achieved. Periarticular soft tissues were injected with a solution containing surgical pain solution. Posterior capsule was repaired with inner aspect of the posterior greater trochanter through drill holes using #2 Ethibond sutures. Deep fascia was closed with a combination of heavy 2-0 Vicryl sutures, #2 Ethibond, and a running #2 Stratafix suture. Skin and subcutaneous layers were closed in layered fashion with 2-0 Vicryl and a running 3-0 Monocryl subcuticular stitch. The wound was dressed with steristrips and an Aquacel occlusive dressing. The patient was transported to the postanesthesia care unit in stable condition.   All counts were correct at the end of the procedure.        Joel Ruelas MD

## 2018-11-01 NOTE — ANESTHESIA PREPROCEDURE EVALUATION
Anesthetic History No history of anesthetic complications Review of Systems / Medical History Patient summary reviewed, nursing notes reviewed and pertinent labs reviewed Pulmonary Within defined limits Neuro/Psych  
 
 
 
TIA Cardiovascular Within defined limits Past MI and CAD Exercise tolerance: <4 METS 
  
GI/Hepatic/Renal 
  
 
 
 
 
 
 Endo/Other Arthritis Other Findings Comments: Sig EtOH use Physical Exam 
 
Airway Mallampati: III 
TM Distance: 4 - 6 cm Neck ROM: normal range of motion Mouth opening: Normal 
 
 Cardiovascular Rhythm: regular Rate: normal 
 
 
 
 Dental 
 
Dentition: Upper dentition intact and Lower dentition intact Pulmonary Breath sounds clear to auscultation Abdominal 
GI exam deferred Other Findings Anesthetic Plan ASA: 3 Anesthesia type: general 
 
 
 
 
Induction: Intravenous Anesthetic plan and risks discussed with: Patient

## 2018-11-01 NOTE — PROGRESS NOTES
Bedside and Verbal shift change report given to Katherin [place]ch (oncoming nurse) by Tamar Bella (offgoing nurse). Report included the following information SBAR, Kardex, ED Summary, Procedure Summary, Intake/Output, MAR and Recent Results. Bedside and Verbal shift change report given to [address] [place] [address] (oncoming nurse) by Yvan Dee RN (offgoing nurse). Report included the following information SBAR, Kardex, ED Summary, Procedure Summary, Intake/Output, MAR and Recent Results.

## 2018-11-01 NOTE — ANESTHESIA POSTPROCEDURE EVALUATION
Procedure(s): RIGHT HIP HEMIARTHROPLASTY. Anesthesia Post Evaluation Multimodal analgesia: multimodal analgesia used between 6 hours prior to anesthesia start to PACU discharge Patient location during evaluation: PACU Patient participation: complete - patient participated Level of consciousness: awake Pain management: adequate Airway patency: patent Anesthetic complications: no 
Cardiovascular status: hemodynamically stable Respiratory status: acceptable Hydration status: acceptable Comments: I have seen and evaluated the patient. The patient is ready for PACU discharge. [address], DO Visit Vitals /63 Pulse 97 Temp 37.3 °C (99.2 °F) Resp 14 Wt 59.6 kg (131 lb 4.8 oz) SpO2 94% Breastfeeding? No  
BMI 25.64 kg/m²

## 2018-11-01 NOTE — PROGRESS NOTES
Hospitalist Progress Note Candis Murphy NP Answering service: [phone] OR [phone] from in house phone Cell: [phone] Date of Service:  2018 NAME:  James Dobson :  10/17/1927 MRN:  [id number] Admission Summary: Pt presented to the ED from [facility] assisted living facility with a chief complaint of pain in R hip after a fall. She reportedly was trying to walk to the bathroom using her walker but tripped and fell, landing onto her right side having subsequent pain to the right hip, buttock, and foot. She was unable to bear weight with a pain level of 10/10, aggravated with any movement, radiating from right hip to right thigh, without specific alleviating factors. Denied hitting her head or any loss of consciousness. Pmhx: coronary artery disease, non-ST elevation myocardial infarction, hypertension, hyperlipidemia, glaucoma, anxiety, chest pain, right lower extremity edema, arthritis Interval history / Subjective:  
Pt in bed, reports a good deal of R hip pain at rest. Says the fentanyl is not working very well for pain control - okay with switch/trying something different. Aware of plans for hip surgery today Assessment & Plan:  
 
Closed R Femur Fracture (POA): - R hip 2 view x-ray shows subcapital right proximal femur fracture. - Ortho is following, surgery today 
- pain management: will try low dose dilaudid for now. Anticipate scheduled tylenol and would try tramadol post op for prn pain med. - bowel management post-op. Cautious - pt reports historically has loose BMs 
- PT/OT after surgery 
- DVT ppx as per Ortho post surgery - would transfer pt to orthopedics on remote tele post surgery Pneumonia (POA): - Chest Xray: right basilar consolidation  
- on zithromax and ceftriaxone 
- no symptoms PTA. No fever PTA, no cough, no leukocytosis - Tmax 100/24 hours Tachycardia:  
 - most likely related to pain - pt in NSR 
- no chest pain Abnormal UA:  
- urine is cloudy, stong smell - + leuks in urine and >WBC - will ask lab to run culture on hold 
- already on abx to treat pneumonia Hx Coronary artery disease and Hx MI:   
- PTA BB ordered Hx  Hypertension: Monitor blood pressure - could vary now due to pain Hx Acute RLE DVT in 2017:  
- initially in 9/2017, it was decided NOT to place pt on [address] [place] [address] Road due to hx of small subdural bleed. She was not dicsharged at that time with blood thinners 
- in 10/2017, she cam back to ED with worsening leg swelling and it was decided to place her on lovenox. - in reviewing [place] most [place] [facility] no longer on this Code status: DNR 
DVT prophylaxis: SCDs for now Care Plan discussed with: patient, ortho Disposition TBD Recommended transfer to 5S post op with remote tele - discussed with Orthopedics Hospital Problems  Date Reviewed: 10/31/2018 Codes Class Noted POA Pneumonia ICD-10-CM: J18.9 ICD-9-CM: 264  10/31/2018 Unknown * (Principal) Other fracture of right femur, initial encounter for closed fracture [facility]) ICD-10-CM: M42.8F3F 
ICD-9-CM: 821.00  10/31/2018 Unknown Review of Systems:  
Denies HA. No chest pain, pressure or palpitations. No GI or urinary complaints. ++ R hip and RLE pain Vital Signs:  
 Last 24hrs VS reviewed since prior progress note. Most recent are: 
Visit Vitals /69 (BP 1 Location: Left arm, BP Patient Position: At rest;Supine) Pulse (!) 110 Temp 98.9 °F (37.2 °C) Resp 18 Wt 59.6 kg (131 lb 4.8 oz) SpO2 95% Breastfeeding? No  
BMI 25.64 kg/m² Intake/Output Summary (Last 24 hours) at 11/1/2018 3495 Last data filed at 11/1/2018 3576 Gross per 24 hour Intake 480 ml Output 850 ml Net -370 ml Physical Examination:  
     
Constitutional:  Cooperative, pleasant   
ENT:  Oral MM dry, oropharynx benign. Resp: CTA bilaterally. No wheezing/rhonchi/rales. No accessory muscle use and on RA  
CV:  Regular rhythm, no murmurs. Tachycardic ~110 GI:  Soft, non distended, non tender. Normoactive bowel sounds : Burris present, cloudy yellow dark urine Musculoskeletal:  No edema, warm, 2+ pulses throughout. Very guarded to RLE Neurologic:  AAOx3. Mildly anxious. Data Review:  
Review and/or order of clinical lab test 
Review and/or order of tests in the radiology section of CPT Review and/or order of tests in the medicine section of CPT Labs:  
 
Recent Labs 11/01/18 0404 10/31/18 
0205 WBC 10.3 6.3 HGB 12.0 12.7 HCT 37.6 38.8 * 135* Recent Labs 11/01/18 
0404 10/31/18 
0205  142  
K 3.5 3.5 * 107 CO2 19* 24 BUN 10 13 CREA 0.79 0.94 * 95  
CA 7.8* 8.6 Recent Labs 10/31/18 
0205 SGOT 17 ALT 20 * TBILI 0.3 TP 6.5 ALB 3.6 GLOB 2.9 Recent Labs 10/31/18 
0205 INR 1.0 PTP 9.8 APTT 23.6 No results for input(s): FE, TIBC, PSAT, FERR in the last 72 hours. Lab Results Component Value Date/Time Folate 5.8 03/08/2017 06:00 PM  
  
No results for input(s): PH, PCO2, PO2 in the last 72 hours. No results for input(s): CPK, CKNDX, TROIQ in the last 72 hours. No lab exists for component: CPKMB Lab Results Component Value Date/Time Cholesterol, total 182 03/08/2017 06:00 PM  
 HDL Cholesterol 64 03/08/2017 06:00 PM  
 LDL, calculated 91.2 03/08/2017 06:00 PM  
 Triglyceride 134 03/08/2017 06:00 PM  
 CHOL/HDL Ratio 2.8 03/08/2017 06:00 PM  
 
Lab Results Component Value Date/Time Glucose (POC) 94 11/20/2016 07:00 AM  
 
Lab Results Component Value Date/Time  Color YELLOW/STRAW 10/31/2018 04:55 AM  
 Appearance CLOUDY (A) 10/31/2018 04:55 AM  
 Specific gravity 1.014 10/31/2018 04:55 AM  
 pH (UA) 5.5 10/31/2018 04:55 AM  
 Protein TRACE (A) 10/31/2018 04:55 AM  
 Glucose NEGATIVE  10/31/2018 04:55 AM  
 Ketone NEGATIVE  10/31/2018 04:55 AM  
 Bilirubin NEGATIVE  10/31/2018 04:55 AM  
 Urobilinogen 0.2 10/31/2018 04:55 AM  
 Nitrites NEGATIVE  10/31/2018 04:55 AM  
 Leukocyte Esterase LARGE (A) 10/31/2018 04:55 AM  
 Epithelial cells FEW 10/31/2018 04:55 AM  
 Bacteria NEGATIVE  10/31/2018 04:55 AM  
 WBC >100 (H) 10/31/2018 04:55 AM  
 RBC 0-5 10/31/2018 04:55 AM  
 
Medications Reviewed:  
 
Current Facility-Administered Medications Medication Dose Route Frequency  latanoprost (XALATAN) 0.005 % ophthalmic solution 1 Drop  1 Drop Both Eyes QHS  sodium chloride (NS) flush 5-10 mL  5-10 mL IntraVENous Q8H  
 sodium chloride (NS) flush 5-10 mL  5-10 mL IntraVENous PRN  
 fentaNYL citrate (PF) injection 25 mcg  25 mcg IntraVENous Q4H PRN  
 azithromycin (ZITHROMAX) 500 mg in 0.9% sodium chloride 250 mL (Znna9Ydz)  500 mg IntraVENous Q24H  cefTRIAXone (ROCEPHIN) 1 g in 0.9% sodium chloride (MBP/ADV) 50 mL  1 g IntraVENous Q24H  carvedilol (COREG) tablet 6.25 mg  6.25 mg Oral BID WITH MEALS  clonazePAM (KlonoPIN) tablet 0.25 mg  0.25 mg Oral QHS PRN  
 hydrALAZINE (APRESOLINE) 20 mg/mL injection 10 mg  10 mg IntraVENous Q6H PRN  
 ondansetron (ZOFRAN) injection 4 mg  4 mg IntraVENous Q8H PRN  
 0.9% sodium chloride 1,000 mL with mvi, adult no. 4 with vit K 10 mL, thiamine 060 mg, folic acid 1 mg infusion   IntraVENous Q24H  
______________________________________________________________________ EXPECTED LENGTH OF STAY: - - - 
ACTUAL LENGTH OF STAY:          1 Kimmie Chand NP

## 2018-11-02 LAB
ANION GAP SERPL CALC-SCNC: 10 MMOL/L (ref 5–15)
BUN SERPL-MCNC: 17 MG/DL (ref 6–20)
BUN/CREAT SERPL: 17 (ref 12–20)
CALCIUM SERPL-MCNC: 7.4 MG/DL (ref 8.5–10.1)
CHLORIDE SERPL-SCNC: 113 MMOL/L (ref 97–108)
CO2 SERPL-SCNC: 19 MMOL/L (ref 21–32)
CREAT SERPL-MCNC: 0.98 MG/DL (ref 0.55–1.02)
GLUCOSE SERPL-MCNC: 110 MG/DL (ref 65–100)
HCT VFR BLD AUTO: 38.6 % (ref 35–47)
HGB BLD-MCNC: 11.9 G/DL (ref 11.5–16)
POTASSIUM SERPL-SCNC: 3.9 MMOL/L (ref 3.5–5.1)
SODIUM SERPL-SCNC: 142 MMOL/L (ref 136–145)

## 2018-11-02 PROCEDURE — G8978 MOBILITY CURRENT STATUS: HCPCS

## 2018-11-02 PROCEDURE — 94760 N-INVAS EAR/PLS OXIMETRY 1: CPT

## 2018-11-02 PROCEDURE — 77010033678 HC OXYGEN DAILY

## 2018-11-02 PROCEDURE — 74011250636 HC RX REV CODE- 250/636: Performed by: NURSE PRACTITIONER

## 2018-11-02 PROCEDURE — 36415 COLL VENOUS BLD VENIPUNCTURE: CPT | Performed by: NURSE PRACTITIONER

## 2018-11-02 PROCEDURE — 97530 THERAPEUTIC ACTIVITIES: CPT

## 2018-11-02 PROCEDURE — 97162 PT EVAL MOD COMPLEX 30 MIN: CPT

## 2018-11-02 PROCEDURE — G8979 MOBILITY GOAL STATUS: HCPCS

## 2018-11-02 PROCEDURE — 74011250636 HC RX REV CODE- 250/636: Performed by: HOSPITALIST

## 2018-11-02 PROCEDURE — 74011000250 HC RX REV CODE- 250: Performed by: FAMILY MEDICINE

## 2018-11-02 PROCEDURE — 74011000258 HC RX REV CODE- 258: Performed by: INTERNAL MEDICINE

## 2018-11-02 PROCEDURE — 74011250637 HC RX REV CODE- 250/637: Performed by: NURSE PRACTITIONER

## 2018-11-02 PROCEDURE — 74011250636 HC RX REV CODE- 250/636: Performed by: FAMILY MEDICINE

## 2018-11-02 PROCEDURE — 80048 BASIC METABOLIC PNL TOTAL CA: CPT | Performed by: NURSE PRACTITIONER

## 2018-11-02 PROCEDURE — 85018 HEMOGLOBIN: CPT | Performed by: NURSE PRACTITIONER

## 2018-11-02 PROCEDURE — 74011250637 HC RX REV CODE- 250/637: Performed by: INTERNAL MEDICINE

## 2018-11-02 PROCEDURE — 97110 THERAPEUTIC EXERCISES: CPT

## 2018-11-02 PROCEDURE — 65270000029 HC RM PRIVATE

## 2018-11-02 PROCEDURE — 97535 SELF CARE MNGMENT TRAINING: CPT

## 2018-11-02 PROCEDURE — 97165 OT EVAL LOW COMPLEX 30 MIN: CPT

## 2018-11-02 PROCEDURE — 74011250636 HC RX REV CODE- 250/636: Performed by: INTERNAL MEDICINE

## 2018-11-02 RX ORDER — ACETAMINOPHEN 325 MG/1
1000 TABLET ORAL 3 TIMES DAILY
COMMUNITY
End: 2018-11-07

## 2018-11-02 RX ORDER — AMOXICILLIN 250 MG
1 CAPSULE ORAL DAILY
Status: DISCONTINUED | OUTPATIENT
Start: 2018-11-02 | End: 2018-11-03

## 2018-11-02 RX ORDER — CHOLECALCIFEROL (VITAMIN D3) 125 MCG
5 CAPSULE ORAL
COMMUNITY

## 2018-11-02 RX ORDER — MIRTAZAPINE 30 MG/1
30 TABLET, FILM COATED ORAL
COMMUNITY

## 2018-11-02 RX ORDER — ACETAMINOPHEN 325 MG/1
650 TABLET ORAL EVERY 6 HOURS
Status: DISCONTINUED | OUTPATIENT
Start: 2018-11-02 | End: 2018-11-07 | Stop reason: HOSPADM

## 2018-11-02 RX ORDER — SODIUM CHLORIDE 9 MG/ML
500 INJECTION, SOLUTION INTRAVENOUS ONCE
Status: COMPLETED | OUTPATIENT
Start: 2018-11-02 | End: 2018-11-02

## 2018-11-02 RX ORDER — ENOXAPARIN SODIUM 100 MG/ML
40 INJECTION SUBCUTANEOUS EVERY 24 HOURS
Status: DISCONTINUED | OUTPATIENT
Start: 2018-11-02 | End: 2018-11-02 | Stop reason: DRUGHIGH

## 2018-11-02 RX ORDER — LOPERAMIDE HYDROCHLORIDE 2 MG/1
CAPSULE ORAL
COMMUNITY
End: 2018-11-07

## 2018-11-02 RX ORDER — TRAMADOL HYDROCHLORIDE 50 MG/1
50 TABLET ORAL
Status: DISCONTINUED | OUTPATIENT
Start: 2018-11-02 | End: 2018-11-07 | Stop reason: HOSPADM

## 2018-11-02 RX ORDER — HYDROXYZINE HYDROCHLORIDE 10 MG/1
TABLET, FILM COATED ORAL
COMMUNITY
End: 2018-11-07

## 2018-11-02 RX ORDER — PROMETHAZINE HYDROCHLORIDE 12.5 MG/1
12.5 TABLET ORAL
COMMUNITY
End: 2019-08-19

## 2018-11-02 RX ORDER — MAG HYDROX/ALUMINUM HYD/SIMETH 200-200-20
30 SUSPENSION, ORAL (FINAL DOSE FORM) ORAL
COMMUNITY
End: 2018-11-07

## 2018-11-02 RX ORDER — DICLOFENAC SODIUM 10 MG/G
GEL TOPICAL 3 TIMES DAILY
COMMUNITY

## 2018-11-02 RX ORDER — ENOXAPARIN SODIUM 100 MG/ML
30 INJECTION SUBCUTANEOUS EVERY 24 HOURS
Status: DISCONTINUED | OUTPATIENT
Start: 2018-11-02 | End: 2018-11-07 | Stop reason: HOSPADM

## 2018-11-02 RX ORDER — MIRTAZAPINE 15 MG/1
30 TABLET, FILM COATED ORAL
Status: DISCONTINUED | OUTPATIENT
Start: 2018-11-02 | End: 2018-11-07 | Stop reason: HOSPADM

## 2018-11-02 RX ORDER — TRAMADOL HYDROCHLORIDE 50 MG/1
50 TABLET ORAL
COMMUNITY
End: 2018-11-07

## 2018-11-02 RX ORDER — TRIAMCINOLONE ACETONIDE 1 MG/G
OINTMENT TOPICAL AS NEEDED
COMMUNITY
End: 2019-08-19

## 2018-11-02 RX ADMIN — CARVEDILOL 6.25 MG: 6.25 TABLET, FILM COATED ORAL at 09:56

## 2018-11-02 RX ADMIN — MIRTAZAPINE 30 MG: 15 TABLET, FILM COATED ORAL at 21:37

## 2018-11-02 RX ADMIN — DOCUSATE SODIUM AND SENNOSIDES 1 TABLET: 8.6; 5 TABLET, FILM COATED ORAL at 09:56

## 2018-11-02 RX ADMIN — ACETAMINOPHEN 650 MG: 325 TABLET ORAL at 21:37

## 2018-11-02 RX ADMIN — CEFTRIAXONE 1 G: 1 INJECTION, POWDER, FOR SOLUTION INTRAMUSCULAR; INTRAVENOUS at 08:34

## 2018-11-02 RX ADMIN — HYDROMORPHONE HYDROCHLORIDE 0.5 MG: 2 INJECTION INTRAMUSCULAR; INTRAVENOUS; SUBCUTANEOUS at 06:04

## 2018-11-02 RX ADMIN — FOLIC ACID: 5 INJECTION, SOLUTION INTRAMUSCULAR; INTRAVENOUS; SUBCUTANEOUS at 00:51

## 2018-11-02 RX ADMIN — FOLIC ACID: 5 INJECTION, SOLUTION INTRAMUSCULAR; INTRAVENOUS; SUBCUTANEOUS at 23:27

## 2018-11-02 RX ADMIN — ACETAMINOPHEN 650 MG: 325 TABLET ORAL at 09:56

## 2018-11-02 RX ADMIN — AZITHROMYCIN MONOHYDRATE 500 MG: 500 INJECTION, POWDER, LYOPHILIZED, FOR SOLUTION INTRAVENOUS at 05:41

## 2018-11-02 RX ADMIN — ENOXAPARIN SODIUM 30 MG: 30 INJECTION SUBCUTANEOUS at 09:57

## 2018-11-02 RX ADMIN — Medication 10 ML: at 21:39

## 2018-11-02 RX ADMIN — TRAMADOL HYDROCHLORIDE 50 MG: 50 TABLET, FILM COATED ORAL at 18:15

## 2018-11-02 RX ADMIN — Medication 10 ML: at 21:38

## 2018-11-02 RX ADMIN — ACETAMINOPHEN 650 MG: 325 TABLET ORAL at 15:13

## 2018-11-02 RX ADMIN — SODIUM CHLORIDE 500 ML: 900 INJECTION, SOLUTION INTRAVENOUS at 14:00

## 2018-11-02 NOTE — PROGRESS NOTES
Problem: Mobility Impaired (Adult and Pediatric) Goal: *Acute Goals and Plan of Care (Insert Text) Physical Therapy Goals Initiated 11/2/2018 1. Patient will move from supine to sit and sit to supine in bed with minimal assistance within 4 days. 2. Patient will perform sit to stand with minimal assistance/contact guard assist within 4 days. 3. Patient will ambulate with minimal assistance/contact guard assist for 50 feet with the least restrictive device within 4 days. 4. Patient will perform THR home exercise program per protocol with supervision/set-up within 4 days. physical Therapy EVALUATION Patient: Gato Tavera (80 y.o. female) Date: 11/2/2018 Primary Diagnosis: Pneumonia Other fracture of right femur, initial encounter for closed fracture ([facility] [address] Procedure(s) (LRB): 
RIGHT HIP HEMIARTHROPLASTY (Right) 1 Day Post-Op Precautions: Total hip, DNR, WBAT, Fall ASSESSMENT : 
Based on the objective data described below, the patient presents day 1 s/p a right hip hemiarthroplasty following a fall at her [address] while trying to walk to the bathroom with her walker. Patient is supine in bed with her family present. Patient is anxious and a bit reluctant to participate in PT. She presents with a nonproductive cough and was diagnosed with Pneumonia along with the hip fracture upon admission. Patient is able to tolerate active assisted exercises in bed with complaints of hip pain. Patient requires maximal assistance for rolling in bed to don a depends and maximal assistance X 2 for supine<>sit transfers. Patient tolerates dangling X 10 minutes and then assists with scooting toward the head of the bed. Standing activities deferred due to blood pressure 80/60. Patient was assisted back to bed and left in NAD. Patient will benefit from a SNF at time of discharge for continued PT/OT services for strengthening, safe mobility, and to increase independence with ambulation. Patient will benefit from skilled intervention to address the above impairments. Patients rehabilitation potential is considered to be Fair Factors which may influence rehabilitation potential include:  
[]         None noted 
[]         Mental ability/status [x]         Medical condition 
[]         Home/family situation and support systems 
[]         Safety awareness [x]         Pain tolerance/management 
[]         Other: PLAN : 
Recommendations and Planned Interventions: 
[x]           Bed Mobility Training             []    Neuromuscular Re-Education 
[x]           Transfer Training                   []    Orthotic/Prosthetic Training 
[x]           Gait Training                         []    Modalities [x]           Therapeutic Exercises           []    Edema Management/Control 
[x]           Therapeutic Activities            [x]    Patient and Family Training/Education 
[]           Other (comment): Frequency/Duration: Patient will be followed by physical therapy  daily to address goals. Discharge Recommendations: Chad Caballero Further Equipment Recommendations for Discharge: to be addressed by SNF SUBJECTIVE:  
Patient stated My right hip hurts.  OBJECTIVE DATA SUMMARY:  
HISTORY:   
Past Medical History:  
Diagnosis Date  Arthritis  History of non-ST elevation myocardial infarction (NSTEMI) 11/28/2016 Past Surgical History:  
Procedure Laterality Date  HX CHOLECYSTECTOMY Prior Level of Function/Home Situation: Patient is modified independent with ambulation using a rolling walker for short distances in her room at the [facility]. She will ambulate once per day to the dining room. She has assistance for bathing but is able to normally dress herself. Personal factors and/or comorbidities impacting plan of care: Multiple medical conditions including, CAD, MI, arthritis, HTN, Glaucoma, Anxiety, limited use of the L UE. Home Situation Home Environment: Assisted living Care Facility Name: [facility] # Steps to Enter: 0 One/Two Story Residence: One story Living Alone: No 
Support Systems: Assisted living, Child(rhonad) Patient Expects to be Discharged to[de-identified] Skilled nursing facility Current DME Used/Available at Home: Walker, rolling EXAMINATION/PRESENTATION/DECISION MAKING: Critical Behavior: 
Neurologic State: Alert, Irritable Orientation Level: Oriented X4 Cognition: Follows commands, Appropriate for age attention/concentration Safety/Judgement: Fall prevention, Decreased awareness of need for safety Hearing: Auditory Auditory Impairment: Hard of hearing, right sideSkin:  S/p right hip surgery. Bandage intact. Edema: bilateral knees Range Of Motion: 
AROM: Generally decreased, functional 
PROM: Within functional limits Strength:   
Strength: Generally decreased, functional 
Tone & Sensation:  
Tone: Normal 
Sensation: Intact Coordination: 
Coordination: Within functional limits Vision:  
Tracking: Able to track stimulus in all quadrants w/o difficulty Acuity: Within Defined Limits Functional Mobility: 
Bed Mobility: 
  
Supine to Sit: Maximum assistance;Assist x2 Sit to Supine: Maximum assistance;Assist x2 Scooting: Maximum assistance Transfers: 
 Deferred due to low blood pressure. Balance:  
Sitting: Intact Standing: (NT d/t hypotension)Ambulation/Gait Training: Deferred Therapeutic Exercises:  
Assisted ankle pumps, heel slides, hip abduction in supine and LAQ's and ankle pumps in sitting. Functional Measure: 
Timed up and go: 
 
Unable to complete due to low blood pressure and right hip pain. Timed Up and Go and G-code impairment scale: 
Percentage of Impairment CH 
 
0% 
 CI 
 
1-19% CJ 
 
20-39% CK 
 
40-59% CL 
 
60-79% CM 
 
80-99% CN  
 
100% Timed Score 0-56 10 11-12 13-14 15-16 17-18 19 20  
 
 
< than 10 seconds=Normal  Greater then 13.5 seconds (in elderly)=Increased fall risk Domingo PINTO. Predicting the probability for falls in community dwelling older adults using the Timed Up and Go Test. Phys Ther. 2000;80:896-903. G codes: In compliance with CMSs Claims Based Outcome Reporting, the following G-code set was chosen for this patient based on their primary functional limitation being treated: The outcome measure chosen to determine the severity of the functional limitation was the Timed up and Go with a score of 0 which was correlated with the impairment scale. ? Mobility - Walking and Moving Around:  
  - CURRENT STATUS: CN - 100% impaired, limited or restricted  - GOAL STATUS: CK - 40%-59% impaired, limited or restricted  - D/C STATUS:  ---------------To be determined--------------- Physical Therapy Evaluation Charge Determination History Examination Presentation Decision-Making MEDIUM  Complexity : 1-2 comorbidities / personal factors will impact the outcome/ POC  MEDIUM Complexity : 3 Standardized tests and measures addressing body structure, function, activity limitation and / or participation in recreation  MEDIUM Complexity : Evolving with changing characteristics  MEDIUM Complexity : FOTO score of 26-74 Based on the above components, the patient evaluation is determined to be of the following complexity level: MEDIUM Pain: 
Pain Scale 1: Numeric (0 - 10) Pain Intensity 1: 10 
Pain Location 1: Hip Pain Description 1: Aching Pain Intervention(s) 1: Medication (see MAR) Activity Tolerance:  
Please refer to the flowsheet for vital signs taken during this treatment. BP 80/60 in supine and sitting. After treatment:  
[]         Patient left in no apparent distress sitting up in chair 
[x]         Patient left in no apparent distress in bed 
[x]         Call bell left within reach [x]         Nursing notified 
[x]         Caregiver present 
[]         Bed alarm activated COMMUNICATION/EDUCATION:  
 The patients plan of care was discussed with: Registered Nurse. [x]         Fall prevention education was provided and the patient/caregiver indicated understanding. [x]         Patient/family have participated as able in goal setting and plan of care. [x]         Patient/family agree to work toward stated goals and plan of care. []         Patient understands intent and goals of therapy, but is neutral about his/her participation. []         Patient is unable to participate in goal setting and plan of care. Thank you for this referral. 
Anthony Gaona, PT Time Calculation: 40 mins

## 2018-11-02 NOTE — PROGRESS NOTES
Per Raquel AHN sodium chloride bolus of 250 ml was given but not documented on the [facility]. Patient vitals are 99/65 and will continue to monitor.

## 2018-11-02 NOTE — PROGRESS NOTES
ORTHOPEDIC PROGRESS NOTE 
 
POD 1 Day Post-Op Procedure:  Procedure(s): RIGHT HIP HEMIARTHROPLASTY Subjective:  
 
Pain reported as mild to moderate today. Patient was sleeping comfortably in room on arrival this AM. Patient has no new complaints today. Tolerating PO. Objective:  
 
Blood pressure 96/56, pulse 88, temperature 98.3 °F (36.8 °C), resp. rate 18, weight 59.6 kg (131 lb 4.8 oz), SpO2 100 %, not currently breastfeeding. Temp (24hrs), Av.8 °F (37.1 °C), Min:97.9 °F (36.6 °C), Max:99.7 °F (37.6 °C) Physical Exam:   
GEN: Awake and alert; NAD 
MSK: Examination of the right hip reveals that the dressing is clean, dry and intact with scant serosanguineous spotting . Sensation is intact to light touch. Intact EHL/FHL/GSC/AT; Brisk capillary refill. Labs:  
Lab Results Component Value Date/Time HGB 11.9 2018 06:00 AM  
 
 
 
Assessment:  
 
Principal Problem: 
  Other fracture of right femur, initial encounter for closed fracture ([facility] [address] (10/31/2018) Active Problems: 
  Pneumonia (10/31/2018) Plan/Recommendations/Medical Decision Making: S/p R hemiarthroplasty on 18 for R femoral neck fracture Recommend D/C sen today if possible Continue physical therapy Advance diet as tolerated Ice and elevate DVT PPX Lovenox or per primary team 
Begin discharge planning/case mangement

## 2018-11-02 NOTE — PERIOP NOTES
TRANSFER - OUT REPORT: 
 
Verbal report given to Cathy AHN(name) on Sharyle Sander  being transferred to 55(unit) for routine post - op Report consisted of patients Situation, Background, Assessment and  
Recommendations(SBAR). Time Pre op antibiotic given:0828 Anesthesia Stop time: 2500 Burris Present on Transfer to floor:yes Order for Burris on Chart:yes Discharge Prescriptions with Chart:no Information from the following report(s) SBAR, ED Summary, OR Summary, Procedure Summary, Intake/Output, MAR, Recent Results, Med Rec Status and Cardiac Rhythm NSR was reviewed with the receiving nurse. Opportunity for questions and clarification was provided. Is the patient on 02? YES 
     L/Min 2 Other nc Is the patient on a monitor? NO Is the nurse transporting with the patient? NO Surgical Waiting Area notified of patient's transfer from PACU? YES The following personal items collected during your admission accompanied patient upon transfer:  
Dental Appliance: Dental Appliances: None Vision: Visual Aid: Glasses, At bedside Hearing Aid:   
Jewelry: Jewelry: None Clothing: Clothing: (VALUABLES IN BED WITH PATIENT IN PACU) Other Valuables: Other Valuables: None Valuables sent to safe:

## 2018-11-02 NOTE — PROGRESS NOTES
Renal Dosing/Monitoring Medication: enoxaparin Current regimen:  40 mg every 24 hr 
Recent Labs 11/02/18 
0600 11/01/18 
0404 10/31/18 
0205 CREA 0.98 0.79 0.94 BUN 17 10 13 Estimated CrCl:  30 ml/min Plan: Change to 30 mg q24h per P&T approved renal dosing protocol.

## 2018-11-02 NOTE — PROGRESS NOTES
Hospitalist Progress Note Shaka Frederick NP Answering service: [phone] OR [phone] from in house phone Cell: [phone] Date of Service:  2018 NAME:  Merwyn Dakins :  10/17/1927 MRN:  [id number] Admission Summary: Pt presented to the ED from [facility] assisted living facility with a chief complaint of pain in R hip after a fall. She reportedly was trying to walk to the bathroom using her walker but tripped and fell, landing onto her right side having subsequent pain to the right hip, buttock, and foot. She was unable to bear weight with a pain level of 10/10, aggravated with any movement, radiating from right hip to right thigh, without specific alleviating factors. Denied hitting her head or any loss of consciousness. Pmhx: coronary artery disease, non-ST elevation myocardial infarction, hypertension, hyperlipidemia, glaucoma, anxiety, chest pain, right lower extremity edema, arthritis Interval history / Subjective:  
Pt in bed, says she is feeling terrible, having pain in her R leg and feels very thirsty. Post-op orders do not appear to be in computer: no diet order, no pain management other than dilaudid, no DVT ppx and no therapy orders. Have ordered lovenox but ultimately plan for anticoagulation should be decided by orthopedic team as this can vary between providers. Started scheduled tylenol with prn tramadol and consulted therapy services. Assessment & Plan:  
 
Closed R Femur Fracture (POA): - R hip 2 view x-ray shows subcapital right proximal femur fracture. - POD 1 R hip Hemiarthroplasty - pain management: d/c dilaudid and start scheduled tylenol and may have tramadol prn - pericolace started, monitor as pt has a hx of loose BMs per her 
- PT/OT ordered 
- DVT ppx as per Ortho post surgery - started lovenox today but Ortho will need to decide duration of therapy Pneumonia (POA):  
 - Chest Xray: right basilar consolidation  
- on zithromax and ceftriaxone 
- no symptoms PTA. No fever PTA, no cough, no leukocytosis - Tmax 99.7/24 hours - 2 L NC at present, nurse reports pt had to be on NRB overnight (though the mask on her this am was just on a mask with 2 L NC) Tachycardia:  
- most likely related to pain - pt in NSR and HR ~ 100 
- no chest pain Abnormal UA:  
- urine cloudy, strong smell - + leuks in urine and >WBC. Culture on hold 
- already on abx to treat pneumonia Hx Coronary artery disease and Hx MI:   
- PTA BB ordered Hx  Hypertension: - Monitor blood pressure - could vary now due to pain and pain medications - hypotension today. Reviewed PTA meds and he is on a lower dose of coreg than what is currently ordered. Updated PTA med list with most recent MAR from [facility]. - 500 bolus given, fluids renewed - 250 bolus given after BP still low Hx Acute RLE DVT in 2017:  
- initially in 9/2017, it was decided NOT to place pt on [address] [place] [address] Road due to hx of small subdural bleed. She was not dicsharged at that time with blood thinners 
- in 10/2017, she cam back to ED with worsening leg swelling and it was decided to place her on lovenox. - in reviewing her most recent med [place] [facility] she is no longer on this Code status: DNR 
DVT prophylaxis: may stop SCDs, lovenox started today Care Plan discussed with: patient, ortho and nurse Disposition TBD - will likely need SNF Hospital Problems  Date Reviewed: 11/1/2018 Codes Class Noted POA Pneumonia ICD-10-CM: J18.9 ICD-9-CM: 793  10/31/2018 Unknown * (Principal) Other fracture of right femur, initial encounter for closed fracture [facility]) ICD-10-CM: O67.8F1H 
ICD-9-CM: 821.00  10/31/2018 Unknown Review of Systems:  
Denies HA. + dry mouth and thirsty. No chest pain, pressure or palpitations. No GI or urinary complaints. Still has R hip and RLE pain Vital Signs: Last 24hrs VS reviewed since prior progress note. Most recent are: 
Visit Vitals BP 96/56 (BP 1 Location: Right arm, BP Patient Position: At rest) Pulse 88 Temp 98.3 °F (36.8 °C) Resp 18 Wt 59.6 kg (131 lb 4.8 oz) SpO2 100% Breastfeeding? No  
BMI 25.64 kg/m² Intake/Output Summary (Last 24 hours) at 11/2/2018 6050 Last data filed at 11/2/2018 4858 Gross per 24 hour Intake 2050 ml Output 775 ml Net 1275 ml Physical Examination:  
     
Constitutional:  Cooperative but irritated this am - does not want to get up OOB   
ENT:  Oral MM dry, oropharynx benign. Resp:  CTA bilaterally. No wheezing/rhonchi/rales. No accessory muscle use and on RA  
CV:  Regular rhythm, no murmurs. Tachycardic ~110 GI:  Soft, non distended, non tender. Normoactive bowel sounds : Burris present, cloudy yellow dark urine (should be removed today) Musculoskeletal:  No edema, warm, 2+ pulses throughout. Very guarded to RLE Neurologic:  AAOx3. Anxious. Skin: Dressing to R hip is intact, small shadow of drainage on dressing Data Review:  
Review and/or order of clinical lab test 
Review and/or order of tests in the radiology section of CPT Review and/or order of tests in the medicine section of CPT Labs:  
 
Recent Labs 11/02/18 
0600 11/01/18 
0404 10/31/18 
0205 WBC  --  10.3 6.3 HGB 11.9 12.0 12.7 HCT 38.6 37.6 38.8 PLT  --  124* 135* Recent Labs 11/02/18 
0600 11/01/18 
0404 10/31/18 
0205  141 142  
K 3.9 3.5 3.5 * 111* 107 CO2 19* 19* 24 BUN 17 10 13 CREA 0.98 0.79 0.94 * 104* 95  
CA 7.4* 7.8* 8.6 Recent Labs 10/31/18 
0205 SGOT 17 ALT 20 * TBILI 0.3 TP 6.5 ALB 3.6 GLOB 2.9 Recent Labs 10/31/18 
0205 INR 1.0 PTP 9.8 APTT 23.6 No results for input(s): FE, TIBC, PSAT, FERR in the last 72 hours. Lab Results Component Value Date/Time  Folate 5.8 03/08/2017 06:00 PM  
  
 No results for input(s): PH, PCO2, PO2 in the last 72 hours. No results for input(s): CPK, CKNDX, TROIQ in the last 72 hours. No lab exists for component: CPKMB Lab Results Component Value Date/Time Cholesterol, total 182 03/08/2017 06:00 PM  
 HDL Cholesterol 64 03/08/2017 06:00 PM  
 LDL, calculated 91.2 03/08/2017 06:00 PM  
 Triglyceride 134 03/08/2017 06:00 PM  
 CHOL/HDL Ratio 2.8 03/08/2017 06:00 PM  
 
Lab Results Component Value Date/Time Glucose (POC) 94 11/20/2016 07:00 AM  
 
Lab Results Component Value Date/Time Color YELLOW/STRAW 10/31/2018 04:55 AM  
 Appearance CLOUDY (A) 10/31/2018 04:55 AM  
 Specific gravity 1.014 10/31/2018 04:55 AM  
 pH (UA) 5.5 10/31/2018 04:55 AM  
 Protein TRACE (A) 10/31/2018 04:55 AM  
 Glucose NEGATIVE  10/31/2018 04:55 AM  
 Ketone NEGATIVE  10/31/2018 04:55 AM  
 Bilirubin NEGATIVE  10/31/2018 04:55 AM  
 Urobilinogen 0.2 10/31/2018 04:55 AM  
 Nitrites NEGATIVE  10/31/2018 04:55 AM  
 Leukocyte Esterase LARGE (A) 10/31/2018 04:55 AM  
 Epithelial cells FEW 10/31/2018 04:55 AM  
 Bacteria NEGATIVE  10/31/2018 04:55 AM  
 WBC >100 (H) 10/31/2018 04:55 AM  
 RBC 0-5 10/31/2018 04:55 AM  
 
Medications Reviewed:  
 
Current Facility-Administered Medications Medication Dose Route Frequency  enoxaparin (LOVENOX) injection 40 mg  40 mg SubCUTAneous Q24H  
 traMADol (ULTRAM) tablet 50 mg  50 mg Oral Q6H PRN  
 acetaminophen (TYLENOL) tablet 650 mg  650 mg Oral Q6H  
 0.9% sodium chloride infusion  125 mL/hr IntraVENous CONTINUOUS  
 latanoprost (XALATAN) 0.005 % ophthalmic solution 1 Drop  1 Drop Both Eyes QHS  sodium chloride (NS) flush 5-10 mL  5-10 mL IntraVENous Q8H  
 sodium chloride (NS) flush 5-10 mL  5-10 mL IntraVENous PRN  
 azithromycin (ZITHROMAX) 500 mg in 0.9% sodium chloride 250 mL ([id number])  500 mg IntraVENous Q24H  cefTRIAXone (ROCEPHIN) 1 g in 0.9% sodium chloride (MBP/ADV) 50 mL  1 g IntraVENous Q24H  carvedilol (COREG) tablet 6.25 mg  6.25 mg Oral BID WITH MEALS  clonazePAM (KlonoPIN) tablet 0.25 mg  0.25 mg Oral QHS PRN  
 hydrALAZINE (APRESOLINE) 20 mg/mL injection 10 mg  10 mg IntraVENous Q6H PRN  
 ondansetron (ZOFRAN) injection 4 mg  4 mg IntraVENous Q8H PRN  
 0.9% sodium chloride 1,000 mL with mvi, adult no. 4 with vit K 10 mL, thiamine 019 mg, folic acid 1 mg infusion   IntraVENous Q24H  
______________________________________________________________________ EXPECTED LENGTH OF STAY: 4d 4h 
ACTUAL LENGTH OF STAY:          2 Breanna Orta NP

## 2018-11-02 NOTE — PROGRESS NOTES
Problem: Self Care Deficits Care Plan (Adult) Goal: *Acute Goals and Plan of Care (Insert Text) Occupational Therapy Goals Initiated 11/2/2018 1. Patient will perform lower body ADLs with AE supervision/set-up within 4 day(s). 2.  Patient will perform upper body ADLs standing 5 mins without fatigue or LOB with supervision/set-up within 4 day(s). 3.  Patient will perform toilet transfer with supervision/set-up within 4 day(s). 4.  Patient will perform all aspects of toileting with supervision/set-up within 4 day(s). 5.  Patient will participate in upper extremity therapeutic exercise/activities with supervision/set-up for 10 minutes within 4 day(s). 6.  Patient will utilize energy conservation techniques during functional activities without cues within 4 day(s). Occupational Therapy EVALUATION Patient: Marilee Saldaña (80 y.o. female) Date: 11/2/2018 Primary Diagnosis: Pneumonia Other fracture of right femur, initial encounter for closed fracture ([facility] [address] Procedure(s) (LRB): 
RIGHT HIP HEMIARTHROPLASTY (Right) 1 Day Post-Op Precautions: Total hip, DNR, WBAT, Fall ASSESSMENT : 
Based on the objective data described below, the patient presents with overall Mod-Max A x2 for functional mobility, up to Total A for lower body ADLs, and independent-setup for upper body ADLs s/p R hip hemiarthroplasty POD 1. Patient received supine in bed with PT and multiple family members present. Patient anxious and at times particular regarding movement d/t pain. Patient hypotensive during visit, BP 82/50 supine, 83/60 sitting EOB; asymptomatic. Patient unable to participate in lower body ADLs at this time d/t pain and hypotension (standing not tested). Patient will benefit from d/c to rehab when medically stable. Patient will benefit from skilled intervention to address the above impairments. Patients rehabilitation potential is considered to be Good Factors which may influence rehabilitation potential include:  
[]             None noted []             Mental ability/status []             Medical condition []             Home/family situation and support systems []             Safety awareness []             Pain tolerance/management 
[]             Other: PLAN : 
Recommendations and Planned Interventions: 
[x]               Self Care Training                  [x]        Therapeutic Activities [x]               Functional Mobility Training    []        Cognitive Retraining 
[x]               Therapeutic Exercises           [x]        Endurance Activities [x]               Balance Training                   []        Neuromuscular Re-Education []               Visual/Perceptual Training     [x]   Home Safety Training 
[x]               Patient Education                 [x]        Family Training/Education []               Other (comment): Frequency/Duration: Patient will be followed by occupational therapy 5 times a week to address goals. Discharge Recommendations: Rehab Further Equipment Recommendations for Discharge: TBD SUBJECTIVE:  
Patient stated I'm surprised by how well I sat up there.  OBJECTIVE DATA SUMMARY:  
HISTORY:  
Past Medical History:  
Diagnosis Date  Arthritis  History of non-ST elevation myocardial infarction (NSTEMI) 11/28/2016 Past Surgical History:  
Procedure Laterality Date  HX CHOLECYSTECTOMY Prior Level of Function/Environment/Context: Per patient report, lives at [facility]. Has assist for bathing. Dresses self. Ambulates with RW. Walks once/day to dining uriostegui. Home Situation Home Environment: Assisted living Care Facility Name: [facility] # Steps to Enter: 0 One/Two Story Residence: One story Living Alone: No 
Support Systems: Assisted living, Child(rhonda) Patient Expects to be Discharged to[de-identified] Skilled nursing facility Current DME Used/Available at Home: Walker, rolling Hand dominance: RightEXAMINATION OF PERFORMANCE DEFICITS: 
Cognitive/Behavioral Status: 
Neurologic State: Alert;Irritable Orientation Level: Oriented X4 Cognition: Follows commands; Appropriate for age attention/concentration Perception: Appears intact Perseveration: No perseveration noted Safety/Judgement: Fall prevention;Decreased awareness of need for safety Skin: Appears intact Edema: None noted in BUEs Hearing: Auditory Auditory Impairment: Hard of hearing, right side Vision/Perceptual:   
Tracking: Able to track stimulus in all quadrants w/o difficulty Acuity: Within Defined Limits Range of Motion: In [address] [place] [address] [address] AROM: Generally decreased, functional 
PROM: Within functional limits Strength: In [address] [place] [address] [address] [facility] Strength: Generally decreased, functional 
Coordination: 
Coordination: Within functional limits Fine Motor Skills-Upper: Left Intact; Right Intact Gross Motor Skills-Upper: Left Impaired;Right Intact Tone & Sensation: In [address] [place], [place] [address] [facility] Tone: Normal 
Sensation: Intact Balance: 
Sitting: Intact Standing: (NT d/t hypotension) Functional Mobility and Transfers for ADLs:Bed Mobility: 
Supine to Sit: Maximum assistance;Assist x2 Sit to Supine: Maximum assistance;Assist x2 Scooting: Maximum assistance Transfers: Toilet Transfer : Total assistance(Currnetly using bedpan) ADL Assessment: 
Feeding: Setup(Completing at bed level) Oral Facial Hygiene/Grooming: Setup(Completing at bed level) Bathing: Moderate assistance(Infer able to assist with upper body bathing only) Upper Body Dressing: Minimum assistance(Inferred per obs of BUE ROM/sitting balance) Lower Body Dressing: Maximum assistance(Decreased functional reach to BLEs) Toileting: Independent(Inferred per obs of BUE ROM/mobility) ADL Intervention and task modifications: 
Lower Body Dressing Assistance Slip on Shoes Without Back: Total assistance(dependent) Cognitive Retraining Safety/Judgement: Fall prevention;Decreased awareness of need for safety Therapeutic Exercise: 
- Patient tolerating sitting EOB for approximately 5 minutes, attempting to boost self up EOB for few minutes Functional Measure: 
Barthel Index: 
 
Bathin Bladder: 5 Bowels: 10 
Groomin Dressin Feedin Mobility: 0 Stairs: 0 Toilet Use: 5 Transfer (Bed to Chair and Back): 5 Total: 35 Barthel and G-code impairment scale: 
Percentage of impairment CH 
0% CI 
1-19% CJ 
20-39% CK 
40-59% CL 
60-79% CM 
80-99% CN 
100% Barthel Score 0-100 100 99-80 79-60 59-40 20-39 1-19 
 0 Barthel Score 0-20 20 17-19 13-16 9-12 5-8 1-4 0 The Barthel ADL Index: Guidelines 1. The index should be used as a record of what a patient does, not as a record of what a patient could do. 2. The main aim is to establish degree of independence from any help, physical or verbal, however minor and for whatever reason. 3. The need for supervision renders the patient not independent. 4. A patient's performance should be established using the best available evidence. Asking the patient, friends/relatives and nurses are the usual sources, but direct observation and common sense are also important. However direct testing is not needed. 5. Usually the patient's performance over the preceding 24-48 hours is important, but occasionally longer periods will be relevant. 6. Middle categories imply that the patient supplies over 50 per cent of the effort. 7. Use of aids to be independent is allowed. Ilan Collier., Barthel, D.W. (5914). Functional evaluation: the Barthel Index. [facility] (14)2. Deb Saldaña danny IRISH Bain, Debi Lombardo., Esteban Barkley., [place], [address] [place], [place] [address]e (). Measuring the change indisability after inpatient rehabilitation; comparison of the responsiveness of the Barthel Index and Functional [place] Measure. Journal of Neurology, Neurosurgery, and Psychiatry, 66(4), 326-047. COLBY Ramires, MELIA Post, & Po Naik M.A. (2004.) Assessment of post-stroke quality of life in cost-effectiveness studies: The usefulness of the Barthel Index and the EuroQoL-5D. [facility], 13 085-11 G codes: In compliance with CMSs Claims Based Outcome Reporting, the following G-code set was chosen for this patient based on their primary functional limitation being treated: The outcome measure chosen to determine the severity of the functional limitation was the Barthel Index with a score of 35/100 which was correlated with the impairment scale. ? Self Care:  
  - CURRENT STATUS: CL - 60%-79% impaired, limited or restricted  - GOAL STATUS: CK - 40%-59% impaired, limited or restricted  - D/C STATUS:  ---------------To be determined--------------- Occupational Therapy Evaluation Charge Determination History Examination Decision-Making LOW Complexity : Brief history review  LOW Complexity : 1-3 performance deficits relating to physical, cognitive , or psychosocial skils that result in activity limitations and / or participation restrictions  LOW Complexity : No comorbidities that affect functional and no verbal or physical assistance needed to complete eval tasks Based on the above components, the patient evaluation is determined to be of the following complexity level: LOW Pain: 
Pain Scale 1: Numeric (0 - 10) Pain Intensity 1: 0 Pain Location 1: Hip Pain Description 1: Aching Pain Intervention(s) 1: Medication (see MAR) Activity Tolerance:  
Good. Please refer to the flowsheet for vital signs taken during this treatment. After treatment:  
[] Patient left in no apparent distress sitting up in chair 
[x] Patient left in no apparent distress in bed 
[x] Call bell left within reach [x] Nursing notified 
[x] Caregiver present 
[] Bed alarm activated COMMUNICATION/EDUCATION:  
 The patients plan of care was discussed with: Physical Therapist and Registered Nurse. [x] Home safety education was provided and the patient/caregiver indicated understanding. [x] Patient/family have participated as able in goal setting and plan of care. [] Patient/family agree to work toward stated goals and plan of care. [] Patient understands intent and goals of therapy, but is neutral about his/her participation. [] Patient is unable to participate in goal setting and plan of care. This patients plan of care is appropriate for delegation to [facility]. Thank you for this referral. 
Cm Valentine OT Time Calculation: 26 mins

## 2018-11-02 NOTE — PROGRESS NOTES
Verbal shift change report given to Muriel Wilson RN (oncoming nurse) by Gavin Mayers RN (offgoing nurse). Report included the following information SBAR, Kardex, Intake/Output, Recent Results and Cardiac Rhythm NSR/ST.

## 2018-11-02 NOTE — PROGRESS NOTES
Bedside shift change report given to [facility] (oncoming nurse) by Carmela Naranjo (offgoing nurse). Report included the following information SBAR, Kardex, Intake/Output and MAR.

## 2018-11-02 NOTE — PROGRESS NOTES
Care Management Interventions PCP Verified by CM: Yes 
Palliative Care Criteria Met (RRAT>21 & CHF Dx)?: No 
Mode of Transport at Discharge: S Transition of Care Consult (CM Consult): SNF(Referrals to [facility]) Partner SNF: Yes MyChart Signup: No 
Discharge Durable Medical Equipment: No 
Health Maintenance Reviewed: Yes Physical Therapy Consult: Yes Occupational Therapy Consult: Yes Speech Therapy Consult: No 
Current Support Network: Assisted Living([facility]) Confirm Follow Up Transport: Family Plan discussed with Pt/Family/Caregiver: Yes Freedom of Choice Offered: Yes [employer] Information Provided?: No 
Discharge Location Discharge Placement: Home with home health Reason for Admission:   Right Femur Fracture/post op day #1 for Right Hip Hemiarthroplasty RRAT Score:  26 Resources/supports as identified by patient/family:  AL and family Top Challenges facing patient (as identified by patient/family and CM):  Needs SNF rehab Finances/Medication cost?      No concerns Transportation? S Support system or lack thereof? Daughter and AL Living arrangements? Assisted Living at [facility] Self-care/ADLs/Cognition? Needs assist with adls and Iadls Current Advanced Directive/Advance Care Plan:  DNR on File Plan for utilizing home health:    NO , SNF Likelihood of readmission: SNF Transition of Care Plan:     Referrals to [facility] and [facility]. The patient would like a private room. The daughter is Kaiden Swan [phone].

## 2018-11-02 NOTE — PROGRESS NOTES
TRANSFER - IN REPORT: 
 
Verbal report received from [facility]) on Zara Rene  being received from Nicole Sanders RN(unit) for routine post - op Report consisted of patients Situation, Background, Assessment and  
Recommendations(SBAR). Information from the following report(s) SBAR, OR Summary, Procedure Summary, MAR and Cardiac Rhythm NSR was reviewed with the receiving nurse. Opportunity for questions and clarification was provided. Assessment completed upon patients arrival to unit and care assumed.

## 2018-11-03 LAB
ALBUMIN SERPL-MCNC: 1.9 G/DL (ref 3.5–5)
ANION GAP SERPL CALC-SCNC: 8 MMOL/L (ref 5–15)
BACTERIA SPEC CULT: ABNORMAL
BUN SERPL-MCNC: 26 MG/DL (ref 6–20)
BUN/CREAT SERPL: 24 (ref 12–20)
CALCIUM SERPL-MCNC: 7.1 MG/DL (ref 8.5–10.1)
CC UR VC: ABNORMAL
CHLORIDE SERPL-SCNC: 116 MMOL/L (ref 97–108)
CO2 SERPL-SCNC: 18 MMOL/L (ref 21–32)
CREAT SERPL-MCNC: 1.1 MG/DL (ref 0.55–1.02)
GLUCOSE SERPL-MCNC: 115 MG/DL (ref 65–100)
HCT VFR BLD AUTO: 33.6 % (ref 35–47)
HGB BLD-MCNC: 10.4 G/DL (ref 11.5–16)
PHOSPHATE SERPL-MCNC: 2.6 MG/DL (ref 2.6–4.7)
POTASSIUM SERPL-SCNC: 4.7 MMOL/L (ref 3.5–5.1)
SERVICE CMNT-IMP: ABNORMAL
SODIUM SERPL-SCNC: 142 MMOL/L (ref 136–145)

## 2018-11-03 PROCEDURE — 74011000250 HC RX REV CODE- 250: Performed by: FAMILY MEDICINE

## 2018-11-03 PROCEDURE — 65270000029 HC RM PRIVATE

## 2018-11-03 PROCEDURE — 74011250637 HC RX REV CODE- 250/637: Performed by: NURSE PRACTITIONER

## 2018-11-03 PROCEDURE — 80069 RENAL FUNCTION PANEL: CPT | Performed by: INTERNAL MEDICINE

## 2018-11-03 PROCEDURE — 74011250636 HC RX REV CODE- 250/636: Performed by: INTERNAL MEDICINE

## 2018-11-03 PROCEDURE — 36415 COLL VENOUS BLD VENIPUNCTURE: CPT | Performed by: NURSE PRACTITIONER

## 2018-11-03 PROCEDURE — 77010033678 HC OXYGEN DAILY

## 2018-11-03 PROCEDURE — 74011000258 HC RX REV CODE- 258: Performed by: INTERNAL MEDICINE

## 2018-11-03 PROCEDURE — 94760 N-INVAS EAR/PLS OXIMETRY 1: CPT

## 2018-11-03 PROCEDURE — 51798 US URINE CAPACITY MEASURE: CPT

## 2018-11-03 PROCEDURE — 74011250636 HC RX REV CODE- 250/636: Performed by: NURSE PRACTITIONER

## 2018-11-03 PROCEDURE — 85018 HEMOGLOBIN: CPT | Performed by: NURSE PRACTITIONER

## 2018-11-03 PROCEDURE — 97530 THERAPEUTIC ACTIVITIES: CPT

## 2018-11-03 RX ORDER — LANOLIN ALCOHOL/MO/W.PET/CERES
100 CREAM (GRAM) TOPICAL DAILY
Status: DISCONTINUED | OUTPATIENT
Start: 2018-11-04 | End: 2018-11-07 | Stop reason: HOSPADM

## 2018-11-03 RX ORDER — SODIUM CHLORIDE 9 MG/ML
50 INJECTION, SOLUTION INTRAVENOUS CONTINUOUS
Status: DISCONTINUED | OUTPATIENT
Start: 2018-11-03 | End: 2018-11-04

## 2018-11-03 RX ORDER — FOLIC ACID 1 MG/1
1 TABLET ORAL DAILY
Status: DISCONTINUED | OUTPATIENT
Start: 2018-11-04 | End: 2018-11-07 | Stop reason: HOSPADM

## 2018-11-03 RX ADMIN — TRAMADOL HYDROCHLORIDE 50 MG: 50 TABLET, FILM COATED ORAL at 20:31

## 2018-11-03 RX ADMIN — CEFTRIAXONE 1 G: 1 INJECTION, POWDER, FOR SOLUTION INTRAMUSCULAR; INTRAVENOUS at 10:24

## 2018-11-03 RX ADMIN — ENOXAPARIN SODIUM 30 MG: 30 INJECTION SUBCUTANEOUS at 10:25

## 2018-11-03 RX ADMIN — ACETAMINOPHEN 650 MG: 325 TABLET ORAL at 10:25

## 2018-11-03 RX ADMIN — MIRTAZAPINE 30 MG: 15 TABLET, FILM COATED ORAL at 21:43

## 2018-11-03 RX ADMIN — ACETAMINOPHEN 650 MG: 325 TABLET ORAL at 03:22

## 2018-11-03 RX ADMIN — LATANOPROST 1 DROP: 50 SOLUTION OPHTHALMIC at 21:42

## 2018-11-03 RX ADMIN — Medication 10 ML: at 21:43

## 2018-11-03 RX ADMIN — TRAMADOL HYDROCHLORIDE 50 MG: 50 TABLET, FILM COATED ORAL at 00:11

## 2018-11-03 RX ADMIN — SODIUM CHLORIDE 125 ML/HR: 900 INJECTION, SOLUTION INTRAVENOUS at 23:31

## 2018-11-03 RX ADMIN — SODIUM CHLORIDE 500 ML: 900 INJECTION, SOLUTION INTRAVENOUS at 03:18

## 2018-11-03 RX ADMIN — AZITHROMYCIN MONOHYDRATE 500 MG: 500 INJECTION, POWDER, LYOPHILIZED, FOR SOLUTION INTRAVENOUS at 04:50

## 2018-11-03 RX ADMIN — ACETAMINOPHEN 650 MG: 325 TABLET ORAL at 20:30

## 2018-11-03 NOTE — PROGRESS NOTES
Problem: Mobility Impaired (Adult and Pediatric) Goal: *Acute Goals and Plan of Care (Insert Text) Physical Therapy Goals Initiated 11/2/2018 1. Patient will move from supine to sit and sit to supine in bed with minimal assistance within 4 days. 2. Patient will perform sit to stand with minimal assistance/contact guard assist within 4 days. 3. Patient will ambulate with minimal assistance/contact guard assist for 50 feet with the least restrictive device within 4 days. 4. Patient will perform THR home exercise program per protocol with supervision/set-up within 4 days. physical Therapy TREATMENT Patient: Praveen Nuñez (80 y.o. female) Date: 11/3/2018 Diagnosis: Pneumonia Other fracture of right femur, initial encounter for closed fracture [facility]) Other fracture of right femur, initial encounter for closed fracture ([facility] [address] Procedure(s) (LRB): 
RIGHT HIP HEMIARTHROPLASTY (Right) 2 Days Post-Op Precautions: Total hip, DNR, WBAT, Fall Chart, physical therapy assessment, plan of care and goals were reviewed. ASSESSMENT: Patient cleared by nursing to participate. Patient is extremely anxious with mobility, but with encouragement and coaching, will participate and agreeable to sitting at edge of bed, but outright refusing to get OOB to chair. She remained seated at EOB x10 minutes. Was able to convince patient to perform one sit to stand and to hold stand for 10 seconds; she actually remained standing for 20 seconds, but required max A x2 and she refused to move hands onto the HRs of walker, holding the anterior portion of walker. But did respond to cues to look up and she focused on the clock. Discussed with patient importance of moving to prevent clots and improve sensation of light headedness when sitting upright; she verbalized understanding, but not necessarily agreement. Stating \"I'm not a spring chicken\" multiple times. Progression toward goals: []    Improving appropriately and progressing toward goals [x]    Improving slowly and progressing toward goals 
[]    Not making progress toward goals and plan of care will be adjusted PLAN: 
Patient continues to benefit from skilled intervention to address the above impairments. Continue treatment per established plan of care. Discharge Recommendations:  Chad Caballero Further Equipment Recommendations for Discharge:  TBD SUBJECTIVE:  
Patient stated I don't want to get out of bed; I don't want to be left alone.  OBJECTIVE DATA SUMMARY:  
Critical Behavior: 
Neurologic State: Alert Orientation Level: Oriented X4 Cognition: Appropriate for age attention/concentration, Appropriate decision making Safety/Judgement: Insight into deficits Functional Mobility Training: 
Bed Mobility: 
  
Supine to Sit: Moderate assistance;Assist x2 Sit to Supine: Moderate assistance;Assist x2 Transfers: 
Sit to Stand: Maximum assistance;Assist x2 Balance: 
Sitting: Intact; With support Standing: Impaired Standing - Static: Constant supportPain: 
Pain Scale 1: Numeric (0 - 10) Pain Intensity 1: 0 Activity Tolerance:  
Fair Please refer to the flowsheet for vital signs taken during this treatment. After treatment:  
[]    Patient left in no apparent distress sitting up in chair 
[x]    Patient left in no apparent distress in bed 
[x]    Call bell left within reach [x]    Nursing notified 
[]    Caregiver present 
[]    Bed alarm activated COMMUNICATION/COLLABORATION:  
The patients plan of care was discussed with: Registered Nurse Juan Dawson PT Time Calculation: 20 mins

## 2018-11-03 NOTE — PROGRESS NOTES
Bedside and Verbal shift change report given to JIMY Williamson (oncoming nurse) by Jessie Flynn RN (offgoing nurse). Report included the following information SBAR and Kardex.

## 2018-11-03 NOTE — PROGRESS NOTES
Paged Dr Alexa Styles regarding patient's lack of output despite receiving IV fluids. Creatinine and BUN both normal.  Bladder scan results 47. Received orders for nephrology consult.

## 2018-11-03 NOTE — PROGRESS NOTES
ORTHOPEDIC PROGRESS NOTE 
 
POD 2 Days Post-Op Procedure:  Procedure(s): RIGHT HIP HEMIARTHROPLASTY Subjective:  
 
Pain reported as mild to moderate today. Patient has no new complaints today. Tolerating PO. Objective:  
 
Blood pressure 99/70, pulse 88, temperature 97.9 °F (36.6 °C), resp. rate 16, height 5' (1.524 m), weight 59.6 kg (131 lb 4.8 oz), SpO2 99 %, not currently breastfeeding. Temp (24hrs), Av.3 °F (36.8 °C), Min:97.9 °F (36.6 °C), Max:98.6 °F (37 °C) Physical Exam:   
GEN: Awake and alert; NAD 
MSK: Examination of the right hip reveals that the dressing is clean, dry and intact with scant serosanguineous spotting . Sensation is intact to light touch. Intact EHL/FHL/GSC/AT; Brisk capillary refill. Labs:  
Lab Results Component Value Date/Time HGB 10.4 (L) 2018 03:31 AM  
 
 
 
Assessment:  
 
Principal Problem: 
  Other fracture of right femur, initial encounter for closed fracture ([address] (10/31/2018) Active Problems: 
  Pneumonia (10/31/2018) Plan/Recommendations/Medical Decision Making:  
 
Continue physical therapy Advance diet as tolerated Maintain R posterior hip precautions Case mangement: Referrals to [facility] and [facility] made pending SNF

## 2018-11-03 NOTE — PROGRESS NOTES
11/03/18 0216 Vital Signs Temp 98.6 °F (37 °C) Temp Source Oral  
Pulse (Heart Rate) 90 Heart Rate Source Monitor Resp Rate 16  
O2 Sat (%) 97 % Level of Consciousness Alert  
BP (!) 80/65 MAP (Calculated) 70 BP 1 Method Automatic  
BP 1 Location Right arm BP Patient Position At rest  
MEWS Score 3 Paged Arch Sample, NP regarding MEWS of 3 resulting from hypotension. Received orders for 500mL NS bolus

## 2018-11-03 NOTE — CONSULTS
NEPHROLOGY CONSULT NOTE     Patient: Padmini Salinas MRN: [id number]  PCP: Morena Gilliland MD   :     10/17/1927  Age:   80 y.o. Sex:  female      Referring physician: Rafat Reid  Reason for consultation: 80 y.o. female with Pneumonia  Other fracture of right femur, initial encounter for closed fracture ([facility] 75.) complicated by YASH   Admission Date: 10/31/2018  1:48 AM  LOS: 3 days     DISCUSSION / PLAN :   Acute kidney injury due to volume issues and meds. Patient is oliguric. Kidney function is normal however. - Please avoid nephrotoxins such as NSAIDs, aminoglycosides and iodinated contrast agents. Avoid hypotension  - No need to adjust medications as GFR is currently normal    Volume status appears to be low. Blood pressures are normal.  Electrolytes are acceptable. AB status is normal.    RECOMMEND:  1. Continue IVF. 2. Urine studies. 3. Serial labs. Active Problems / Assessment AAActive  :   Principal Problem:    Other fracture of right femur, initial encounter for closed fracture ([facility] 75.) (10/31/2018)    Active Problems:    Pneumonia (10/31/2018)         Subjective:   HPI: Padmini Salinas is a 80 y.o.  female who has been admitted to the hospital for a fall. Right hip hemiarthroplasty done 18. BP has remained normal.    Overnight UO has been low. KF is normal.      Receiving IV fluids. Scanned bladder volume was 45 cc on 18. Past Medical Hx:   Past Medical History:   Diagnosis Date    Arthritis     History of non-ST elevation myocardial infarction (NSTEMI) 2016        Past Surgical Hx:     Past Surgical History:   Procedure Laterality Date    HX CHOLECYSTECTOMY         Medications:  Prior to Admission medications    Medication Sig Start Date End Date Taking? Authorizing Provider   acetaminophen (TYLENOL) 325 mg tablet Take 1,000 mg by mouth three (3) times daily.    Yes Provider, Historical   diclofenac (VOLTAREN) 1 % gel Apply  to affected area three (3) times daily. Yes Provider, Historical   melatonin tab tablet Take 5 mg by mouth nightly. Yes Provider, Historical   mirtazapine (REMERON) 30 mg tablet Take 30 mg by mouth nightly. Yes Provider, Historical   traMADol (ULTRAM) 50 mg tablet Take 50 mg by mouth nightly. Yes Provider, Historical   hydrOXYzine HCl (ATARAX) 10 mg tablet Take  by mouth three (3) times daily as needed for Itching. Yes Provider, Historical   alum-mag hydroxide-simeth (MYLANTA) 200-200-20 mg/5 mL susp Take 30 mL by mouth every eight (8) hours as needed. Yes Provider, Historical   loperamide (IMODIUM) 2 mg capsule Take  by mouth every six (6) hours as needed for Diarrhea. Yes Provider, Historical   promethazine (PHENERGAN) 12.5 mg tablet Take  by mouth three (3) times daily as needed for Nausea. Yes Provider, Historical   triamcinolone acetonide (KENALOG) 0.1 % ointment Apply  to affected area as needed for Skin Irritation. use thin layer   Yes Provider, Historical   diphenhydrAMINE (BANOPHEN) 25 mg tablet Take 25 mg by mouth nightly. Yes Provider, Historical   nystatin (MYCOSTATIN) powder Apply  to affected area four (4) times daily. Yes Provider, Historical   carvedilol (COREG) 3.125 mg tablet Take  by mouth two (2) times daily (with meals). Yes Provider, Historical   latanoprost (XALATAN) 0.005 % ophthalmic solution Administer 1 Drop to both eyes nightly. Yes Provider, Historical   omeprazole (PRILOSEC) 20 mg capsule Take 20 mg by mouth daily. Yes Provider, Historical   thiamine (B-1) 100 mg tablet Take  by mouth daily. Provider, Historical   cholecalciferol (VITAMIN D3) 1,000 unit tablet Take 1,000 Units by mouth daily. Provider, Historical   docusate sodium (COLACE) 100 mg capsule Take 100 mg by mouth two (2) times daily as needed for Constipation.     Provider, Historical       Allergies   Allergen Reactions    Codeine Unknown (comments)    Prednisone Unknown (comments)       Current Facility-Administered Medications:     traMADol (ULTRAM) tablet 50 mg, 50 mg, Oral, Q6H PRN, [facility], NP, 50 mg at 11/03/18 0011    acetaminophen (TYLENOL) tablet 650 mg, 650 mg, Oral, Q6H, Fior, Selestine Kingdom, NP, 650 mg at 11/03/18 1025    senna-docusate (PERICOLACE) 8.6-50 mg per tablet 1 Tab, 1 Tab, Oral, DAILY, [facility], NP, 1 Tab at 11/02/18 0956    enoxaparin (LOVENOX) injection 30 mg, 30 mg, SubCUTAneous, Q24H, [facility] NP, 30 mg at 11/03/18 1025    mirtazapine (REMERON) tablet 30 mg, 30 mg, Oral, QHS, [facility], NP, 30 mg at 11/02/18 2137    0.9% sodium chloride infusion, 125 mL/hr, IntraVENous, CONTINUOUS, [facility], GRACY, Last Rate: 125 mL/hr at 11/01/18 1933, 125 mL/hr at 11/01/18 1933    latanoprost (XALATAN) 0.005 % ophthalmic solution 1 Drop, 1 Drop, Both Eyes, QHS, Stephanie Martins MD, 1 Drop at 10/31/18 2155    sodium chloride (NS) flush 5-10 mL, 5-10 mL, IntraVENous, Q8H, EliezerZack goel MD, 10 mL at 11/02/18 2139    sodium chloride (NS) flush 5-10 mL, 5-10 mL, IntraVENous, PRN, Stephanie Martins MD    azithromycin (ZITHROMAX) 500 mg in 0.9% sodium chloride 250 mL (Onuj2Wfe), 500 mg, IntraVENous, Q24H, Jordan Burch MD, Last Rate: 250 mL/hr at 11/03/18 0450, 500 mg at 11/03/18 0450    cefTRIAXone (ROCEPHIN) 1 g in 0.9% sodium chloride (MBP/ADV) 50 mL, 1 g, IntraVENous, Q24H, Jordan Burch MD, Last Rate: 100 mL/hr at 11/03/18 1024, 1 g at 11/03/18 1024    hydrALAZINE (APRESOLINE) 20 mg/mL injection 10 mg, 10 mg, IntraVENous, Q6H PRN, Jordan Burch MD, 10 mg at 10/31/18 1250    ondansetron (ZOFRAN) injection 4 mg, 4 mg, IntraVENous, Q8H PRN, Jordan Burch MD, 4 mg at 11/01/18 0316    0.9% sodium chloride 1,000 mL with mvi, adult no. 4 with vit K 10 mL, thiamine 119 mg, folic acid 1 mg infusion, , IntraVENous, Q24H, Zack Martins MD, Last Rate: 125 mL/hr at 11/02/18 2823     Social Hx:  reports that  has never smoked.  she has never used smokeless tobacco. She reports that she drinks alcohol. She reports that she does not use drugs. History reviewed. No pertinent family history. Review of Systems:  A twelve point review of system was performed today. Pertinent positives and negatives are mentioned in the HPI. The reminder of the ROS is negative and noncontributory. Objective:    Vitals:    Vitals:    11/02/18 2027 11/03/18 0216 11/03/18 0544 11/03/18 0849   BP: 97/61 (!) 80/65 91/61 99/70   Pulse: 87 90 (!) 105 88   Resp: 16 16  16   Temp: 98.2 °F (36.8 °C) 98.6 °F (37 °C)  97.9 °F (36.6 °C)   SpO2: 100% 97%  99%   Weight:       Height:         I&O's:  11/02 0701 - 11/03 0700  In: -   Out: 10 [Urine:10]  Visit Vitals  BP 99/70 (BP 1 Location: Right arm)   Pulse 88   Temp 97.9 °F (36.6 °C)   Resp 16   Ht 5' (1.524 m)   Wt 59.6 kg (131 lb 4.8 oz)   SpO2 99%   Breastfeeding? No   BMI 25.64 kg/m²       Physical Exam:  General:Alert, No distress,   HEENT: Eyes are PERRL. Conjunctiva without pallor ,erythema. Sclerae: +/- Icterus  Neck: Supple,no mass palpable  Lungs : Clears to auscultation Bilaterally, Normal respiratory effort  CVS: RRR, S1 S2 normal, No rub, trace LE edema  Abdomen: Soft, Non tender, No hepatosplenomegaly, bowel sounds present  Extremities: No cyanosis, No clubbing  Skin: No rash or lesions.   Lymph nodes: No palpable nodes  MS: No joint swelling, erythema, warmth  Neurologic: non focal, AAO x 3  Psych: normal affect    Laboratory Results:    Lab Results   Component Value Date    BUN 17 11/02/2018     11/02/2018    K 3.9 11/02/2018     (H) 11/02/2018    CO2 19 (L) 11/02/2018       Lab Results   Component Value Date    BUN 17 11/02/2018    BUN 10 11/01/2018    BUN 13 10/31/2018    BUN 18 10/07/2017    BUN 9 09/13/2017    K 3.9 11/02/2018    K 3.5 11/01/2018    K 3.5 10/31/2018    K 5.0 10/07/2017    K 4.5 09/13/2017       Lab Results   Component Value Date    WBC 10.3 11/01/2018    RBC 3.50 (L) 11/01/2018 HGB 10.4 (L) 11/03/2018    HCT 33.6 (L) 11/03/2018    .4 (H) 11/01/2018    MCH 34.3 (H) 11/01/2018    RDW 12.4 11/01/2018     (L) 11/01/2018       Lab Results   Component Value Date    PHOS 3.8 09/13/2017       Urine dipstick:   Lab Results   Component Value Date/Time    Color YELLOW/STRAW 10/31/2018 04:55 AM    Appearance CLOUDY (A) 10/31/2018 04:55 AM    Specific gravity 1.014 10/31/2018 04:55 AM    pH (UA) 5.5 10/31/2018 04:55 AM    Protein TRACE (A) 10/31/2018 04:55 AM    Glucose NEGATIVE  10/31/2018 04:55 AM    Ketone NEGATIVE  10/31/2018 04:55 AM    Bilirubin NEGATIVE  10/31/2018 04:55 AM    Urobilinogen 0.2 10/31/2018 04:55 AM    Nitrites NEGATIVE  10/31/2018 04:55 AM    Leukocyte Esterase LARGE (A) 10/31/2018 04:55 AM    Epithelial cells FEW 10/31/2018 04:55 AM    Bacteria NEGATIVE  10/31/2018 04:55 AM    WBC >100 (H) 10/31/2018 04:55 AM    RBC 0-5 10/31/2018 04:55 AM       I have reviewed the following: All pertinent labs, microbiology data, radiology imaging for my assessment     Care Plan discussed with:  nurse     Chart reviewed. Total time spent with patient:  35 minutes  Medications list Personally Reviewed   [x]      Yes     []               No      Thank you for allowing us to participate in the care of this patient. We will follow patient. Please dont hesitate to call with any questions    Roslyn Beebe MD  11/3/2018    [facility]  [address], [address]  Phone - [phone]  Fax - [phone]  [email or web address]

## 2018-11-03 NOTE — PROGRESS NOTES
Bedside shift change report given to [facility] (oncoming nurse) by Julius Salgado** (off going nurse). Report included the following information SBAR, Kardex and MAR.

## 2018-11-03 NOTE — PROGRESS NOTES
Follow up. Referrals to [facility] and [facility] are still awaiting responses.

## 2018-11-03 NOTE — PROGRESS NOTES
Hospitalist Progress Note Bishop Davies NP Answering service: [phone] OR [phone] from in house phone Cell: [phone] Date of Service:  11/3/2018 NAME:  Bell Hathaway :  10/17/1927 MRN:  [id number] Admission Summary: Pt presented to the ED from [facility] assisted living facility with a chief complaint of pain in R hip after a fall. She reportedly was trying to walk to the bathroom using her walker but tripped and fell, landing onto her right side having subsequent pain to the right hip, buttock, and foot. She was unable to bear weight with a pain level of 10/10, aggravated with any movement, radiating from right hip to right thigh, without specific alleviating factors. Denied hitting her head or any loss of consciousness. Pmhx: coronary artery disease, non-ST elevation myocardial infarction, hypertension, hyperlipidemia, glaucoma, anxiety, chest pain, right lower extremity edema, arthritis Interval history / Subjective:  
Pt in bed, feeling fair today. Has some pain in her R leg but was able to participate in therapy. Seen ~ 1200 and weaned her oxygen to RA - still sats ~ 94-99% at 1400. Assessment & Plan:  
 
Closed R Femur Fracture (POA): - R hip 2 view x-ray shows subcapital R proximal femur fx.  
- POD 2 R hip Hemiarthroplasty - pain management: scheduled tylenol and may have tramadol prn 
- stop gabriel-colace as she has had multiple stools today 
- DVT ppx as per Ortho post surgery - started lovenox  but Ortho will need to decide duration of therapy 
- continue with PT/OT, pt will need SNF Hypotension with hx of  Hypertension:  
- hypotension. Reviewed PTA meds and she is on a lower dose of coreg PTA than what is currently ordered. Updated PTA med list with most recent MAR from group home. - coreg has been on hold 
- received some boluses  and continues on fluids now 
- monitor, has gotten better Pneumonia (POA): - Chest Xray: right basilar consolidation  
- on zithromax and ceftriaxone 
- no symptoms PTA. No fever PTA, no cough, no leukocytosis  
- afebrile >24 hrs 
- on RA now, no cough Tachycardia:  
- most likely related to pain - pt in NSR and HR ~  
- no chest pain UTI (POA):  
- urine cloudy, strong smell - + leuks in urine and >WBC. Klebsiella in urine, susceptible to currently ordered ceftriaxone Hx Coronary artery disease and Hx MI: PTA coreg on hold due to hypotension Hx Acute RLE DVT in 2017:  
- initially in 9/2017, it was decided NOT to place pt on [address] [place] [address] Road due to hx of small subdural bleed. She was not dicsharged at that time with blood thinners 
- in 10/2017, she cam back to ED with worsening leg swelling and it was decided to place her on lovenox. - in reviewing her most recent med list from [facility], she is no longer on this Code status: DNR 
DVT prophylaxis: Lovenox Care Plan discussed with: patient Disposition: SNF Hospital Problems  Date Reviewed: 11/1/2018 Codes Class Noted POA Pneumonia ICD-10-CM: J18.9 ICD-9-CM: 079  10/31/2018 Unknown * (Principal) Other fracture of right femur, initial encounter for closed fracture [facility]) ICD-10-CM: N73.5Z4T 
ICD-9-CM: 821.00  10/31/2018 Unknown Review of Systems:  
Denies HA. No respiratory complatins. No chest pain, pressure or palpitations. No urinary complaints. Multiple BMs + R hip pain Vital Signs:  
 Last 24hrs VS reviewed since prior progress note. Most recent are: 
Visit Vitals /72 (BP 1 Location: Right arm, BP Patient Position: At rest) Pulse 100 Temp 97.5 °F (36.4 °C) Resp 16 Ht 5' (1.524 m) Wt 59.6 kg (131 lb 4.8 oz) SpO2 95% Breastfeeding? No  
BMI 25.64 kg/m² Intake/Output Summary (Last 24 hours) at 11/3/2018 1422 Last data filed at 11/3/2018 1257 Gross per 24 hour Intake 2590 ml Output  Net 2590 ml Physical Examination: Constitutional:  Cooperative but irritated this am - does not want to get up OOB   
ENT:  Oral MM dry. Resp:  CTA bilaterally. No wheezing. No accessory muscle use and on RA  
CV:  Regular rhythm, no murmurs. HR ~ GI:  Soft, non distended, non tender. Normoactive bowel sounds + BM x 3 today Musculoskeletal:  R hip and thigh edema, warm, 2+ pulses throughout. Neurologic:  AAOx3. Anxious. Skin: Dressing to R hip is intact, small shadow of drainage on dressing Data Review:  
Review and/or order of clinical lab test 
Review and/or order of tests in the radiology section of CPT Review and/or order of tests in the medicine section of CPT Labs:  
 
Recent Labs 11/03/18 
0331 11/02/18 
0600 11/01/18 
0404 WBC  --   --  10.3 HGB 10.4* 11.9 12.0 HCT 33.6* 38.6 37.6 PLT  --   --  124* Recent Labs 11/02/18 
0600 11/01/18 
0404  141  
K 3.9 3.5 * 111* CO2 19* 19* BUN 17 10 CREA 0.98 0.79 * 104* CA 7.4* 7.8* No results for input(s): SGOT, GPT, ALT, AP, TBIL, TBILI, TP, ALB, GLOB, GGT, AML, LPSE in the last 72 hours. No lab exists for component: AMYP, HLPSE No results for input(s): INR, PTP, APTT in the last 72 hours. No lab exists for component: INREXT, INREXT No results for input(s): FE, TIBC, PSAT, FERR in the last 72 hours. Lab Results Component Value Date/Time Folate 5.8 03/08/2017 06:00 PM  
  
No results for input(s): PH, PCO2, PO2 in the last 72 hours. No results for input(s): CPK, CKNDX, TROIQ in the last 72 hours. No lab exists for component: CPKMB Lab Results Component Value Date/Time Cholesterol, total 182 03/08/2017 06:00 PM  
 HDL Cholesterol 64 03/08/2017 06:00 PM  
 LDL, calculated 91.2 03/08/2017 06:00 PM  
 Triglyceride 134 03/08/2017 06:00 PM  
 CHOL/HDL Ratio 2.8 03/08/2017 06:00 PM  
 
Lab Results Component Value Date/Time Glucose (POC) 94 11/20/2016 07:00 AM  
 
Lab Results Component Value Date/Time Color YELLOW/STRAW 10/31/2018 04:55 AM  
 Appearance CLOUDY (A) 10/31/2018 04:55 AM  
 Specific gravity 1.014 10/31/2018 04:55 AM  
 pH (UA) 5.5 10/31/2018 04:55 AM  
 Protein TRACE (A) 10/31/2018 04:55 AM  
 Glucose NEGATIVE  10/31/2018 04:55 AM  
 Ketone NEGATIVE  10/31/2018 04:55 AM  
 Bilirubin NEGATIVE  10/31/2018 04:55 AM  
 Urobilinogen 0.2 10/31/2018 04:55 AM  
 Nitrites NEGATIVE  10/31/2018 04:55 AM  
 Leukocyte Esterase LARGE (A) 10/31/2018 04:55 AM  
 Epithelial cells FEW 10/31/2018 04:55 AM  
 Bacteria NEGATIVE  10/31/2018 04:55 AM  
 WBC >100 (H) 10/31/2018 04:55 AM  
 RBC 0-5 10/31/2018 04:55 AM  
 
Medications Reviewed:  
 
Current Facility-Administered Medications Medication Dose Route Frequency  traMADol (ULTRAM) tablet 50 mg  50 mg Oral Q6H PRN  
 acetaminophen (TYLENOL) tablet 650 mg  650 mg Oral Q6H  
 senna-docusate (PERICOLACE) 8.6-50 mg per tablet 1 Tab  1 Tab Oral DAILY  enoxaparin (LOVENOX) injection 30 mg  30 mg SubCUTAneous Q24H  
 mirtazapine (REMERON) tablet 30 mg  30 mg Oral QHS  
 0.9% sodium chloride infusion  125 mL/hr IntraVENous CONTINUOUS  
 latanoprost (XALATAN) 0.005 % ophthalmic solution 1 Drop  1 Drop Both Eyes QHS  sodium chloride (NS) flush 5-10 mL  5-10 mL IntraVENous Q8H  
 sodium chloride (NS) flush 5-10 mL  5-10 mL IntraVENous PRN  
 azithromycin (ZITHROMAX) 500 mg in 0.9% sodium chloride 250 mL ([id number])  500 mg IntraVENous Q24H  cefTRIAXone (ROCEPHIN) 1 g in 0.9% sodium chloride (MBP/ADV) 50 mL  1 g IntraVENous Q24H  hydrALAZINE (APRESOLINE) 20 mg/mL injection 10 mg  10 mg IntraVENous Q6H PRN  
 ondansetron (ZOFRAN) injection 4 mg  4 mg IntraVENous Q8H PRN  
 0.9% sodium chloride 1,000 mL with mvi, adult no. 4 with vit K 10 mL, thiamine 279 mg, folic acid 1 mg infusion   IntraVENous Q24H  
______________________________________________________________________ EXPECTED LENGTH OF STAY: 4d 21h ACTUAL LENGTH OF STAY:          3 Jenn Delgado, NP

## 2018-11-04 ENCOUNTER — APPOINTMENT (OUTPATIENT)
Dept: GENERAL RADIOLOGY | Age: 83
DRG: 469 | End: 2018-11-04
Attending: NURSE PRACTITIONER
Payer: MEDICARE

## 2018-11-04 LAB
ALBUMIN SERPL-MCNC: 1.9 G/DL (ref 3.5–5)
ANION GAP SERPL CALC-SCNC: 12 MMOL/L (ref 5–15)
BUN SERPL-MCNC: 25 MG/DL (ref 6–20)
BUN/CREAT SERPL: 24 (ref 12–20)
CALCIUM SERPL-MCNC: 7.1 MG/DL (ref 8.5–10.1)
CHLORIDE SERPL-SCNC: 114 MMOL/L (ref 97–108)
CO2 SERPL-SCNC: 17 MMOL/L (ref 21–32)
CREAT SERPL-MCNC: 1.06 MG/DL (ref 0.55–1.02)
CREAT UR-MCNC: <13 MG/DL
GLUCOSE SERPL-MCNC: 115 MG/DL (ref 65–100)
HCT VFR BLD AUTO: 30.6 % (ref 35–47)
HGB BLD-MCNC: 9.3 G/DL (ref 11.5–16)
MAGNESIUM SERPL-MCNC: 1.4 MG/DL (ref 1.6–2.4)
PHOSPHATE SERPL-MCNC: 2.7 MG/DL (ref 2.6–4.7)
POTASSIUM SERPL-SCNC: 3.7 MMOL/L (ref 3.5–5.1)
SODIUM SERPL-SCNC: 143 MMOL/L (ref 136–145)
SODIUM UR-SCNC: 133 MMOL/L
URATE SERPL-MCNC: 4.7 MG/DL (ref 2.6–6)

## 2018-11-04 PROCEDURE — 83735 ASSAY OF MAGNESIUM: CPT | Performed by: NURSE PRACTITIONER

## 2018-11-04 PROCEDURE — 84550 ASSAY OF BLOOD/URIC ACID: CPT | Performed by: INTERNAL MEDICINE

## 2018-11-04 PROCEDURE — 36415 COLL VENOUS BLD VENIPUNCTURE: CPT | Performed by: INTERNAL MEDICINE

## 2018-11-04 PROCEDURE — 85018 HEMOGLOBIN: CPT | Performed by: NURSE PRACTITIONER

## 2018-11-04 PROCEDURE — 74011000250 HC RX REV CODE- 250: Performed by: NURSE PRACTITIONER

## 2018-11-04 PROCEDURE — 74011250636 HC RX REV CODE- 250/636: Performed by: NURSE PRACTITIONER

## 2018-11-04 PROCEDURE — 94640 AIRWAY INHALATION TREATMENT: CPT

## 2018-11-04 PROCEDURE — 65270000029 HC RM PRIVATE

## 2018-11-04 PROCEDURE — 71045 X-RAY EXAM CHEST 1 VIEW: CPT

## 2018-11-04 PROCEDURE — 80069 RENAL FUNCTION PANEL: CPT | Performed by: INTERNAL MEDICINE

## 2018-11-04 PROCEDURE — 97530 THERAPEUTIC ACTIVITIES: CPT | Performed by: PHYSICAL THERAPIST

## 2018-11-04 PROCEDURE — 94760 N-INVAS EAR/PLS OXIMETRY 1: CPT

## 2018-11-04 PROCEDURE — 82570 ASSAY OF URINE CREATININE: CPT | Performed by: INTERNAL MEDICINE

## 2018-11-04 PROCEDURE — 84300 ASSAY OF URINE SODIUM: CPT | Performed by: INTERNAL MEDICINE

## 2018-11-04 PROCEDURE — 74011250637 HC RX REV CODE- 250/637: Performed by: NURSE PRACTITIONER

## 2018-11-04 PROCEDURE — 77030029684 HC NEB SM VOL KT MONA -A

## 2018-11-04 PROCEDURE — 74011250636 HC RX REV CODE- 250/636: Performed by: INTERNAL MEDICINE

## 2018-11-04 PROCEDURE — 74011000258 HC RX REV CODE- 258: Performed by: INTERNAL MEDICINE

## 2018-11-04 RX ORDER — IPRATROPIUM BROMIDE AND ALBUTEROL SULFATE 2.5; .5 MG/3ML; MG/3ML
3 SOLUTION RESPIRATORY (INHALATION)
Status: DISCONTINUED | OUTPATIENT
Start: 2018-11-04 | End: 2018-11-05

## 2018-11-04 RX ORDER — FUROSEMIDE 10 MG/ML
40 INJECTION INTRAMUSCULAR; INTRAVENOUS ONCE
Status: COMPLETED | OUTPATIENT
Start: 2018-11-04 | End: 2018-11-04

## 2018-11-04 RX ORDER — LOPERAMIDE HYDROCHLORIDE 2 MG/1
4 CAPSULE ORAL ONCE
Status: COMPLETED | OUTPATIENT
Start: 2018-11-04 | End: 2018-11-04

## 2018-11-04 RX ORDER — MAGNESIUM SULFATE HEPTAHYDRATE 40 MG/ML
2 INJECTION, SOLUTION INTRAVENOUS ONCE
Status: COMPLETED | OUTPATIENT
Start: 2018-11-04 | End: 2018-11-04

## 2018-11-04 RX ORDER — IPRATROPIUM BROMIDE AND ALBUTEROL SULFATE 2.5; .5 MG/3ML; MG/3ML
3 SOLUTION RESPIRATORY (INHALATION)
Status: DISCONTINUED | OUTPATIENT
Start: 2018-11-04 | End: 2018-11-04

## 2018-11-04 RX ADMIN — ACETAMINOPHEN 650 MG: 325 TABLET ORAL at 09:37

## 2018-11-04 RX ADMIN — ACETAMINOPHEN 650 MG: 325 TABLET ORAL at 17:57

## 2018-11-04 RX ADMIN — TRAMADOL HYDROCHLORIDE 50 MG: 50 TABLET, FILM COATED ORAL at 21:39

## 2018-11-04 RX ADMIN — LOPERAMIDE HYDROCHLORIDE 4 MG: 2 CAPSULE ORAL at 17:57

## 2018-11-04 RX ADMIN — MIRTAZAPINE 30 MG: 15 TABLET, FILM COATED ORAL at 22:00

## 2018-11-04 RX ADMIN — SODIUM CHLORIDE 125 ML/HR: 900 INJECTION, SOLUTION INTRAVENOUS at 07:15

## 2018-11-04 RX ADMIN — SODIUM CHLORIDE 125 ML/HR: 900 INJECTION, SOLUTION INTRAVENOUS at 07:14

## 2018-11-04 RX ADMIN — ENOXAPARIN SODIUM 30 MG: 30 INJECTION SUBCUTANEOUS at 09:37

## 2018-11-04 RX ADMIN — Medication 10 ML: at 13:55

## 2018-11-04 RX ADMIN — IPRATROPIUM BROMIDE AND ALBUTEROL SULFATE 3 ML: .5; 3 SOLUTION RESPIRATORY (INHALATION) at 20:45

## 2018-11-04 RX ADMIN — ACETAMINOPHEN 650 MG: 325 TABLET ORAL at 02:13

## 2018-11-04 RX ADMIN — Medication 100 MG: at 09:37

## 2018-11-04 RX ADMIN — Medication 10 ML: at 21:37

## 2018-11-04 RX ADMIN — MAGNESIUM SULFATE HEPTAHYDRATE 2 G: 40 INJECTION, SOLUTION INTRAVENOUS at 09:36

## 2018-11-04 RX ADMIN — Medication 10 ML: at 07:14

## 2018-11-04 RX ADMIN — CEFTRIAXONE 1 G: 1 INJECTION, POWDER, FOR SOLUTION INTRAMUSCULAR; INTRAVENOUS at 07:15

## 2018-11-04 RX ADMIN — IPRATROPIUM BROMIDE AND ALBUTEROL SULFATE 3 ML: .5; 3 SOLUTION RESPIRATORY (INHALATION) at 10:58

## 2018-11-04 RX ADMIN — LATANOPROST 1 DROP: 50 SOLUTION OPHTHALMIC at 21:36

## 2018-11-04 RX ADMIN — FOLIC ACID 1 MG: 1 TABLET ORAL at 09:37

## 2018-11-04 RX ADMIN — AZITHROMYCIN MONOHYDRATE 500 MG: 500 INJECTION, POWDER, LYOPHILIZED, FOR SOLUTION INTRAVENOUS at 04:14

## 2018-11-04 RX ADMIN — FUROSEMIDE 40 MG: 10 INJECTION, SOLUTION INTRAMUSCULAR; INTRAVENOUS at 13:55

## 2018-11-04 NOTE — PROGRESS NOTES
Ortho Daily Progress Note Patient: Gato Tavera                   MRN: [id number]  Sex: female YOB: 1927           Age: 80 y.o. 
 
3 Days Post-Op Procedure(s): RIGHT HIP HEMIARTHROPLASTY Subjective: Still having quite a bit of pain, slow progress with therapy Visit Vitals /81 (BP 1 Location: Right arm, BP Patient Position: At rest) Pulse 92 Temp 97.8 °F (36.6 °C) Resp 18 Ht 5' (1.524 m) Wt 59.6 kg (131 lb 4.8 oz) SpO2 91% Breastfeeding? No  
BMI 25.64 kg/m² Lab Results: HGB Date/Time Value Ref Range Status 11/04/2018 02:35 AM 9.3 (L) 11.5 - 16.0 g/dL Final  
 
INR Date/Time Value Ref Range Status 10/31/2018 02:05 AM 1.0 0.9 - 1.1   Final  
  Comment:  
  A single therapeutic range for Vit K antagonists may not be optimal for all indications - see June, 2008 issue of Chest, American College of Chest Physicians Evidence-Based Clinical Practice Guidelines, 8th Edition. Physical Exam: 
 
DRESSING: Aquacel in place SWELLING: moderate NEUROLOGICAL: intact PULSE:yes GENERAL: alert, cooperative, no distress, appears stated age DVT Exam: No evidence of DVT seen on physical exam. 
 
 
Plan: DVT prophylaxisLovenox Weight bearing restrictionWBAT Pain Control:stable, mild-to-moderate joint symptoms intermittently, reasonably well controlled by current meds Dispo: [place] [address] [address] [place], [place] 
11/4/2018 12:17 PM 
 
 
.

## 2018-11-04 NOTE — PROGRESS NOTES
Spiritual Care Partner Volunteer visited patient in Rm 554 on 11/4/2018. Documented by: 
Chaplain Marte MDiv, MS, [place] [id number] PRA ([id number])

## 2018-11-04 NOTE — CONSULTS
[address] PROGRESS NOTE     Patient: Meagan Modi MRN: [id number]  PCP: Maura Ascencio MD   :     10/17/1927  Age:   80 y.o. Sex:  female      Referring physician: Vaughn Antonio  Reason for consultation: 80 y.o. female with Pneumonia  Other fracture of right femur, initial encounter for closed fracture ([address] complicated by YASH   Admission Date: 10/31/2018  1:48 AM  LOS: 4 days     DISCUSSION / PLAN :   Low UO  Mild volume up. Post op ortho surgery  PNA on admit. Dilutional acidosis d/t NS infusion. RECOMMEND:  1. Off IVF. 2. CXR and loop if volume up. .  3. Serial labs. Subjective:   HPI: Meagan Modi is a 80 y.o.  female who was admitted to the hospital for a fall, PNA. Right hip hemiarthroplasty done 18. BP has remained normal.    Overnight UO has been low. KF is still rel normal.      Off IV due to SOB. More orthopnea and CHI. CXR pending. Past Medical Hx:   Past Medical History:   Diagnosis Date    Arthritis     History of non-ST elevation myocardial infarction (NSTEMI) 2016        Past Surgical Hx:     Past Surgical History:   Procedure Laterality Date    HX CHOLECYSTECTOMY         Medications:  Prior to Admission medications    Medication Sig Start Date End Date Taking? Authorizing Provider   acetaminophen (TYLENOL) 325 mg tablet Take 1,000 mg by mouth three (3) times daily. Yes Provider, Historical   diclofenac (VOLTAREN) 1 % gel Apply  to affected area three (3) times daily. Yes Provider, Historical   melatonin tab tablet Take 5 mg by mouth nightly. Yes Provider, Historical   mirtazapine (REMERON) 30 mg tablet Take 30 mg by mouth nightly. Yes Provider, Historical   traMADol (ULTRAM) 50 mg tablet Take 50 mg by mouth nightly. Yes Provider, Historical   hydrOXYzine HCl (ATARAX) 10 mg tablet Take  by mouth three (3) times daily as needed for Itching.    Yes Provider, Historical   alum-mag hydroxide-simeth (MYLANTA) 200-200-20 mg/5 mL susp Take 30 mL by mouth every eight (8) hours as needed. Yes Provider, Historical   loperamide (IMODIUM) 2 mg capsule Take  by mouth every six (6) hours as needed for Diarrhea. Yes Provider, Historical   promethazine (PHENERGAN) 12.5 mg tablet Take  by mouth three (3) times daily as needed for Nausea. Yes Provider, Historical   triamcinolone acetonide (KENALOG) 0.1 % ointment Apply  to affected area as needed for Skin Irritation. use thin layer   Yes Provider, Historical   diphenhydrAMINE (BANOPHEN) 25 mg tablet Take 25 mg by mouth nightly. Yes Provider, Historical   nystatin (MYCOSTATIN) powder Apply  to affected area four (4) times daily. Yes Provider, Historical   carvedilol (COREG) 3.125 mg tablet Take  by mouth two (2) times daily (with meals). Yes Provider, Historical   latanoprost (XALATAN) 0.005 % ophthalmic solution Administer 1 Drop to both eyes nightly. Yes Provider, Historical   omeprazole (PRILOSEC) 20 mg capsule Take 20 mg by mouth daily. Yes Provider, Historical   thiamine (B-1) 100 mg tablet Take  by mouth daily. Provider, Historical   cholecalciferol (VITAMIN D3) 1,000 unit tablet Take 1,000 Units by mouth daily. Provider, Historical   docusate sodium (COLACE) 100 mg capsule Take 100 mg by mouth two (2) times daily as needed for Constipation.     Provider, Historical       Allergies   Allergen Reactions    Codeine Unknown (comments)    Prednisone Unknown (comments)       Current Facility-Administered Medications:     albuterol-ipratropium (DUO-NEB) 2.5 MG-0.5 MG/3 ML, 3 mL, Nebulization, Q6H RT, Capri Childress NP, 3 mL at 11/04/18 1058    thiamine HCL (B-1) tablet 100 mg, 100 mg, Oral, DAILY, Garland Childress NP, 100 mg at 22/89/52 8845    folic acid (FOLVITE) tablet 1 mg, 1 mg, Oral, DAILY, Garland Childress NP, 1 mg at 11/04/18 0937    0.9% sodium chloride infusion, 50 mL/hr, IntraVENous, CONTINUOUS, Isaac Allen NP, Last Rate: 125 mL/hr at 11/04/18 0715, 125 mL/hr at 11/04/18 0715    traMADol (ULTRAM) tablet 50 mg, 50 mg, Oral, Q6H PRN, Chico Slice, NP, 50 mg at 11/03/18 2031    acetaminophen (TYLENOL) tablet 650 mg, 650 mg, Oral, Q6H, Chico Slice, NP, 650 mg at 11/04/18 0937    enoxaparin (LOVENOX) injection 30 mg, 30 mg, SubCUTAneous, Q24H, Chiquis Childress, NP, 30 mg at 11/04/18 5296    mirtazapine (REMERON) tablet 30 mg, 30 mg, Oral, QHS, Chico Slice, NP, 30 mg at 11/03/18 2143    latanoprost (XALATAN) 0.005 % ophthalmic solution 1 Drop, 1 Drop, Both Eyes, QHS, Zack Martins MD, 1 Drop at 11/03/18 2142    sodium chloride (NS) flush 5-10 mL, 5-10 mL, IntraVENous, Q8H, Zack Martins MD, 10 mL at 11/04/18 0714    sodium chloride (NS) flush 5-10 mL, 5-10 mL, IntraVENous, PRN, [place], Gail Roche MD    cefTRIAXone (ROCEPHIN) 1 g in 0.9% sodium chloride (MBP/ADV) 50 mL, 1 g, IntraVENous, Q24H, Jordan Burch MD, Last Rate: 100 mL/hr at 11/04/18 0715, 1 g at 11/04/18 0715    hydrALAZINE (APRESOLINE) 20 mg/mL injection 10 mg, 10 mg, IntraVENous, Q6H PRN, Jordan Burch MD, 10 mg at 10/31/18 1250    ondansetron (ZOFRAN) injection 4 mg, 4 mg, IntraVENous, Q8H PRN, Jordan Burch MD, 4 mg at 11/01/18 8992     Social Hx:  reports that  has never smoked. she has never used smokeless tobacco. She reports that she drinks alcohol. She reports that she does not use drugs. History reviewed. No pertinent family history. Review of Systems:  A twelve point review of system was performed today. Pertinent positives and negatives are mentioned in the HPI. The reminder of the ROS is negative and noncontributory.      Objective:    Vitals:    Vitals:    11/04/18 0227 11/04/18 0749 11/04/18 0805 11/04/18 1100   BP: 125/64 129/90 128/81    Pulse: 99 (!) 116 92    Resp: 18 18 18    Temp: 98.7 °F (37.1 °C) 97.8 °F (36.6 °C) 97.8 °F (36.6 °C)    SpO2: 96% 96% 97% 91%   Weight:       Height:         I&O's:  11/03 0701 - 11/04 0700  In: 1310 [P.O.:240; I.V.:2350]  Out: -   Visit Vitals  /81 (BP 1 Location: Right arm, BP Patient Position: At rest)   Pulse 92   Temp 97.8 °F (36.6 °C)   Resp 18   Ht 5' (1.524 m)   Wt 59.6 kg (131 lb 4.8 oz)   SpO2 91%   Breastfeeding?  No   BMI 25.64 kg/m²       Physical Exam:  General:Alert, No distress,   Neck: Supple,no mass palpable  Lungs : B rhonchi and sl wheezing  CVS: RRR, S1 S2 normal, No rub, trace LE edema  Abdomen: Soft, Non tender, No hepatosplenomegaly, bowel sounds present  Extremities: No cyanosis, No clubbing  Neurologic: non focal, AAO x 3  Psych: normal affect    Laboratory Results:    Lab Results   Component Value Date    BUN 25 (H) 11/04/2018     11/04/2018    K 3.7 11/04/2018     (H) 11/04/2018    CO2 17 (L) 11/04/2018       Lab Results   Component Value Date    BUN 25 (H) 11/04/2018    BUN 26 (H) 11/03/2018    BUN 17 11/02/2018    BUN 10 11/01/2018    BUN 13 10/31/2018    K 3.7 11/04/2018    K 4.7 11/03/2018    K 3.9 11/02/2018    K 3.5 11/01/2018    K 3.5 10/31/2018       Lab Results   Component Value Date    WBC 10.3 11/01/2018    RBC 3.50 (L) 11/01/2018    HGB 9.3 (L) 11/04/2018    HCT 30.6 (L) 11/04/2018    .4 (H) 11/01/2018    MCH 34.3 (H) 11/01/2018    RDW 12.4 11/01/2018     (L) 11/01/2018       Lab Results   Component Value Date    PHOS 2.7 11/04/2018       Urine dipstick:   Lab Results   Component Value Date/Time    Color YELLOW/STRAW 10/31/2018 04:55 AM    Appearance CLOUDY (A) 10/31/2018 04:55 AM    Specific gravity 1.014 10/31/2018 04:55 AM    pH (UA) 5.5 10/31/2018 04:55 AM    Protein TRACE (A) 10/31/2018 04:55 AM    Glucose NEGATIVE  10/31/2018 04:55 AM    Ketone NEGATIVE  10/31/2018 04:55 AM    Bilirubin NEGATIVE  10/31/2018 04:55 AM    Urobilinogen 0.2 10/31/2018 04:55 AM    Nitrites NEGATIVE  10/31/2018 04:55 AM    Leukocyte Esterase LARGE (A) 10/31/2018 04:55 AM    Epithelial cells FEW 10/31/2018 04:55 AM    Bacteria NEGATIVE  10/31/2018 04:55 AM    WBC >100 (H) 10/31/2018 04:55 AM    RBC 0-5 10/31/2018 04:55 AM       I have reviewed the following: All pertinent labs, microbiology data, radiology imaging for my assessment     Care Plan discussed with:  nurse     Chart reviewed. Total time spent with patient:  35 minutes  Medications list Personally Reviewed   [x]      Yes     []               No      Thank you for allowing us to participate in the care of this patient. We will follow patient. Please dont hesitate to call with any questions    Caro Simms MD  11/4/2018    [facility]  [address] [facility], [address] [place] [address]  Phone - [phone]  Fax - [phone]  [email or web address]

## 2018-11-04 NOTE — PROGRESS NOTES
Hospitalist Progress Note Jannet Rothman NP Answering service: [phone] OR [phone] from in house phone Cell: [phone] Date of Service:  2018 NAME:  Jesus Ramey :  10/17/1927 MRN:  [id number] Admission Summary: Pt presented to the ED from [facility] assisted living facility with a chief complaint of pain in R hip after a fall. She reportedly was trying to walk to the bathroom using her walker but tripped and fell, landing onto her right side having subsequent pain to the right hip, buttock, and foot. She was unable to bear weight with a pain level of 10/10, aggravated with any movement, radiating from right hip to right thigh, without specific alleviating factors. Denied hitting her head or any loss of consciousness. Pmhx: coronary artery disease, non-ST elevation myocardial infarction, hypertension, hyperlipidemia, glaucoma, anxiety, chest pain, right lower extremity edema, arthritis Interval history / Subjective:  
Pt in bed, says she feels terrible today - has some shortness of breath this am. HR is 102 and sats 95% on oxygen 2L NC. She was repositioned for comfort - discussed plan for chest Xray and nebs today. Addendum 7857: pt still having loose BMs. Ordered for 4 mg immodium x 1 Assessment & Plan:  
 
Shortness of breath: 
- pt complaining of SOB with activity and is she lays down 
- few wheezes noted in left base and + dry sounding cough - will stop IVF 
- order due nebs Addendum [id number]: - Chest XRay: Left basilar airspace disease with left effusion. Interval resolution of right basilar consolidation 
- will give dose of 40 mg IV lasix - Incentive Spirometer Closed R Femur Fracture (POA): - R hip 2 view x-ray shows subcapital R proximal femur fx.  
- POD 3 R hip Hemiarthroplasty - pain management: scheduled tylenol and may have tramadol prn 
 - stopped gabriel-colace 11/3 as she has had multiple stools - these continue 
- DVT ppx as per Ortho post surgery - started lovenox 11/2 but Ortho will need to decide duration of therapy 
- continue with PT/OT, pt will need SNF Decreased Urine output:  
- appears Night MD 11/3 ordered Nephrology consult 
- difficult to obtain I/Os due to incontinence Hypotension with hx of  Hypertension: - Reviewed PTA meds and she is on a lower dose of coreg PTA than what is currently ordered. Updated PTA med list with most recent MAR from [facility]. - coreg has been on hold due to hypotension 
- has been bolused ~ 750 mL fluid and on 125 mL/hr.  
- BP normalized 
- reduced fluids to 50 mL/hr Pneumonia (POA): - Chest Xray: right basilar consolidation  
- on zithromax and ceftriaxone 
- no symptoms PTA. No fever PTA, no cough, no leukocytosis  
- afebrile >24 hrs 
- duoneamanda added 11/4 Tachycardia:  
- most likely related to pain - pt in NSR and HR ~  
- no chest pain UTI (POA):  
- urine cloudy, strong smell - + leuks in urine and >WBC. - Klebsiella in urine, susceptible to currently ordered ceftriaxone Hx Coronary artery disease and Hx MI: PTA coreg on hold due to hypotension Hx Acute RLE DVT in 2017:  
- initially in 9/2017, it was decided NOT to place pt on [facility] due to hx of small subdural bleed. She was not discharged at that time with blood thinners 
- in 10/2017, she came back to ED with worsening leg swelling and it was decided to place her on lovenox. - in reviewing her most recent med list from [facility], she is no longer on this Code status: DNR 
DVT prophylaxis: Lovenox Care Plan discussed with: patient, nurse Disposition: SNF Hospital Problems  Date Reviewed: 11/1/2018 Codes Class Noted POA Pneumonia ICD-10-CM: J18.9 ICD-9-CM: 671  10/31/2018 Unknown  * (Principal) Other fracture of right femur, initial encounter for closed fracture [facility]) ICD-10-CM: Q87.7H7K 
 ICD-9-CM: 821.00  10/31/2018 Unknown Review of Systems:  
Denies HA. No respiratory complatins. No chest pain, pressure or palpitations. No urinary complaints. Multiple BMs + R hip pain Vital Signs:  
 Last 24hrs VS reviewed since prior progress note. Most recent are: 
Visit Vitals /81 (BP 1 Location: Right arm, BP Patient Position: At rest) Pulse 92 Temp 97.8 °F (36.6 °C) Resp 18 Ht 5' (1.524 m) Wt 59.6 kg (131 lb 4.8 oz) SpO2 97% Breastfeeding? No  
BMI 25.64 kg/m² No intake or output data in the 24 hours ending 11/04/18 1025 Physical Examination:  
     
Constitutional:  Cooperative but irritated this am - does not want to get up OOB   
ENT:  Oral MM dry. Resp:  + wheeze in left posterior. No accessory muscle use and on RA  
CV:  Regular rhythm, no murmurs. HR ~ GI:  Soft, non distended, non tender. Normoactive bowel sounds + multiple BMs Musculoskeletal:  R hip and thigh edema, warm, 2+ pulses throughout. Neurologic:  AAOx3. Anxious. Skin: Dressing to R hip is intact, small shadow of drainage on dressing Data Review:  
Review and/or order of clinical lab test 
Review and/or order of tests in the radiology section of CPT Review and/or order of tests in the medicine section of CPT Labs:  
 
Recent Labs 11/04/18 0235 11/03/18 
7445 HGB 9.3* 10.4* HCT 30.6* 33.6* Recent Labs 11/04/18 0235 11/03/18 2134 11/02/18 
0600  142 142  
K 3.7 4.7 3.9 * 116* 113* CO2 17* 18* 19*  
BUN 25* 26* 17  
CREA 1.06* 1.10* 0.98  
* 115* 110* CA 7.1* 7.1* 7.4* MG 1.4*  --   --   
PHOS 2.7 2.6  --   
URICA 4.7  --   --   
 
Recent Labs 11/04/18 0235 11/03/18 2134 ALB 1.9* 1.9* No results for input(s): INR, PTP, APTT in the last 72 hours. No lab exists for component: INREXT, INREXT No results for input(s): FE, TIBC, PSAT, FERR in the last 72 hours. Lab Results Component Value Date/Time Folate 5.8 03/08/2017 06:00 PM  
  
No results for input(s): PH, PCO2, PO2 in the last 72 hours. No results for input(s): CPK, CKNDX, TROIQ in the last 72 hours. No lab exists for component: CPKMB Lab Results Component Value Date/Time Cholesterol, total 182 03/08/2017 06:00 PM  
 HDL Cholesterol 64 03/08/2017 06:00 PM  
 LDL, calculated 91.2 03/08/2017 06:00 PM  
 Triglyceride 134 03/08/2017 06:00 PM  
 CHOL/HDL Ratio 2.8 03/08/2017 06:00 PM  
 
Lab Results Component Value Date/Time Glucose (POC) 94 11/20/2016 07:00 AM  
 
Lab Results Component Value Date/Time Color YELLOW/STRAW 10/31/2018 04:55 AM  
 Appearance CLOUDY (A) 10/31/2018 04:55 AM  
 Specific gravity 1.014 10/31/2018 04:55 AM  
 pH (UA) 5.5 10/31/2018 04:55 AM  
 Protein TRACE (A) 10/31/2018 04:55 AM  
 Glucose NEGATIVE  10/31/2018 04:55 AM  
 Ketone NEGATIVE  10/31/2018 04:55 AM  
 Bilirubin NEGATIVE  10/31/2018 04:55 AM  
 Urobilinogen 0.2 10/31/2018 04:55 AM  
 Nitrites NEGATIVE  10/31/2018 04:55 AM  
 Leukocyte Esterase LARGE (A) 10/31/2018 04:55 AM  
 Epithelial cells FEW 10/31/2018 04:55 AM  
 Bacteria NEGATIVE  10/31/2018 04:55 AM  
 WBC >100 (H) 10/31/2018 04:55 AM  
 RBC 0-5 10/31/2018 04:55 AM  
 
Medications Reviewed:  
 
Current Facility-Administered Medications Medication Dose Route Frequency  magnesium sulfate 2 g/50 ml IVPB (premix or compounded)  2 g IntraVENous ONCE  
 albuterol-ipratropium (DUO-NEB) 2.5 MG-0.5 MG/3 ML  3 mL Nebulization Q6H RT  
 thiamine HCL (B-1) tablet 100 mg  100 mg Oral DAILY  folic acid (FOLVITE) tablet 1 mg  1 mg Oral DAILY  0.9% sodium chloride infusion  50 mL/hr IntraVENous CONTINUOUS  
 traMADol (ULTRAM) tablet 50 mg  50 mg Oral Q6H PRN  
 acetaminophen (TYLENOL) tablet 650 mg  650 mg Oral Q6H  
 enoxaparin (LOVENOX) injection 30 mg  30 mg SubCUTAneous Q24H  
 mirtazapine (REMERON) tablet 30 mg  30 mg Oral QHS  latanoprost (XALATAN) 0.005 % ophthalmic solution 1 Drop  1 Drop Both Eyes QHS  sodium chloride (NS) flush 5-10 mL  5-10 mL IntraVENous Q8H  
 sodium chloride (NS) flush 5-10 mL  5-10 mL IntraVENous PRN  
 cefTRIAXone (ROCEPHIN) 1 g in 0.9% sodium chloride (MBP/ADV) 50 mL  1 g IntraVENous Q24H  hydrALAZINE (APRESOLINE) 20 mg/mL injection 10 mg  10 mg IntraVENous Q6H PRN  
 ondansetron (ZOFRAN) injection 4 mg  4 mg IntraVENous Q8H PRN  
______________________________________________________________________ EXPECTED LENGTH OF STAY: 4d 21h ACTUAL LENGTH OF STAY:          4 Venkat Porter NP

## 2018-11-04 NOTE — PROGRESS NOTES
Problem: Mobility Impaired (Adult and Pediatric) Goal: *Acute Goals and Plan of Care (Insert Text) Physical Therapy Goals Initiated 11/2/2018 1. Patient will move from supine to sit and sit to supine in bed with minimal assistance within 4 days. 2. Patient will perform sit to stand with minimal assistance/contact guard assist within 4 days. 3. Patient will ambulate with minimal assistance/contact guard assist for 50 feet with the least restrictive device within 4 days. 4. Patient will perform THR home exercise program per protocol with supervision/set-up within 4 days. physical Therapy TREATMENT Patient: Alexandria Escoto (80 y.o. female) Date: 11/4/2018 Diagnosis: Pneumonia Other fracture of right femur, initial encounter for closed fracture [facility]) Other fracture of right femur, initial encounter for closed fracture ([facility] [address] Procedure(s) (LRB): 
RIGHT HIP HEMIARTHROPLASTY (Right) 3 Days Post-Op Precautions: Total hip, DNR, WBAT, Fall Chart, physical therapy assessment, plan of care and goals were reviewed. ASSESSMENT: 
Patient making slow progress toward goals. Patient overall needing max encouragement to participate with PT. Agreeable to sit EOB only with max encouragement. Requires maxA x 2 for supine to sit. Reports feeling very SOB and encouraged coughing in sitting with some mild relief per patient. SpO2 98% on room air during session. Patient returned to supine with maxA x 2. Recommend SNF rehab at [place]. Progression toward go 
[x]      Improving appropriately and progressing toward goals 
[]      Improving slowly and progressing toward goals 
[]      Not making progress toward goals and plan of care will be adjusted PLAN: 
Patient continues to benefit from skilled intervention to address the above impairments. Continue treatment per established plan of care. Discharge Recommendations:  Chad Caballero Further Equipment Recommendations for Discharge:  TBD SUBJECTIVE:  
 Patient stated I am so SOB. I am not going to stand so don't even try to make me.  OBJECTIVE DATA SUMMARY:  
Critical Behavior: 
Neurologic State: Alert, Appropriate for age Orientation Level: Oriented X4 Cognition: Appropriate decision making, Appropriate for age attention/concentration, Appropriate safety awareness, Follows commands Safety/Judgement: Insight into deficits Functional Mobility Training: 
Bed Mobility: 
  
Supine to Sit: Maximum assistance;Assist x2 Sit to Supine: Maximum assistance;Assist x2 Scooting: Maximum assistance Transfers: 
Sit to Stand: (not tested) Balance: 
Sitting: Intact; With support Standing: (patient refused to stand)Ambulation/Gait Training: 
  
Pain: 
Pain Scale 1: Numeric (0 - 10) Pain Intensity 1: 0 Pain Location 1: Leg 
Pain Orientation 1: Right Pain Description 1: Aching Pain Intervention(s) 1: Medication (see MAR) Activity Tolerance:  
SpO98% on room air during session Please refer to the flowsheet for vital signs taken during this treatment. After treatment:  
[] Patient left in no apparent distress sitting up in chair 
[x] Patient left in no apparent distress in bed 
[x] Call bell left within reach [x] Nursing notified 
[] Caregiver present 
[] Bed alarm activated COMMUNICATION/COLLABORATION:  
The patients plan of care was discussed with: Physical Therapist, Occupational Therapist and Registered Nurse Pastor Hamzah PT, DPT Time Calculation: 8 mins

## 2018-11-04 NOTE — PROGRESS NOTES
Bedside and Verbal shift change report given to JIMY Leon (oncoming nurse) by Shahid Dixon (offgoing nurse). Report included the following information SBAR, Kardex, ED Summary, Procedure Summary, Intake/Output, MAR and Recent Results.

## 2018-11-05 LAB
ANION GAP SERPL CALC-SCNC: 12 MMOL/L (ref 5–15)
BACTERIA SPEC CULT: NORMAL
BUN SERPL-MCNC: 21 MG/DL (ref 6–20)
BUN/CREAT SERPL: 22 (ref 12–20)
CALCIUM SERPL-MCNC: 7.9 MG/DL (ref 8.5–10.1)
CHLORIDE SERPL-SCNC: 113 MMOL/L (ref 97–108)
CO2 SERPL-SCNC: 20 MMOL/L (ref 21–32)
CREAT SERPL-MCNC: 0.97 MG/DL (ref 0.55–1.02)
GLUCOSE SERPL-MCNC: 100 MG/DL (ref 65–100)
HCT VFR BLD AUTO: 29.4 % (ref 35–47)
HGB BLD-MCNC: 9.4 G/DL (ref 11.5–16)
MAGNESIUM SERPL-MCNC: 1.8 MG/DL (ref 1.6–2.4)
POTASSIUM SERPL-SCNC: 3.2 MMOL/L (ref 3.5–5.1)
SERVICE CMNT-IMP: NORMAL
SODIUM SERPL-SCNC: 145 MMOL/L (ref 136–145)
URATE SERPL-MCNC: 5.4 MG/DL (ref 2.6–6)

## 2018-11-05 PROCEDURE — 74011250636 HC RX REV CODE- 250/636: Performed by: NURSE PRACTITIONER

## 2018-11-05 PROCEDURE — 74011000258 HC RX REV CODE- 258: Performed by: INTERNAL MEDICINE

## 2018-11-05 PROCEDURE — 74011000250 HC RX REV CODE- 250: Performed by: NURSE PRACTITIONER

## 2018-11-05 PROCEDURE — 36415 COLL VENOUS BLD VENIPUNCTURE: CPT | Performed by: INTERNAL MEDICINE

## 2018-11-05 PROCEDURE — 97530 THERAPEUTIC ACTIVITIES: CPT | Performed by: OCCUPATIONAL THERAPIST

## 2018-11-05 PROCEDURE — 94640 AIRWAY INHALATION TREATMENT: CPT

## 2018-11-05 PROCEDURE — 74011250636 HC RX REV CODE- 250/636: Performed by: INTERNAL MEDICINE

## 2018-11-05 PROCEDURE — 97535 SELF CARE MNGMENT TRAINING: CPT | Performed by: OCCUPATIONAL THERAPIST

## 2018-11-05 PROCEDURE — 85018 HEMOGLOBIN: CPT | Performed by: NURSE PRACTITIONER

## 2018-11-05 PROCEDURE — 84550 ASSAY OF BLOOD/URIC ACID: CPT | Performed by: INTERNAL MEDICINE

## 2018-11-05 PROCEDURE — 74011250637 HC RX REV CODE- 250/637: Performed by: NURSE PRACTITIONER

## 2018-11-05 PROCEDURE — 65270000029 HC RM PRIVATE

## 2018-11-05 PROCEDURE — 80048 BASIC METABOLIC PNL TOTAL CA: CPT | Performed by: NURSE PRACTITIONER

## 2018-11-05 PROCEDURE — 97530 THERAPEUTIC ACTIVITIES: CPT | Performed by: PHYSICAL THERAPIST

## 2018-11-05 PROCEDURE — 83735 ASSAY OF MAGNESIUM: CPT | Performed by: NURSE PRACTITIONER

## 2018-11-05 RX ORDER — CARVEDILOL 3.12 MG/1
3.12 TABLET ORAL 2 TIMES DAILY WITH MEALS
Status: DISCONTINUED | OUTPATIENT
Start: 2018-11-05 | End: 2018-11-07 | Stop reason: HOSPADM

## 2018-11-05 RX ORDER — POTASSIUM CHLORIDE 750 MG/1
40 TABLET, FILM COATED, EXTENDED RELEASE ORAL
Status: COMPLETED | OUTPATIENT
Start: 2018-11-05 | End: 2018-11-05

## 2018-11-05 RX ORDER — IPRATROPIUM BROMIDE AND ALBUTEROL SULFATE 2.5; .5 MG/3ML; MG/3ML
3 SOLUTION RESPIRATORY (INHALATION)
Status: DISCONTINUED | OUTPATIENT
Start: 2018-11-05 | End: 2018-11-07 | Stop reason: HOSPADM

## 2018-11-05 RX ORDER — MAGNESIUM SULFATE HEPTAHYDRATE 40 MG/ML
2 INJECTION, SOLUTION INTRAVENOUS ONCE
Status: COMPLETED | OUTPATIENT
Start: 2018-11-05 | End: 2018-11-05

## 2018-11-05 RX ADMIN — LATANOPROST 1 DROP: 50 SOLUTION OPHTHALMIC at 22:15

## 2018-11-05 RX ADMIN — TRAMADOL HYDROCHLORIDE 50 MG: 50 TABLET, FILM COATED ORAL at 16:20

## 2018-11-05 RX ADMIN — ACETAMINOPHEN 650 MG: 325 TABLET ORAL at 20:45

## 2018-11-05 RX ADMIN — POTASSIUM CHLORIDE 40 MEQ: 750 TABLET, FILM COATED, EXTENDED RELEASE ORAL at 09:28

## 2018-11-05 RX ADMIN — TRAMADOL HYDROCHLORIDE 50 MG: 50 TABLET, FILM COATED ORAL at 09:28

## 2018-11-05 RX ADMIN — FOLIC ACID 1 MG: 1 TABLET ORAL at 09:23

## 2018-11-05 RX ADMIN — ENOXAPARIN SODIUM 30 MG: 30 INJECTION SUBCUTANEOUS at 09:22

## 2018-11-05 RX ADMIN — Medication 10 ML: at 07:33

## 2018-11-05 RX ADMIN — CEFTRIAXONE 1 G: 1 INJECTION, POWDER, FOR SOLUTION INTRAMUSCULAR; INTRAVENOUS at 07:28

## 2018-11-05 RX ADMIN — Medication 100 MG: at 09:28

## 2018-11-05 RX ADMIN — MAGNESIUM SULFATE HEPTAHYDRATE 2 G: 40 INJECTION, SOLUTION INTRAVENOUS at 09:29

## 2018-11-05 RX ADMIN — TRAMADOL HYDROCHLORIDE 50 MG: 50 TABLET, FILM COATED ORAL at 22:15

## 2018-11-05 RX ADMIN — MIRTAZAPINE 30 MG: 15 TABLET, FILM COATED ORAL at 22:15

## 2018-11-05 RX ADMIN — Medication 10 ML: at 22:15

## 2018-11-05 RX ADMIN — CARVEDILOL 3.12 MG: 3.12 TABLET, FILM COATED ORAL at 16:20

## 2018-11-05 RX ADMIN — CARVEDILOL 3.12 MG: 3.12 TABLET, FILM COATED ORAL at 09:22

## 2018-11-05 RX ADMIN — ACETAMINOPHEN 650 MG: 325 TABLET ORAL at 02:39

## 2018-11-05 RX ADMIN — IPRATROPIUM BROMIDE AND ALBUTEROL SULFATE 3 ML: .5; 3 SOLUTION RESPIRATORY (INHALATION) at 08:20

## 2018-11-05 NOTE — PROGRESS NOTES
Problem: Mobility Impaired (Adult and Pediatric) Goal: *Acute Goals and Plan of Care (Insert Text) Physical Therapy Goals Initiated 11/2/2018 1. Patient will move from supine to sit and sit to supine in bed with minimal assistance within 4 days. 2. Patient will perform sit to stand with minimal assistance/contact guard assist within 4 days. 3. Patient will ambulate with minimal assistance/contact guard assist for 50 feet with the least restrictive device within 4 days. 4. Patient will perform THR home exercise program per protocol with supervision/set-up within 4 days. physical Therapy TREATMENT Patient: Verona Kamara (80 y.o. female) Date: 11/5/2018 Diagnosis: Pneumonia Other fracture of right femur, initial encounter for closed fracture [facility]) Other [facility] Procedure(s) (LRB): 
RIGHT HIP HEMIARTHROPLASTY (Right) 4 Days Post-Op Precautions: Total hip, DNR, WBAT, Fall Chart, physical therapy assessment, plan of care and goals were reviewed. ASSESSMENT: 
Patient making steady progress toward goals. Patient reports improved pain control this session and is agreeable to PT/OT to mobilize OOB. Currently needing maxA x 1 for supine to sit. Scoots to EOB with maxA x 1. Sit to stand with modA x 1 for 3 trials and is able to stand approx 2-3 mins each trial.  Patient incontinent of bowel and bladder in standing and needs totalA for clean up. Returned to supine with maxA x 2 and scoots with maxA x 2 to [facility]. Continue to recommend SNF level rehab to progress patient to more independent level of function. Progression toward goals: 
[]      Improving appropriately and progressing toward goals [x]      Improving slowly and progressing toward goals 
[]      Not making progress toward goals and plan of care will be adjusted PLAN: 
Patient continues to benefit from skilled intervention to address the above impairments. Continue treatment per established plan of care. Discharge Recommendations:  Chad Caballero Further Equipment Recommendations for Discharge:  TBD SUBJECTIVE:  
Patient stated I'm crapping!  OBJECTIVE DATA SUMMARY:  
Critical Behavior: 
Neurologic State: Alert Orientation Level: Oriented X4 Cognition: Follows commands Safety/Judgement: Awareness of environment, Fall prevention, Insight into deficits Functional Mobility Training: 
Bed Mobility: 
  
Supine to Sit: Maximum assistance; Additional time;Assist x1 Sit to Supine: Maximum assistance; Additional time;Assist x2 Scooting: Maximum assistance; Additional time;Assist x1 Transfers: 
Sit to Stand: Moderate assistance; Additional time;Assist x1(with RW) Balance: 
Sitting: Intact Standing: Impaired; With support(RW) 
Standing - Static: Poor Standing - Dynamic : PoorAmbulation/Gait Training: 
  
Pain: 
Pain Scale 1: Numeric (0 - 10) Pain Intensity 1: 0 Pain Location 1: Hip Pain Orientation 1: Right Activity Tolerance: VSS Please refer to the flowsheet for vital signs taken during this treatment. After treatment:  
[] Patient left in no apparent distress sitting up in chair 
[x] Patient left in no apparent distress in bed 
[x] Call bell left within reach [x] Nursing notified 
[] Caregiver present 
[] Bed alarm activated COMMUNICATION/COLLABORATION:  
The patients plan of care was discussed with: Physical Therapist, Occupational Therapist and Registered Nurse Aisha Guerrero PT, DPT Time Calculation: 30 mins

## 2018-11-05 NOTE — PROGRESS NOTES
[facility] 
 [address] [facility], [address] [facility], [facility] Phone: [phone]   [phone]   
  
Nephrology Progress Note Spencer Bowels     10/17/1927     [id number] Date of Admission : 10/31/2018 
11/05/18 CC: Follow up for YASH Assessment and Plan Mild YASH Oliguria PNA Hypokalemia Hyperchloremic acidosis Post op anemia  
R hip arthroplasty Plan : 
- No need for further IVF  
- PRN lasix for edema Interval History: 
Seen and examined Reports R hip pain Denies any N/V/CP./SOB/ ABDO PAIN Review of Systems: Pertinent items are noted in HPI. Current Medications:  
Current Facility-Administered Medications Medication Dose Route Frequency  carvedilol (COREG) tablet 3.125 mg  3.125 mg Oral BID WITH MEALS  
 albuterol-ipratropium (DUO-NEB) 2.5 MG-0.5 MG/3 ML  3 mL Nebulization Q6H PRN  thiamine HCL (B-1) tablet 100 mg  100 mg Oral DAILY  folic acid (FOLVITE) tablet 1 mg  1 mg Oral DAILY  traMADol (ULTRAM) tablet 50 mg  50 mg Oral Q6H PRN  
 acetaminophen (TYLENOL) tablet 650 mg  650 mg Oral Q6H  
 enoxaparin (LOVENOX) injection 30 mg  30 mg SubCUTAneous Q24H  
 mirtazapine (REMERON) tablet 30 mg  30 mg Oral QHS  latanoprost (XALATAN) 0.005 % ophthalmic solution 1 Drop  1 Drop Both Eyes QHS  sodium chloride (NS) flush 5-10 mL  5-10 mL IntraVENous Q8H  
 sodium chloride (NS) flush 5-10 mL  5-10 mL IntraVENous PRN  
 cefTRIAXone (ROCEPHIN) 1 g in 0.9% sodium chloride (MBP/ADV) 50 mL  1 g IntraVENous Q24H  hydrALAZINE (APRESOLINE) 20 mg/mL injection 10 mg  10 mg IntraVENous Q6H PRN  
 ondansetron (ZOFRAN) injection 4 mg  4 mg IntraVENous Q8H PRN Allergies Allergen Reactions  Codeine Unknown (comments)  Prednisone Unknown (comments) Objective: 
Vitals:   
Vitals:  
 11/04/18 2135 11/05/18 0236 11/05/18 0820 11/05/18 0830 BP: 132/82 150/77  140/84 Pulse: 99 93  92 Resp: 16 16 16 Temp: 98.3 °F (36.8 °C) 98.1 °F (36.7 °C)  98.1 °F (36.7 °C) SpO2: 97% 95% 100% 100% Weight:      
Height:      
 
Intake and Output: 
11/05 0701 - 11/05 1900 In: 240 [P.O.:240] Out: - No intake/output data recorded. Physical Examination: 
General:Alert, No distress, Neck: Supple,no mass palpable Lungs : B rhonchi and sl wheezing CVS: RRR, S1 S2 normal, No rub, trace LE edema Abdomen: Soft, Non tender, No hepatosplenomegaly, bowel sounds present Extremities: No cyanosis, No clubbing Neurologic: non focal, AAO x 3 Psych: normal affect 
 
 
 
[]    High complexity decision making was performed 
[]    Patient is at high-risk of decompensation with multiple organ involvement Lab Data Personally Reviewed: I have reviewed all the pertinent labs, microbiology data and radiology studies during assessment. Recent Labs 11/05/18 
0247 11/04/18 
0235 11/03/18 
2134  143 142  
K 3.2* 3.7 4.7 * 114* 116* CO2 20* 17* 18*  115* 115* BUN 21* 25* 26* CREA 0.97 1.06* 1.10* CA 7.9* 7.1* 7.1*  
MG 1.8 1.4*  --   
PHOS  --  2.7 2.6 ALB  --  1.9* 1.9* Recent Labs 11/05/18 
[id number] 11/04/18 
0235 11/03/18 
1632 HGB 9.4* 9.3* 10.4* HCT 29.4* 30.6* 33.6* No results found for: SDES Lab Results Component Value Date/Time Culture result: KLEBSIELLA PNEUMONIAE (A) 10/31/2018 12:11 PM  
 Culture result: NO GROWTH 5 DAYS 10/31/2018 03:55 AM  
 
Recent Results (from the past 24 hour(s)) SODIUM, UR, RANDOM Collection Time: 11/04/18  4:14 PM  
Result Value Ref Range Sodium,urine random 133 MMOL/L  
CREATININE, UR, RANDOM Collection Time: 11/04/18  4:14 PM  
Result Value Ref Range Creatinine, urine <13.00 mg/dL URIC ACID Collection Time: 11/05/18  2:47 AM  
Result Value Ref Range Uric acid 5.4 2.6 - 6.0 MG/DL  
METABOLIC PANEL, BASIC Collection Time: 11/05/18  2:47 AM  
Result Value Ref Range  Sodium 145 136 - 145 mmol/L  
 Potassium 3.2 (L) 3.5 - 5.1 mmol/L Chloride 113 (H) 97 - 108 mmol/L  
 CO2 20 (L) 21 - 32 mmol/L Anion gap 12 5 - 15 mmol/L Glucose 100 65 - 100 mg/dL BUN 21 (H) 6 - 20 MG/DL Creatinine 0.97 0.55 - 1.02 MG/DL  
 BUN/Creatinine ratio 22 (H) 12 - 20 GFR est AA >60 >60 ml/min/1.73m2 GFR est non-AA 54 (L) >60 ml/min/1.73m2 Calcium 7.9 (L) 8.5 - 10.1 MG/DL MAGNESIUM Collection Time: 11/05/18  2:47 AM  
Result Value Ref Range Magnesium 1.8 1.6 - 2.4 mg/dL HGB & HCT Collection Time: 11/05/18  2:47 AM  
Result Value Ref Range HGB 9.4 (L) 11.5 - 16.0 g/dL HCT 29.4 (L) 35.0 - 47.0 % Total time spent with patient:  xxx   min. Care Plan discussed with: 
Patient Family RN Consulting Physician [address] [place] [address]     
 
I have reviewed the flowsheets. Chart and Pertinent Notes have been reviewed. No change in PMH ,family and social history from Consult note.  
 
 
Paul Cortes MD

## 2018-11-05 NOTE — PROGRESS NOTES
Hospitalist Progress Note Anne Quintero NP Answering service: [phone] OR [phone] from in house phone Date of Service:  2018 NAME:  James Dobson :  10/17/1927 MRN:  [id number] Admission Summary: Pt presented to the ED from [facility] assisted living facility with a chief complaint of pain in R hip after a fall. She reportedly was trying to walk to the bathroom using her walker but tripped and fell, landing onto her right side having subsequent pain to the right hip, buttock, and foot. She was unable to bear weight with a pain level of 10/10, aggravated with any movement, radiating from right hip to right thigh, without specific alleviating factors. Denied hitting her head or any loss of consciousness. Pmhx: coronary artery disease, non-ST elevation myocardial infarction, hypertension, hyperlipidemia, glaucoma, anxiety, chest pain, right lower extremity edema, arthritis Interval history / Subjective: \"I'm not taking lasix anymore. \" SOB is with repeated exertion not at rest, reports she has this at home as well. Loose stools have resolved. Pending SNF Acceptance POD #4 Right hip hemiarthroplasty, pain controlled Voiding without difficulty Assessment & Plan:  
 
Shortness of breath: Improved - IVF d/c'ed 
- duo neb's prn, IS 
-  CXR: Left basilar airspace disease with left effusion. Interval resolution of right basilar consolidation Hypokalemia - repleted Closed R Femur Fracture (POA): - R hip 2 view x-ray shows subcapital R proximal femur fx.  
-  right hip hemiarthroplasty - pain management: scheduled tylenol and may have tramadol prn 
- stopped gabriel-colace 11/3 as she has had multiple stools - DVT ppx as per Ortho post surgery - started lovenox  but Ortho will need to decide duration of therapy 
- continue with PT/OT, pt will need SNF Decreased Urine output: - appears Night MD 11/3 ordered Nephrology consult 
- difficult to obtain I/Os due to incontinence Hypotension with hx of  Hypertension: - Reviewed PTA meds and she is on a lower dose of coreg PTA than what is currently ordered. Updated PTA med list with most recent MAR from [facility]. - coreg has been on hold due to hypotension 
- has been bolused ~ 750 mL fluid and on 125 mL/hr.  
- 11/5 hypotension resolved, added back low dose coreg Pneumonia (POA): - Chest Xray: right basilar consolidation  
- on zithromax and ceftriaxone 
- no symptoms PTA. No fever PTA, no cough, no leukocytosis  
- afebrile >24 hrs 
- duonebs added 11/4 
- 11/5 will finish abx on 11/6 Tachycardia: Resolved 
- most likely related to pain - pt in NSR and HR ~  
- no chest pain UTI (POA):  
- urine cloudy, strong smell - + leuks in urine and >WBC. - Klebsiella in urine, susceptible to currently ordered ceftriaxone Hx Coronary artery disease and Hx MI: c/w coreg Hx Acute RLE DVT in 2017:  
- initially in 9/2017, it was decided NOT to place pt on [facility] due to hx of small subdural bleed. She was not discharged at that time with blood thinners 
- in 10/2017, she came back to ED with worsening leg swelling and it was decided to place her on lovenox. - in reviewing her most recent med list from [facility], she is no longer on this Code status: DNR 
DVT prophylaxis: Lovenox Care Plan discussed with: patient, nurse Disposition: SNF Hospital Problems  Date Reviewed: 11/1/2018 Codes Class Noted POA Pneumonia ICD-10-CM: J18.9 ICD-9-CM: 449  10/31/2018 Unknown * (Principal) Other fracture of right femur, initial encounter for closed fracture [facility]) ICD-10-CM: H79.6E0D 
ICD-9-CM: 821.00  10/31/2018 Unknown Review of Systems:  
Denies HA. No respiratory complatins. No chest pain, pressure or palpitations. No urinary complaints.  + R hip pain Vital Signs: Last 24hrs VS reviewed since prior progress note. Most recent are: 
Visit Vitals /84 (BP 1 Location: Left arm, BP Patient Position: At rest) Pulse 92 Temp 98.1 °F (36.7 °C) Resp 16 Ht 5' (1.524 m) Wt 59.6 kg (131 lb 4.8 oz) SpO2 100% Breastfeeding? No  
BMI 25.64 kg/m² Intake/Output Summary (Last 24 hours) at 11/5/2018 1345 Last data filed at 11/5/2018 6399 Gross per 24 hour Intake 240 ml Output  Net 240 ml Physical Examination:  
     
Constitutional:  Cooperative, no acute distress   
ENT:  Oral MM dry. Resp:  Chest is CTA. No accessory muscle use and on RA  
CV:  Regular rhythm, no murmurs. GI:  Soft, non distended, non tender. Normoactive bowel sounds Musculoskeletal:  R hip and thigh edema, warm, 2+ pulses throughout. Neurologic:  AAOx3. Anxious. Skin: Dressing to R hip I s c/d/i Data Review:  
Review and/or order of clinical lab test 
Review and/or order of tests in the radiology section of CPT Review and/or order of tests in the medicine section of CPT Labs:  
 
Recent Labs 11/05/18 
[id number] 11/04/18 0235 HGB 9.4* 9.3* HCT 29.4* 30.6* Recent Labs 11/05/18 
0247 11/04/18 0235 11/03/18 
2134  143 142  
K 3.2* 3.7 4.7 * 114* 116* CO2 20* 17* 18*  
BUN 21* 25* 26* CREA 0.97 1.06* 1.10*  115* 115* CA 7.9* 7.1* 7.1*  
MG 1.8 1.4*  --   
PHOS  --  2.7 2.6 URICA 5.4 4.7  --   
 
Recent Labs 11/04/18 0235 11/03/18 
2134 ALB 1.9* 1.9* No results for input(s): INR, PTP, APTT in the last 72 hours. No lab exists for component: INREXT, INREXT No results for input(s): FE, TIBC, PSAT, FERR in the last 72 hours. Lab Results Component Value Date/Time Folate 5.8 03/08/2017 06:00 PM  
  
No results for input(s): PH, PCO2, PO2 in the last 72 hours. No results for input(s): CPK, CKNDX, TROIQ in the last 72 hours. No lab exists for component: CPKMB Lab Results Component Value Date/Time Cholesterol, total 182 03/08/2017 06:00 PM  
 HDL Cholesterol 64 03/08/2017 06:00 PM  
 LDL, calculated 91.2 03/08/2017 06:00 PM  
 Triglyceride 134 03/08/2017 06:00 PM  
 CHOL/HDL Ratio 2.8 03/08/2017 06:00 PM  
 
Lab Results Component Value Date/Time Glucose (POC) 94 11/20/2016 07:00 AM  
 
Lab Results Component Value Date/Time Color YELLOW/STRAW 10/31/2018 04:55 AM  
 Appearance CLOUDY (A) 10/31/2018 04:55 AM  
 Specific gravity 1.014 10/31/2018 04:55 AM  
 pH (UA) 5.5 10/31/2018 04:55 AM  
 Protein TRACE (A) 10/31/2018 04:55 AM  
 Glucose NEGATIVE  10/31/2018 04:55 AM  
 Ketone NEGATIVE  10/31/2018 04:55 AM  
 Bilirubin NEGATIVE  10/31/2018 04:55 AM  
 Urobilinogen 0.2 10/31/2018 04:55 AM  
 Nitrites NEGATIVE  10/31/2018 04:55 AM  
 Leukocyte Esterase LARGE (A) 10/31/2018 04:55 AM  
 Epithelial cells FEW 10/31/2018 04:55 AM  
 Bacteria NEGATIVE  10/31/2018 04:55 AM  
 WBC >100 (H) 10/31/2018 04:55 AM  
 RBC 0-5 10/31/2018 04:55 AM  
 
Medications Reviewed:  
 
Current Facility-Administered Medications Medication Dose Route Frequency  carvedilol (COREG) tablet 3.125 mg  3.125 mg Oral BID WITH MEALS  
 albuterol-ipratropium (DUO-NEB) 2.5 MG-0.5 MG/3 ML  3 mL Nebulization Q6H PRN  thiamine HCL (B-1) tablet 100 mg  100 mg Oral DAILY  folic acid (FOLVITE) tablet 1 mg  1 mg Oral DAILY  traMADol (ULTRAM) tablet 50 mg  50 mg Oral Q6H PRN  
 acetaminophen (TYLENOL) tablet 650 mg  650 mg Oral Q6H  
 enoxaparin (LOVENOX) injection 30 mg  30 mg SubCUTAneous Q24H  
 mirtazapine (REMERON) tablet 30 mg  30 mg Oral QHS  latanoprost (XALATAN) 0.005 % ophthalmic solution 1 Drop  1 Drop Both Eyes QHS  sodium chloride (NS) flush 5-10 mL  5-10 mL IntraVENous Q8H  
 sodium chloride (NS) flush 5-10 mL  5-10 mL IntraVENous PRN  
 cefTRIAXone (ROCEPHIN) 1 g in 0.9% sodium chloride (MBP/ADV) 50 mL  1 g IntraVENous Q24H  hydrALAZINE (APRESOLINE) 20 mg/mL injection 10 mg  10 mg IntraVENous Q6H PRN  
  ondansetron (ZOFRAN) injection 4 mg  4 mg IntraVENous Q8H PRN  
______________________________________________________________________ EXPECTED LENGTH OF STAY: 4d 21h ACTUAL LENGTH OF STAY:          5 Chau Dash NP

## 2018-11-05 NOTE — PROGRESS NOTES
Chart reviewed. Referral sent to [facility] and [facility] this past Friday. No response. CRM LVM for John Medel with [facility] [phone] and José Miguel Apodaca with [facility] (re-sent referral to [facility]) [phone]. CRM spoke with the patient's daughter Dulce Koch and gave update [phone]. Will follow. KJT 
 
CRM received a VM form Sarah Gregory with [facility]. They do not have a bed available today and do not know if they will have one this week. CRM updated the daughter. Waiting to hear from [facility]. KJT 
 
CRM received an [employer] message from [facility]. They have accepted for SNF. CRM will updated the daughter Dulce Koch [phone] via VM and will follow for possible discharge tomorrow. The patient will need [facility] transport.  JAIRO

## 2018-11-05 NOTE — PROGRESS NOTES
Problem: Self Care Deficits Care Plan (Adult) Goal: *Acute Goals and Plan of Care (Insert Text) Occupational Therapy Goals Initiated 11/2/2018 1. Patient will perform lower body ADLs with AE supervision/set-up within 4 day(s). 2.  Patient will perform upper body ADLs standing 5 mins without fatigue or LOB with supervision/set-up within 4 day(s). 3.  Patient will perform toilet transfer with supervision/set-up within 4 day(s). 4.  Patient will perform all aspects of toileting with supervision/set-up within 4 day(s). 5.  Patient will participate in upper extremity therapeutic exercise/activities with supervision/set-up for 10 minutes within 4 day(s). 6.  Patient will utilize energy conservation techniques during functional activities without cues within 4 day(s). Occupational Therapy TREATMENT Patient: Lavell Rehman (80 y.o. female) Date: 11/5/2018 Diagnosis: Pneumonia Other fracture of right femur, initial encounter for closed fracture [facility]) Other fracture of right femur, initial encounter for closed fracture ([facility] [address] Procedure(s) (LRB): 
RIGHT HIP HEMIARTHROPLASTY (Right) 4 Days Post-Op Precautions: Total hip, DNR, WBAT, Fall Chart, occupational therapy assessment, plan of care, and goals were reviewed. ASSESSMENT: 
Pt is making slow progress towards goals. She required max education to recognizer her abilities vs impairments and max encouragement for active participation. During session pt was incontinent of urine and bowel x4 requiring total A for toileting and bed linen management, max A for bed mobility and mod A to come to stand with RW. Recommend SNF at discharge. Progression toward goals: 
[]          Improving appropriately and progressing toward goals [x]          Improving slowly and progressing toward goals 
[]          Not making progress toward goals and plan of care will be adjusted PLAN: 
Patient continues to benefit from skilled intervention to address the above impairments. Continue treatment per established plan of care. Discharge Recommendations:  Chad Caballero Further Equipment Recommendations for Discharge:  TBD SUBJECTIVE:  
Patient stated I am miserable and in 10.5/10 pain.  The patient stated 1/3 hip precautions. Reviewed all 3 with patient. OBJECTIVE DATA SUMMARY:  
Cognitive/Behavioral Status: 
Neurologic State: Alert Orientation Level: Oriented X4 Cognition: Follows commands Safety/Judgement: Awareness of environment, Fall prevention, Insight into deficitsFunctional Mobility and Transfers for ADLs:Bed Mobility: 
Supine to Sit: Maximum assistance; Additional time;Assist x1 Sit to Supine: Maximum assistance; Additional time;Assist x2 Scooting: Maximum assistance; Additional time;Assist x1 Transfers: 
Sit to Stand: Moderate assistance; Additional time;Assist x1(with RW) Balance: 
Sitting: Intact Standing: Impaired; With support(RW) 
Standing - Static: Poor Standing - Dynamic : Poor ADL Intervention and Instruction: Lower Body Dressing Assistance Dressing Assistance: Total assistance(dependent) Protective Undergarmet: Total assistance (dependent) Socks: Total assistance (dependent) Slip on Shoes with Back: Total assistance (dependent) Leg Crossed Method Used: No 
Position Performed: Seated edge of bed Cues: Don;Doff;Physical assistance; Tactile cues provided;Verbal cues provided;Visual cues provided Adaptive Equipment Used: Walker(educated pt on use of AE, but she declined participation) Toileting Toileting Assistance: Total assistance(dependent)(pt was incontinent of bladder and bowel) Bladder Hygiene: Total assistance (dependent) Bowel Hygiene: Total assistance (dependent) Clothing Management: Total assistance (dependent) Cues: Tactile cues provided;Verbal cues provided;Visual cues provided;Physical assistance for pants up;Physical assistance for pants down Adaptive Equipment: David Tan Cognitive Retraining Safety/Judgement: Awareness of environment; Fall prevention; Insight into deficits Bathing: Patient instructed when bathing to not submerge wound in water, stand to shower or sponge bathe, cover wound with plastic and tape to ensure no water reaches bandage/wound without cues. Patient indicated understanding. Dressing joint: Patient instructed and demonstrated to don/doff Right LE first/last verbal and visual cues. Patient instructed and demonstrated to don all clothing while sitting prior to standing, doff all clothing to knees while standing, then sit to doff clothing off from knees to feet in order to facilitate fall prevention, pain management, and energy conservation with Supervision. Home safety: Patient instructed on home modifications and safety (raise height of ADL objects, appropriate height of chair surfaces, recliner safety, change of floor surfaces, clear pathways) to increase independence and fall prevention. Patient indicated understanding. Standing: Patient instructed and demonstrated to walk up to sink/counter top/surfaces, step into walker to increase safety of joint and fall prevention with Supervision. Patient educated about hip anatomy verbally and with pictures and educated to avoid internal rotation of Right LE. Instructed to apply concept to ADLs within the home (no reaching across body to Right side, square off while using objects, slide objects along surfaces). Patient instructed to increase amount of time standing, observe standing position during ADLs in order to increase even weight bearing through bilateral LEs in order to increase independence with ADLs. Goal to be reached 30 days post - op, per orthopedic surgeon or per PT. Patient indicated understanding.  
Tub transfer: Patient instructed regarding when it is safe to begin transfer into tub (complete stairs with PT, advance exercises with PT high enough to clear tub height). Patient instructed to use the same technique as used with stairs when entering and exiting tub (\"up with the non-surgical, down with the surgical leg\"). Patient indicated understanding. Pain: 
Pain Scale 1: Numeric (0 - 10) Pain Intensity 1: 0 Pain Location 1: Hip Pain Orientation 1: Right Activity Tolerance:  
Poor Please refer to the flowsheet for vital signs taken during this treatment. After treatment:  
[] Patient left in no apparent distress sitting up in chair 
[x] Patient left in no apparent distress in bed 
[x] Call bell left within reach [x] Nursing notified 
[] Caregiver present 
[] Bed alarm activated COMMUNICATION/COLLABORATION:  
The patients plan of care was discussed with: Physical Therapist and Registered Nurse Hollie Homans, OT Time Calculation: 47 mins

## 2018-11-05 NOTE — PROGRESS NOTES
Informed GRACY Farooq that the patient has had several bowel movements throughout the day. Received a verbal order of Imodium 4mg PO 1x dose.

## 2018-11-05 NOTE — PROGRESS NOTES
Bedside and Verbal shift change report given to Mita Love (oncoming nurse) by Mingo Jones (offgoing nurse). Report included the following information SBAR, Kardex, MAR and Recent Results.

## 2018-11-05 NOTE — PROGRESS NOTES
ADULT PROTOCOL: JET AEROSOL ASSESSMENT Patient  Verona Kamara     80 y.o.   female     11/5/2018  10:37 AM 
 
Breath Sounds Pre Procedure: Right Breath Sounds: Clear, Diminished Left Breath Sounds: Clear, Diminished Breath Sounds Post Procedure: Right Breath Sounds: Diminished Left Breath Sounds: Diminished Breathing pattern: Pre procedure Breathing Pattern: Regular Post procedure Breathing Pattern: Regular Heart Rate: Pre procedure Pulse: 109 Post procedure Pulse: 100 Resp Rate: Pre procedure Respirations: 93 
         Post procedure Respirations: 20 Peak Flow: Pre bronchodilator Post bronchodilator Suctioned: NO Sputum: Pre procedure Post procedure Oxygen: O2 Device: Nasal cannula   Flow rate (L/min) 3 Changed: NO SpO2: Pre procedure SpO2: 100 %   with oxygen Post procedure SpO2: 94 %  with oxygen Nebulizer Therapy: Current medications Aerosolized Medications: DuoNeb Changed: YES Smoking History: never Problem List:  
Patient Active Problem List  
Diagnosis Code  Hypokalemia E87.6  [facility] I21.4  Diarrhea R19.7  Hypocalcemia E83.51  
 Hypomagnesemia E83.42  
 CAD in native artery I25.10  History of non-ST elevation myocardial infarction (NSTEMI) I25.2  TIA (transient ischemic attack) G45.9  GI bleed K92.2  Numbness and tingling R20.0, R20.2  Chest pain R07.9  Pneumonia J18.9  Other fracture of right femur, [facility] J86.5A7I Respiratory Therapist: Chantel Villalpando

## 2018-11-05 NOTE — PROGRESS NOTES
ORTH FRACTURE PROGRESS NOTE 2018 Admit Date:  
10/31/2018 Post Op day: 4 Days Post-Op Subjective: Abel Bowles states that she doesn't feel well. SOB complaints yesterday and on neb treatment now. States that her pain is poorly controlled but indicates that she has really only had Tylenol. She wants to get moving but has had trouble mobilizing with PT. Complains about the Lasix she was on yesterday and does not want to take again. Tolerating diet Denies N/V 
 
PT/OT:  
Gait:    
            
 
Vital Signs:   
Patient Vitals for the past 8 hrs: 
 BP Temp Pulse Resp SpO2  
18 0830 140/84 98.1 °F (36.7 °C) 92 16 100 % 18 0820     100 % 18 0236 150/77 98.1 °F (36.7 °C) 93 16 95 % Temp (24hrs), Av.2 °F (36.8 °C), Min:98.1 °F (36.7 °C), Max:98.3 °F (36.8 °C) Pain Control:  
Pain Assessment Pain Scale 1: Numeric (0 - 10) Pain Intensity 1: 0 Pain Location 1: Hip Pain Orientation 1: Right Pain Description 1: Aching Pain Intervention(s) 1: Medication (see MAR) Meds: 
 
Current Facility-Administered Medications Medication Dose Route Frequency  potassium chloride SR (KLOR-CON 10) tablet 40 mEq  40 mEq Oral NOW  magnesium sulfate 2 g/50 ml IVPB (premix or compounded)  2 g IntraVENous ONCE  carvedilol (COREG) tablet 3.125 mg  3.125 mg Oral BID WITH MEALS  
 albuterol-ipratropium (DUO-NEB) 2.5 MG-0.5 MG/3 ML  3 mL Nebulization Q6H RT  
 thiamine HCL (B-1) tablet 100 mg  100 mg Oral DAILY  folic acid (FOLVITE) tablet 1 mg  1 mg Oral DAILY  traMADol (ULTRAM) tablet 50 mg  50 mg Oral Q6H PRN  
 acetaminophen (TYLENOL) tablet 650 mg  650 mg Oral Q6H  
 enoxaparin (LOVENOX) injection 30 mg  30 mg SubCUTAneous Q24H  
 mirtazapine (REMERON) tablet 30 mg  30 mg Oral QHS  latanoprost (XALATAN) 0.005 % ophthalmic solution 1 Drop  1 Drop Both Eyes QHS  sodium chloride (NS) flush 5-10 mL  5-10 mL IntraVENous Q8H  
  sodium chloride (NS) flush 5-10 mL  5-10 mL IntraVENous PRN  
 cefTRIAXone (ROCEPHIN) 1 g in 0.9% sodium chloride (MBP/ADV) 50 mL  1 g IntraVENous Q24H  hydrALAZINE (APRESOLINE) 20 mg/mL injection 10 mg  10 mg IntraVENous Q6H PRN  
 ondansetron (ZOFRAN) injection 4 mg  4 mg IntraVENous Q8H PRN  
 
 
LAB:   
Recent Labs 11/05/18 
0247 HCT 29.4* HGB 9.4* Transfuse PRBC's:   
 
Assessment & Physician's Comment: 
Dressing is clean, dry, and intact Neurovascular checks within normal limits Orientation:  Oriented Principal Problem: 
  Other fracture of right femur, initial encounter for closed fracture ([facility] [address] (10/31/2018) Active Problems: 
  Pneumonia (10/31/2018) Plan: 
 
Cont PT/OT - WBAT Tylenol for pain with Tramadol PRN - encourage further Tramadol use so long as it doesn't cause confusion Lovenox for DVT prophylaxis Ice packs to hip PRN Medical management per primary team 
Case Management for disposition planning - snf when bed available and medically stable Dre Posada PA-C Orthopedic Trauma [facility]

## 2018-11-05 NOTE — PROGRESS NOTES
Nurse Rk Fuentes gave bedside report to oncoming nurse Raghavendra Negron RN by [employer] and [facility]

## 2018-11-06 PROBLEM — S72.8X1A OTHER FRACTURE OF RIGHT FEMUR, INITIAL ENCOUNTER FOR CLOSED FRACTURE (HCC): Status: RESOLVED | Noted: 2018-10-31 | Resolved: 2018-11-06

## 2018-11-06 PROBLEM — J18.9 PNEUMONIA: Status: RESOLVED | Noted: 2018-10-31 | Resolved: 2018-11-06

## 2018-11-06 LAB — URATE SERPL-MCNC: 5 MG/DL (ref 2.6–6)

## 2018-11-06 PROCEDURE — 36415 COLL VENOUS BLD VENIPUNCTURE: CPT | Performed by: INTERNAL MEDICINE

## 2018-11-06 PROCEDURE — 74011250637 HC RX REV CODE- 250/637: Performed by: NURSE PRACTITIONER

## 2018-11-06 PROCEDURE — 74011250636 HC RX REV CODE- 250/636: Performed by: NURSE PRACTITIONER

## 2018-11-06 PROCEDURE — 84550 ASSAY OF BLOOD/URIC ACID: CPT | Performed by: INTERNAL MEDICINE

## 2018-11-06 PROCEDURE — 74011250637 HC RX REV CODE- 250/637: Performed by: INTERNAL MEDICINE

## 2018-11-06 PROCEDURE — 65270000029 HC RM PRIVATE

## 2018-11-06 PROCEDURE — 74011250636 HC RX REV CODE- 250/636: Performed by: INTERNAL MEDICINE

## 2018-11-06 PROCEDURE — 74011000258 HC RX REV CODE- 258: Performed by: INTERNAL MEDICINE

## 2018-11-06 RX ORDER — ACETAMINOPHEN 325 MG/1
650 TABLET ORAL EVERY 6 HOURS
Qty: 10 TAB | Refills: 0 | Status: SHIPPED
Start: 2018-11-06

## 2018-11-06 RX ORDER — FOLIC ACID 1 MG/1
1 TABLET ORAL DAILY
Qty: 10 TAB | Refills: 0 | Status: SHIPPED
Start: 2018-11-06

## 2018-11-06 RX ORDER — LOPERAMIDE HYDROCHLORIDE 2 MG/1
4 CAPSULE ORAL
Status: DISCONTINUED | OUTPATIENT
Start: 2018-11-06 | End: 2018-11-07 | Stop reason: HOSPADM

## 2018-11-06 RX ORDER — POTASSIUM CHLORIDE 750 MG/1
40 TABLET, FILM COATED, EXTENDED RELEASE ORAL DAILY
Status: DISCONTINUED | OUTPATIENT
Start: 2018-11-06 | End: 2018-11-07 | Stop reason: HOSPADM

## 2018-11-06 RX ORDER — TRAMADOL HYDROCHLORIDE 50 MG/1
50 TABLET ORAL
Qty: 10 TAB | Refills: 0 | Status: SHIPPED | OUTPATIENT
Start: 2018-11-06

## 2018-11-06 RX ORDER — ENOXAPARIN SODIUM 100 MG/ML
30 INJECTION SUBCUTANEOUS EVERY 24 HOURS
Qty: 1 SYRINGE | Refills: 0 | Status: SHIPPED
Start: 2018-11-06 | End: 2018-11-15

## 2018-11-06 RX ADMIN — CARVEDILOL 3.12 MG: 3.12 TABLET, FILM COATED ORAL at 11:27

## 2018-11-06 RX ADMIN — Medication 5 ML: at 06:00

## 2018-11-06 RX ADMIN — Medication 5 ML: at 22:00

## 2018-11-06 RX ADMIN — Medication 100 MG: at 11:28

## 2018-11-06 RX ADMIN — CEFTRIAXONE 1 G: 1 INJECTION, POWDER, FOR SOLUTION INTRAMUSCULAR; INTRAVENOUS at 07:35

## 2018-11-06 RX ADMIN — CARVEDILOL 3.12 MG: 3.12 TABLET, FILM COATED ORAL at 16:29

## 2018-11-06 RX ADMIN — ENOXAPARIN SODIUM 30 MG: 30 INJECTION SUBCUTANEOUS at 10:00

## 2018-11-06 RX ADMIN — FOLIC ACID 1 MG: 1 TABLET ORAL at 11:27

## 2018-11-06 RX ADMIN — POTASSIUM CHLORIDE 40 MEQ: 750 TABLET, FILM COATED, EXTENDED RELEASE ORAL at 11:28

## 2018-11-06 RX ADMIN — TRAMADOL HYDROCHLORIDE 50 MG: 50 TABLET, FILM COATED ORAL at 23:15

## 2018-11-06 RX ADMIN — LOPERAMIDE HYDROCHLORIDE 4 MG: 2 CAPSULE ORAL at 16:30

## 2018-11-06 RX ADMIN — ACETAMINOPHEN 650 MG: 325 TABLET ORAL at 11:27

## 2018-11-06 RX ADMIN — TRAMADOL HYDROCHLORIDE 50 MG: 50 TABLET, FILM COATED ORAL at 14:26

## 2018-11-06 RX ADMIN — ACETAMINOPHEN 650 MG: 325 TABLET ORAL at 04:23

## 2018-11-06 NOTE — PROGRESS NOTES
Physical Therapy Attempted PT treatment. Patient declines to participate at this time and wishes to rest.  Will continue to follow for mobility progression as tolerated.  
 
Олег Dawkins, PT, DPT

## 2018-11-06 NOTE — PROGRESS NOTES
ORTHO PROGRESS NOTE 2018 Admit Date:  
10/31/2018 Post Op day: 5 Days Post-Op Right hip kade-arthroplasty SUBJECTIVE: 
   
George Para denies hip pain at rest but some when standing with PT yesterday. Denies CP/SOB. C/O abd gas.  + flatus. OBJECTIVE: 
   
Physical Exam: 
Temp (24hrs), Av.8 °F (36.6 °C), Min:97.7 °F (36.5 °C), Max:98 °F (36.7 °C) Patient Vitals for the past 8 hrs: 
 BP Temp Pulse SpO2  
18 0905 (!) 147/97 97.8 °F (36.6 °C) 91 97 % General: Alert, cooperative, no distress. Respiratory: Respirations unlabored Neurological:  SILT Musculoskeletal: No hip pain with gentle PROM. Calves soft, non-tender. Dressing/Wound:  CDI. No significant erythema or swelling. LAB:   
Recent Results (from the past 24 hour(s)) URIC ACID Collection Time: 18  4:29 AM  
Result Value Ref Range Uric acid 5.0 2.6 - 6.0 MG/DL  
 
 
PT/OT:  
Gait:    
            
 
 
ASSESSMENT & PLAN: 
   
Active Problems: * No active hospital problems. * 
 
S/p right kade-arthroplasty for right femoral neck fracture. Per hospitalist, h/o DVT  Plan: DVT proph: Lovenox PT/OT WBAT Analgesics: tylenol and/or tramadol. Appears she will be discharged to facility today. F/U Dr. Richard Dominguez 2 weeks. [employer].  
 
DARCY Lentz

## 2018-11-06 NOTE — PROGRESS NOTES
Chart reviewed. Noted that [facility] has accepted this patient. Discharge order has been written. CM called daughter Juventino Culver ([phone]). Daughter is very upset stating that no one has communicated with her about the discharge plan. Daughter stated she knows nothing about the facility and needs time to take a tour and make sure this is where she wants the patient going. Daughter would like discharge to be held until tomorrow. NP is aware. CM called Hermes Lu with [facility] ([phone]), left voicemail message to find out if they have a private room. Awaiting response. 1:10 PM: Spoke with Hermes Lu with [facility]. They do have a private room and will have one available tomorrow as well. Late Entry: Daughter called CM back late yesterday. She toured the facility and would like patient to go there. Discharge planned for Wednesday. Lacey Puentes, BSW/CRM

## 2018-11-06 NOTE — PROGRESS NOTES
Occupational Therapy Attempted OT services. Patient requesting to rest at this time, deferring therapy. She reports plans to dc to SNF tomorrow morning. Continue to recommend SNF for additional rehab services. Thank you, Radha Lebron OTR/RODRICK

## 2018-11-06 NOTE — PROGRESS NOTES
Spoke with patient's daughter over the phone, Jean Casillas, she does not want her mother discharged until she has the opportunity to visit [facility] today. This is a delay in her discharge attributable to family as referrals were sent out on Friday.   Hopefully, we will be able to d/c pt tomorrow first thing in am. 
 
Elizabeth Verduzco NP

## 2018-11-06 NOTE — DISCHARGE SUMMARY
Discharge Summary       PATIENT ID: Gato Tavera  MRN: [id number]   YOB: 1927    DATE OF ADMISSION: 10/31/2018  1:48 AM    DATE OF DISCHARGE: 11/6/2018  PRIMARY CARE PROVIDER: Lali Garg MD     ATTENDING PHYSICIAN: Dr. Leo Hdez  DISCHARGING PROVIDER: Alexa Talbot NP    To contact this individual call [phone] and ask the  to page. If unavailable ask to be transferred the Adult Hospitalist Department. CONSULTATIONS: IP CONSULT TO HOSPITALIST  IP CONSULT TO ORTHOPEDIC SURGERY  IP CONSULT TO NEPHROLOGY    PROCEDURES/SURGERIES: Procedure(s):  RIGHT HIP HEMIARTHROPLASTY    [address] COURSE:   Pt presented to the ED from [facility] assisted living facility with a chief complaint of pain in R hip after a fall.  She reportedly was trying to walk to the bathroom using her walker but tripped and fell, landing onto her right side having subsequent pain to the right hip, buttock, and foot.  She was unable to bear weight with a pain level of 10/10, aggravated with any movement, radiating from right hip to right thigh, without specific alleviating factors.  Denied hitting her head or any loss of consciousness.         Pmhx: coronary artery disease, non-ST elevation myocardial infarction, hypertension, hyperlipidemia, glaucoma, anxiety, chest pain, right lower extremity edema, arthritis    11/6/2018  Pt suffered a closed right femur fracture after a fall and underwent a Right hemiarthroplasty on 11/1 with Dr. Kimmie Chowdhury. Pt should be on lovenox for a total of 2 weeks, then a full dose aspirin daily for two weeks. She should f/u with Dr. Abril Kim in 3-4 weeks from surgical date. MsrTimur Musa was treated for a Klebsiella uti with ceftriaxone and finished treatment. She also had a right basilar consolidation on cxr from 10/31, pt did not have any cough/fevers or leukocytosis. She did have some sob which resolved with lasix.   Repeat cxr on 11/4 showed resolution of consolidation. DISCHARGE DIAGNOSES / PLAN:      Shortness of breath:Resolved  - IVF d/c'ed  - duo neb's prn, IS  - 11/4 CXR: Left basilar airspace disease with left effusion. Interval resolution of right basilar consolidation     Hypokalemia - repleted     Closed R Femur Fracture (POA):  - R hip 2 view x-ray shows subcapital R proximal femur fx.   - 11/1 right hip hemiarthroplasty  - pain management: scheduled tylenol and may have tramadol prn  - stopped gabriel-colace 11/3 as she has had multiple stools  - DVT ppx as per Ortho post surgery - started lovenox 11/2   - continue with PT/OT, pt will need SNF     Decreased Urine output: Resolved  - appears Night MD 11/3 ordered Nephrology consult  - difficult to obtain I/Os due to incontinence     Hypotension with hx of  Hypertension:   - Reviewed PTA meds and she is on a lower dose of coreg PTA than what is currently ordered. Updated PTA med list with most recent MAR from [facility]. - coreg has been on hold due to hypotension  - has been bolused ~ 750 mL fluid and on 125 mL/hr.   - 11/5 hypotension resolved, added back low dose coreg     Pneumonia (POA): Treated  - Chest Xray: right basilar consolidation   - on zithromax and ceftriaxone  - no symptoms PTA. No fever PTA, no cough, no leukocytosis   - afebrile >24 hrs  - duonebs added 11/4  - 11/5 will finish abx on 11/6     Tachycardia: Resolved  - most likely related to pain  - pt in NSR and HR ~   - no chest pain     UTI (POA): Treated  - urine cloudy, strong smell  - + leuks in urine and >WBC. - Klebsiella in urine, susceptible to currently ordered ceftriaxone     Hx Coronary artery disease and Hx MI: c/w coreg     Hx Acute RLE DVT in 2017:   - initially in 9/2017, it was decided NOT to place pt on [address] [place] [address] Road due to hx of small subdural bleed. She was not discharged at that time with blood thinners  - in 10/2017, she came back to ED with worsening leg swelling and it was decided to place her on lovenox.    - in reviewing her most recent med list from [facility], she is no longer on this     Code status: DNR       PENDING TEST RESULTS:   At the time of discharge the following test results are still pending: none    FOLLOW UP APPOINTMENTS:    Follow-up Information     Follow up With Specialties Details Why Contact Info    Thaddeus Yeung MD Family Practice  follow up post rehab [address] [facility]  [phone]      Basim Painting MD Orthopedic Surgery  follow up 3-4 weeks from surgery date (11/1) [address] [facility] [address] [phone]             ADDITIONAL CARE RECOMMENDATIONS:   Post op Discharge Instructions Partial Hip Replacement     Patient Name: Hamlet Beasley  Date of procedure: 11/1/2018   Procedure: Procedure(s):  RIGHT HIP HEMIARTHROPLASTY  Surgeon: Susanna Velez) and Role:     Mir Tran MD - Primary   PCP: Thaddeus Yeung MD  Date of discharge: No discharge date for patient encounter. Follow up appointment with surgeon  [facility] See Surgeon(s) and Role:      Mir Tran MD - Primary approximately 3-4 weeks from date of surgery. Call [phone] to make an appointment. When to call your Orthopaedic Surgeon.  If you call after 5pm or on a weekend, the on call physician will be contacted   Pain that is not relieved by pain medication, ice, activity   Signs of infection  o Incision is reddened  o Incision continues to drain; drainage has an odor  o Persistent fever over 101 degrees   Signs of a blood clot in your leg  o Calf pain, tenderness, redness and/or swelling of lower leg    When to call your Primary Care Physician   Concerns about medical conditions such as diabetes, high blood pressure, asthma, congestive heart failure   Call if blood sugars are elevated, persistent headache or dizziness, coughing or congestion, constipation or diarrhea, burning with urination, abnormal heart rate (slow or fast)    When to call 585 and go to the nearest emergency room   Acute onset of chest pain, shortness of breath, difficulty breathing    Activity   Walk with your walker WBAT weight bearing as instructed by your physical therapist. Continue using your walker until seen for follow-up visit.  Practice your exercises 3 times a day as instructed by the physical therapist.  Sergio  up frequently and walk (with assistance as needed)   You may not drive     Routine Hip Precautions  Maintain for 6 weeks post-surgery date  always get clarification from the physician before discontinuing   No straight leg raise   No internal rotation   No adduction past midline   No flexion beyond 90 degrees          Incision Care   Keep a dressing on your incision and change daily. Once your incision is not draining, you may leave it open to air.  Wash hands thoroughly before changing the dressing.  You may take a shower when your incision is dry. Do not take a tub bath or go swimming   You have absorbable sutures in your incision. Preventing blood clots   Lovenox injection 30mg sub q once daily until 14 days post-op then Enteric coated Aspirin 325 mg one tablet once daily for two weeks   Take Lovenox as prescribed   Wear elastic stockings (TEDS) for 4 weeks. Remove them for approximately 1 hour daily for showering/sponge bathing    Pain management   Take pain medication as prescribed; decrease the amount you take as your pain lessens   Avoid alcoholic beverages while taking pain medications   Place an ice bag on the hip for 15-20 minutes after exercising and as needed throughout the day and night    Diet   Resume usual diet; drink plenty of fluids; eat foods high in fiber, calcium and vitamin D.   You may want to take a stool softener (such as Senokot-S or Colace) to prevent constipations while you are taking pain medication.    If constipation occurs, take a laxative (such as Dulcolax tablets, Miralax, or a suppository)          DISCHARGE MEDICATIONS:  Current Discharge Medication List START taking these medications    Details   enoxaparin (LOVENOX) 30 mg/0.3 mL injection 0.3 mL by SubCUTAneous route every twenty-four (24) hours for 9 days. Qty: 1 Syringe, Refills: 0      folic acid (FOLVITE) 1 mg tablet Take 1 Tab by mouth daily. Qty: 10 Tab, Refills: 0         CONTINUE these medications which have CHANGED    Details   acetaminophen (TYLENOL) 325 mg tablet Take 2 Tabs by mouth every six (6) hours. Qty: 10 Tab, Refills: 0      traMADol (ULTRAM) 50 mg tablet Take 1 Tab by mouth every six (6) hours as needed. Max Daily Amount: 200 mg. Qty: 10 Tab, Refills: 0    Associated Diagnoses: Other fracture of right femur, initial encounter for closed fracture ([facility] [address]         CONTINUE these medications which have NOT CHANGED    Details   diclofenac (VOLTAREN) 1 % gel Apply  to affected area three (3) times daily. melatonin tab tablet Take 5 mg by mouth nightly. mirtazapine (REMERON) 30 mg tablet Take 30 mg by mouth nightly. promethazine (PHENERGAN) 12.5 mg tablet Take  by mouth three (3) times daily as needed for Nausea. triamcinolone acetonide (KENALOG) 0.1 % ointment Apply  to affected area as needed for Skin Irritation. use thin layer      nystatin (MYCOSTATIN) powder Apply  to affected area four (4) times daily. carvedilol (COREG) 3.125 mg tablet Take  by mouth two (2) times daily (with meals). latanoprost (XALATAN) 0.005 % ophthalmic solution Administer 1 Drop to both eyes nightly. omeprazole (PRILOSEC) 20 mg capsule Take 20 mg by mouth daily. thiamine (B-1) 100 mg tablet Take  by mouth daily. cholecalciferol (VITAMIN D3) 1,000 unit tablet Take 1,000 Units by mouth daily.          STOP taking these medications       hydrOXYzine HCl (ATARAX) 10 mg tablet Comments:   Reason for Stopping:         alum-mag hydroxide-simeth (MYLANTA) 200-200-20 mg/5 mL susp Comments:   Reason for Stopping:         loperamide (IMODIUM) 2 mg capsule Comments:   Reason for Stopping:         diphenhydrAMINE (BANOPHEN) 25 mg tablet Comments:   Reason for Stopping:         docusate sodium (COLACE) 100 mg capsule Comments:   Reason for Stopping:         oxybutynin chloride XL (DITROPAN XL) 10 mg CR tablet Comments:   Reason for Stopping:                 NOTIFY YOUR PHYSICIAN FOR ANY OF THE FOLLOWING:   Fever over 101 degrees for 24 hours. Chest pain, shortness of breath, fever, chills, nausea, vomiting, diarrhea, change in mentation, falling, weakness, bleeding. Severe pain or pain not relieved by medications. Or, any other signs or symptoms that you may have questions about. DISPOSITION:    Home With:   OT  PT  HH  RN      x Long term SNF/Inpatient Rehab    Independent/assisted living    Hospice    Other:       PATIENT CONDITION AT DISCHARGE:     Functional status    Poor    x Deconditioned     Independent      Cognition    x Lucid     Forgetful     Dementia      Catheters/lines (plus indication)    Burris     PICC     PEG    x None      Code status     Full code    x DNR      PHYSICAL EXAMINATION AT DISCHARGE:  Constitutional:  Cooperative, no acute distress    ENT:  Oral MM dry. Resp:  Chest is CTA. No accessory muscle use and on RA   CV:  Regular rhythm, no murmurs. GI:  Soft, non distended, non tender. Normoactive bowel sounds    Musculoskeletal:  R hip and thigh edema, warm, 2+ pulses throughout. Neurologic:  AAOx3. Anxious.    Skin: Dressing to R hip I s c/d/i       CHRONIC MEDICAL DIAGNOSES:  Problem List as of 11/6/2018 Date Reviewed: 11/6/2018          Codes Class Noted - Resolved    Chest pain ICD-10-CM: R07.9  ICD-9-CM: 786.50  9/12/2017 - Present        TIA (transient ischemic attack) ICD-10-CM: G45.9  ICD-9-CM: 435.9  3/8/2017 - Present        GI bleed ICD-10-CM: K92.2  ICD-9-CM: 578.9  3/8/2017 - Present        Numbness and tingling ICD-10-CM: R20.0, R20.2  ICD-9-CM: 782.0  3/8/2017 - Present        CAD in native artery ICD-10-CM: I25.10  ICD-9-CM: 414.01 11/28/2016 - Present        History of non-ST elevation myocardial infarction (NSTEMI) ICD-10-CM: I25.2  ICD-9-CM: 412  11/28/2016 - Present        NSTEMI (non-ST elevated myocardial infarction) [facility]) ICD-10-CM: I21.4  ICD-9-CM: 410.70  11/22/2016 - Present        Diarrhea ICD-10-CM: R19.7  ICD-9-CM: 787.91  11/22/2016 - Present        Hypocalcemia ICD-10-CM: E83.51  ICD-9-CM: 275.41  11/22/2016 - Present        Hypomagnesemia ICD-10-CM: E83.42  ICD-9-CM: 275.2  11/22/2016 - Present        Hypokalemia ICD-10-CM: E87.6  ICD-9-CM: 276.8  11/20/2016 - Present        RESOLVED: Pneumonia ICD-10-CM: J18.9  ICD-9-CM: 486  10/31/2018 - 11/6/2018        * (Principal) RESOLVED: Other fracture of right femur, initial encounter for closed fracture ([facility] [address] ICD-10-CM: S53.3V7S  ICD-9-CM: 821.00  10/31/2018 - 11/6/2018              Greater than 25 inutes were spent with the patient on counseling and coordination of care    Signed:   Anne Quintero NP  11/6/2018  10:29 AM

## 2018-11-06 NOTE — PROGRESS NOTES
[facility] 
 [address] [address] Phone: [phone]   [phone]   
  
Nephrology Progress Note Marilee Saldaña     10/17/1927     [id number] Date of Admission : 10/31/2018 
11/06/18 CC: Follow up for YASH Assessment and Plan Mild YASH Oliguria PNA Hypokalemia / Hypomagnesemia Hyperchloremic acidosis Post op anemia  
R hip arthroplasty Plan : 
- replete K and Mg  
- Not much to offer from renal stand point  
- signing off Interval History: 
Seen and examined Sleeping when seen earlier Review of Systems: Review of systems not obtained due to patient factors. Current Medications:  
Current Facility-Administered Medications Medication Dose Route Frequency  potassium chloride SR (KLOR-CON 10) tablet 40 mEq  40 mEq Oral DAILY  carvedilol (COREG) tablet 3.125 mg  3.125 mg Oral BID WITH MEALS  
 albuterol-ipratropium (DUO-NEB) 2.5 MG-0.5 MG/3 ML  3 mL Nebulization Q6H PRN  thiamine HCL (B-1) tablet 100 mg  100 mg Oral DAILY  folic acid (FOLVITE) tablet 1 mg  1 mg Oral DAILY  traMADol (ULTRAM) tablet 50 mg  50 mg Oral Q6H PRN  
 acetaminophen (TYLENOL) tablet 650 mg  650 mg Oral Q6H  
 enoxaparin (LOVENOX) injection 30 mg  30 mg SubCUTAneous Q24H  
 mirtazapine (REMERON) tablet 30 mg  30 mg Oral QHS  latanoprost (XALATAN) 0.005 % ophthalmic solution 1 Drop  1 Drop Both Eyes QHS  sodium chloride (NS) flush 5-10 mL  5-10 mL IntraVENous Q8H  
 sodium chloride (NS) flush 5-10 mL  5-10 mL IntraVENous PRN  
 hydrALAZINE (APRESOLINE) 20 mg/mL injection 10 mg  10 mg IntraVENous Q6H PRN  
 ondansetron (ZOFRAN) injection 4 mg  4 mg IntraVENous Q8H PRN Allergies Allergen Reactions  Codeine Unknown (comments)  Prednisone Unknown (comments) Objective: 
Vitals:   
Vitals:  
 11/05/18 1554 11/05/18 2046 11/06/18 0257 11/06/18 2242 BP: 122/74 122/67 127/83 (!) 147/97 Pulse: 90 86 83 91 Resp: 18 20 18 Temp: 97.7 °F (36.5 °C) 98 °F (36.7 °C) 97.8 °F (36.6 °C) 97.8 °F (36.6 °C) SpO2: 99% 95% 94% 97% Weight:      
Height:      
 
Intake and Output: 
No intake/output data recorded. 11/04 1901 - 11/06 0700 In: 360 [P.O.:360] Out: - Physical Examination:General:sleepinh Neck: Supple,no mass palpable Lungs : B rhonchi and sl wheezing CVS: RRR, S1 S2 normal, No rub, trace LE edema Abdomen: Soft, Non tender, No hepatosplenomegaly, bowel sounds present Neurologic: sleepinh Psych: normal affect 
 
 
 
[]    High complexity decision making was performed 
[]    Patient is at high-risk of decompensation with multiple organ involvement Lab Data Personally Reviewed: I have reviewed all the pertinent labs, microbiology data and radiology studies during assessment. Recent Labs 11/05/18 
0247 11/04/18 
0235 11/03/18 
2134  143 142  
K 3.2* 3.7 4.7 * 114* 116* CO2 20* 17* 18*  115* 115* BUN 21* 25* 26* CREA 0.97 1.06* 1.10* CA 7.9* 7.1* 7.1*  
MG 1.8 1.4*  --   
PHOS  --  2.7 2.6 ALB  --  1.9* 1.9* Recent Labs 11/05/18 
434 6815 11/04/18 
0235 HGB 9.4* 9.3* HCT 29.4* 30.6* No results found for: SDES Lab Results Component Value Date/Time Culture result: KLEBSIELLA PNEUMONIAE (A) 10/31/2018 12:11 PM  
 Culture result: NO GROWTH 5 DAYS 10/31/2018 03:55 AM  
 
Recent Results (from the past 24 hour(s)) URIC ACID Collection Time: 11/06/18  4:29 AM  
Result Value Ref Range Uric acid 5.0 2.6 - 6.0 MG/DL Total time spent with patient:  xxx   min. Care Plan discussed with: 
Patient Family RN Consulting Physician [address] [place] [address]     
 
I have reviewed the flowsheets. Chart and Pertinent Notes have been reviewed. No change in PMH ,family and social history from Consult note.  
 
 
Karla Washburn MD

## 2018-11-06 NOTE — ROUTINE PROCESS
Bedside shift change report given JIMY Williamson (oncoming nurse) by Grey Salter RN(offgoing nurse). Report given with SBAR.

## 2018-11-07 VITALS
DIASTOLIC BLOOD PRESSURE: 82 MMHG | BODY MASS INDEX: 25.78 KG/M2 | HEIGHT: 60 IN | RESPIRATION RATE: 16 BRPM | SYSTOLIC BLOOD PRESSURE: 144 MMHG | OXYGEN SATURATION: 97 % | TEMPERATURE: 98.3 F | HEART RATE: 89 BPM | WEIGHT: 131.3 LBS

## 2018-11-07 LAB — URATE SERPL-MCNC: 4.7 MG/DL (ref 2.6–6)

## 2018-11-07 PROCEDURE — 84550 ASSAY OF BLOOD/URIC ACID: CPT | Performed by: INTERNAL MEDICINE

## 2018-11-07 PROCEDURE — 74011250636 HC RX REV CODE- 250/636: Performed by: NURSE PRACTITIONER

## 2018-11-07 PROCEDURE — 36415 COLL VENOUS BLD VENIPUNCTURE: CPT | Performed by: INTERNAL MEDICINE

## 2018-11-07 PROCEDURE — 74011250637 HC RX REV CODE- 250/637: Performed by: NURSE PRACTITIONER

## 2018-11-07 RX ADMIN — ENOXAPARIN SODIUM 30 MG: 30 INJECTION SUBCUTANEOUS at 09:41

## 2018-11-07 RX ADMIN — Medication 10 ML: at 06:49

## 2018-11-07 RX ADMIN — CARVEDILOL 3.12 MG: 3.12 TABLET, FILM COATED ORAL at 09:40

## 2018-11-07 NOTE — DISCHARGE INSTRUCTIONS
Discharge Summary         PATIENT ID: Padmini Salinas  MRN: [id number]   YOB: 1927    DATE OF ADMISSION: 10/31/2018  1:48 AM    DATE OF DISCHARGE: 11/6/2018  PRIMARY CARE PROVIDER: Morena Gilliland MD      ATTENDING PHYSICIAN: Dr. Saw Kim  DISCHARGING PROVIDER: Nathaniel Marmolejo NP    To contact this individual call [phone] and ask the  to page. If unavailable ask to be transferred the Adult Hospitalist Department.     CONSULTATIONS: IP CONSULT TO HOSPITALIST  IP CONSULT TO ORTHOPEDIC SURGERY  IP CONSULT TO NEPHROLOGY     PROCEDURES/SURGERIES: Procedure(s):  RIGHT HIP HEMIARTHROPLASTY     [address] COURSE:   Pt presented to the ED from [facility] assisted living facility with a chief complaint of pain in R hip after a fall.  She reportedly was trying to walk to the bathroom using her walker but tripped and fell, landing onto her right side having subsequent pain to the right hip, buttock, and foot.  She was unable to bear weight with a pain level of 10/10, aggravated with any movement, radiating from right hip to right thigh, without specific alleviating factors.  Denied hitting her head or any loss of consciousness.         Pmhx: coronary artery disease, non-ST elevation myocardial infarction, hypertension, hyperlipidemia, glaucoma, anxiety, chest pain, right lower extremity edema, arthritis     11/6/2018  Pt suffered a closed right femur fracture after a fall and underwent a Right hemiarthroplasty on 11/1 with Dr. Daisy Schofield. Pt should be on lovenox for a total of 2 weeks, then a full dose aspirin daily for two weeks. She should f/u with Dr. Mat Roland in 3-4 weeks from surgical date. MsrTimur Ji was treated for a Klebsiella uti with ceftriaxone and finished treatment. She also had a right basilar consolidation on cxr from 10/31, pt did not have any cough/fevers or leukocytosis. She did have some sob which resolved with lasix.   Repeat cxr on 11/4 showed resolution of consolidation.     11/7/2018  Discharge was delayed yesterday as pt's daughter wanted to visit/tour [facility]. VSS, pain controlled. No acute events overnight.          DISCHARGE DIAGNOSES / PLAN:       Shortness of breath:Resolved  - IVF d/c'ed  - duo neb's prn, IS  - 11/4 CXR: Left basilar airspace disease with left effusion. Interval resolution of right basilar consolidation     Hypokalemia - repleted     Closed R Femur Fracture (POA):  - R hip 2 view x-ray shows subcapital R proximal femur fx.   - 11/1 right hip hemiarthroplasty  - pain management: scheduled tylenol and may have tramadol prn  - stopped gabriel-colace 11/3 as she has had multiple stools  - DVT ppx as per Ortho post surgery - started lovenox 11/2   - continue with PT/OT, pt will need SNF     Decreased Urine output: Resolved  - appears Night MD 11/3 ordered Nephrology consult  - difficult to obtain I/Os due to incontinence     Hypotension with hx of  Hypertension:   - Reviewed PTA meds and she is on a lower dose of coreg PTA than what is currently ordered. Updated PTA med list with most recent MAR from [facility]. - coreg has been on hold due to hypotension  - has been bolused ~ 750 mL fluid and on 125 mL/hr.   - 11/5 hypotension resolved, added back low dose coreg     Pneumonia (POA): Treated  - Chest Xray: right basilar consolidation   - on zithromax and ceftriaxone  - no symptoms PTA. No fever PTA, no cough, no leukocytosis   - afebrile >24 hrs  - duonebs added 11/4  - 11/5 will finish abx on 11/6     Tachycardia: Resolved  - most likely related to pain  - pt in NSR and HR ~   - no chest pain     UTI (POA): Treated  - urine cloudy, strong smell  - + leuks in urine and >WBC. - Klebsiella in urine, susceptible to currently ordered ceftriaxone     Hx Coronary artery disease and Hx MI: c/w coreg     Hx Acute RLE DVT in 2017:   - initially in 9/2017, it was decided NOT to place pt on [address] [place] [address] Road due to hx of small subdural bleed.  She was not discharged at that time with blood thinners  - in 10/2017, she came back to ED with worsening leg swelling and it was decided to place her on lovenox. - in reviewing her most recent med list from [facility], she is no longer on this     Code status: DNR         PENDING TEST RESULTS:   At the time of discharge the following test results are still pending: none     FOLLOW UP APPOINTMENTS:             Follow-up Information      Follow up With Specialties Details Why Contact Info     Ritchie Vernon MD [facility]   follow up post rehab [facility]  [phone]        Mary Quinn MD Orthopedic Surgery   follow up 3-4 weeks from surgery date (11/1) [address] [facility] [address] [phone]  Nathaly Mcgregor, [address]  Phone: [phone]             ADDITIONAL CARE RECOMMENDATIONS:   Post op Discharge Instructions Partial Hip Replacement      Patient Name: Caro Saavedra  Date of procedure: 11/1/2018   Procedure: Procedure(s):  RIGHT HIP HEMIARTHROPLASTY  Surgeon: Marisela Méndez) and Role:     Daxa Valdivia MD - Primary   PCP: Ritchie Vernon MD  Date of discharge: No discharge date for patient encounter.     Follow up appointment with surgeon  · See Surgeon(s) and Role:  ·    Daxa Valdivia MD - Primary approximately 3-4 weeks from date of surgery. Call [phone] to make an appointment.     When to call your Orthopaedic Surgeon. If you call after 5pm or on a weekend, the on call physician will be contacted  · Pain that is not relieved by pain medication, ice, activity  · Signs of infection  ? Incision is reddened  ? Incision continues to drain; drainage has an odor  ? Persistent fever over 101 degrees  · Signs of a blood clot in your leg  ?  Calf pain, tenderness, redness and/or swelling of lower leg     When to call your Primary Care Physician  · Concerns about medical conditions such as diabetes, high blood pressure, asthma, congestive heart failure  · Call if blood sugars are elevated, persistent headache or dizziness, coughing or congestion, constipation or diarrhea, burning with urination, abnormal heart rate (slow or fast)     When to call 911 and go to the nearest emergency room  · Acute onset of chest pain, shortness of breath, difficulty breathing     Activity  · Walk with your walker WBAT weight bearing as instructed by your physical therapist. Continue using your walker until seen for follow-up visit. · Practice your exercises 3 times a day as instructed by the physical therapist.  · Get up frequently and walk (with assistance as needed)  · You may not drive      Routine Hip Precautions - Maintain for 6 weeks post-surgery date - always get clarification from the physician before discontinuing  · No straight leg raise  · No internal rotation  · No adduction past midline  · No flexion beyond 90 degrees              Incision Care  · Keep a dressing on your incision and change daily. Once your incision is not draining, you may leave it open to air. · Wash hands thoroughly before changing the dressing. · You may take a shower when your incision is dry. Do not take a tub bath or go swimming  · You have absorbable sutures in your incision.     Preventing blood clots  · Lovenox injection 30mg sub q once daily until 14 days post-op then Enteric coated Aspirin 325 mg one tablet once daily for two weeks  · Take Lovenox as prescribed  · Wear elastic stockings (TEDS) for 4 weeks.   Remove them for approximately 1 hour daily for showering/sponge bathing     Pain management  · Take pain medication as prescribed; decrease the amount you take as your pain lessens  · Avoid alcoholic beverages while taking pain medications  · Place an ice bag on the hip for 15-20 minutes after exercising and as needed throughout the day and night     Diet  · Resume usual diet; drink plenty of fluids; eat foods high in fiber, calcium and vitamin D.  · You may want to take a stool softener (such as Senokot-S or Colace) to prevent constipations while you are taking pain medication. · If constipation occurs, take a laxative (such as Dulcolax tablets, Miralax, or a suppository)              DISCHARGE MEDICATIONS:        Current Discharge Medication List             START taking these medications     Details   enoxaparin (LOVENOX) 30 mg/0.3 mL injection 0.3 mL by SubCUTAneous route every twenty-four (24) hours for 9 days. Qty: 1 Syringe, Refills: 0       folic acid (FOLVITE) 1 mg tablet Take 1 Tab by mouth daily. Qty: 10 Tab, Refills: 0                 CONTINUE these medications which have CHANGED     Details   acetaminophen (TYLENOL) 325 mg tablet Take 2 Tabs by mouth every six (6) hours. Qty: 10 Tab, Refills: 0       traMADol (ULTRAM) 50 mg tablet Take 1 Tab by mouth every six (6) hours as needed. Max Daily Amount: 200 mg. Qty: 10 Tab, Refills: 0     Associated Diagnoses: Other fracture of right femur, initial encounter for closed fracture ([facility] [address]                 CONTINUE these medications which have NOT CHANGED     Details   diclofenac (VOLTAREN) 1 % gel Apply  to affected area three (3) times daily.       melatonin tab tablet Take 5 mg by mouth nightly.       mirtazapine (REMERON) 30 mg tablet Take 30 mg by mouth nightly.       promethazine (PHENERGAN) 12.5 mg tablet Take  by mouth three (3) times daily as needed for Nausea.       triamcinolone acetonide (KENALOG) 0.1 % ointment Apply  to affected area as needed for Skin Irritation.  use thin layer       nystatin (MYCOSTATIN) powder Apply  to affected area four (4) times daily.       carvedilol (COREG) 3.125 mg tablet Take  by mouth two (2) times daily (with meals).       latanoprost (XALATAN) 0.005 % ophthalmic solution Administer 1 Drop to both eyes nightly.       omeprazole (PRILOSEC) 20 mg capsule Take 20 mg by mouth daily.       thiamine (B-1) 100 mg tablet Take  by mouth daily.       cholecalciferol (VITAMIN D3) 1,000 unit tablet Take 1,000 Units by mouth daily.                STOP taking these medications         hydrOXYzine HCl (ATARAX) 10 mg tablet Comments:   Reason for Stopping:            alum-mag hydroxide-simeth (MYLANTA) 200-200-20 mg/5 mL susp Comments:   Reason for Stopping:            loperamide (IMODIUM) 2 mg capsule Comments:   Reason for Stopping:            diphenhydrAMINE (BANOPHEN) 25 mg tablet Comments:   Reason for Stopping:            docusate sodium (COLACE) 100 mg capsule Comments:   Reason for Stopping:            oxybutynin chloride XL (DITROPAN XL) 10 mg CR tablet Comments:   Reason for Stopping:                       NOTIFY YOUR PHYSICIAN FOR ANY OF THE FOLLOWING:   Fever over 101 degrees for 24 hours. Chest pain, shortness of breath, fever, chills, nausea, vomiting, diarrhea, change in mentation, falling, weakness, bleeding. Severe pain or pain not relieved by medications. Or, any other signs or symptoms that you may have questions about.     DISPOSITION:    Home With:    OT   PT   HH   RN      x Long term SNF/Inpatient Rehab     Independent/assisted living     Hospice     Other:         PATIENT CONDITION AT DISCHARGE:      Functional status     Poor    x Deconditioned      Independent       Cognition    x Lucid      Forgetful      Dementia       Catheters/lines (plus indication)     Burris      PICC      PEG    x None       Code status     Full code    x DNR       PHYSICAL EXAMINATION AT DISCHARGE:  Constitutional:  Cooperative, no acute distress    ENT:  Oral MM dry.     Resp:  Chest is CTA.  No accessory muscle use and on RA   CV:  Regular rhythm, no murmurs.    GI:  Soft, non distended, non tender. Normoactive bowel sounds    Musculoskeletal:  R hip and thigh edema, warm, 2+ pulses throughout.     Neurologic:  AAOx3. Anxious.    Skin: Dressing to R hip I s c/d/i         CHRONIC MEDICAL DIAGNOSES:            Problem List as of 11/7/2018 Date Reviewed: 11/6/2018           Codes Class Noted - Resolved     Chest pain ICD-10-CM: R07.9  ICD-9-CM: 786.50   9/12/2017 - Present           TIA (transient ischemic attack) ICD-10-CM: G45.9  ICD-9-CM: 435. 9   3/8/2017 - Present           GI bleed ICD-10-CM: K92.2  ICD-9-CM: 612. 9   3/8/2017 - Present           Numbness and tingling ICD-10-CM: R20.0, R20.2  ICD-9-CM: 782. 0   3/8/2017 - Present           CAD in native artery ICD-10-CM: I25.10  ICD-9-CM: 414.01   11/28/2016 - Present           History of non-ST elevation myocardial infarction (NSTEMI) ICD-10-CM: I25.2  ICD-9-CM: 412   11/28/2016 - Present           NSTEMI (non-ST elevated myocardial infarction) ([facility] ICD-10-CM: I21.4  ICD-9-CM: 410.70   11/22/2016 - Present           Diarrhea ICD-10-CM: R19.7  ICD-9-CM: 787.91   11/22/2016 - Present           Hypocalcemia ICD-10-CM: E83.51  ICD-9-CM: 275.41   11/22/2016 - Present           Hypomagnesemia ICD-10-CM: E83.42  ICD-9-CM: 275.2   11/22/2016 - Present           Hypokalemia ICD-10-CM: E87.6  ICD-9-CM: 276.8   11/20/2016 - Present           RESOLVED: Pneumonia ICD-10-CM: J18.9  ICD-9-CM: 486   10/31/2018 - 11/6/2018           * (Principal) RESOLVED: Other fracture of right femur, initial encounter for closed fracture ([facility] ICD-10-CM: M99.4Y4I  ICD-9-CM: 821.00   10/31/2018 - 11/6/2018                   Greater than 25 inutes were spent with the patient on counseling and coordination of care     Signed:   Fan Vargas NP  11/7/2018  10:29 AM

## 2018-11-07 NOTE — PROGRESS NOTES
The patient has just been discharged and left with [facility]. Prescriptions,MAR, Kardex, SBAR, as well as the patient's belongings are with the patient. Report was given to Kaylee Verduzco RN at [facility].

## 2018-11-07 NOTE — PROGRESS NOTES
Bedside and Verbal shift change report given to 2018 [facility] Janneth (oncoming nurse) by Deirdre Anderson (offgoing nurse). Report included the following information SBAR, Kardex, OR Summary, Procedure Summary, Intake/Output, MAR and Recent Results.

## 2018-11-07 NOTE — PROGRESS NOTES
Bedside and Verbal shift change report given to Teri RN (oncoming nurse) by TOO Knight RN (offgoing nurse). Report given with SBAR, Kardex, Intake/Output, MAR and Recent Results.

## 2018-11-07 NOTE — PROGRESS NOTES
Bedside and Verbal shift change report given to Mariana Romero RN (oncoming nurse) by Heriberto Oneil RN (offgoing nurse). Report included the following information SBAR, Kardex, Procedure Summary, Intake/Output, MAR and Recent Results.

## 2018-11-07 NOTE — PROGRESS NOTES
CM called Burgess Primrose with [facility] ([phone]). They would like patient after 11 AM today. CM requested transport with [facility]. They can accommodate 12:30 PM. CM called daughter Sunitha Johnson ([phone]), she is aware of discharge and transport time. CM notified Burgess Primrose at the facility as well. Discharge folder located on hard chart to include ambulance form and discharge instructions. RN to follow with Gina and MAR. RN to call report to #[phone]. Ally Avery, JOSUÉW/CRM

## 2018-11-07 NOTE — NURSE NAVIGATOR
[address]  SBAR   
 
FROM:                                TO: [facility] 
                                                    ([address] [place] [address] or Facility name) [address] 
[address] [place] [address] Dept: [phone] Loc: [phone] Room#:  554/01 Nurse Navigator:  Erica Fuentes RN 
  
 
  SITUATION  
  
ASAScore: ASA 3 - Patient with moderate systemic disease with functional limitations Admitted:  10/31/2018  Hospital Day: 8      Attending Provider:  Rosa Marc MD    
Consultations:  IP CONSULT TO HOSPITALIST 
IP CONSULT TO ORTHOPEDIC SURGERY 
IP CONSULT TO NEPHROLOGY 
 
PCP:  Lily Hayes MD   [phone] Admitting Dx:  Pneumonia Other fracture of right femur, initial encounter for closed fracture ([facility] [address] Active Problems: * No active hospital problems. * 
 
6 Days Post-Op of  
Procedure(s): RIGHT HIP HEMIARTHROPLASTY BY: Manuel Martinez MD             ON: 11/1/2018 Code Status: DNR             Advance Directive? Verified (Send w/patient) Isolation:  There are currently no Active Isolations       MDRO: No current active infections BACKGROUND Allergies: Allergies Allergen Reactions  Codeine Unknown (comments)  Prednisone Unknown (comments) Past Medical History:  
Diagnosis Date  Arthritis  History of non-ST elevation myocardial infarction (NSTEMI) 11/28/2016 Past Surgical History:  
Procedure Laterality Date  HX CHOLECYSTECTOMY Prior to Admission Medications Prescriptions Last Dose Informant Patient Reported? Taking?  
acetaminophen (TYLENOL) 325 mg tablet   Yes Yes Sig: Take 1,000 mg by mouth three (3) times daily. alum-mag hydroxide-simeth (MYLANTA) 200-200-20 mg/5 mL susp   Yes Yes Sig: Take 30 mL by mouth every eight (8) hours as needed. carvedilol (COREG) 3.125 mg tablet 10/31/2018 at Unknown time  Yes Yes Sig: Take  by mouth two (2) times daily (with meals). cholecalciferol (VITAMIN D3) 1,000 unit tablet Not Taking at Unknown time  Yes No  
Sig: Take 1,000 Units by mouth daily. diclofenac (VOLTAREN) 1 % gel   Yes Yes Sig: Apply  to affected area three (3) times daily. diphenhydrAMINE (BANOPHEN) 25 mg tablet 10/30/2018 at Unknown time  Yes Yes Sig: Take 25 mg by mouth nightly. docusate sodium (COLACE) 100 mg capsule Not Taking at Unknown time  Yes No  
Sig: Take 100 mg by mouth two (2) times daily as needed for Constipation. hydrOXYzine HCl (ATARAX) 10 mg tablet   Yes Yes Sig: Take  by mouth three (3) times daily as needed for Itching. latanoprost (XALATAN) 0.005 % ophthalmic solution 10/30/2018 at Unknown time  Yes Yes Sig: Administer 1 Drop to both eyes nightly. loperamide (IMODIUM) 2 mg capsule   Yes Yes Sig: Take  by mouth every six (6) hours as needed for Diarrhea.  
melatonin tab tablet   Yes Yes Sig: Take 5 mg by mouth nightly. mirtazapine (REMERON) 30 mg tablet   Yes Yes Sig: Take 30 mg by mouth nightly. nystatin (MYCOSTATIN) powder 10/30/2018 at Unknown time  Yes Yes Sig: Apply  to affected area four (4) times daily. omeprazole (PRILOSEC) 20 mg capsule 10/30/2018 at Unknown time  Yes Yes Sig: Take 20 mg by mouth daily. promethazine (PHENERGAN) 12.5 mg tablet   Yes Yes Sig: Take  by mouth three (3) times daily as needed for Nausea. thiamine (B-1) 100 mg tablet Not Taking at Unknown time  Yes No  
Sig: Take  by mouth daily. traMADol (ULTRAM) 50 mg tablet   Yes Yes Sig: Take 50 mg by mouth nightly. triamcinolone acetonide (KENALOG) 0.1 % ointment   Yes Yes Sig: Apply  to affected area as needed for Skin Irritation. use thin layer Facility-Administered Medications: None Vaccinations:   
Immunization History Administered Date(s) Administered  Influenza Vaccine (Quad) PF 11/22/2016  Tdap 11/20/2016 ASSESSMENT Age: 80 y.o. Gender: female Height: Height: 5' (152.4 cm)                    Weight:Weight: 59.6 kg (131 lb 4.8 oz) Patient Vitals for the past 8 hrs: 
 Temp Pulse Resp BP SpO2  
11/07/18 0743 98.3 °F (36.8 °C) 89 16 144/82 97 % Active Orders Diet DIET CARDIAC Regular; 2 GM NA (House Low NA) Orientation: Orientation Level: Appropriate for age, Oriented X4 Active Lines/Drains:  (Peg Tube / Burris / CL or S/L?):no 
 
Urinary Status: Incontinent briefs      Last BM: Last Bowel Movement Date: 11/06/18 Skin Integrity: Incision (comment)(r.hip) Wound Hip-DRESSING STATUS: Clean, dry, and intact Wound Hip-DRESSING TYPE: Aquacel Mobility: Slightly limited Weight Bearing Status: WBAT (Weight Bearing as Tolerated) On Anticoagulation? YES  Lovenox  30 mg X 9 days, then Aspirin 325 mg daily X 2 weeks Pain Medications given:  Tramadol 50 mg q 6 hours PRN; Max daily dose 200 mg Lab Results Component Value Date/Time Glucose 100 11/05/2018 02:47 AM  
 Hemoglobin A1c 4.7 03/09/2017 01:23 AM  
 INR 1.0 10/31/2018 02:05 AM  
 INR 1.1 10/07/2017 01:16 PM  
 HGB 9.4 (L) 11/05/2018 02:47 AM  
 HGB 9.3 (L) 11/04/2018 02:35 AM  
 HGB 10.4 (L) 11/03/2018 03:31 AM  
 HGB 11.9 11/02/2018 06:00 AM  
 
 
Readmission Risks: 
Score:      
  RECOMMENDATION See After Visit Summary (AVS) for: · Discharge instructions · After [facility] · Medication Reconciliation [address] [place] [address] Orthopaedic Nurse Navigator BOBO Jose, RN-BC Office  [phone] Cell      [phone] Fax      [phone] 
[email or web address] Pierson

## 2018-11-07 NOTE — DISCHARGE SUMMARY
Discharge Summary       PATIENT ID: Sharyle Sander  MRN: [id number]   YOB: 1927    DATE OF ADMISSION: 10/31/2018  1:48 AM    DATE OF DISCHARGE: 11/6/2018  PRIMARY CARE PROVIDER: Sonu Gonzales MD     ATTENDING PHYSICIAN: Dr. Kenny Paulino  DISCHARGING PROVIDER: Noni Benavidez NP    To contact this individual call [phone] and ask the  to page. If unavailable ask to be transferred the Adult Hospitalist Department. CONSULTATIONS: IP CONSULT TO HOSPITALIST  IP CONSULT TO ORTHOPEDIC SURGERY  IP CONSULT TO NEPHROLOGY    PROCEDURES/SURGERIES: Procedure(s):  RIGHT HIP HEMIARTHROPLASTY    [address] COURSE:   Pt presented to the ED from [facility] assisted living facility with a chief complaint of pain in R hip after a fall.  She reportedly was trying to walk to the bathroom using her walker but tripped and fell, landing onto her right side having subsequent pain to the right hip, buttock, and foot.  She was unable to bear weight with a pain level of 10/10, aggravated with any movement, radiating from right hip to right thigh, without specific alleviating factors.  Denied hitting her head or any loss of consciousness.         Pmhx: coronary artery disease, non-ST elevation myocardial infarction, hypertension, hyperlipidemia, glaucoma, anxiety, chest pain, right lower extremity edema, arthritis    11/6/2018  Pt suffered a closed right femur fracture after a fall and underwent a Right hemiarthroplasty on 11/1 with Dr. Tara Quan. Pt should be on lovenox for a total of 2 weeks, then a full dose aspirin daily for two weeks. She should f/u with Dr. Lizzy Mars in 3-4 weeks from surgical date. MsrTimur Rabago was treated for a Klebsiella uti with ceftriaxone and finished treatment. She also had a right basilar consolidation on cxr from 10/31, pt did not have any cough/fevers or leukocytosis. She did have some sob which resolved with lasix.   Repeat cxr on 11/4 showed resolution of consolidation. 11/7/2018  Discharge was delayed yesterday as pt's daughter wanted to visit/tour [place]. VSS, pain controlled. No acute events overnight. DISCHARGE DIAGNOSES / PLAN:      Shortness of breath:Resolved  - IVF d/c'ed  - duo neb's prn, IS  - 11/4 CXR: Left basilar airspace disease with left effusion. Interval resolution of right basilar consolidation     Hypokalemia - repleted     Closed R Femur Fracture (POA):  - R hip 2 view x-ray shows subcapital R proximal femur fx.   - 11/1 right hip hemiarthroplasty  - pain management: scheduled tylenol and may have tramadol prn  - stopped gabriel-colace 11/3 as she has had multiple stools  - DVT ppx as per Ortho post surgery - started lovenox 11/2   - continue with PT/OT, pt will need SNF     Decreased Urine output: Resolved  - appears Night MD 11/3 ordered Nephrology consult  - difficult to obtain I/Os due to incontinence     Hypotension with hx of  Hypertension:   - Reviewed PTA meds and she is on a lower dose of coreg PTA than what is currently ordered. Updated PTA med list with most recent MAR from [facility]. - coreg has been on hold due to hypotension  - has been bolused ~ 750 mL fluid and on 125 mL/hr.   - 11/5 hypotension resolved, added back low dose coreg     Pneumonia (POA): Treated  - Chest Xray: right basilar consolidation   - on zithromax and ceftriaxone  - no symptoms PTA. No fever PTA, no cough, no leukocytosis   - afebrile >24 hrs  - duonebs added 11/4  - 11/5 will finish abx on 11/6     Tachycardia: Resolved  - most likely related to pain  - pt in NSR and HR ~   - no chest pain     UTI (POA): Treated  - urine cloudy, strong smell  - + leuks in urine and >WBC. - Klebsiella in urine, susceptible to currently ordered ceftriaxone     Hx Coronary artery disease and Hx MI: c/w coreg     Hx Acute RLE DVT in 2017:   - initially in 9/2017, it was decided NOT to place pt on [address] due to hx of small subdural bleed.  She was not discharged at that time with blood thinners  - in 10/2017, she came back to ED with worsening leg swelling and it was decided to place her on lovenox. - in reviewing her most recent med [place] [facility], she is no longer on this     Code status: DNR       PENDING TEST RESULTS:   At the time of discharge the following test results are still pending: none    FOLLOW UP APPOINTMENTS:    Follow-up Information     Follow up With Specialties Details Why Contact Info    Marcie Abdul MD [facility]  follow up post rehab [address] [facility]  [phone]      Jannet Gaytan MD Orthopedic Surgery  follow up 3-4 weeks from surgery date (11/1) [address] [facility] [address] [phone]      Lane Mcgregor, [address]  Phone: [phone]           ADDITIONAL CARE RECOMMENDATIONS:   Post op Discharge Instructions Partial Hip Replacement     Patient Name: Irma Hidden  Date of procedure: 11/1/2018   Procedure: Procedure(s):  RIGHT HIP HEMIARTHROPLASTY  Surgeon: Keith Avila) and Role:     Dimitris Blood MD - Primary   PCP: Marcie Abdul MD  Date of discharge: No discharge date for patient encounter. Follow up appointment with surgeon  [address] [place] [address] See Surgeon(s) and Role:      Dimitris Blood MD - Primary approximately 3-4 weeks from date of surgery. Call [phone] to make an appointment. When to call your Orthopaedic Surgeon.  If you call after 5pm or on a weekend, the on call physician will be contacted   Pain that is not relieved by pain medication, ice, activity   Signs of infection  o Incision is reddened  o Incision continues to drain; drainage has an odor  o Persistent fever over 101 degrees   Signs of a blood clot in your leg  o Calf pain, tenderness, redness and/or swelling of lower leg    When to call your Primary Care Physician   Concerns about medical conditions such as diabetes, high blood pressure, asthma, congestive heart failure   Call if blood sugars are elevated, persistent headache or dizziness, coughing or congestion, constipation or diarrhea, burning with urination, abnormal heart rate (slow or fast)    When to call 911 and go to the nearest emergency room   Acute onset of chest pain, shortness of breath, difficulty breathing    Activity   Walk with your walker WBAT weight bearing as instructed by your physical therapist. Continue using your walker until seen for follow-up visit.  Practice your exercises 3 times a day as instructed by the physical therapist.  Oren Prader up frequently and walk (with assistance as needed)   You may not drive     Routine Hip Precautions  Maintain for 6 weeks post-surgery date  always get clarification from the physician before discontinuing   No straight leg raise   No internal rotation   No adduction past midline   No flexion beyond 90 degrees          Incision Care   Keep a dressing on your incision and change daily. Once your incision is not draining, you may leave it open to air.  Wash hands thoroughly before changing the dressing.  You may take a shower when your incision is dry. Do not take a tub bath or go swimming   You have absorbable sutures in your incision. Preventing blood clots   Lovenox injection 30mg sub q once daily until 14 days post-op then Enteric coated Aspirin 325 mg one tablet once daily for two weeks   Take Lovenox as prescribed   Wear elastic stockings (TEDS) for 4 weeks.   Remove them for approximately 1 hour daily for showering/sponge bathing    Pain management   Take pain medication as prescribed; decrease the amount you take as your pain lessens   Avoid alcoholic beverages while taking pain medications   Place an ice bag on the hip for 15-20 minutes after exercising and as needed throughout the day and night    Diet   Resume usual diet; drink plenty of fluids; eat foods high in fiber, calcium and vitamin D.   You may want to take a stool softener (such as Senokot-S or Colace) to prevent constipations while you are taking pain medication.  If constipation occurs, take a laxative (such as Dulcolax tablets, Miralax, or a suppository)          DISCHARGE MEDICATIONS:  Current Discharge Medication List      START taking these medications    Details   enoxaparin (LOVENOX) 30 mg/0.3 mL injection 0.3 mL by SubCUTAneous route every twenty-four (24) hours for 9 days. Qty: 1 Syringe, Refills: 0      folic acid (FOLVITE) 1 mg tablet Take 1 Tab by mouth daily. Qty: 10 Tab, Refills: 0         CONTINUE these medications which have CHANGED    Details   acetaminophen (TYLENOL) 325 mg tablet Take 2 Tabs by mouth every six (6) hours. Qty: 10 Tab, Refills: 0      traMADol (ULTRAM) 50 mg tablet Take 1 Tab by mouth every six (6) hours as needed. Max Daily Amount: 200 mg. Qty: 10 Tab, Refills: 0    Associated Diagnoses: Other fracture of right femur, initial encounter for closed fracture ([facility]         CONTINUE these medications which have NOT CHANGED    Details   diclofenac (VOLTAREN) 1 % gel Apply  to affected area three (3) times daily. melatonin tab tablet Take 5 mg by mouth nightly. mirtazapine (REMERON) 30 mg tablet Take 30 mg by mouth nightly. promethazine (PHENERGAN) 12.5 mg tablet Take  by mouth three (3) times daily as needed for Nausea. triamcinolone acetonide (KENALOG) 0.1 % ointment Apply  to affected area as needed for Skin Irritation. use thin layer      nystatin (MYCOSTATIN) powder Apply  to affected area four (4) times daily. carvedilol (COREG) 3.125 mg tablet Take  by mouth two (2) times daily (with meals). latanoprost (XALATAN) 0.005 % ophthalmic solution Administer 1 Drop to both eyes nightly. omeprazole (PRILOSEC) 20 mg capsule Take 20 mg by mouth daily. thiamine (B-1) 100 mg tablet Take  by mouth daily. cholecalciferol (VITAMIN D3) 1,000 unit tablet Take 1,000 Units by mouth daily.          STOP taking these medications hydrOXYzine HCl (ATARAX) 10 mg tablet Comments:   Reason for Stopping:         alum-mag hydroxide-simeth (MYLANTA) 200-200-20 mg/5 mL susp Comments:   Reason for Stopping:         loperamide (IMODIUM) 2 mg capsule Comments:   Reason for Stopping:         diphenhydrAMINE (BANOPHEN) 25 mg tablet Comments:   Reason for Stopping:         docusate sodium (COLACE) 100 mg capsule Comments:   Reason for Stopping:         oxybutynin chloride XL (DITROPAN XL) 10 mg CR tablet Comments:   Reason for Stopping:                 NOTIFY YOUR PHYSICIAN FOR ANY OF THE FOLLOWING:   Fever over 101 degrees for 24 hours. Chest pain, shortness of breath, fever, chills, nausea, vomiting, diarrhea, change in mentation, falling, weakness, bleeding. Severe pain or pain not relieved by medications. Or, any other signs or symptoms that you may have questions about. DISPOSITION:    Home With:   OT  PT  HH  RN      x Long term SNF/Inpatient Rehab    Independent/assisted living    Hospice    Other:       PATIENT CONDITION AT DISCHARGE:     Functional status    Poor    x Deconditioned     Independent      Cognition    x Lucid     Forgetful     Dementia      Catheters/lines (plus indication)    Burris     PICC     PEG    x None      Code status     Full code    x DNR      PHYSICAL EXAMINATION AT DISCHARGE:  Constitutional:  Cooperative, no acute distress    ENT:  Oral MM dry. Resp:  Chest is CTA. No accessory muscle use and on RA   CV:  Regular rhythm, no murmurs. GI:  Soft, non distended, non tender. Normoactive bowel sounds    Musculoskeletal:  R hip and thigh edema, warm, 2+ pulses throughout. Neurologic:  AAOx3. Anxious.    Skin: Dressing to R hip I s c/d/i       CHRONIC MEDICAL DIAGNOSES:  Problem List as of 11/7/2018 Date Reviewed: 11/6/2018          Codes Class Noted - Resolved    Chest pain ICD-10-CM: R07.9  ICD-9-CM: 786.50  9/12/2017 - Present        TIA (transient ischemic attack) ICD-10-CM: G45.9  ICD-9-CM: 435.9  3/8/2017 - Present        GI bleed ICD-10-CM: K92.2  ICD-9-CM: 578.9  3/8/2017 - Present        Numbness and tingling ICD-10-CM: R20.0, R20.2  ICD-9-CM: 782.0  3/8/2017 - Present        CAD in native artery ICD-10-CM: I25.10  ICD-9-CM: 414.01  11/28/2016 - Present        History of non-ST elevation myocardial infarction (NSTEMI) ICD-10-CM: I25.2  ICD-9-CM: 881  11/28/2016 - Present        NSTEMI (non-ST elevated myocardial infarction) ([facility] ICD-10-CM: I21.4  ICD-9-CM: 410.70  11/22/2016 - Present        Diarrhea ICD-10-CM: R19.7  ICD-9-CM: 787.91  11/22/2016 - Present        Hypocalcemia ICD-10-CM: E83.51  ICD-9-CM: 275.41  11/22/2016 - Present        Hypomagnesemia ICD-10-CM: E83.42  ICD-9-CM: 275.2  11/22/2016 - Present        Hypokalemia ICD-10-CM: E87.6  ICD-9-CM: 276.8  11/20/2016 - Present        RESOLVED: Pneumonia ICD-10-CM: J18.9  ICD-9-CM: 712  10/31/2018 - 11/6/2018        * (Principal) RESOLVED: Other fracture of right femur, initial encounter for closed fracture ([facility] ICD-10-CM: K33.3Y5Q  ICD-9-CM: 821.00  10/31/2018 - 11/6/2018              Greater than 25 inutes were spent with the patient on counseling and coordination of care    Signed:   Yuli Garcia NP  11/7/2018  10:29 AM

## 2018-12-17 ENCOUNTER — HOSPITAL ENCOUNTER (OUTPATIENT)
Dept: CT IMAGING | Age: 83
Discharge: HOME | End: 2018-12-17
Payer: MEDICARE

## 2018-12-17 DIAGNOSIS — R10.9 ABDOMINAL PAIN: ICD-10-CM

## 2018-12-17 DIAGNOSIS — Z78.9 DISCOMFORT: ICD-10-CM

## 2018-12-17 PROCEDURE — 74176 CT ABD & PELVIS W/O CONTRAST: CPT

## 2019-01-28 ENCOUNTER — HOSPITAL ENCOUNTER (INPATIENT)
Age: 84
LOS: 4 days | Discharge: HOME HEALTH | DRG: 641 | End: 2019-02-01
Attending: EMERGENCY MEDICINE | Admitting: INTERNAL MEDICINE
Payer: MEDICARE

## 2019-01-28 DIAGNOSIS — E87.6 HYPOKALEMIA: ICD-10-CM

## 2019-01-28 DIAGNOSIS — E83.51 HYPOCALCEMIA: ICD-10-CM

## 2019-01-28 DIAGNOSIS — E83.42 HYPOMAGNESEMIA: Primary | ICD-10-CM

## 2019-01-28 LAB
ALBUMIN SERPL-MCNC: 2.6 G/DL (ref 3.5–5)
ALBUMIN/GLOB SERPL: 0.7 {RATIO} (ref 1.1–2.2)
ALP SERPL-CCNC: 152 U/L (ref 45–117)
ALT SERPL-CCNC: 9 U/L (ref 12–78)
ANION GAP SERPL CALC-SCNC: 10 MMOL/L (ref 5–15)
ARTERIAL PATENCY WRIST A: ABNORMAL
AST SERPL-CCNC: 16 U/L (ref 15–37)
BASE EXCESS BLD CALC-SCNC: 1 MMOL/L
BASOPHILS # BLD: 0.1 K/UL (ref 0–0.1)
BASOPHILS NFR BLD: 1 % (ref 0–1)
BDY SITE: ABNORMAL
BILIRUB SERPL-MCNC: 0.4 MG/DL (ref 0.2–1)
BUN SERPL-MCNC: 7 MG/DL (ref 6–20)
BUN/CREAT SERPL: 8 (ref 12–20)
CA-I BLD-SCNC: 0.69 MMOL/L (ref 1.12–1.32)
CALCIUM SERPL-MCNC: 5.6 MG/DL (ref 8.5–10.1)
CHLORIDE SERPL-SCNC: 100 MMOL/L (ref 97–108)
CO2 SERPL-SCNC: 27 MMOL/L (ref 21–32)
COMMENT, HOLDF: NORMAL
CREAT SERPL-MCNC: 0.89 MG/DL (ref 0.55–1.02)
DIFFERENTIAL METHOD BLD: ABNORMAL
EOSINOPHIL # BLD: 0.2 K/UL (ref 0–0.4)
EOSINOPHIL NFR BLD: 3 % (ref 0–7)
ERYTHROCYTE [DISTWIDTH] IN BLOOD BY AUTOMATED COUNT: 13.2 % (ref 11.5–14.5)
GAS FLOW.O2 O2 DELIVERY SYS: ABNORMAL L/MIN
GLOBULIN SER CALC-MCNC: 3.6 G/DL (ref 2–4)
GLUCOSE SERPL-MCNC: 80 MG/DL (ref 65–100)
HCO3 BLD-SCNC: 26 MMOL/L (ref 22–26)
HCT VFR BLD AUTO: 36.8 % (ref 35–47)
HGB BLD-MCNC: 11.2 G/DL (ref 11.5–16)
IMM GRANULOCYTES # BLD AUTO: 0 K/UL (ref 0–0.04)
IMM GRANULOCYTES NFR BLD AUTO: 0 % (ref 0–0.5)
LYMPHOCYTES # BLD: 1.5 K/UL (ref 0.8–3.5)
LYMPHOCYTES NFR BLD: 21 % (ref 12–49)
MAGNESIUM SERPL-MCNC: 0.7 MG/DL (ref 1.6–2.4)
MCH RBC QN AUTO: 27.8 PG (ref 26–34)
MCHC RBC AUTO-ENTMCNC: 30.4 G/DL (ref 30–36.5)
MCV RBC AUTO: 91.3 FL (ref 80–99)
MONOCYTES # BLD: 0.7 K/UL (ref 0–1)
MONOCYTES NFR BLD: 9 % (ref 5–13)
NEUTS SEG # BLD: 4.8 K/UL (ref 1.8–8)
NEUTS SEG NFR BLD: 66 % (ref 32–75)
NRBC # BLD: 0 K/UL (ref 0–0.01)
NRBC BLD-RTO: 0 PER 100 WBC
PCO2 BLD: 41.1 MMHG (ref 35–45)
PH BLD: 7.41 [PH] (ref 7.35–7.45)
PHOSPHATE SERPL-MCNC: 4 MG/DL (ref 2.6–4.7)
PLATELET # BLD AUTO: 273 K/UL (ref 150–400)
PMV BLD AUTO: 9.6 FL (ref 8.9–12.9)
PO2 BLD: 22 MMHG (ref 80–100)
POTASSIUM SERPL-SCNC: 2.6 MMOL/L (ref 3.5–5.1)
PROT SERPL-MCNC: 6.2 G/DL (ref 6.4–8.2)
RBC # BLD AUTO: 4.03 M/UL (ref 3.8–5.2)
SAMPLES BEING HELD,HOLD: NORMAL
SAO2 % BLD: 38 % (ref 92–97)
SODIUM SERPL-SCNC: 137 MMOL/L (ref 136–145)
SPECIMEN TYPE: ABNORMAL
WBC # BLD AUTO: 7.3 K/UL (ref 3.6–11)

## 2019-01-28 PROCEDURE — 74011250637 HC RX REV CODE- 250/637: Performed by: INTERNAL MEDICINE

## 2019-01-28 PROCEDURE — 74011000258 HC RX REV CODE- 258: Performed by: INTERNAL MEDICINE

## 2019-01-28 PROCEDURE — 93005 ELECTROCARDIOGRAM TRACING: CPT

## 2019-01-28 PROCEDURE — 99285 EMERGENCY DEPT VISIT HI MDM: CPT

## 2019-01-28 PROCEDURE — 83735 ASSAY OF MAGNESIUM: CPT

## 2019-01-28 PROCEDURE — 65270000029 HC RM PRIVATE

## 2019-01-28 PROCEDURE — 80053 COMPREHEN METABOLIC PANEL: CPT

## 2019-01-28 PROCEDURE — 82803 BLOOD GASES ANY COMBINATION: CPT

## 2019-01-28 PROCEDURE — 84100 ASSAY OF PHOSPHORUS: CPT

## 2019-01-28 PROCEDURE — 74011250636 HC RX REV CODE- 250/636: Performed by: INTERNAL MEDICINE

## 2019-01-28 PROCEDURE — 99218 HC RM OBSERVATION: CPT

## 2019-01-28 PROCEDURE — 96365 THER/PROPH/DIAG IV INF INIT: CPT

## 2019-01-28 PROCEDURE — 74011250636 HC RX REV CODE- 250/636: Performed by: EMERGENCY MEDICINE

## 2019-01-28 PROCEDURE — 65660000000 HC RM CCU STEPDOWN

## 2019-01-28 PROCEDURE — 74011250637 HC RX REV CODE- 250/637: Performed by: EMERGENCY MEDICINE

## 2019-01-28 PROCEDURE — 85025 COMPLETE CBC W/AUTO DIFF WBC: CPT

## 2019-01-28 PROCEDURE — 74011000250 HC RX REV CODE- 250: Performed by: INTERNAL MEDICINE

## 2019-01-28 RX ORDER — FOLIC ACID 1 MG/1
1 TABLET ORAL DAILY
Status: DISCONTINUED | OUTPATIENT
Start: 2019-01-29 | End: 2019-02-01 | Stop reason: HOSPADM

## 2019-01-28 RX ORDER — SODIUM CHLORIDE 0.9 % (FLUSH) 0.9 %
5-40 SYRINGE (ML) INJECTION AS NEEDED
Status: DISCONTINUED | OUTPATIENT
Start: 2019-01-28 | End: 2019-02-01 | Stop reason: HOSPADM

## 2019-01-28 RX ORDER — SODIUM CHLORIDE 0.9 % (FLUSH) 0.9 %
5-40 SYRINGE (ML) INJECTION EVERY 8 HOURS
Status: DISCONTINUED | OUTPATIENT
Start: 2019-01-28 | End: 2019-02-01 | Stop reason: HOSPADM

## 2019-01-28 RX ORDER — POTASSIUM CHLORIDE 14.9 MG/ML
10 INJECTION INTRAVENOUS
Status: DISPENSED | OUTPATIENT
Start: 2019-01-28 | End: 2019-01-28

## 2019-01-28 RX ORDER — PROMETHAZINE HYDROCHLORIDE 25 MG/1
12.5 TABLET ORAL
Status: DISCONTINUED | OUTPATIENT
Start: 2019-01-28 | End: 2019-02-01 | Stop reason: HOSPADM

## 2019-01-28 RX ORDER — CALCIUM CARBONATE 500(1250)
500 TABLET ORAL DAILY
Status: DISCONTINUED | OUTPATIENT
Start: 2019-01-29 | End: 2019-01-29

## 2019-01-28 RX ORDER — TRAMADOL HYDROCHLORIDE 50 MG/1
50 TABLET ORAL
Status: DISCONTINUED | OUTPATIENT
Start: 2019-01-28 | End: 2019-02-01 | Stop reason: HOSPADM

## 2019-01-28 RX ORDER — BUMETANIDE 0.5 MG/1
0.5 TABLET ORAL DAILY
COMMUNITY
End: 2019-02-01

## 2019-01-28 RX ORDER — LANOLIN ALCOHOL/MO/W.PET/CERES
400 CREAM (GRAM) TOPICAL 2 TIMES DAILY
Status: DISCONTINUED | OUTPATIENT
Start: 2019-01-28 | End: 2019-02-01 | Stop reason: HOSPADM

## 2019-01-28 RX ORDER — MUPIROCIN 20 MG/G
OINTMENT TOPICAL DAILY
Status: DISCONTINUED | OUTPATIENT
Start: 2019-01-29 | End: 2019-02-01 | Stop reason: HOSPADM

## 2019-01-28 RX ORDER — MENTHOL AND ZINC OXIDE .44; 20.625 G/100G; G/100G
OINTMENT TOPICAL DAILY
COMMUNITY

## 2019-01-28 RX ORDER — LANOLIN ALCOHOL/MO/W.PET/CERES
5 CREAM (GRAM) TOPICAL
Status: DISCONTINUED | OUTPATIENT
Start: 2019-01-28 | End: 2019-02-01 | Stop reason: HOSPADM

## 2019-01-28 RX ORDER — LATANOPROST 50 UG/ML
1 SOLUTION/ DROPS OPHTHALMIC
Status: DISCONTINUED | OUTPATIENT
Start: 2019-01-28 | End: 2019-02-01 | Stop reason: HOSPADM

## 2019-01-28 RX ORDER — POTASSIUM CHLORIDE 1.5 G/1.77G
40 POWDER, FOR SOLUTION ORAL
Status: ACTIVE | OUTPATIENT
Start: 2019-01-28 | End: 2019-01-29

## 2019-01-28 RX ORDER — AMOXICILLIN AND CLAVULANATE POTASSIUM 875; 125 MG/1; MG/1
1 TABLET, FILM COATED ORAL 2 TIMES DAILY
COMMUNITY
Start: 2019-01-22 | End: 2019-02-01

## 2019-01-28 RX ORDER — MAGNESIUM SULFATE HEPTAHYDRATE 40 MG/ML
2 INJECTION, SOLUTION INTRAVENOUS ONCE
Status: COMPLETED | OUTPATIENT
Start: 2019-01-28 | End: 2019-01-28

## 2019-01-28 RX ORDER — MELATONIN
1000 DAILY
Status: DISCONTINUED | OUTPATIENT
Start: 2019-01-29 | End: 2019-02-01 | Stop reason: HOSPADM

## 2019-01-28 RX ORDER — TRIAMCINOLONE ACETONIDE 1 MG/G
OINTMENT TOPICAL AS NEEDED
Status: DISCONTINUED | OUTPATIENT
Start: 2019-01-28 | End: 2019-02-01 | Stop reason: HOSPADM

## 2019-01-28 RX ORDER — CARVEDILOL 3.12 MG/1
3.12 TABLET ORAL 2 TIMES DAILY WITH MEALS
Status: DISCONTINUED | OUTPATIENT
Start: 2019-01-29 | End: 2019-02-01 | Stop reason: HOSPADM

## 2019-01-28 RX ORDER — LANOLIN ALCOHOL/MO/W.PET/CERES
100 CREAM (GRAM) TOPICAL DAILY
Status: DISCONTINUED | OUTPATIENT
Start: 2019-01-29 | End: 2019-02-01 | Stop reason: HOSPADM

## 2019-01-28 RX ORDER — POTASSIUM CHLORIDE 750 MG/1
40 TABLET, FILM COATED, EXTENDED RELEASE ORAL
Status: COMPLETED | OUTPATIENT
Start: 2019-01-28 | End: 2019-01-28

## 2019-01-28 RX ORDER — CALCIUM CARBONATE 500(1250)
500 TABLET ORAL
Status: DISCONTINUED | OUTPATIENT
Start: 2019-01-28 | End: 2019-01-29

## 2019-01-28 RX ORDER — MIRTAZAPINE 15 MG/1
30 TABLET, FILM COATED ORAL
Status: DISCONTINUED | OUTPATIENT
Start: 2019-01-28 | End: 2019-02-01 | Stop reason: HOSPADM

## 2019-01-28 RX ORDER — CALCIUM CARBONATE 500(1250)
1 TABLET ORAL DAILY
COMMUNITY
End: 2019-02-01

## 2019-01-28 RX ORDER — MAGNESIUM SULFATE 1 G/100ML
1 INJECTION INTRAVENOUS
Status: COMPLETED | OUTPATIENT
Start: 2019-01-28 | End: 2019-01-28

## 2019-01-28 RX ORDER — ACETAMINOPHEN 325 MG/1
650 TABLET ORAL EVERY 6 HOURS
Status: DISCONTINUED | OUTPATIENT
Start: 2019-01-28 | End: 2019-02-01 | Stop reason: HOSPADM

## 2019-01-28 RX ORDER — POTASSIUM CHLORIDE 1.5 G/1.77G
40 POWDER, FOR SOLUTION ORAL
Status: COMPLETED | OUTPATIENT
Start: 2019-01-28 | End: 2019-01-28

## 2019-01-28 RX ORDER — BUMETANIDE 0.5 MG/1
0.5 TABLET ORAL DAILY
Status: CANCELLED | OUTPATIENT
Start: 2019-01-29

## 2019-01-28 RX ORDER — AMOXICILLIN AND CLAVULANATE POTASSIUM 875; 125 MG/1; MG/1
1 TABLET, FILM COATED ORAL 2 TIMES DAILY
Status: COMPLETED | OUTPATIENT
Start: 2019-01-28 | End: 2019-01-29

## 2019-01-28 RX ORDER — ENOXAPARIN SODIUM 100 MG/ML
40 INJECTION SUBCUTANEOUS EVERY 24 HOURS
Status: DISCONTINUED | OUTPATIENT
Start: 2019-01-28 | End: 2019-02-01 | Stop reason: HOSPADM

## 2019-01-28 RX ORDER — TRAMADOL HYDROCHLORIDE 50 MG/1
50 TABLET ORAL
COMMUNITY

## 2019-01-28 RX ORDER — ZINC OXIDE 20 G/100G
OINTMENT TOPICAL AS NEEDED
Status: DISCONTINUED | OUTPATIENT
Start: 2019-01-29 | End: 2019-02-01 | Stop reason: HOSPADM

## 2019-01-28 RX ORDER — MUPIROCIN 20 MG/G
OINTMENT TOPICAL DAILY
COMMUNITY

## 2019-01-28 RX ADMIN — POTASSIUM CHLORIDE 40 MEQ: 750 TABLET, EXTENDED RELEASE ORAL at 18:12

## 2019-01-28 RX ADMIN — MAGNESIUM SULFATE IN DEXTROSE 1 G: 10 INJECTION, SOLUTION INTRAVENOUS at 18:27

## 2019-01-28 RX ADMIN — POTASSIUM CHLORIDE 40 MEQ: 1.5 POWDER, FOR SOLUTION ORAL at 19:35

## 2019-01-28 RX ADMIN — AMOXICILLIN AND CLAVULANATE POTASSIUM 1 TABLET: 875; 125 TABLET, FILM COATED ORAL at 22:49

## 2019-01-28 RX ADMIN — MAGNESIUM SULFATE HEPTAHYDRATE 2 G: 40 INJECTION, SOLUTION INTRAVENOUS at 20:42

## 2019-01-28 RX ADMIN — ACETAMINOPHEN 650 MG: 325 TABLET ORAL at 22:49

## 2019-01-28 RX ADMIN — POTASSIUM CHLORIDE 10 MEQ: 200 INJECTION, SOLUTION INTRAVENOUS at 22:44

## 2019-01-28 RX ADMIN — ENOXAPARIN SODIUM 40 MG: 40 INJECTION SUBCUTANEOUS at 22:50

## 2019-01-28 RX ADMIN — Medication 10 ML: at 22:51

## 2019-01-28 RX ADMIN — LATANOPROST 1 DROP: 50 SOLUTION OPHTHALMIC at 23:05

## 2019-01-28 RX ADMIN — TRAMADOL HYDROCHLORIDE 50 MG: 50 TABLET, FILM COATED ORAL at 22:50

## 2019-01-28 RX ADMIN — CALCIUM GLUCONATE 2 G: 98 INJECTION, SOLUTION INTRAVENOUS at 19:29

## 2019-01-28 RX ADMIN — Medication 500 MG: at 19:35

## 2019-01-28 RX ADMIN — Medication 400 MG: at 19:35

## 2019-01-28 RX ADMIN — POTASSIUM CHLORIDE 10 MEQ: 200 INJECTION, SOLUTION INTRAVENOUS at 19:57

## 2019-01-28 NOTE — CONSULTS
I called by ER physician regarding  electrolyte abnormalities includes hypomagnesemia 0.7, hypokalemia 2.7 and hypocalcemia 5.4 corrected ca was 6.4    Plan:  Give calcium gluconate 2 gm IVX1 then start on calcium carbonate 650 mg TID  Give IV KCL 40 meq x1 and give oral 40 meq  Give Mag sulfate 3 gm IVX1 and then start with mag oxide 400 mg BID  Check BMP at 8 pm.     Plan is discussed with ER physician.     Thanks  Felipa Hair MD

## 2019-01-28 NOTE — ED PROVIDER NOTES
80 y.o. female with past medical history significant for h/o NSTEMI, arthritis, s/p cholecystectomy, who presents to the ED via EMS, with chief complaint of abnormal lab results and numbness. Pt reports that she is here in the ED due to abnormal lab results taken today at her assisted living facility. Review of medical records indicate that the patient's potassium level was \"2.81\" and her calcium level was \"5.3\" earlier today. Pt complains of numbness and \"tingling\" in her bilateral hands and feet, and notes that her hands are \"cramping\". She also notes that she has had a lack of appetite and has not been sleeping since her recent move into [facility] ~1 week ago. Pt also notes that she has cellulitis of the left leg and receives wound care, noting that they visited this morning. States that her wound is dressed but is \"leaking and bleeding\". Pt also reports that she experiences intermittent nausea, chest pain and shortness of breath \"from time to time\", but states that these symptoms are not why she is here in the ED today. Pt specifically denies vomiting. There are no other acute medical concerns at this time. Review of medical records indicate that the patient is taking calcium supplements and is on Augmentin for cellulitis. Social hx: Negative for Tobacco use; Positive for EtOH use; Negative for Illicit Drug use PCP: Ron Bosworth, MD 
 
Note written by [facility], as dictated by Amarilys Sanchez MD 4:21 PM 
 
 
The history is provided by the patient and medical records. No  was used. Past Medical History:  
Diagnosis Date  Arthritis  History of non-ST elevation myocardial infarction (NSTEMI) 11/28/2016 Past Surgical History:  
Procedure Laterality Date  HX CHOLECYSTECTOMY History reviewed. No pertinent family history. Social History Socioeconomic History  Marital status:  Spouse name: Not on file  Number of children: Not on file  Years of education: Not on file  Highest education level: Not on file Social Needs  Financial resource strain: Not on file  Food insecurity - worry: Not on file  Food insecurity - inability: Not on file  Transportation needs - medical: Not on file  Transportation needs - non-medical: Not on file Occupational History  Not on file Tobacco Use  Smoking status: Never Smoker  Smokeless tobacco: Never Used Substance and Sexual Activity  Alcohol use: Yes Comment: a couple of scotch drinks daily  Drug use: No  
 Sexual activity: Not on file Other Topics Concern  Not on file Social History Narrative  Not on file ALLERGIES: Codeine and Prednisone Review of Systems Constitutional: Positive for appetite change. Negative for chills, diaphoresis and fever. HENT: Negative for congestion, postnasal drip, rhinorrhea and sore throat. Eyes: Negative for photophobia, discharge, redness and visual disturbance. Respiratory: Positive for shortness of breath. Negative for cough, chest tightness and wheezing. Cardiovascular: Positive for chest pain. Negative for palpitations and leg swelling. Gastrointestinal: Positive for nausea. Negative for abdominal distention, abdominal pain, blood in stool, constipation, diarrhea and vomiting. Genitourinary: Negative for difficulty urinating, dysuria, frequency, hematuria and urgency. Musculoskeletal: Negative for arthralgias, back pain, joint swelling and myalgias. Skin: Positive for wound (left leg). Negative for color change and rash. Neurological: Positive for numbness (bilateral hands, bilateral feet). Negative for dizziness, speech difficulty, weakness, light-headedness and headaches. Psychiatric/Behavioral: Positive for sleep disturbance. Negative for confusion. The patient is not nervous/anxious. All other systems reviewed and are negative. Vitals:  
 01/28/19 1635 01/28/19 1645 01/28/19 1730 BP: 109/90 122/62 117/81 Pulse: 100 (!) 113 94 Resp: 25 16 16 Temp: 97.6 °F (36.4 °C) SpO2: 100% 100% 97% Weight: 59.4 kg (131 lb) Height: 5' (1.524 m) Physical Exam  
Constitutional: She is oriented to person, place, and time. She appears well-developed and well-nourished. No distress. HENT:  
Head: Normocephalic and atraumatic. Right Ear: External ear normal.  
Left Ear: External ear normal.  
Nose: Nose normal.  
Mouth/Throat: Oropharynx is clear and moist.  
Eyes: Conjunctivae and EOM are normal. Pupils are equal, round, and reactive to light. No scleral icterus. Neck: Normal range of motion. Neck supple. No JVD present. No tracheal deviation present. No thyromegaly present. Cardiovascular: Normal rate, regular rhythm and normal heart sounds. Exam reveals no gallop and no friction rub. No murmur heard. Pulmonary/Chest: Effort normal and breath sounds normal. No respiratory distress. She has no wheezes. She has no rales. She exhibits no tenderness. Abdominal: Soft. Bowel sounds are normal. She exhibits no distension and no mass. There is no tenderness. There is no rebound and no guarding. Musculoskeletal: Normal range of motion. She exhibits edema. She exhibits no tenderness. Chronic bilateral leg edema (L>R). Erythema and warmth with left leg. Dressing on left leg. Lymphadenopathy:  
  She has no cervical adenopathy. Neurological: She is alert and oriented to person, place, and time. She has normal strength. She displays no atrophy and no tremor. No cranial nerve deficit. She exhibits normal muscle tone. Coordination and gait normal.  
Positive [place] sign. Skin: Skin is warm and dry. No rash noted. She is not diaphoretic. Psychiatric: She has a normal mood and affect.  Her behavior is normal. Judgment and thought content normal.  
 Nursing note and vitals reviewed. Note written by Markus Santiago, as dictated by Siva Yap MD 4:29 PM 
 
MDM Number of Diagnoses or Management Options Diagnosis management comments: Cailin Ruby Impression: 80-year-old female referred here from her living care facility for abnormal lab values which revealed low potassium and low calcium. Of note the patient has been experiencing what she describes as burning into her hands. Plan of care we repeated baseline labs, and treat accordingly. Procedures ED EKG interpretation: 
Rhythm: sinus tachycardia and RBBB; Rate (approx.): 120; Axis: normal; 
Note written by Markus Santiago, as dictated by Siva Yap MD 4:42 PM 
 
  
Hospitalist TigerText for Admission 6:09 PM 
 
ED Room Number: ZY60/97 Patient Name and age: Cindy Riding 80 y.o.  female Working Diagnosis: 1. Hypomagnesemia 2. Hypocalcemia 3. Hypokalemia Readmission: no 
Isolation Requirements:  no 
Recommended Level of Care:  telemetry Code Status:  Full Other:  none CONSULT NOTE: 
6:11 PM Siva Yap MD communicated with Dr. Marry Carrera MD, Consult for Hospitalist via [facility] Text. Discussed available diagnostic tests and clinical findings. Dr. Conor Del Real will evaluate the patient for admission to the hospital. Recommends consulting with nephrology. 6:11 PM 
Patient is being admitted to the hospital.  The results of their tests and reasons for their admission have been discussed with them and/or available family. They convey agreement and understanding for the need to be admitted and for their admission diagnosis. Consultation will be made now with the inpatient physician for hospitalization. CONSULT NOTE: 
6:41 PM Siva Yap MD spoke with Consult for Nephrology. Discussed available diagnostic tests and clinical findings. Nephrologist has already placed additional orders for the patient.

## 2019-01-28 NOTE — ED TRIAGE NOTES
Patient arrives from [facility] after abnormal labs this am.  Patient also has bilateral hand numbness x1 week. Also has LEFT leg cellulitis with weeping. VS stable en route per EMS.

## 2019-01-28 NOTE — PROGRESS NOTES
Admission Medication Reconciliation: 
 
Information obtained from: Patient's transfer MAR from [address] facility Significant PMH/Disease States:  
Past Medical History:  
Diagnosis Date  Arthritis  History of non-ST elevation myocardial infarction (NSTEMI) 11/28/2016 Chief Complaint for this Admission: Chief Complaint Patient presents with  Abnormal Lab Results  Numbness Allergies:  Codeine and Prednisone Prior to Admission Medications:  
Prior to Admission Medications Prescriptions Last Dose Informant Patient Reported? Taking?  
acetaminophen (TYLENOL) 325 mg tablet 1/28/2019 at Unknown time  No Yes Sig: Take 2 Tabs by mouth every six (6) hours. amoxicillin-clavulanate (AUGMENTIN) 875-125 mg per tablet 1/28/2019 at Unknown time  Yes Yes Sig: Take 1 Tab by mouth two (2) times a day. bumetanide (BUMEX) 0.5 mg tablet 1/28/2019 at Unknown time  Yes Yes Sig: Take 0.5 mg by mouth daily. calcium carbonate (OS-DAVIAN) 500 mg calcium (1,250 mg) tablet 1/28/2019 at Unknown time  Yes Yes Sig: Take 1 Tab by mouth daily. carvedilol (COREG) 3.125 mg tablet 1/28/2019 at Unknown time  Yes Yes Sig: Take  by mouth two (2) times daily (with meals). cholecalciferol (VITAMIN D3) 1,000 unit tablet 1/28/2019 at Unknown time  Yes Yes Sig: Take 1,000 Units by mouth daily. diclofenac (VOLTAREN) 1 % gel 1/21/2019 at Unknown time  Yes Yes Sig: Apply  to affected area three (3) times daily. folic acid (FOLVITE) 1 mg tablet 1/28/2019 at Unknown time  No Yes Sig: Take 1 Tab by mouth daily. latanoprost (XALATAN) 0.005 % ophthalmic solution 1/27/2019 at Unknown time  Yes Yes Sig: Administer 1 Drop to both eyes nightly. melatonin tab tablet 1/27/2019 at Unknown time  Yes Yes Sig: Take 5 mg by mouth nightly. menthol-zinc oxide (CALMOSEPTINE) 0.44-20.6 % oint 1/28/2019 at Unknown time  Yes Yes Sig: Apply  to affected area daily. mirtazapine (REMERON) 30 mg tablet 1/27/2019 at Unknown time  Yes Yes Sig: Take 30 mg by mouth nightly. mupirocin (BACTROBAN) 2 % ointment 1/28/2019 at Unknown time  Yes Yes Sig: Apply  to affected area daily. omeprazole (PRILOSEC) 20 mg capsule 1/28/2019 at Unknown time  Yes Yes Sig: Take 20 mg by mouth daily. promethazine (PHENERGAN) 12.5 mg tablet 1/21/2019 at Unknown time  Yes Yes Sig: Take  by mouth three (3) times daily as needed for Nausea. thiamine (B-1) 100 mg tablet 1/28/2019 at Unknown time  Yes Yes Sig: Take  by mouth daily. traMADol (ULTRAM) 50 mg tablet   No No  
Sig: Take 1 Tab by mouth every six (6) hours as needed. Max Daily Amount: 200 mg.  
traMADol (ULTRAM) 50 mg tablet 1/27/2019 at Unknown time  Yes Yes Sig: Take 50 mg by mouth nightly. triamcinolone acetonide (KENALOG) 0.1 % ointment 1/21/2019 at Unknown time  Yes Yes Sig: Apply  to affected area as needed for Skin Irritation. use thin layer Facility-Administered Medications: None Comments/Recommendations: Medication reconciliation completed for this patient utilizing the transfer paperwork provided by [facility]. The following updates were made to the PTA medication list: 
 
Removed:  Nystatin powder Added:  Augmentin, bactroban, calmoseptine, calcium, and bumex. Of note:  Per the [facility], patient has been on augmentin twice daily since 1/22/2019 and this was set to finish on 1/29/2019. She took her morning medications today and last had her night time medications yesterday, 1/27/2019. Also of note: The patient's transfer [facility] lists the fact that daily alcohol is allowed but not specified. Thank you for allowing me to participate in the care of this patient. If there are any further questions, please contact the pharmacy at  or the medication reconciliation pharmacist at . Melinda Reilly., BCPS

## 2019-01-29 ENCOUNTER — APPOINTMENT (OUTPATIENT)
Dept: VASCULAR SURGERY | Age: 84
DRG: 641 | End: 2019-01-29
Attending: INTERNAL MEDICINE
Payer: MEDICARE

## 2019-01-29 LAB
ANION GAP SERPL CALC-SCNC: 11 MMOL/L (ref 5–15)
ATRIAL RATE: 117 BPM
ATRIAL RATE: 127 BPM
BUN SERPL-MCNC: 6 MG/DL (ref 6–20)
BUN/CREAT SERPL: 8 (ref 12–20)
CALCIUM SERPL-MCNC: 5.9 MG/DL (ref 8.5–10.1)
CALCULATED R AXIS, ECG10: -25 DEGREES
CALCULATED R AXIS, ECG10: -39 DEGREES
CALCULATED T AXIS, ECG11: -15 DEGREES
CALCULATED T AXIS, ECG11: -9 DEGREES
CHLORIDE SERPL-SCNC: 106 MMOL/L (ref 97–108)
CO2 SERPL-SCNC: 24 MMOL/L (ref 21–32)
CREAT SERPL-MCNC: 0.8 MG/DL (ref 0.55–1.02)
DIAGNOSIS, 93000: NORMAL
DIAGNOSIS, 93000: NORMAL
ERYTHROCYTE [DISTWIDTH] IN BLOOD BY AUTOMATED COUNT: 13.2 % (ref 11.5–14.5)
GLUCOSE SERPL-MCNC: 69 MG/DL (ref 65–100)
HCT VFR BLD AUTO: 33.4 % (ref 35–47)
HGB BLD-MCNC: 10.3 G/DL (ref 11.5–16)
MAGNESIUM SERPL-MCNC: 1.7 MG/DL (ref 1.6–2.4)
MCH RBC QN AUTO: 28.3 PG (ref 26–34)
MCHC RBC AUTO-ENTMCNC: 30.8 G/DL (ref 30–36.5)
MCV RBC AUTO: 91.8 FL (ref 80–99)
NRBC # BLD: 0 K/UL (ref 0–0.01)
NRBC BLD-RTO: 0 PER 100 WBC
PLATELET # BLD AUTO: 213 K/UL (ref 150–400)
PMV BLD AUTO: 9.1 FL (ref 8.9–12.9)
POTASSIUM SERPL-SCNC: 3.7 MMOL/L (ref 3.5–5.1)
Q-T INTERVAL, ECG07: 370 MS
Q-T INTERVAL, ECG07: 424 MS
QRS DURATION, ECG06: 118 MS
QRS DURATION, ECG06: 120 MS
QTC CALCULATION (BEZET), ECG08: 537 MS
QTC CALCULATION (BEZET), ECG08: 602 MS
RBC # BLD AUTO: 3.64 M/UL (ref 3.8–5.2)
SODIUM SERPL-SCNC: 141 MMOL/L (ref 136–145)
VENTRICULAR RATE, ECG03: 121 BPM
VENTRICULAR RATE, ECG03: 127 BPM
WBC # BLD AUTO: 6.3 K/UL (ref 3.6–11)

## 2019-01-29 PROCEDURE — 83735 ASSAY OF MAGNESIUM: CPT

## 2019-01-29 PROCEDURE — 36415 COLL VENOUS BLD VENIPUNCTURE: CPT

## 2019-01-29 PROCEDURE — 74011000258 HC RX REV CODE- 258: Performed by: INTERNAL MEDICINE

## 2019-01-29 PROCEDURE — 80048 BASIC METABOLIC PNL TOTAL CA: CPT

## 2019-01-29 PROCEDURE — 74011250637 HC RX REV CODE- 250/637: Performed by: INTERNAL MEDICINE

## 2019-01-29 PROCEDURE — 74011250636 HC RX REV CODE- 250/636: Performed by: INTERNAL MEDICINE

## 2019-01-29 PROCEDURE — 65660000000 HC RM CCU STEPDOWN

## 2019-01-29 PROCEDURE — 93970 EXTREMITY STUDY: CPT

## 2019-01-29 PROCEDURE — 65270000029 HC RM PRIVATE

## 2019-01-29 PROCEDURE — 85027 COMPLETE CBC AUTOMATED: CPT

## 2019-01-29 RX ORDER — CALCIUM CARBONATE 500(1250)
1000 TABLET ORAL 2 TIMES DAILY
Status: DISCONTINUED | OUTPATIENT
Start: 2019-01-29 | End: 2019-01-30

## 2019-01-29 RX ORDER — CALCIUM CARBONATE 500(1250)
1500 TABLET ORAL 2 TIMES DAILY
Status: DISCONTINUED | OUTPATIENT
Start: 2019-01-29 | End: 2019-01-29

## 2019-01-29 RX ADMIN — AMOXICILLIN AND CLAVULANATE POTASSIUM 1 TABLET: 875; 125 TABLET, FILM COATED ORAL at 09:29

## 2019-01-29 RX ADMIN — TRAMADOL HYDROCHLORIDE 50 MG: 50 TABLET, FILM COATED ORAL at 21:17

## 2019-01-29 RX ADMIN — CALCIUM GLUCONATE 2 G: 98 INJECTION, SOLUTION INTRAVENOUS at 06:59

## 2019-01-29 RX ADMIN — VITAMIN D, TAB 1000IU (100/BT) 1000 UNITS: 25 TAB at 09:29

## 2019-01-29 RX ADMIN — FOLIC ACID 1 MG: 1 TABLET ORAL at 09:29

## 2019-01-29 RX ADMIN — ACETAMINOPHEN 650 MG: 325 TABLET ORAL at 20:58

## 2019-01-29 RX ADMIN — CARVEDILOL 3.12 MG: 3.12 TABLET, FILM COATED ORAL at 17:42

## 2019-01-29 RX ADMIN — Medication 4.5 MG: at 20:59

## 2019-01-29 RX ADMIN — CALCIUM 500 MG: 500 TABLET ORAL at 09:29

## 2019-01-29 RX ADMIN — CALCIUM GLUCONATE 2 G: 98 INJECTION, SOLUTION INTRAVENOUS at 10:21

## 2019-01-29 RX ADMIN — Medication 400 MG: at 09:29

## 2019-01-29 RX ADMIN — ENOXAPARIN SODIUM 40 MG: 40 INJECTION SUBCUTANEOUS at 21:00

## 2019-01-29 RX ADMIN — CALCIUM 1000 MG: 500 TABLET ORAL at 17:42

## 2019-01-29 RX ADMIN — ACETAMINOPHEN 650 MG: 325 TABLET ORAL at 04:34

## 2019-01-29 RX ADMIN — AMOXICILLIN AND CLAVULANATE POTASSIUM 1 TABLET: 875; 125 TABLET, FILM COATED ORAL at 17:42

## 2019-01-29 RX ADMIN — MIRTAZAPINE 30 MG: 15 TABLET, FILM COATED ORAL at 20:58

## 2019-01-29 RX ADMIN — Medication 100 MG: at 09:29

## 2019-01-29 RX ADMIN — MUPIROCIN: 20 OINTMENT TOPICAL at 09:00

## 2019-01-29 RX ADMIN — Medication 500 MG: at 09:34

## 2019-01-29 RX ADMIN — Medication 400 MG: at 17:42

## 2019-01-29 RX ADMIN — CARVEDILOL 3.12 MG: 3.12 TABLET, FILM COATED ORAL at 09:29

## 2019-01-29 RX ADMIN — ACETAMINOPHEN 650 MG: 325 TABLET ORAL at 09:29

## 2019-01-29 RX ADMIN — Medication 10 ML: at 20:59

## 2019-01-29 RX ADMIN — ACETAMINOPHEN 650 MG: 325 TABLET ORAL at 17:42

## 2019-01-29 NOTE — PROGRESS NOTES
Hospitalist Progress Note Eda Thompson MD. Cell: [phone] NAME:  Dottie Dyer :  10/17/1927 MRN:  [id number] Date of Service:  2019 Summary: Dottie Dyer is a 80 y.o. female with PMH of CAD?, OA who presents with extremity numbness. Has been having progressively worsening numbness and cramping in her hands and feet for the past 3-4 days. She was found to have severe electrolyte derangement on admission Assessment/Plan: 
Electrolyte derangements - Hypokalemia - Hypocalcemia 
- hypomagnesemia Requiring kcl, magnesium sulfate and calcium gluconate supplementation. Replete prn Bilateral upper extremity numbness: Due to above 
resolving Left lower extremity cellulitis; S/p Augmentin therapy. No evidence SIRS or infection. H/o right femur fracture s/p ORIF Ambulates on wheel chair CAD: Continue coreg Diarrhea: Probably related to antibiotics- Augmentin Monitor. Unlikely due to c difficile. No leucocytosis or abdominal pain. Code status: Full code, recommend DNR 
DVT prophylaxsis:Lovenox Dispo: Likely within the next 1-2 days to prior living arrangement. Interval History/Subjective: 
 
F/u for bilateral upper extremity numbness. States numbness is resolving. She is also complaining of diarrhea up to 5 x today. No fever or chills. No abdominal pain. No N/V. Review of Systems: 
Pertinent items are noted in HPI. Objective: VITALS:  
Last 24hrs VS reviewed since prior progress note. Most recent are: 
Visit Vitals /75 Pulse 86 Temp 97.6 °F (36.4 °C) Resp 18 Ht 5' (1.524 m) Wt 59.4 kg (131 lb) SpO2 97% BMI 25.58 kg/m² No intake or output data in the 24 hours ending 19 1452 PHYSICAL EXAM: 
General: No acute distress, cooperative, pleasant EENT: EOMI. Anicteric sclerae. Oral mucous moist, oropharynx benign Resp: CTA bilaterally. No wheezing/rhonchi/rales. No accessory muscle use CV: Regular rhythm, normal rate, no murmurs, gallops, rubs GI: Soft, non distended, non tender. normoactive bowel sounds, no hepatosplenomegaly Extremities: No edema, warm, 2+ pulses throughout Neurologic: Moves all extremities. AAOx3, CN II-XII grossly intact Psych: Good insight. Not anxious nor agitated. Skin: Good Turgor, no rashes or ulcers Lab Data Personally Reviewed: (see below) Medications list Personally Reviewed:  x YES  NO  
 
_______________________________________________________________________ Care Plan discussed with:  Patient/Family and Nurse Total NON critical care TIME:  30 minutes Bishop Correia MD  
 
Procedures: see electronic medical records for all procedures/Xrays and details which were not copied into this note but were reviewed prior to creation of Plan. LABS: 
Recent Labs  
  01/29/19 
0437 01/28/19 
1623 WBC 6.3 7.3 HGB 10.3* 11.2* HCT 33.4* 36.8  273 Recent Labs  
  01/29/19 
0437 01/28/19 
1623  137  
K 3.7 2.6*  
 100 CO2 24 27 BUN 6 7 CREA 0.80 0.89 GLU 69 80  
CA 5.9* 5.6*  
MG 1.7 0.7* PHOS  --  4.0 Recent Labs  
  01/28/19 
1623 SGOT 16 ALT 9* * TBILI 0.4 TP 6.2* ALB 2.6*  
GLOB 3.6 No results for input(s): INR, PTP, APTT in the last 72 hours. No lab exists for component: INREXT No results for input(s): FE, TIBC, PSAT, FERR in the last 72 hours. Lab Results Component Value Date/Time Folate 5.8 03/08/2017 06:00 PM  
  
No results for input(s): PH, PCO2, PO2 in the last 72 hours. No results for input(s): CPK, CKNDX, TROIQ in the last 72 hours. No lab exists for component: CPKMB Lab Results Component Value Date/Time  Cholesterol, total 182 03/08/2017 06:00 PM  
 HDL Cholesterol 64 03/08/2017 06:00 PM  
 LDL, calculated 91.2 03/08/2017 06:00 PM  
 Triglyceride 134 03/08/2017 06:00 PM  
 CHOL/HDL Ratio 2.8 03/08/2017 06:00 PM  
 
Lab Results Component Value Date/Time Glucose (POC) 94 11/20/2016 07:00 AM  
 
Lab Results Component Value Date/Time  Color YELLOW/STRAW 10/31/2018 04:55 AM  
 Appearance CLOUDY (A) 10/31/2018 04:55 AM  
 Specific gravity 1.014 10/31/2018 04:55 AM  
 pH (UA) 5.5 10/31/2018 04:55 AM  
 Protein TRACE (A) 10/31/2018 04:55 AM  
 Glucose NEGATIVE  10/31/2018 04:55 AM  
 Ketone NEGATIVE  10/31/2018 04:55 AM  
 Bilirubin NEGATIVE  10/31/2018 04:55 AM  
 Urobilinogen 0.2 10/31/2018 04:55 AM  
 Nitrites NEGATIVE  10/31/2018 04:55 AM  
 Leukocyte Esterase LARGE (A) 10/31/2018 04:55 AM  
 Epithelial cells FEW 10/31/2018 04:55 AM  
 Bacteria NEGATIVE  10/31/2018 04:55 AM  
 WBC >100 (H) 10/31/2018 04:55 AM  
 RBC 0-5 10/31/2018 04:55 AM

## 2019-01-29 NOTE — ED NOTES
Updated Dr Gurwinder Dumont on patient's rate and repeated EKG with no changes, no change to orders.

## 2019-01-29 NOTE — PROGRESS NOTES
Care Management Interventions PCP Verified by CM: Yes(Dr Jalyn Swartz) Last Visit to PCP: 01/08/19 Palliative Care Criteria Met (RRAT>21 & CHF Dx)?: No 
Mode of Transport at Discharge: (tbd  may need stretcher ) Transition of Care Consult (CM Consult): (Patient to return if possible back to [facility] assisted lviing  facililty) Current Support Network: Assisted Living, Family Lives [facility]  ph [phone]) Confirm Follow Up Transport: (tbd ) Plan discussed with Pt/Family/Caregiver: Claus Donovan ) Discharge Location Discharge Placement: (tbd most likely [place])  went to see patient who was sleeping. I spoke with daughter Pollo Pa on the phone [phone]. Daughter presently has the flu and cannot visit her mom. I gave her the name of the nurse Anthony Sotelo who is her mom's nurse today. Per daughter mom had been at [facility] in the [address] [place] [address]. Per Helen Meigs mom fell at the assisted living and had sustained a fx hip. She came to the [facility] and had her hip surgery. Per daughter mom was able to use the rollator post surgery and went to [facility] assisted living located [address] [facility] [address] [facility] [address] She had fallen per our conversation and has now been evaluated by our wound care nurse at [facility]. Daughter is hesitant to discuss her mom even going to a short term skilled nursing facility. I have asked patient's nurse Anthony Sotelo to call patient's daughter this evening. Patient has an advanced directive in place and Daughter is adamant that if at all possible mom return to [address] [place] [address]. She states that they have [facility] see patient for skilled nursing and Physical Therapy. I spoke briefly with patient who had been taking a nap. Patient still complaining of some numbness in her hands and offers no complaints of leg pain.   Daughter has spoken to the director at [facility] and they will hopefully consider patient returning. Care management will follow for transitions of care needs.

## 2019-01-29 NOTE — ROUTINE PROCESS
TRANSFER - OUT REPORT: 
 
Verbal report given to [facility], RN(name) on Paxton Esteves  being transferred to [facility](unit) for routine progression of care Report consisted of patients Situation, Background, Assessment and  
Recommendations(SBAR). Information from the following report(s) SBAR was reviewed with the receiving nurse. Lines:  
Peripheral IV 01/28/19 Left Antecubital (Active) Site Assessment Clean, dry, & intact 1/28/2019  4:32 PM  
Phlebitis Assessment 0 1/28/2019  4:32 PM  
Infiltration Assessment 0 1/28/2019  4:32 PM  
Dressing Status Clean, dry, & intact 1/28/2019  4:32 PM  
Hub Color/Line Status Pink 1/28/2019  4:32 PM  
   
Peripheral IV 01/28/19 Right Forearm (Active) Site Assessment Clean, dry, & intact 1/28/2019  7:29 PM  
Phlebitis Assessment 0 1/28/2019  7:29 PM  
Infiltration Assessment 0 1/28/2019  7:29 PM  
Dressing Status Clean, dry, & intact 1/28/2019  7:29 PM  
Dressing Type Transparent 1/28/2019  7:29 PM  
  
 
Opportunity for questions and clarification was provided. Patient transported with: 
 Monitor

## 2019-01-29 NOTE — CONSULTS
[facility]    Name: Elke River      Admitted: 2019  MRN #: [id number]      : 10/17/1927  Account #: [de-identified]     Age: 80 y.o. [address] [facility]   Physician: Fred Hamilton MD        Reason for consult: Hypomagnesemia        REASON FOR ADMISSION:  Principal Problem:    Numbness and tingling (3/8/2017)        HISTORY OF PRESENT ILLNESS:    Ms. Diane Campo is a 81 y/o F with PMH listed below who presented with worsening numbness and tingling in hands for the past 3 days. No change in vision, LOC, seizure or headaches. She reports nausea and very poor po intake for the past week or so. She developed severe diarrhea yesterday. Labs notable for low K 2.6, Mg 0.7 and Ca 5.6. Cr 0.89, Albumin 2.6, phos 4, Na 137. PTA lasix and PPI were held. She was given Calcium gluconate 2 gx1, Magnesium sulfate 2 g x1 and KCl. PAST MEDICAL HISTORY:   CAD  TIA   GI bleed         MEDICATIONS ON ADMISSION:  Prior to Admission medications    Medication Sig Start Date End Date Taking? Authorizing Provider   traMADol (ULTRAM) 50 mg tablet Take 50 mg by mouth nightly. Yes Provider, Historical   mupirocin (BACTROBAN) 2 % ointment Apply  to affected area daily. Yes Provider, Historical   menthol-zinc oxide (CALMOSEPTINE) 0.44-20.6 % oint Apply  to affected area daily. Yes Provider, Historical   calcium carbonate (OS-DAVIAN) 500 mg calcium (1,250 mg) tablet Take 1 Tab by mouth daily. Yes Provider, Historical   bumetanide (BUMEX) 0.5 mg tablet Take 0.5 mg by mouth daily. Yes Provider, Historical   amoxicillin-clavulanate (AUGMENTIN) 875-125 mg per tablet Take 1 Tab by mouth two (2) times a day. 19 Yes Provider, Historical   acetaminophen (TYLENOL) 325 mg tablet Take 2 Tabs by mouth every six (6) hours. 18  Yes [place], NP   folic acid (FOLVITE) 1 mg tablet Take 1 Tab by mouth daily.  18  Yes Philly Carter NP   diclofenac (VOLTAREN) 1 % gel Apply  to affected area three (3) times daily. Yes Provider, Historical   melatonin tab tablet Take 5 mg by mouth nightly. Yes Provider, Historical   mirtazapine (REMERON) 30 mg tablet Take 30 mg by mouth nightly. Yes Provider, Historical   promethazine (PHENERGAN) 12.5 mg tablet Take  by mouth three (3) times daily as needed for Nausea. Yes Provider, Historical   triamcinolone acetonide (KENALOG) 0.1 % ointment Apply  to affected area as needed for Skin Irritation. use thin layer   Yes Provider, Historical   carvedilol (COREG) 3.125 mg tablet Take  by mouth two (2) times daily (with meals). Yes Provider, Historical   thiamine (B-1) 100 mg tablet Take  by mouth daily. Yes Provider, Historical   cholecalciferol (VITAMIN D3) 1,000 unit tablet Take 1,000 Units by mouth daily. Yes Provider, Historical   latanoprost (XALATAN) 0.005 % ophthalmic solution Administer 1 Drop to both eyes nightly. Yes Provider, Historical   omeprazole (PRILOSEC) 20 mg capsule Take 20 mg by mouth daily. Yes Provider, Historical   traMADol (ULTRAM) 50 mg tablet Take 1 Tab by mouth every six (6) hours as needed. Max Daily Amount: 200 mg. 11/6/18   Minor, Candis Borden NP       ALLERGIES:   Allergies   Allergen Reactions    Codeine Unknown (comments)    Prednisone Unknown (comments)       SOCIAL HISTORY:   No smoking, No alcohol use     FAMILY HISTORY:   Non-contributory. REVIEW OF SYSTEMS:   +numbness and tingling     All other 12 systems were reviewed and found neg     PHYSICAL EXAMINATION:      GENERAL:   She looks ill. She a 80 y.o.  female. Constitutional:  No acute distress, cooperative, pleasant    ENT:  Oral mucous moist, oropharynx benign. Neck supple,    Resp:  CTA bilaterally. No wheezing/rhonchi/rales. No accessory muscle use   CV:  Regular rhythm, normal rate, no murmurs    GI:  Soft, non distended, non tender.  normoactive bowel sounds, no hepatosplenomegaly     Musculoskeletal:  No edema, warm, 2+ pulses throughout  Extremities: L leg wrapped, +1 edema     Neurologic:  Moves all extremities.  AAOx3, grossly intact                    VITAL SIGNS:   The patients  height is 5' (1.524 m) and weight is 59.4 kg (131 lb). Her temperature is 97.6 °F (36.4 °C). Her blood pressure is 126/65 and her pulse is 99. Her respiration is 18 and oxygen saturation is 96%. She is afebrile. LABORATORY DATA:       CBC W/O DIFF    Collection Time: 01/29/19  4:37 AM   Result Value Ref Range    WBC 6.3 3.6 - 11.0 K/uL    RBC 3.64 (L) 3.80 - 5.20 M/uL    HGB 10.3 (L) 11.5 - 16.0 g/dL    HCT 33.4 (L) 35.0 - 47.0 %    MCV 91.8 80.0 - 99.0 FL    MCH 28.3 26.0 - 34.0 PG    MCHC 30.8 30.0 - 36.5 g/dL    RDW 13.2 11.5 - 14.5 %    PLATELET 747 702 - 779 K/uL    MPV 9.1 8.9 - 12.9 FL    NRBC 0.0 0  WBC    ABSOLUTE NRBC 0.00 0.00 - 0.01 K/uL     BMP:   Lab Results   Component Value Date/Time     01/29/2019 04:37 AM    K 3.7 01/29/2019 04:37 AM     01/29/2019 04:37 AM    CO2 24 01/29/2019 04:37 AM    AGAP 11 01/29/2019 04:37 AM    GLU 69 01/29/2019 04:37 AM    BUN 6 01/29/2019 04:37 AM    CREA 0.80 01/29/2019 04:37 AM    GFRAA >60 01/29/2019 04:37 AM    GFRNA >60 01/29/2019 04:37 AM            IMPRESSION:    Numbness and tingling: likely 2/2 hypocalcemia     Hypocalcemia: Corrected Ca ~7. suspect it's secondary to hypomagnesemia d/t resistance to PTH     Hypomagnesemia; 2/2 PPI, diuretics and diarrhea:    Hypokalemia: 2/2 Hypomagnesemia and GI losses     PLAN:     Replete Mg PRN. Agree with holding lasix and PPI for now. Diarrhea w/u per primary service     Replete K PRN     Check ionized Ca     Increase calcium carbonate to 1000 mg bid (between meals).      Check PTH and 25 Vit D     Check BMP and Mag q 12 hrs     Alvarez Briggs MD  1/29/2019

## 2019-01-29 NOTE — WOUND CARE
WOCN Note:  
 
New consult placed by RN for left leg cellulitis. Chart shows: 
Admitted for hand numbness x 1 week; left leg cellulitis; history of hypokalemia, NSTEMI, CAD, MI, TIA, GI bleed, CP, pneumonia, arthritis, cholycystectomy. WBC = 7.3 On = 1-28-19 Admitted from : [address] Assessment:  
Patient is A&O x 3 , communicative / talks loud, incontinent and mobile. Turns independently in bed but needs assistance moving up in bed and getting on and off bedpan. Patient wearing briefs for incontinence / no sen. Bed: [facility] Bed Diet: cardiac regular diet. Patient reports no pain / RN: Leyda Diane in room for assessment. Bilateral heels,  buttocks and sacrum  skin intact and without erythema. Heels offloaded on pillow 
- sacrum is pink after bedpan use and clears / hyperemia. - heels are pink and nir - All wounds POA: left leg: 
 -1.0cm x 0.5cm x 0.1cm / 100% dry slough / no drainage and surrounding skin is pink and blanchable. 
          : left lower leg: 
-1.5cm x 0.3cm x 0.1cm / 100% eschar / no drainage and surrounding skin is pink with cellulitis. 
- 0.6cm x 2.0cm x 0.0cm / 100% dry eschar / no drainage / and surrounding skin is pink / cellulitic. 
- 2.0cm x 1.5cm x 0.4cm / 100% slough / no drainage / and surrounding skin is pink and cellulitic. 
           : left medial leg:  
- dry scabs: 0.5cm x 1.0cm x 0.0cm and 0.3cm x 0.8cm x 0.0cm  / black scabs / no drainage and no redness. 
           : right lateral leg: pin point area:  
- 0.2cm x 0.2cm x <0.1cm : scant ser/sang drainage and surrounding skin is intact. All above wounds POA After assessment writer and nurse placed patient on bed pan. Recommendations:   
- every other day: left and right leg wounds ( all) : clean with Hattie Klenz Spray, apply Carrasyn Gel, dry 4x4's and nathan.  Patient preference is to apply ace wrap to keep dressing from sliding down on left leg. Applied. Minimize layers of linen/pads under patient to optimize support surface. Turn/reposition approximately every 2 hours and offload heels. Manage incontinence / promote continence; Aloe Vesta to buttocks and sacrum daily and as needed with incontinence care. Specialty bed: [facility]. Discussed above plan with patient and RN / Ilia Mendoza. Transition of Care: Plan to follow weekly and as needed while admitted to hospital.  
Pierre BROUSSARD RN Wound Care Department Office: [phone] Pager: [phone]

## 2019-01-29 NOTE — ED NOTES
Patient HR increased back up to 120s/130s, will notify hospitalist, no change to patient assessment.

## 2019-01-29 NOTE — H&P
History and Physical 
Primary Care Provider: Ian Mcclain MD 
 
Subjective: Raymond Mccall is a 80 y.o. female with PMH of CAD?, OA who presents with extremity numbness. Has been having progressively worsening numbness and cramping in her hands and feet for the past 3-4 days. Associated with generalized weakness, decreased PO intake. Denies fever, chills, severe diarrhea, urinary symptoms, CP, dyspnea. Has been treated for cellulitis for the LLE with augmentin for an unclear period of time (pt does not know) Review of Systems: 
 
Intermittent dyspnea and chest pain at times, but currently denies at this time. Past Medical History:  
Diagnosis Date  Arthritis  History of non-ST elevation myocardial infarction (NSTEMI) 11/28/2016 Past Surgical History:  
Procedure Laterality Date  HX CHOLECYSTECTOMY Prior to Admission medications Medication Sig Start Date End Date Taking? Authorizing Provider  
traMADol (ULTRAM) 50 mg tablet Take 50 mg by mouth nightly. Yes Provider, Historical  
mupirocin (BACTROBAN) 2 % ointment Apply  to affected area daily. Yes Provider, Historical  
menthol-zinc oxide (CALMOSEPTINE) 0.44-20.6 % oint Apply  to affected area daily. Yes Provider, Historical  
calcium carbonate (OS-DAVIAN) 500 mg calcium (1,250 mg) tablet Take 1 Tab by mouth daily. Yes Provider, Historical  
bumetanide (BUMEX) 0.5 mg tablet Take 0.5 mg by mouth daily. Yes Provider, Historical  
amoxicillin-clavulanate (AUGMENTIN) 875-125 mg per tablet Take 1 Tab by mouth two (2) times a day. 1/22/19 1/29/19 Yes Provider, Historical  
acetaminophen (TYLENOL) 325 mg tablet Take 2 Tabs by mouth every six (6) hours. 11/6/18  Yes Minor, Kobe Orantes, NP  
folic acid (FOLVITE) 1 mg tablet Take 1 Tab by mouth daily. 11/6/18  Yes Minor, Kobe Orantes, NP  
diclofenac (VOLTAREN) 1 % gel Apply  to affected area three (3) times daily.    Yes Provider, Historical  
 melatonin tab tablet Take 5 mg by mouth nightly. Yes Provider, Historical  
mirtazapine (REMERON) 30 mg tablet Take 30 mg by mouth nightly. Yes Provider, Historical  
promethazine (PHENERGAN) 12.5 mg tablet Take  by mouth three (3) times daily as needed for Nausea. Yes Provider, Historical  
triamcinolone acetonide (KENALOG) 0.1 % ointment Apply  to affected area as needed for Skin Irritation. use thin layer   Yes Provider, Historical  
carvedilol (COREG) 3.125 mg tablet Take  by mouth two (2) times daily (with meals). Yes Provider, Historical  
thiamine (B-1) 100 mg tablet Take  by mouth daily. Yes Provider, Historical  
cholecalciferol (VITAMIN D3) 1,000 unit tablet Take 1,000 Units by mouth daily. Yes Provider, Historical  
latanoprost (XALATAN) 0.005 % ophthalmic solution Administer 1 Drop to both eyes nightly. Yes Provider, Historical  
omeprazole (PRILOSEC) 20 mg capsule Take 20 mg by mouth daily. Yes Provider, Historical  
traMADol (ULTRAM) 50 mg tablet Take 1 Tab by mouth every six (6) hours as needed. Max Daily Amount: 200 mg. 11/6/18   Minor, Kendra Alaniz NP Allergies Allergen Reactions  Codeine Unknown (comments)  Prednisone Unknown (comments) History reviewed. No pertinent family history. SOCIAL HISTORY: 
Social History Socioeconomic History  Marital status:  Spouse name: Not on file  Number of children: Not on file  Years of education: Not on file  Highest education level: Not on file Social Needs  Financial resource strain: Not on file  Food insecurity - worry: Not on file  Food insecurity - inability: Not on file  Transportation needs - medical: Not on file  Transportation needs - non-medical: Not on file Occupational History  Not on file Tobacco Use  Smoking status: Never Smoker  Smokeless tobacco: Never Used Substance and Sexual Activity  Alcohol use: Yes Comment: a couple of scotch drinks daily  Drug use: No  
 Sexual activity: Not on file Other Topics Concern  Not on file Social History Narrative  Not on file Objective:  
 
 
Physical Exam:  
General:    Alert, cooperative, no distress, appears stated age. HEENT: Atraumatic, anicteric sclerae Neck:  Supple, symmetrical 
Lungs:   Clear to auscultation bilaterally. No Wheezing or Rhonchi. No rales. No Accessory muscle use. Heart:   Regular  rhythm,  Normal S1 and S2   No edema Abdomen:   Soft, non-tender. Not distended. Bowel sounds normal.  No rebound Extremities: Bilateral LE edema with L markedly more so than R, LLE with overlying dressing / erythematous, LLE venous stasis changes. Skin:     Warm, dry Neurologic: No gross focal neuro deficits Data Review: All diagnostic labs and studies have been reviewed. Assessment:  
 
Active Problems: 
  Numbness and tingling (3/8/2017) Bilateral hand and feet numbness. Likely 2/2 severe hypokalemia (2.6), hypomagnesemia (0.7), and hypocalcemia (5.6). Possibly 2/2 polypharmacy. LLE edema and cellulitis. Chronic? Has been on augmentin for unclear period of time as pt does not remember. PMH of CAD?, OA Plan:  
 
Replace K and magnesemia; already started on IV and oral replacement S/p calcium gluconate Recheck lytes in am 
Check ionized calcium Hold lasix and PPI for now Monitor telemetry Continue with augmentin for now; will have to clarify duration Wound care PPX: lovenox Dispo: inpt, tele remote Signed By: Teresa Lovett MD   
 January 28, 2019

## 2019-01-29 NOTE — ROUTINE PROCESS
IV infiltrated, attempted 3 times to insert new one. Called vascular access. Said they could not place one at this time.

## 2019-01-30 LAB
ANION GAP SERPL CALC-SCNC: 8 MMOL/L (ref 5–15)
BUN SERPL-MCNC: 5 MG/DL (ref 6–20)
BUN/CREAT SERPL: 7 (ref 12–20)
CALCIUM SERPL-MCNC: 7.3 MG/DL (ref 8.5–10.1)
CHLORIDE SERPL-SCNC: 109 MMOL/L (ref 97–108)
CO2 SERPL-SCNC: 27 MMOL/L (ref 21–32)
CREAT SERPL-MCNC: 0.72 MG/DL (ref 0.55–1.02)
ERYTHROCYTE [DISTWIDTH] IN BLOOD BY AUTOMATED COUNT: 13.2 % (ref 11.5–14.5)
GLUCOSE SERPL-MCNC: 83 MG/DL (ref 65–100)
HCT VFR BLD AUTO: 36.2 % (ref 35–47)
HGB BLD-MCNC: 10.8 G/DL (ref 11.5–16)
MAGNESIUM SERPL-MCNC: 1.7 MG/DL (ref 1.6–2.4)
MCH RBC QN AUTO: 27.6 PG (ref 26–34)
MCHC RBC AUTO-ENTMCNC: 29.8 G/DL (ref 30–36.5)
MCV RBC AUTO: 92.6 FL (ref 80–99)
NRBC # BLD: 0 K/UL (ref 0–0.01)
NRBC BLD-RTO: 0 PER 100 WBC
PLATELET # BLD AUTO: 212 K/UL (ref 150–400)
PMV BLD AUTO: 9.4 FL (ref 8.9–12.9)
POTASSIUM SERPL-SCNC: 4 MMOL/L (ref 3.5–5.1)
RBC # BLD AUTO: 3.91 M/UL (ref 3.8–5.2)
SODIUM SERPL-SCNC: 144 MMOL/L (ref 136–145)
WBC # BLD AUTO: 5.4 K/UL (ref 3.6–11)

## 2019-01-30 PROCEDURE — 74011250637 HC RX REV CODE- 250/637: Performed by: INTERNAL MEDICINE

## 2019-01-30 PROCEDURE — 83735 ASSAY OF MAGNESIUM: CPT

## 2019-01-30 PROCEDURE — 89055 LEUKOCYTE ASSESSMENT FECAL: CPT

## 2019-01-30 PROCEDURE — 74011250636 HC RX REV CODE- 250/636: Performed by: HOSPITALIST

## 2019-01-30 PROCEDURE — 36415 COLL VENOUS BLD VENIPUNCTURE: CPT

## 2019-01-30 PROCEDURE — 87045 FECES CULTURE AEROBIC BACT: CPT

## 2019-01-30 PROCEDURE — 65270000029 HC RM PRIVATE

## 2019-01-30 PROCEDURE — 74011250636 HC RX REV CODE- 250/636: Performed by: INTERNAL MEDICINE

## 2019-01-30 PROCEDURE — 80048 BASIC METABOLIC PNL TOTAL CA: CPT

## 2019-01-30 PROCEDURE — 85027 COMPLETE CBC AUTOMATED: CPT

## 2019-01-30 RX ORDER — SODIUM CHLORIDE 9 MG/ML
75 INJECTION, SOLUTION INTRAVENOUS CONTINUOUS
Status: DISCONTINUED | OUTPATIENT
Start: 2019-01-30 | End: 2019-01-30

## 2019-01-30 RX ORDER — CALCIUM CARBONATE 500(1250)
500 TABLET ORAL 2 TIMES DAILY
Status: DISCONTINUED | OUTPATIENT
Start: 2019-01-30 | End: 2019-02-01 | Stop reason: HOSPADM

## 2019-01-30 RX ADMIN — ACETAMINOPHEN 650 MG: 325 TABLET ORAL at 16:51

## 2019-01-30 RX ADMIN — ENOXAPARIN SODIUM 40 MG: 40 INJECTION SUBCUTANEOUS at 21:01

## 2019-01-30 RX ADMIN — Medication 10 ML: at 21:02

## 2019-01-30 RX ADMIN — MUPIROCIN: 20 OINTMENT TOPICAL at 09:00

## 2019-01-30 RX ADMIN — ACETAMINOPHEN 650 MG: 325 TABLET ORAL at 10:15

## 2019-01-30 RX ADMIN — VITAMIN D, TAB 1000IU (100/BT) 1000 UNITS: 25 TAB at 08:41

## 2019-01-30 RX ADMIN — SODIUM CHLORIDE 75 ML/HR: 900 INJECTION, SOLUTION INTRAVENOUS at 17:00

## 2019-01-30 RX ADMIN — FOLIC ACID 1 MG: 1 TABLET ORAL at 08:41

## 2019-01-30 RX ADMIN — Medication 400 MG: at 18:00

## 2019-01-30 RX ADMIN — TRAMADOL HYDROCHLORIDE 50 MG: 50 TABLET, FILM COATED ORAL at 21:01

## 2019-01-30 RX ADMIN — CARVEDILOL 3.12 MG: 3.12 TABLET, FILM COATED ORAL at 08:40

## 2019-01-30 RX ADMIN — ACETAMINOPHEN 650 MG: 325 TABLET ORAL at 21:01

## 2019-01-30 RX ADMIN — Medication 100 MG: at 08:40

## 2019-01-30 RX ADMIN — Medication 10 ML: at 05:55

## 2019-01-30 RX ADMIN — Medication 400 MG: at 08:41

## 2019-01-30 RX ADMIN — CALCIUM 1000 MG: 500 TABLET ORAL at 08:41

## 2019-01-30 RX ADMIN — Medication 4.5 MG: at 21:01

## 2019-01-30 RX ADMIN — CARVEDILOL 3.12 MG: 3.12 TABLET, FILM COATED ORAL at 16:52

## 2019-01-30 RX ADMIN — MIRTAZAPINE 30 MG: 15 TABLET, FILM COATED ORAL at 21:01

## 2019-01-30 RX ADMIN — CALCIUM 500 MG: 500 TABLET ORAL at 18:00

## 2019-01-30 NOTE — PROGRESS NOTES
[facility] KIDNEY Renal Daily Progress Note:  
 
Admission Date: 2019 Subjective: - Numbness and tingling almost gone. - Still has diarrhea Review of Systems A comprehensive review of systems was negative. Objective:  
 
Visit Vitals /74 (BP 1 Location: Right arm, BP Patient Position: At rest) Pulse 62 Temp 98.5 °F (36.9 °C) Resp 18 Ht 5' (1.524 m) Wt 59.4 kg (131 lb) SpO2 98% BMI 25.58 kg/m² Temp (24hrs), Av °F (36.7 °C), Min:97.6 °F (36.4 °C), Max:98.5 °F (36.9 °C) No intake or output data in the 24 hours ending 19 1100 Current Facility-Administered Medications Medication Dose Route Frequency  calcium carbonate (OS-DAVIAN) tablet 500 mg [elemental]  500 mg Oral BID  magnesium oxide (MAG-OX) tablet 400 mg  400 mg Oral BID  acetaminophen (TYLENOL) tablet 650 mg  650 mg Oral Q6H  
 latanoprost (XALATAN) 0.005 % ophthalmic solution 1 Drop  1 Drop Both Eyes QHS  cholecalciferol (VITAMIN D3) tablet 1,000 Units  1,000 Units Oral DAILY  melatonin tablet 4.5 mg  4.5 mg Oral QHS  mirtazapine (REMERON) tablet 30 mg  30 mg Oral QHS  triamcinolone acetonide (KENALOG) 0.1 % ointment   Topical PRN  
 folic acid (FOLVITE) tablet 1 mg  1 mg Oral DAILY  traMADol (ULTRAM) tablet 50 mg  50 mg Oral Q6H PRN  
 mupirocin (BACTROBAN) 2 % ointment   Topical DAILY  zinc oxide 20 % ointment   Topical PRN  
 carvedilol (COREG) tablet 3.125 mg  3.125 mg Oral BID WITH MEALS  promethazine (PHENERGAN) tablet 12.5 mg  12.5 mg Oral TID PRN  thiamine HCL (B-1) tablet 100 mg  100 mg Oral DAILY  sodium chloride (NS) flush 5-40 mL  5-40 mL IntraVENous Q8H  
 sodium chloride (NS) flush 5-40 mL  5-40 mL IntraVENous PRN  
 enoxaparin (LOVENOX) injection 40 mg  40 mg SubCUTAneous Q24H Physical Exam: 
Constitutional:  No acute distress, cooperative, pleasant   
ENT:  Oral mucous moist, oropharynx benign.  Neck supple,   
 Resp:  CTA bilaterally. No wheezing/rhonchi/rales. No accessory muscle use CV:  Regular rhythm, normal rate, no murmurs  
 GI:  Soft, non distended, non tender. normoactive bowel sounds, no hepatosplenomegaly   
 Musculoskeletal:  No edema, warm, 2+ pulses throughout Extremities: L leg wrapped, +1 edema   
 Neurologic:  Moves all extremities.  AAOx3, grossly intact   
 
 
 
 
Data Review:  
 
LABS: 
Recent Labs  
  01/30/19 0229 01/29/19 0437 01/28/19 
1623  141 137  
K 4.0 3.7 2.6*  
* 106 100 CO2 27 24 27 BUN 5* 6 7 CREA 0.72 0.80 0.89 CA 7.3* 5.9* 5.6* ALB  --   --  2.6* PHOS  --   --  4.0 MG 1.7 1.7 0.7* Recent Labs  
  01/30/19 0229 01/29/19 0437 01/28/19 
1623 WBC 5.4 6.3 7.3 HGB 10.8* 10.3* 11.2* HCT 36.2 33.4* 36.8  213 273 No results for input(s): JOSHUA, KU, CLU, CREAU in the last 72 hours. No lab exists for component: PROU Assessment:  
Renal Specific Problems Hypocalcemia Hypomagnesemia Hypokalemia Plan:  
 
Obtain/ Order: labs/cultures/radiology/procedures:  Ca carbonate 500 mg bid Therapeutic:   
Decrease calcium carbonate to 500 mg bid (to be given between meals) Encourage PO intake Consider starting maintenance IVF if diarrhea continues Cont to hold PPI and diuretics Follow up on PTH and 25 Vit D levels Milla Cuba MD

## 2019-01-30 NOTE — PROGRESS NOTES
Hospitalist Progress Note Kathy Alonzo MD. Cell: [phone] NAME:  Maria Dolores López :  10/17/1927 MRN:  [id number] Date of Service:  2019 Summary: Maria Dolores López is a 80 y.o. female with PMH of CAD?, OA who presents with extremity numbness. Has been having progressively worsening numbness and cramping in her hands and feet for the past 3-4 days. She was found to have severe electrolyte derangement on admission Assessment/Plan: 
Electrolyte derangements - Hypokalemia - Hypocalcemia 
- hypomagnesemia Requiring kcl, magnesium sulfate and calcium gluconate supplementation. Replete prn 
- resolved. Bilateral upper extremity numbness: Due to above 
resolved Recent Left lower extremity cellulitis; S/p Augmentin therapy. No evidence SIRS or infection. Currently, no evidence of cellulitis on leg. Has 2 leg wounds that appears to be draining sero sanguinous fluid. Consult wound care to evaluate. H/o right femur fracture s/p ORIF Ambulates on wheel chair CAD: Continue coreg. Denies chest pain Diarrhea: Probably related to ree Augmentin Monitor. Unlikely due to c difficile. No leucocytosis or abdominal pain. States diarrhea persist. Had 3 bm today. Encourage generous fluid intake Check stool wbc, culture. Code status: Full code, recommend DNR 
DVT prophylaxsis:Lovenox Dispo: probable home tomorrow to pro- living arrangements. Interval History/Subjective: 
 
F/u for bilateral upper extremity numbness, and diarrhea. States she had up to 3 loose BM. No abdominal pain. Fever or chills Review of Systems: 
Pertinent items are noted in HPI. Objective: VITALS:  
Last 24hrs VS reviewed since prior progress note. Most recent are: 
Visit Vitals /65 Pulse 82 Temp 98.4 °F (36.9 °C) Resp 18 Ht 5' (1.524 m) Wt 59.4 kg (131 lb) SpO2 97% BMI 25.58 kg/m² No intake or output data in the 24 hours ending 01/30/19 1701 PHYSICAL EXAM: 
General: No acute distress, cooperative, pleasant EENT: EOMI. Anicteric sclerae. Oral mucous moist, oropharynx benign Resp: CTA bilaterally. No wheezing/rhonchi/rales. No accessory muscle use CV: Regular rhythm, normal rate, no murmurs, gallops, rubs GI: Soft, non distended, non tender. normoactive bowel sounds, no hepatosplenomegaly Extremities: No edema, warm, 2+ pulses throughout Neurologic: Moves all extremities. AAOx3, CN II-XII grossly intact Psych: Good insight. Not anxious nor agitated. Skin: Good Turgor, no rashes or ulcers Lab Data Personally Reviewed: (see below) Medications list Personally Reviewed:  x YES  NO  
 
_______________________________________________________________________ Care Plan discussed with:  Patient/Family and Nurse Total NON critical care TIME:  30 minutes Oscar Burr MD  
 
Procedures: see electronic medical records for all procedures/Xrays and details which were not copied into this note but were reviewed prior to creation of Plan. LABS: 
Recent Labs  
  01/30/19 0229 01/29/19 
9397 WBC 5.4 6.3 HGB 10.8* 10.3* HCT 36.2 33.4*  
 213 Recent Labs  
  01/30/19 0229 01/29/19 
0437 01/28/19 
1623  141 137  
K 4.0 3.7 2.6*  
* 106 100 CO2 27 24 27 BUN 5* 6 7 CREA 0.72 0.80 0.89 GLU 83 69 80  
CA 7.3* 5.9* 5.6*  
MG 1.7 1.7 0.7* PHOS  --   --  4.0 Recent Labs  
  01/28/19 
1623 SGOT 16 ALT 9* * TBILI 0.4 TP 6.2* ALB 2.6*  
GLOB 3.6 No results for input(s): INR, PTP, APTT in the last 72 hours. No lab exists for component: INREXT, INREXT No results for input(s): FE, TIBC, PSAT, FERR in the last 72 hours. Lab Results Component Value Date/Time  Folate 5.8 03/08/2017 06:00 PM  
  
 No results for input(s): PH, PCO2, PO2 in the last 72 hours. No results for input(s): CPK, CKNDX, TROIQ in the last 72 hours. No lab exists for component: CPKMB Lab Results Component Value Date/Time Cholesterol, total 182 03/08/2017 06:00 PM  
 HDL Cholesterol 64 03/08/2017 06:00 PM  
 LDL, calculated 91.2 03/08/2017 06:00 PM  
 Triglyceride 134 03/08/2017 06:00 PM  
 CHOL/HDL Ratio 2.8 03/08/2017 06:00 PM  
 
Lab Results Component Value Date/Time Glucose (POC) 94 11/20/2016 07:00 AM  
 
Lab Results Component Value Date/Time  Color YELLOW/STRAW 10/31/2018 04:55 AM  
 Appearance CLOUDY (A) 10/31/2018 04:55 AM  
 Specific gravity 1.014 10/31/2018 04:55 AM  
 pH (UA) 5.5 10/31/2018 04:55 AM  
 Protein TRACE (A) 10/31/2018 04:55 AM  
 Glucose NEGATIVE  10/31/2018 04:55 AM  
 Ketone NEGATIVE  10/31/2018 04:55 AM  
 Bilirubin NEGATIVE  10/31/2018 04:55 AM  
 Urobilinogen 0.2 10/31/2018 04:55 AM  
 Nitrites NEGATIVE  10/31/2018 04:55 AM  
 Leukocyte Esterase LARGE (A) 10/31/2018 04:55 AM  
 Epithelial cells FEW 10/31/2018 04:55 AM  
 Bacteria NEGATIVE  10/31/2018 04:55 AM  
 WBC >100 (H) 10/31/2018 04:55 AM  
 RBC 0-5 10/31/2018 04:55 AM

## 2019-01-31 LAB
25(OH)D3 SERPL-MCNC: 58.6 NG/ML (ref 30–100)
ANION GAP SERPL CALC-SCNC: 6 MMOL/L (ref 5–15)
BUN SERPL-MCNC: 6 MG/DL (ref 6–20)
BUN/CREAT SERPL: 8 (ref 12–20)
CALCIUM SERPL-MCNC: 7.8 MG/DL (ref 8.5–10.1)
CALCIUM SERPL-MCNC: 7.9 MG/DL (ref 8.5–10.1)
CHLORIDE SERPL-SCNC: 106 MMOL/L (ref 97–108)
CO2 SERPL-SCNC: 31 MMOL/L (ref 21–32)
CREAT SERPL-MCNC: 0.76 MG/DL (ref 0.55–1.02)
GLUCOSE SERPL-MCNC: 81 MG/DL (ref 65–100)
MAGNESIUM SERPL-MCNC: 1.8 MG/DL (ref 1.6–2.4)
POTASSIUM SERPL-SCNC: 4.1 MMOL/L (ref 3.5–5.1)
PTH-INTACT SERPL-MCNC: 112.4 PG/ML (ref 18.4–88)
SODIUM SERPL-SCNC: 143 MMOL/L (ref 136–145)
WBC #/AREA STL HPF: NORMAL /HPF (ref 0–4)

## 2019-01-31 PROCEDURE — 36415 COLL VENOUS BLD VENIPUNCTURE: CPT

## 2019-01-31 PROCEDURE — 83735 ASSAY OF MAGNESIUM: CPT

## 2019-01-31 PROCEDURE — 74011250637 HC RX REV CODE- 250/637: Performed by: INTERNAL MEDICINE

## 2019-01-31 PROCEDURE — 80048 BASIC METABOLIC PNL TOTAL CA: CPT

## 2019-01-31 PROCEDURE — 65270000029 HC RM PRIVATE

## 2019-01-31 PROCEDURE — 82306 VITAMIN D 25 HYDROXY: CPT

## 2019-01-31 PROCEDURE — 83970 ASSAY OF PARATHORMONE: CPT

## 2019-01-31 PROCEDURE — 74011250636 HC RX REV CODE- 250/636: Performed by: INTERNAL MEDICINE

## 2019-01-31 RX ORDER — CALCIUM CARBONATE 500(1250)
1 TABLET ORAL 2 TIMES DAILY
Qty: 30 TAB | Refills: 0 | Status: SHIPPED | OUTPATIENT
Start: 2019-01-31 | End: 2019-03-15

## 2019-01-31 RX ORDER — LANOLIN ALCOHOL/MO/W.PET/CERES
400 CREAM (GRAM) TOPICAL 2 TIMES DAILY
Qty: 10 TAB | Refills: 0 | Status: SHIPPED | OUTPATIENT
Start: 2019-01-31 | End: 2019-02-05

## 2019-01-31 RX ADMIN — CALCIUM 500 MG: 500 TABLET ORAL at 08:47

## 2019-01-31 RX ADMIN — ACETAMINOPHEN 650 MG: 325 TABLET ORAL at 08:50

## 2019-01-31 RX ADMIN — TRAMADOL HYDROCHLORIDE 50 MG: 50 TABLET, FILM COATED ORAL at 20:41

## 2019-01-31 RX ADMIN — FOLIC ACID 1 MG: 1 TABLET ORAL at 08:46

## 2019-01-31 RX ADMIN — Medication 10 ML: at 03:59

## 2019-01-31 RX ADMIN — CALCIUM 500 MG: 500 TABLET ORAL at 18:45

## 2019-01-31 RX ADMIN — CARVEDILOL 3.12 MG: 3.12 TABLET, FILM COATED ORAL at 08:46

## 2019-01-31 RX ADMIN — MIRTAZAPINE 30 MG: 15 TABLET, FILM COATED ORAL at 21:57

## 2019-01-31 RX ADMIN — VITAMIN D, TAB 1000IU (100/BT) 1000 UNITS: 25 TAB at 08:46

## 2019-01-31 RX ADMIN — CARVEDILOL 3.12 MG: 3.12 TABLET, FILM COATED ORAL at 18:45

## 2019-01-31 RX ADMIN — ENOXAPARIN SODIUM 40 MG: 40 INJECTION SUBCUTANEOUS at 21:58

## 2019-01-31 RX ADMIN — Medication 400 MG: at 08:47

## 2019-01-31 RX ADMIN — Medication 100 MG: at 08:47

## 2019-01-31 RX ADMIN — MUPIROCIN: 20 OINTMENT TOPICAL at 09:00

## 2019-01-31 RX ADMIN — Medication 4.5 MG: at 21:57

## 2019-01-31 RX ADMIN — ACETAMINOPHEN 650 MG: 325 TABLET ORAL at 21:57

## 2019-01-31 RX ADMIN — ACETAMINOPHEN 650 MG: 325 TABLET ORAL at 18:48

## 2019-01-31 RX ADMIN — Medication 400 MG: at 18:45

## 2019-01-31 NOTE — PROGRESS NOTES
[facility] KIDNEY Renal Daily Progress Note:  
 
Admission Date: 2019 Subjective: 
- no new issues reported Review of Systems A comprehensive review of systems was negative. Objective:  
 
Visit Vitals /83 Pulse (!) 102 Temp 98.3 °F (36.8 °C) Resp 18 Ht 5' (1.524 m) Wt 59.4 kg (131 lb) SpO2 97% BMI 25.58 kg/m² Temp (24hrs), Av °F (36.7 °C), Min:97.4 °F (36.3 °C), Max:98.4 °F (36.9 °C) No intake or output data in the 24 hours ending 19 1022 Current Facility-Administered Medications Medication Dose Route Frequency  calcium carbonate (OS-DAVIAN) tablet 500 mg [elemental]  500 mg Oral BID  magnesium oxide (MAG-OX) tablet 400 mg  400 mg Oral BID  acetaminophen (TYLENOL) tablet 650 mg  650 mg Oral Q6H  
 latanoprost (XALATAN) 0.005 % ophthalmic solution 1 Drop  1 Drop Both Eyes QHS  cholecalciferol (VITAMIN D3) tablet 1,000 Units  1,000 Units Oral DAILY  melatonin tablet 4.5 mg  4.5 mg Oral QHS  mirtazapine (REMERON) tablet 30 mg  30 mg Oral QHS  triamcinolone acetonide (KENALOG) 0.1 % ointment   Topical PRN  
 folic acid (FOLVITE) tablet 1 mg  1 mg Oral DAILY  traMADol (ULTRAM) tablet 50 mg  50 mg Oral Q6H PRN  
 mupirocin (BACTROBAN) 2 % ointment   Topical DAILY  zinc oxide 20 % ointment   Topical PRN  
 carvedilol (COREG) tablet 3.125 mg  3.125 mg Oral BID WITH MEALS  promethazine (PHENERGAN) tablet 12.5 mg  12.5 mg Oral TID PRN  thiamine HCL (B-1) tablet 100 mg  100 mg Oral DAILY  sodium chloride (NS) flush 5-40 mL  5-40 mL IntraVENous Q8H  
 sodium chloride (NS) flush 5-40 mL  5-40 mL IntraVENous PRN  
 enoxaparin (LOVENOX) injection 40 mg  40 mg SubCUTAneous Q24H Physical Exam: 
Constitutional:  No acute distress, cooperative, pleasant   
ENT:  Oral mucous moist, oropharynx benign. Neck supple, Resp:  CTA bilaterally. No wheezing/rhonchi/rales. No accessory muscle use CV:  Regular rhythm, normal rate, no murmurs  
 GI:  Soft, non distended, non tender. normoactive bowel sounds, no hepatosplenomegaly   
 Musculoskeletal:  No edema, warm, 2+ pulses throughout Extremities: L leg wrapped, +1 edema   
 Neurologic:  Moves all extremities.  AAOx3, grossly intact   
 
 
 
 
Data Review:  
 
LABS: 
Recent Labs  
  01/31/19 
0355 01/30/19 0229 01/29/19 0437 01/28/19 
1623 NA  --  144 141 137 K  --  4.0 3.7 2.6*  
CL  --  109* 106 100 CO2  --  27 24 27 BUN  --  5* 6 7 CREA  --  0.72 0.80 0.89 CA  --  7.3* 5.9* 5.6* ALB  --   --   --  2.6* PHOS  --   --   --  4.0 MG 1.8 1.7 1.7 0.7* Recent Labs  
  01/30/19 0229 01/29/19 
0437 01/28/19 
1623 WBC 5.4 6.3 7.3 HGB 10.8* 10.3* 11.2* HCT 36.2 33.4* 36.8  213 273 No results for input(s): JOSHUA, KU, CLU, CREAU in the last 72 hours. No lab exists for component: PROU Assessment:  
Renal Specific Problems Hypocalcemia Hypomagnesemia Hypokalemia Plan:  
 
Obtain/ Order: labs/cultures/radiology/procedures:  BMP Therapeutic:   
Cont calcium carbonate to 500 mg bid (to be given between meals) Recheck BMP Encourage PO intake Consider starting maintenance IVF if diarrhea continues Cont to hold PPI and diuretics Follow up on PTH and 25 Vit D levels Kelly Keys MD

## 2019-01-31 NOTE — PROGRESS NOTES
Chart reviewed for transitions of care, discussed patient during rounds. Cm informed patient may be ready for discharge soon, therapy has not been consulted so cm asked for that. Cm contacted [employer] [phone], f: [phone]) and had to leave a voicemail message. They may need to come to the hospital to evaluate patient prior to her return. Cm will wait for return call.  
Trinidad MCKEON, ACM

## 2019-01-31 NOTE — PROGRESS NOTES
Hospitalist Progress Note Mp Crawford MD. Cell: [phone] NAME:  Pedro Gallegos :  10/17/1927 MRN:  [id number] Date of Service:  2019 Summary: Pedro Gallegos is a 80 y.o. female with PMH of CAD?, OA who presents with extremity numbness. Has been having progressively worsening numbness and cramping in her hands and feet for the past 3-4 days. She was found to have severe electrolyte derangement on admission Assessment/Plan: 
Electrolyte derangements- This is likely due to chronic diarrhea - Hypokalemia - Hypocalcemia 
- hypomagnesemia-  
Requiring kcl, magnesium sulfate and calcium carbonate tabs/ supplementation. Replete prn 
- Now resolved. Bilateral upper extremity numbness: Due to above 
resolved Recent Left lower extremity cellulitis; S/p Augmentin therapy. No evidence SIRS or infection. Currently, no evidence of cellulitis on leg. Has 2 leg wounds that appears to be draining sero sanguinous fluid. wound care consulted. H/o right femur fracture s/p ORIF Ambulates on wheel chair. Consult PT to evaluate CAD: Continue coreg. Denies chest pain Diarrhea: This is chronic but probably exacerbated by recent antibiotic use. She recently finished a course of Augmentin. Unlikely due to c difficile. No leucocytosis or abdominal pain. Stool wbc, negative. Start imodium as needed for diarrhea. Encourage generous fluid intake Code status: Full code, recommend DNR 
DVT prophylaxsis:Lovenox Plan; lives in [address] assisted living facility. Patient can be discharged back to prior living arrangements. Likely tomorrow. Patient is still having diarrhea Dispo: Likely d/c tomorrow Interval History/Subjective: 
 
F/u for bilateral upper extremity numbness, and diarrhea. She still complains of persistent diarrhea. She has had up to 3 loose stools this morning. She denies abdominal pain. No fever or chills. Review of Systems: 
Pertinent items are noted in HPI. Objective: VITALS:  
Last 24hrs VS reviewed since prior progress note. Most recent are: 
Visit Vitals /59 (BP 1 Location: Right arm, BP Patient Position: At rest) Pulse 80 Temp 98 °F (36.7 °C) Resp 18 Ht 5' (1.524 m) Wt 59.4 kg (131 lb) SpO2 96% BMI 25.58 kg/m² No intake or output data in the 24 hours ending 01/31/19 1803 PHYSICAL EXAM: 
General: No acute distress, cooperative, pleasant EENT: EOMI. Anicteric sclerae. Oral mucous moist, oropharynx benign Resp: CTA bilaterally. No wheezing/rhonchi/rales. No accessory muscle use CV: Regular rhythm, normal rate, no murmurs, gallops, rubs GI: Soft, non distended, non tender. normoactive bowel sounds, no hepatosplenomegaly Extremities: No edema, warm, 2+ pulses throughout Neurologic: Moves all extremities. AAOx3, CN II-XII grossly intact Psych: Good insight. Not anxious nor agitated. Skin: left lower extremity dressing clean, dry and intact Lab Data Personally Reviewed: (see below) Medications list Personally Reviewed:  x YES  NO  
 
_______________________________________________________________________ Care Plan discussed with:  Patient/Family and Nurse Total NON critical care TIME:  30 minutes Nelson Philippe MD  
 
Procedures: see electronic medical records for all procedures/Xrays and details which were not copied into this note but were reviewed prior to creation of Plan. LABS: 
Recent Labs  
  01/30/19 
0229 01/29/19 
3021 WBC 5.4 6.3 HGB 10.8* 10.3* HCT 36.2 33.4*  
 213 Recent Labs  
  01/31/19 
1204 01/31/19 
1145 01/31/19 
0355 01/30/19 
0229 01/29/19 
7516 NA  --  143  --  144 141 K  --  4.1  --  4.0 3.7 CL  --  106  --  109* 106 CO2  --  31  --  27 24 BUN  --  6  --  5* 6  
CREA  --  0.76  --  0.72 0.80 GLU  --  81  --  83 69  
CA 7.8* 7.9*  --  7.3* 5.9*  
MG  --   --  1.8 1.7 1.7 No results for input(s): SGOT, GPT, ALT, AP, TBIL, TBILI, TP, ALB, GLOB, GGT, AML, LPSE in the last 72 hours. No lab exists for component: AMYP, HLPSE No results for input(s): INR, PTP, APTT in the last 72 hours. No lab exists for component: INREXT, INREXT No results for input(s): FE, TIBC, PSAT, FERR in the last 72 hours. Lab Results Component Value Date/Time Folate 5.8 03/08/2017 06:00 PM  
  
No results for input(s): PH, PCO2, PO2 in the last 72 hours. No results for input(s): CPK, CKNDX, TROIQ in the last 72 hours. No lab exists for component: CPKMB Lab Results Component Value Date/Time Cholesterol, total 182 03/08/2017 06:00 PM  
 HDL Cholesterol 64 03/08/2017 06:00 PM  
 LDL, calculated 91.2 03/08/2017 06:00 PM  
 Triglyceride 134 03/08/2017 06:00 PM  
 CHOL/HDL Ratio 2.8 03/08/2017 06:00 PM  
 
Lab Results Component Value Date/Time Glucose (POC) 94 11/20/2016 07:00 AM  
 
Lab Results Component Value Date/Time  Color YELLOW/STRAW 10/31/2018 04:55 AM  
 Appearance CLOUDY (A) 10/31/2018 04:55 AM  
 Specific gravity 1.014 10/31/2018 04:55 AM  
 pH (UA) 5.5 10/31/2018 04:55 AM  
 Protein TRACE (A) 10/31/2018 04:55 AM  
 Glucose NEGATIVE  10/31/2018 04:55 AM  
 Ketone NEGATIVE  10/31/2018 04:55 AM  
 Bilirubin NEGATIVE  10/31/2018 04:55 AM  
 Urobilinogen 0.2 10/31/2018 04:55 AM  
 Nitrites NEGATIVE  10/31/2018 04:55 AM  
 Leukocyte Esterase LARGE (A) 10/31/2018 04:55 AM  
 Epithelial cells FEW 10/31/2018 04:55 AM  
 Bacteria NEGATIVE  10/31/2018 04:55 AM  
 WBC >100 (H) 10/31/2018 04:55 AM  
 RBC 0-5 10/31/2018 04:55 AM

## 2019-02-01 VITALS
TEMPERATURE: 98.5 F | DIASTOLIC BLOOD PRESSURE: 80 MMHG | RESPIRATION RATE: 16 BRPM | HEART RATE: 94 BPM | SYSTOLIC BLOOD PRESSURE: 139 MMHG | HEIGHT: 60 IN | WEIGHT: 131 LBS | OXYGEN SATURATION: 91 % | BODY MASS INDEX: 25.72 KG/M2

## 2019-02-01 LAB
BACTERIA SPEC CULT: ABNORMAL
C JEJUNI+C COLI AG STL QL: NEGATIVE
E COLI SXT1+2 STL IA: NO GROWTH
MAGNESIUM SERPL-MCNC: 1.7 MG/DL (ref 1.6–2.4)
SERVICE CMNT-IMP: ABNORMAL

## 2019-02-01 PROCEDURE — 74011250637 HC RX REV CODE- 250/637: Performed by: INTERNAL MEDICINE

## 2019-02-01 PROCEDURE — 97535 SELF CARE MNGMENT TRAINING: CPT

## 2019-02-01 PROCEDURE — 97530 THERAPEUTIC ACTIVITIES: CPT

## 2019-02-01 PROCEDURE — 97165 OT EVAL LOW COMPLEX 30 MIN: CPT

## 2019-02-01 PROCEDURE — 83735 ASSAY OF MAGNESIUM: CPT

## 2019-02-01 PROCEDURE — 97161 PT EVAL LOW COMPLEX 20 MIN: CPT

## 2019-02-01 PROCEDURE — 36415 COLL VENOUS BLD VENIPUNCTURE: CPT

## 2019-02-01 RX ADMIN — FOLIC ACID 1 MG: 1 TABLET ORAL at 08:34

## 2019-02-01 RX ADMIN — CALCIUM 500 MG: 500 TABLET ORAL at 08:34

## 2019-02-01 RX ADMIN — Medication 400 MG: at 08:34

## 2019-02-01 RX ADMIN — CARVEDILOL 3.12 MG: 3.12 TABLET, FILM COATED ORAL at 08:34

## 2019-02-01 RX ADMIN — Medication 100 MG: at 08:34

## 2019-02-01 RX ADMIN — ACETAMINOPHEN 650 MG: 325 TABLET ORAL at 05:10

## 2019-02-01 RX ADMIN — MUPIROCIN: 20 OINTMENT TOPICAL at 08:34

## 2019-02-01 RX ADMIN — VITAMIN D, TAB 1000IU (100/BT) 1000 UNITS: 25 TAB at 08:33

## 2019-02-01 RX ADMIN — ACETAMINOPHEN 650 MG: 325 TABLET ORAL at 08:33

## 2019-02-01 NOTE — PROGRESS NOTES
Hospitalist Progress Note Ayden Drew MD. Cell: [phone] NAME:  Starr Gao :  10/17/1927 MRN:  [id number] Date of Service:  2019 Summary: Starr Gao is a 80 y.o. female with PMH of CAD?, OA who presents with extremity numbness. Has been having progressively worsening numbness and cramping in her hands and feet for the past 3-4 days. She was found to have severe electrolyte derangement on admission Assessment/Plan: 
Electrolyte derangements-resolved - This is likely due to chronic diarrhea - Hypokalemia - Hypocalcemia 
- hypomagnesemia-  
Required kcl, magnesium sulfate and calcium carbonate tabs/ supplementation. Replete prn 
- Now resolved. Bilateral upper extremity numbness: Due to above 
resolved Recent Left lower extremity cellulitis; S/p Augmentin therapy. No evidence SIRS or infection. Currently, no evidence of cellulitis on leg. Has 2 leg wounds that appears to be draining sero sanguinous fluid. wound care consulted. H/o right femur fracture s/p ORIF Ambulates on wheel chair. Consulted PT to evaluate CAD: Continue coreg. Denies chest pain Diarrhea: Improved This is chronic but probably exacerbated by recent antibiotic use. She recently finished a course of Augmentin. Unlikely due to c difficile. No leucocytosis or abdominal pain. Stool wbc, negative. Stool Cx to date neg  
 imodium as needed for diarrhea. Encourage generous fluid intake Code status: Full code, recommend DNR 
DVT prophylaxsis:Lovenox Plan; lives in [address] assisted living facility. Patient can be discharged back to prior living arrangements. discuss with case management, Needs HH evaluation per facility  prior to d/c Interval History/Subjective: 
 
F/u for bilateral upper extremity numbness, resolved F/o Diarrhea improved, no loose stool overnight  1 loose stools this morning. She denies abdominal pain. No fever or chills. Review of Systems: 
Pertinent items are noted in HPI. Objective: VITALS:  
Last 24hrs VS reviewed since prior progress note. Most recent are: 
Visit Vitals /88 (BP 1 Location: Right arm) Pulse 95 Temp 98.6 °F (37 °C) Resp 16 Ht 5' (1.524 m) Wt 59.4 kg (131 lb) SpO2 97% BMI 25.58 kg/m² No intake or output data in the 24 hours ending 02/01/19 1018 PHYSICAL EXAM: 
General: No acute distress, cooperative, pleasant EENT: EOMI. Anicteric sclerae. Oral mucous moist, oropharynx benign Resp: CTA bilaterally. No wheezing/rhonchi/rales. No accessory muscle use CV: Regular rhythm, normal rate, no murmurs, gallops, rubs GI: Soft, non distended, non tender. normoactive bowel sounds, no hepatosplenomegaly Extremities: No edema, warm, 2+ pulses throughout Neurologic: Moves all extremities. AAOx3, CN II-XII grossly intact Psych: Good insight. Not anxious nor agitated. Skin: left lower extremity dressing clean, dry and intact Lab Data Personally Reviewed: (see below) Medications list Personally Reviewed:  x YES  NO  
 
_______________________________________________________________________ Care Plan discussed with:  Patient/Family and Nurse Total NON critical care TIME:  30 minutes Jena Carmen MD  
 
Procedures: see electronic medical records for all procedures/Xrays and details which were not copied into this note but were reviewed prior to creation of Plan. LABS: 
Recent Labs  
  01/30/19 
0229 WBC 5.4 HGB 10.8* HCT 36.2  Recent Labs 02/01/19 
9953 01/31/19 
1204 01/31/19 
1145 01/31/19 
0355 01/30/19 
7905 NA  --   --  143  --  144 K  --   --  4.1  --  4.0  
CL  --   --  106  --  109* CO2  --   --  31  --  27 BUN  --   --  6  --  5* CREA  --   --  0.76  --  0.72 GLU  --   --  81  --  83  
 CA  --  7.8* 7.9*  --  7.3*  
MG 1.7  --   --  1.8 1.7 No results for input(s): SGOT, GPT, ALT, AP, TBIL, TBILI, TP, ALB, GLOB, GGT, AML, LPSE in the last 72 hours. No lab exists for component: AMYP, HLPSE No results for input(s): INR, PTP, APTT in the last 72 hours. No lab exists for component: INREXT, INREXT No results for input(s): FE, TIBC, PSAT, FERR in the last 72 hours. Lab Results Component Value Date/Time Folate 5.8 03/08/2017 06:00 PM  
  
No results for input(s): PH, PCO2, PO2 in the last 72 hours. No results for input(s): CPK, CKNDX, TROIQ in the last 72 hours. No lab exists for component: CPKMB Lab Results Component Value Date/Time Cholesterol, total 182 03/08/2017 06:00 PM  
 HDL Cholesterol 64 03/08/2017 06:00 PM  
 LDL, calculated 91.2 03/08/2017 06:00 PM  
 Triglyceride 134 03/08/2017 06:00 PM  
 CHOL/HDL Ratio 2.8 03/08/2017 06:00 PM  
 
Lab Results Component Value Date/Time Glucose (POC) 94 11/20/2016 07:00 AM  
 
Lab Results Component Value Date/Time  Color YELLOW/STRAW 10/31/2018 04:55 AM  
 Appearance CLOUDY (A) 10/31/2018 04:55 AM  
 Specific gravity 1.014 10/31/2018 04:55 AM  
 pH (UA) 5.5 10/31/2018 04:55 AM  
 Protein TRACE (A) 10/31/2018 04:55 AM  
 Glucose NEGATIVE  10/31/2018 04:55 AM  
 Ketone NEGATIVE  10/31/2018 04:55 AM  
 Bilirubin NEGATIVE  10/31/2018 04:55 AM  
 Urobilinogen 0.2 10/31/2018 04:55 AM  
 Nitrites NEGATIVE  10/31/2018 04:55 AM  
 Leukocyte Esterase LARGE (A) 10/31/2018 04:55 AM  
 Epithelial cells FEW 10/31/2018 04:55 AM  
 Bacteria NEGATIVE  10/31/2018 04:55 AM  
 WBC >100 (H) 10/31/2018 04:55 AM  
 RBC 0-5 10/31/2018 04:55 AM

## 2019-02-01 NOTE — PROGRESS NOTES
Problem: Mobility Impaired (Adult and Pediatric) Goal: *Acute Goals and Plan of Care (Insert Text) physical Therapy EVALUATION/DISCHARGE Patient: Tasia Braswell (80 y.o. female) Date: 2019 Primary Diagnosis: Numbness and tingling Numbness and tingling Precautions: fall ASSESSMENT : 
Based on the objective data described below, the patient presents with general weakness, impaired balance,  indep with mobility. Pt eager to d/c today. Pt lives in [facility] where she used w/c for mobility and was able to transfer to/ from chair with modified indep; notes that she was receiving PT/OT. Pt moving well overall this date, mobility complicated by elevated mattress height in relation to short pt stature. Pt performed bed mob with min A for lifting LEs onto high mattress, transfer sit<->stand with CGA, short distance amb with min A x 2, pt c/o poor walker function compared to rollator. Pt presents at/near baseline LOF with mobility this date. Recommend return to [facility] with resumption of  PT at d/c. Further skilled acute physical therapy is not indicated at this time. PLAN : 
Discharge Recommendations: Home Health Further Equipment Recommendations for Discharge: none, pt owns w/c SUBJECTIVE:  
Patient stated This is how I have to do it.  (re forearm support on walker) OBJECTIVE DATA SUMMARY:  
HISTORY:   
Past Medical History:  
Diagnosis Date  Arthritis  History of non-ST elevation myocardial infarction (NSTEMI) 2016 Past Surgical History:  
Procedure Laterality Date  HX CHOLECYSTECTOMY Prior Level of Function/Home Situation: pt lives in [place], uses w/c for mobility, indep stand pivot transfer PTA Personal factors and/or comorbidities impacting plan of care:  
 
Home Situation Home Environment: Assisted living Care Facility Name: [facility] Current DME Used/Available at Home: Wheelchair EXAMINATION/PRESENTATION/DECISION MAKING:  
 Critical Behavior: 
Neurologic State: Alert Orientation Level: Oriented X4 Hearing: Auditory Auditory Impairment: NoneSkin:   
Edema:  
Range Of Motion: 
AROM: Generally decreased, functional 
  
  
  
  
  
  
  
Strength:   
Strength: Generally decreased, functional 
  
  
  
  
  
  
Tone & Sensation:  
Tone: Normal 
  
  
  
  
Sensation: Intact Coordination: 
Coordination: Generally decreased, functional 
Vision:  
  
Functional Mobility: 
Bed Mobility: 
  
Supine to Sit: Supervision Sit to Supine: Minimum assistance(assist for lifting LEs) Transfers: 
Sit to Stand: Contact guard assistance(cues for hand placement due to pt pulling up on walker) Stand to Sit: Contact guard assistance(due to elevated mattress height) Balance:  
Sitting: Intact Standing: Impaired; With support Standing - Static: Fair Standing - Dynamic : PoorAmbulation/Gait Training: 
Distance (ft): 5 Feet (ft) Assistive Device: Gait belt;Walker, rolling Ambulation - Level of Assistance: Minimal assistance;Assist x2 Gait Abnormalities: Decreased step clearance;Shuffling gait;Trunk sway increased(flexed posture, WB through forearms on walker) Base of Support: Widened Stance: Left increased;Right increased Speed/Oralia: Shuffled; Slow Step Length: Left shortened;Right shortened Stairs: Therapeutic Exercises:  
 
 
Functional Measure: 
Tinetti test: 
 
Sitting Balance: 1 Arises: 0 Attempts to Rise: 0 Immediate Standing Balance: 1 Standing Balance: 0 Nudged: 1 Eyes Closed: 1 Turn 360 Degrees - Continuous/Discontinuous: 0 Turn 360 Degrees - Steady/Unsteady: 0 Sitting Down: 1 Balance Score: 5 Indication of Gait: 0 
R Step Length/Height: 0 
L Step Length/Height: 0 
R Foot Clearance: 1 L Foot Clearance: 1 Step Symmetry: 1 Step Continuity: 0 Path: 1 Trunk: 0 Walking Time: 0 Gait Score: 4 Total Score: 9 Tinetti Tool Score Risk of Falls 
<19 = High Fall Risk 19-24 = Moderate Fall Risk 25-28 = Low Fall Risk Tinetti ME. Performance-Oriented Assessment of Mobility Problems in Elderly Patients. [facility]; [id number]. (Scoring Description: PT Bulletin Feb. 10, 1993) Older adults: Amelia Cary et al, 2009; n = [address] elderly evaluated with ABC, JADEN, ADL, and IADL) · Mean JADEN score for males aged 69-68 years = 26.21(3.40) · Mean JADEN score for females age 69-68 years = 25.16(4.30) · Mean JADEN score for males over 80 years = 23.29(6.02) · Mean JADEN score for females over 80 years = 17.20(8.32) Physical Therapy Evaluation Charge Determination History Examination Presentation Decision-Making MEDIUM  Complexity : 1-2 comorbidities / personal factors will impact the outcome/ POC  LOW Complexity : 1-2 Standardized tests and measures addressing body structure, function, activity limitation and / or participation in recreation  LOW Complexity : Stable, uncomplicated  LOW Complexity : FOTO score of  Based on the above components, the patient evaluation is determined to be of the following complexity level: LOW Pain: 
Pain Scale 1: Numeric (0 - 10) Pain Intensity 1: 0 Pain Location 1: Leg Activity Tolerance:  
Pt tolerated EOB activity and brief standing/ gait trial. No c/o. Please refer to the flowsheet for vital signs taken during this treatment. After treatment:  
[]   Patient left in no apparent distress sitting up in chair 
[x]   Patient left in no apparent distress in bed 
[x]   Call bell left within reach [x]   Nursing notified 
[]   Caregiver present 
[]   Bed alarm activated COMMUNICATION/EDUCATION:  
Communication/Collaboration: 
[x]   Fall prevention education was provided and the patient/caregiver indicated understanding. [x]   Patient/family have participated as able and agree with findings and recommendations. []   Patient is unable to participate in plan of care at this time. Findings and recommendations were discussed with: Occupational Therapist and Registered Nurse Thank you for this referral. 
Stephanie Guzman, PT Time Calculation: 18 mins

## 2019-02-01 NOTE — DISCHARGE SUMMARY
Discharge Summary       PATIENT ID: Lyla Steele  MRN: [id number]   YOB: 1927    DATE OF ADMISSION: 1/28/2019  4:09 PM    DATE OF DISCHARGE: 2/01/2019  PRIMARY CARE PROVIDER: Corey Daniels MD     ATTENDING PHYSICIAN:   DISCHARGING PROVIDER: Joanna Silver MD    To contact this individual call [phone] and ask the  to page. If unavailable ask to be transferred the Adult Hospitalist Department. CONSULTATIONS: IP CONSULT TO HOSPITALIST  IP CONSULT TO NEPHROLOGY    PROCEDURES/SURGERIES: * No surgery found *    ADMITTING DIAGNOSES & HOSPITAL COURSE:     Summary: Johnathan Garcia a 80 y. o. female with PMH of CAD?, OA who presents with extremity numbness. Has been having progressively worsening numbness and cramping in her hands and feet for the past 3-4 days. She was found to have severe electrolyte derangement on admission                      DISCHARGE DIAGNOSES / PLAN:      Electrolyte derangements-resolved   - This is likely due to chronic diarrhea  - Hypokalemia  - Hypocalcemia  - hypomagnesemia-   Required kcl, magnesium sulfate and calcium carbonate tabs/ supplementation. Replete prn  - Now resolved.     Bilateral upper extremity numbness: Due to above  resolved     Recent Left lower extremity cellulitis; S/p Augmentin therapy. No evidence SIRS or infection. Currently, no evidence of cellulitis on leg. Has 2 leg wounds that appears to be draining sero sanguinous fluid. wound care consulted.     H/o right femur fracture s/p ORIF  Ambulates on wheel chair. Consulted PT to evaluate     CAD: Continue coreg. Denies chest pain     Diarrhea: Improved  This is chronic but probably exacerbated by recent antibiotic use. She recently finished a course of Augmentin. Unlikely due to c difficile. No leucocytosis or abdominal pain. Stool wbc, negative. Stool Cx with no bacterial infection,show yeast   imodium as needed for diarrhea.  Encourage generous fluid intake        ADDITIONAL CARE RECOMMENDATIONS:   Continue Home health, PT/OT/ Skilled nursing       FOLLOW UP APPOINTMENTS:    Follow-up Information     Follow up With Specialties Details Why Contact Info    Corey Daniels MD Family Practice In 3 days  [address] [facility]  [phone]               DIET: Cardiac Diet      ACTIVITY: PT/OT per [address] leg wound clean with Hattie Judieth Bold spray,apply thin amount of carrasyn gel,cover with dry  4x4 and secure with nathan,ace wrap to lt leg (to keep dressing from sliding)          DISCHARGE MEDICATIONS:  Current Discharge Medication List      START taking these medications    Details   magnesium oxide (MAG-OX) 400 mg tablet Take 1 Tab by mouth two (2) times a day for 5 days. Qty: 10 Tab, Refills: 0         CONTINUE these medications which have CHANGED    Details   calcium carbonate (OS-DAVIAN) 500 mg calcium (1,250 mg) tablet Take 1 Tab by mouth two (2) times a day. Qty: 30 Tab, Refills: 0         CONTINUE these medications which have NOT CHANGED    Details   !! traMADol (ULTRAM) 50 mg tablet Take 50 mg by mouth nightly. mupirocin (BACTROBAN) 2 % ointment Apply  to affected area daily. menthol-zinc oxide (CALMOSEPTINE) 0.44-20.6 % oint Apply  to affected area daily. acetaminophen (TYLENOL) 325 mg tablet Take 2 Tabs by mouth every six (6) hours. Qty: 10 Tab, Refills: 0      folic acid (FOLVITE) 1 mg tablet Take 1 Tab by mouth daily. Qty: 10 Tab, Refills: 0      diclofenac (VOLTAREN) 1 % gel Apply  to affected area three (3) times daily. melatonin tab tablet Take 5 mg by mouth nightly. mirtazapine (REMERON) 30 mg tablet Take 30 mg by mouth nightly. promethazine (PHENERGAN) 12.5 mg tablet Take  by mouth three (3) times daily as needed for Nausea. triamcinolone acetonide (KENALOG) 0.1 % ointment Apply  to affected area as needed for Skin Irritation.  use thin layer      carvedilol (COREG) 3.125 mg tablet Take  by mouth two (2) times daily (with meals). thiamine (B-1) 100 mg tablet Take  by mouth daily. cholecalciferol (VITAMIN D3) 1,000 unit tablet Take 1,000 Units by mouth daily. latanoprost (XALATAN) 0.005 % ophthalmic solution Administer 1 Drop to both eyes nightly. omeprazole (PRILOSEC) 20 mg capsule Take 20 mg by mouth daily. !! traMADol (ULTRAM) 50 mg tablet Take 1 Tab by mouth every six (6) hours as needed. Max Daily Amount: 200 mg. Qty: 10 Tab, Refills: 0    Associated Diagnoses: Other fracture of right femur, initial encounter for closed fracture [facility])       ! ! - Potential duplicate medications found. Please discuss with provider. STOP taking these medications       bumetanide (BUMEX) 0.5 mg tablet Comments:   Reason for Stopping:         amoxicillin-clavulanate (AUGMENTIN) 875-125 mg per tablet Comments:   Reason for Stopping:                 NOTIFY YOUR PHYSICIAN FOR ANY OF THE FOLLOWING:   Fever over 101 degrees for 24 hours. Chest pain, shortness of breath, fever, chills, nausea, vomiting, diarrhea, change in mentation, falling, weakness, bleeding. Severe pain or pain not relieved by medications. Or, any other signs or symptoms that you may have questions about.     DISPOSITION:    Home With:  x OT x PT x HH x RN       Long term SNF/Inpatient Rehab   x Independent/assisted living    Hospice    Other:       PATIENT CONDITION AT DISCHARGE:     Functional status    Poor    x Deconditioned     Independent      Cognition     Lucid     Forgetful     Dementia      Catheters/lines (plus indication)    Burris     PICC     PEG    x None      Code status   x  Full code     DNR      PHYSICAL EXAMINATION AT DISCHARGE:   Refer to Progress Note      CHRONIC MEDICAL DIAGNOSES:  Problem List as of 2/1/2019 Date Reviewed: 11/6/2018          Codes Class Noted - Resolved    Chest pain ICD-10-CM: R07.9  ICD-9-CM: 786.50  9/12/2017 - Present        TIA (transient ischemic attack) ICD-10-CM: G45.9  ICD-9-CM: 435.9 3/8/2017 - Present        GI bleed ICD-10-CM: K92.2  ICD-9-CM: 578.9  3/8/2017 - Present        * (Principal) Numbness and tingling ICD-10-CM: R20.0, R20.2  ICD-9-CM: 782.0  3/8/2017 - Present        CAD in native artery ICD-10-CM: I25.10  ICD-9-CM: 414.01  11/28/2016 - Present        History of non-ST elevation myocardial infarction (NSTEMI) ICD-10-CM: I25.2  ICD-9-CM: 454  11/28/2016 - Present        NSTEMI (non-ST elevated myocardial infarction) ([facility] ICD-10-CM: I21.4  ICD-9-CM: 410.70  11/22/2016 - Present        Diarrhea ICD-10-CM: R19.7  ICD-9-CM: 787.91  11/22/2016 - Present        Hypocalcemia ICD-10-CM: E83.51  ICD-9-CM: 275.41  11/22/2016 - Present        Hypomagnesemia ICD-10-CM: E83.42  ICD-9-CM: 275.2  11/22/2016 - Present        Hypokalemia ICD-10-CM: E87.6  ICD-9-CM: 276.8  11/20/2016 - Present        RESOLVED: Pneumonia ICD-10-CM: J18.9  ICD-9-CM: 486  10/31/2018 - 11/6/2018        RESOLVED: Other fracture of right femur, initial encounter for closed fracture ([facility] ICD-10-CM: S07.1F1X  ICD-9-CM: 821.00  10/31/2018 - 11/6/2018              Greater than 35 minutes were spent with the patient on counseling and coordination of care    Signed:   Joanna Silver MD  2/1/2019  3:11 PM

## 2019-02-01 NOTE — PROGRESS NOTES
CM left another message for the staff at [address] [place] [address], asking for a return call from a nurse regarding patient returning to the AL today. Therapy is evaluating patient today for [place] needs. [facility] services the AL and CM will arrange [place] if ordered. Daughter is Link Sapp [phone] UPDATE 11:30 am   CM talked with MIKEL Barnett at [facility]-- She needs to come to the hospital to assess patient before she can return to the AL. Emmie Barnett will come today and will call CM prior to coming. Patient is open to [facility]. Physician will need to state on discharge instructions that patient will continue to need home health services. PT/OT. Cm will contact emmanuel Bonds when determined when patient will be discharged. As patient does not ambulate -- ambulance will be provided if needed UPDATE 1:40 pm  Cm received call from [address] [place] [address] is on her way to the hospital to assess patient. UPDATE 3 pm  Patient can return to [facility] today. Radha Haines asked that nurse call report to [phone]. Envelope and ambulance form on chart. Referral sent to [facility] via allscripts and 5 pm transport confirmed . Patient and daughter, Johnny Bonds in agreement with this plan.   Johnny Bonds is home with the flu [phone] and cannot be at the hospital.

## 2019-02-01 NOTE — PROGRESS NOTES
TRANSFER - OUT REPORT: 
 
Verbal report given to HILTON(name) on Lyla Steele  being [facility] [place] [facility] PT'S ASSISTED LIVING Report consisted of patients Situation, Background, Assessment and  
Recommendations(SBAR). Information from the following report(s) SBAR, Kardex and MAR was reviewed with the receiving nurse. Lines:    
 
Opportunity for questions and clarification was provided. Patient transported with:  AMBULANCE

## 2019-02-01 NOTE — PROGRESS NOTES
Occupational Therapy EVALUATION/discharge Patient: Kamari Nascimento (80 y.o. female) Date: 2/1/2019 Primary Diagnosis: Numbness and tingling Numbness and tingling Precautions: fall ASSESSMENT:  
Based on the objective data described below, the patient presents with largely CGA-MIN A for ADL transfers and tasks with patient reporting that she was receiving HHOT and HHPT at [facility]. Patient also reports that she has a raised toilet and is working on getting a shower chair at her [facility] however had only been living at [facility] for one week prior to admission, receiving assistance for stand-pivot transfers, for bathing, and dressing herself. Patient is close to functional baseline at this time and is eager to return to [facility]. Recommend continued HHOT/HHPT services post discharge to maximize patient safety and independence with ADL tasks. Further skilled acute occupational therapy is not indicated at this time. Discharge Recommendations: Home Health Further Equipment Recommendations for Discharge: shower chair (patient's daughter is working on obtaining however has the flu at this time) SUBJECTIVE:  
Patient stated I have got to get out of here today.  OBJECTIVE DATA SUMMARY:  
HISTORY:  
Past Medical History:  
Diagnosis Date  Arthritis  History of non-ST elevation myocardial infarction (NSTEMI) 11/28/2016 Past Surgical History:  
Procedure Laterality Date  HX CHOLECYSTECTOMY Prior Level of Function/Environment/Context: PTA, patient lived at [address] [place] [address] and was completing stand-pivot transfers > w/c and to raised toilet. Patient reports she received assistance for bathing and for some aspects of LB dressing occasionally. Expanded or extensive additional review of patient history:  
 
Home Situation Home Environment: Assisted living Care Facility Name: [facility] Living Alone: No 
Support Systems: ([facility] staff) Patient Expects to be Discharged to[de-identified] Assisted living Current DME Used/Available at Home: Wheelchair Tub or Shower Type: Shower Hand dominance: Right EXAMINATION OF PERFORMANCE DEFICITS: 
Cognitive/Behavioral Status: 
Neurologic State: Alert Orientation Level: Oriented X4 Cognition: Appropriate for age attention/concentration Perception: Appears intact Perseveration: No perseveration noted Safety/Judgement: Insight into deficits Skin: bruises on BLE Edema: BLE Hearing: Auditory Auditory Impairment: None Vision/Perceptual:   
    
    
    
  
    
    
Corrective Lenses: Glasses Range of Motion: BUE 
AROM: Generally decreased, functional 
  
  
  
  
  
  
  
Strength: BUE Strength: Generally decreased, functional 
  
  
  
  
 
Coordination: 
Coordination: Generally decreased, functional 
Fine Motor Skills-Upper: Left Intact; Right Intact Gross Motor Skills-Upper: Left Intact; Right Intact Tone & Sensation: BUE Tone: Normal 
Sensation: Intact Balance: 
Sitting: Intact Standing: Impaired; With support Standing - Static: Fair Standing - Dynamic : Poor Functional Mobility and Transfers for ADLs:Bed Mobility: 
Supine to Sit: Supervision Sit to Supine: Minimum assistance(assist for lifting LEs) Transfers: 
Sit to Stand: Contact guard assistance(cues for hand placement due to pt pulling up on walker) Stand to Sit: Contact guard assistance(due to elevated mattress height) ADL Assessment: 
Feeding: Independent(per patient report) Oral Facial Hygiene/Grooming: Setup(infer 2* decreased functional mobility) Bathing: Contact guard assistance(infer 2* dynamic standing balance) Upper Body Dressing: Setup(infer 2* decreased functional mobility) Lower Body Dressing: Setup(to kirill bilateral slip-on shoes) Toileting: Contact guard assistance(infer 2* dynamic standing balance) ADL Intervention and task modifications: OT provided safety education for functional transfers and ADL tasks. Lower Body Dressing Assistance Slip on Shoes Without Back: Supervision/set-up Cognitive Retraining Safety/Judgement: Insight into deficits Functional Measure: 
Barthel Index: 
 
Bathin Bladder: 5 Bowels: 5 Groomin Dressin Feeding: 10 Mobility: 10 Stairs: 0 Toilet Use: 5 Transfer (Bed to Chair and Back): 10 Total: 55 The Barthel ADL Index: Guidelines 1. The index should be used as a record of what a patient does, not as a record of what a patient could do. 2. The main aim is to establish degree of independence from any help, physical or verbal, however minor and for whatever reason. 3. The need for supervision renders the patient not independent. 4. A patient's performance should be established using the best available evidence. Asking the patient, friends/relatives and nurses are the usual sources, but direct observation and common sense are also important. However direct testing is not needed. 5. Usually the patient's performance over the preceding 24-48 hours is important, but occasionally longer periods will be relevant. 6. Middle categories imply that the patient supplies over 50 per cent of the effort. 7. Use of aids to be independent is allowed. Yovanny Cortez., Barthel, D.W. (). Functional evaluation: the Barthel Index. [facility] (14)2. Carey Ybarra danny IRISH Bain, Rudi Arreguin., Melissa Rodriguez, [place], [address] [place] [address] (). Measuring the change indisability after inpatient rehabilitation; comparison of the responsiveness of the Barthel Index and Functional [place]ancock Measure. Journal of Neurology, Neurosurgery, and Psychiatry, 66(4), 324-938. Tatyana Guzman, N.J.A, MELIA Post, & William Alexis MTimurA. (2004.) Assessment of post-stroke quality of life in cost-effectiveness studies: The usefulness of the Barthel Index and the EuroQoL-5D.  Quality of Life Research, 13, 151-18 Occupational Therapy Evaluation Charge Determination History Examination Decision-Making LOW Complexity : Brief history review  LOW Complexity : 1-3 performance deficits relating to physical, cognitive , or psychosocial skils that result in activity limitations and / or participation restrictions  LOW Complexity : No comorbidities that affect functional and no verbal or physical assistance needed to complete eval tasks Based on the above components, the patient evaluation is determined to be of the following complexity level: LOW Pain: 
Pain Scale 1: Numeric (0 - 10) Pain Intensity 1: 0 Activity Tolerance: VSS Please refer to the flowsheet for vital signs taken during this treatment. After treatment:  
[]  Patient left in no apparent distress sitting up in chair 
[x]  Patient left in no apparent distress in bed 
[x]  Call bell left within reach [x]  Nursing notified 
[]  Caregiver present 
[]  Bed alarm activated COMMUNICATION/EDUCATION:  
Communication/Collaboration: 
[x]      Home safety education was provided and the patient/caregiver indicated understanding. [x]      Patient/family have participated as able and agree with findings and recommendations. []      Patient is unable to participate in plan of care at this time. Findings and recommendations were discussed with: Physical Therapist and Registered Nurse Kevan Han Time Calculation: 18 mins

## 2019-02-01 NOTE — DISCHARGE INSTRUCTIONS
DIET: Cardiac Diet      ACTIVITY: PT/OT per [address] leg wound clean with Hattie klenz spray,apply thin amount of carrasyn gel,cover with dry  4x4 and secure with nathan,ace wrap to lt leg (to keep dressing from sliding)    ADDITIONAL CARE RECOMMENDATIONS:   Continue Home health, PT/OT/ Skilled nursing       NOTIFY YOUR PHYSICIAN FOR ANY OF THE FOLLOWING:   Fever over 101 degrees for 24 hours. Chest pain, shortness of breath, fever, chills, nausea, vomiting, diarrhea, change in mentation, falling, weakness, bleeding. Severe pain or pain not relieved by medications. Or, any other signs or symptoms that you may have questions about.

## 2019-03-12 ENCOUNTER — HOSPITAL ENCOUNTER (INPATIENT)
Age: 84
LOS: 3 days | Discharge: HOME HEALTH | DRG: 641 | End: 2019-03-15
Attending: EMERGENCY MEDICINE | Admitting: INTERNAL MEDICINE
Payer: MEDICARE

## 2019-03-12 DIAGNOSIS — E83.51 HYPOCALCEMIA: ICD-10-CM

## 2019-03-12 DIAGNOSIS — R20.2 BILATERAL NUMBNESS AND TINGLING OF ARMS AND LEGS: ICD-10-CM

## 2019-03-12 DIAGNOSIS — E87.6 HYPOKALEMIA: ICD-10-CM

## 2019-03-12 DIAGNOSIS — E83.42 HYPOMAGNESEMIA: ICD-10-CM

## 2019-03-12 DIAGNOSIS — R53.1 WEAKNESS: Primary | ICD-10-CM

## 2019-03-12 DIAGNOSIS — R20.0 BILATERAL NUMBNESS AND TINGLING OF ARMS AND LEGS: ICD-10-CM

## 2019-03-12 PROBLEM — E87.8 ELECTROLYTE ABNORMALITY: Status: ACTIVE | Noted: 2019-03-12

## 2019-03-12 LAB
ALBUMIN SERPL-MCNC: 3.1 G/DL (ref 3.5–5)
ANION GAP SERPL CALC-SCNC: 11 MMOL/L (ref 5–15)
ATRIAL RATE: 75 BPM
BASOPHILS # BLD: 0.1 K/UL (ref 0–0.1)
BASOPHILS NFR BLD: 1 % (ref 0–1)
BUN SERPL-MCNC: 14 MG/DL (ref 6–20)
BUN/CREAT SERPL: 13 (ref 12–20)
CALCIUM SERPL-MCNC: 6.3 MG/DL (ref 8.5–10.1)
CALCULATED P AXIS, ECG09: 15 DEGREES
CALCULATED R AXIS, ECG10: -37 DEGREES
CALCULATED T AXIS, ECG11: -37 DEGREES
CHLORIDE SERPL-SCNC: 100 MMOL/L (ref 97–108)
CK SERPL-CCNC: 80 U/L (ref 26–192)
CO2 SERPL-SCNC: 29 MMOL/L (ref 21–32)
COMMENT, HOLDF: NORMAL
CREAT SERPL-MCNC: 1.05 MG/DL (ref 0.55–1.02)
DIAGNOSIS, 93000: NORMAL
DIFFERENTIAL METHOD BLD: ABNORMAL
EOSINOPHIL # BLD: 0.3 K/UL (ref 0–0.4)
EOSINOPHIL NFR BLD: 4 % (ref 0–7)
ERYTHROCYTE [DISTWIDTH] IN BLOOD BY AUTOMATED COUNT: 13.7 % (ref 11.5–14.5)
GLUCOSE SERPL-MCNC: 104 MG/DL (ref 65–100)
HCT VFR BLD AUTO: 36.6 % (ref 35–47)
HGB BLD-MCNC: 11.3 G/DL (ref 11.5–16)
IMM GRANULOCYTES # BLD AUTO: 0 K/UL (ref 0–0.04)
IMM GRANULOCYTES NFR BLD AUTO: 0 % (ref 0–0.5)
LYMPHOCYTES # BLD: 1.7 K/UL (ref 0.8–3.5)
LYMPHOCYTES NFR BLD: 21 % (ref 12–49)
MAGNESIUM SERPL-MCNC: 0.7 MG/DL (ref 1.6–2.4)
MCH RBC QN AUTO: 27.5 PG (ref 26–34)
MCHC RBC AUTO-ENTMCNC: 30.9 G/DL (ref 30–36.5)
MCV RBC AUTO: 89.1 FL (ref 80–99)
MONOCYTES # BLD: 0.7 K/UL (ref 0–1)
MONOCYTES NFR BLD: 9 % (ref 5–13)
NEUTS SEG # BLD: 5.2 K/UL (ref 1.8–8)
NEUTS SEG NFR BLD: 65 % (ref 32–75)
NRBC # BLD: 0 K/UL (ref 0–0.01)
NRBC BLD-RTO: 0 PER 100 WBC
P-R INTERVAL, ECG05: 188 MS
PHOSPHATE SERPL-MCNC: 5.2 MG/DL (ref 2.6–4.7)
PLATELET # BLD AUTO: 231 K/UL (ref 150–400)
PMV BLD AUTO: 9.8 FL (ref 8.9–12.9)
POTASSIUM SERPL-SCNC: 3.1 MMOL/L (ref 3.5–5.1)
Q-T INTERVAL, ECG07: 454 MS
QRS DURATION, ECG06: 122 MS
QTC CALCULATION (BEZET), ECG08: 506 MS
RBC # BLD AUTO: 4.11 M/UL (ref 3.8–5.2)
SAMPLES BEING HELD,HOLD: NORMAL
SODIUM SERPL-SCNC: 140 MMOL/L (ref 136–145)
TROPONIN I SERPL-MCNC: <0.05 NG/ML
VENTRICULAR RATE, ECG03: 75 BPM
WBC # BLD AUTO: 8 K/UL (ref 3.6–11)

## 2019-03-12 PROCEDURE — 94762 N-INVAS EAR/PLS OXIMTRY CONT: CPT

## 2019-03-12 PROCEDURE — 74011000250 HC RX REV CODE- 250: Performed by: INTERNAL MEDICINE

## 2019-03-12 PROCEDURE — 82550 ASSAY OF CK (CPK): CPT

## 2019-03-12 PROCEDURE — 65660000000 HC RM CCU STEPDOWN

## 2019-03-12 PROCEDURE — 96360 HYDRATION IV INFUSION INIT: CPT

## 2019-03-12 PROCEDURE — 74011250636 HC RX REV CODE- 250/636: Performed by: INTERNAL MEDICINE

## 2019-03-12 PROCEDURE — 83735 ASSAY OF MAGNESIUM: CPT

## 2019-03-12 PROCEDURE — 84100 ASSAY OF PHOSPHORUS: CPT

## 2019-03-12 PROCEDURE — 74011250637 HC RX REV CODE- 250/637: Performed by: INTERNAL MEDICINE

## 2019-03-12 PROCEDURE — 74011000258 HC RX REV CODE- 258: Performed by: EMERGENCY MEDICINE

## 2019-03-12 PROCEDURE — 84484 ASSAY OF TROPONIN QUANT: CPT

## 2019-03-12 PROCEDURE — 80048 BASIC METABOLIC PNL TOTAL CA: CPT

## 2019-03-12 PROCEDURE — 74011250636 HC RX REV CODE- 250/636: Performed by: EMERGENCY MEDICINE

## 2019-03-12 PROCEDURE — 82040 ASSAY OF SERUM ALBUMIN: CPT

## 2019-03-12 PROCEDURE — 85025 COMPLETE CBC W/AUTO DIFF WBC: CPT

## 2019-03-12 PROCEDURE — 99283 EMERGENCY DEPT VISIT LOW MDM: CPT

## 2019-03-12 PROCEDURE — 93005 ELECTROCARDIOGRAM TRACING: CPT

## 2019-03-12 RX ORDER — MIRTAZAPINE 15 MG/1
30 TABLET, FILM COATED ORAL
Status: DISCONTINUED | OUTPATIENT
Start: 2019-03-12 | End: 2019-03-15 | Stop reason: HOSPADM

## 2019-03-12 RX ORDER — MELATONIN
1000 DAILY
Status: DISCONTINUED | OUTPATIENT
Start: 2019-03-13 | End: 2019-03-15 | Stop reason: HOSPADM

## 2019-03-12 RX ORDER — TRAMADOL HYDROCHLORIDE 50 MG/1
50 TABLET ORAL
Status: DISCONTINUED | OUTPATIENT
Start: 2019-03-12 | End: 2019-03-15 | Stop reason: HOSPADM

## 2019-03-12 RX ORDER — SODIUM CHLORIDE 0.9 % (FLUSH) 0.9 %
5-40 SYRINGE (ML) INJECTION AS NEEDED
Status: DISCONTINUED | OUTPATIENT
Start: 2019-03-12 | End: 2019-03-15 | Stop reason: HOSPADM

## 2019-03-12 RX ORDER — LATANOPROST 50 UG/ML
1 SOLUTION/ DROPS OPHTHALMIC
Status: DISCONTINUED | OUTPATIENT
Start: 2019-03-12 | End: 2019-03-15 | Stop reason: HOSPADM

## 2019-03-12 RX ORDER — ENOXAPARIN SODIUM 100 MG/ML
30 INJECTION SUBCUTANEOUS EVERY 24 HOURS
Status: DISCONTINUED | OUTPATIENT
Start: 2019-03-12 | End: 2019-03-14 | Stop reason: DRUGHIGH

## 2019-03-12 RX ORDER — MUPIROCIN 20 MG/G
OINTMENT TOPICAL DAILY
Status: DISCONTINUED | OUTPATIENT
Start: 2019-03-13 | End: 2019-03-14

## 2019-03-12 RX ORDER — ONDANSETRON 4 MG/1
4 TABLET, ORALLY DISINTEGRATING ORAL
COMMUNITY
End: 2019-08-19

## 2019-03-12 RX ORDER — CETIRIZINE HCL 10 MG
10 TABLET ORAL DAILY
COMMUNITY

## 2019-03-12 RX ORDER — POTASSIUM CHLORIDE 7.45 MG/ML
10 INJECTION INTRAVENOUS
Status: COMPLETED | OUTPATIENT
Start: 2019-03-12 | End: 2019-03-12

## 2019-03-12 RX ORDER — FOLIC ACID 1 MG/1
1 TABLET ORAL DAILY
Status: DISCONTINUED | OUTPATIENT
Start: 2019-03-13 | End: 2019-03-15 | Stop reason: HOSPADM

## 2019-03-12 RX ORDER — MAGNESIUM SULFATE HEPTAHYDRATE 40 MG/ML
2 INJECTION, SOLUTION INTRAVENOUS ONCE
Status: COMPLETED | OUTPATIENT
Start: 2019-03-12 | End: 2019-03-12

## 2019-03-12 RX ORDER — CARVEDILOL 3.12 MG/1
3.12 TABLET ORAL 2 TIMES DAILY WITH MEALS
Status: DISCONTINUED | OUTPATIENT
Start: 2019-03-13 | End: 2019-03-15 | Stop reason: HOSPADM

## 2019-03-12 RX ORDER — DEXTROSE, SODIUM CHLORIDE, AND POTASSIUM CHLORIDE 5; .45; .15 G/100ML; G/100ML; G/100ML
75 INJECTION INTRAVENOUS CONTINUOUS
Status: DISCONTINUED | OUTPATIENT
Start: 2019-03-12 | End: 2019-03-14

## 2019-03-12 RX ORDER — PANTOPRAZOLE SODIUM 40 MG/1
40 TABLET, DELAYED RELEASE ORAL DAILY
Status: DISCONTINUED | OUTPATIENT
Start: 2019-03-13 | End: 2019-03-15 | Stop reason: HOSPADM

## 2019-03-12 RX ORDER — TRIAMCINOLONE ACETONIDE 1 MG/G
OINTMENT TOPICAL AS NEEDED
Status: DISCONTINUED | OUTPATIENT
Start: 2019-03-12 | End: 2019-03-15 | Stop reason: HOSPADM

## 2019-03-12 RX ORDER — LANOLIN ALCOHOL/MO/W.PET/CERES
50 CREAM (GRAM) TOPICAL DAILY
Status: DISCONTINUED | OUTPATIENT
Start: 2019-03-13 | End: 2019-03-15 | Stop reason: HOSPADM

## 2019-03-12 RX ORDER — BUMETANIDE 0.5 MG/1
0.5 TABLET ORAL DAILY
COMMUNITY

## 2019-03-12 RX ORDER — CALCIUM CARBONATE 500(1250)
500 TABLET ORAL 2 TIMES DAILY
Status: DISCONTINUED | OUTPATIENT
Start: 2019-03-12 | End: 2019-03-13

## 2019-03-12 RX ORDER — ONDANSETRON 2 MG/ML
4 INJECTION INTRAMUSCULAR; INTRAVENOUS
Status: DISCONTINUED | OUTPATIENT
Start: 2019-03-12 | End: 2019-03-15 | Stop reason: HOSPADM

## 2019-03-12 RX ORDER — SODIUM CHLORIDE 0.9 % (FLUSH) 0.9 %
5-40 SYRINGE (ML) INJECTION EVERY 8 HOURS
Status: DISCONTINUED | OUTPATIENT
Start: 2019-03-12 | End: 2019-03-15 | Stop reason: HOSPADM

## 2019-03-12 RX ADMIN — LATANOPROST 1 DROP: 50 SOLUTION OPHTHALMIC at 22:34

## 2019-03-12 RX ADMIN — MIRTAZAPINE 30 MG: 15 TABLET, FILM COATED ORAL at 22:09

## 2019-03-12 RX ADMIN — MAGNESIUM SULFATE HEPTAHYDRATE 2 G: 40 INJECTION, SOLUTION INTRAVENOUS at 12:14

## 2019-03-12 RX ADMIN — TRAMADOL HYDROCHLORIDE 50 MG: 50 TABLET, FILM COATED ORAL at 22:10

## 2019-03-12 RX ADMIN — DEXTROSE MONOHYDRATE, SODIUM CHLORIDE, AND POTASSIUM CHLORIDE 75 ML/HR: 50; 4.5; 1.49 INJECTION, SOLUTION INTRAVENOUS at 22:34

## 2019-03-12 RX ADMIN — SODIUM CHLORIDE 1000 ML: 900 INJECTION, SOLUTION INTRAVENOUS at 10:49

## 2019-03-12 RX ADMIN — ENOXAPARIN SODIUM 30 MG: 30 INJECTION SUBCUTANEOUS at 22:10

## 2019-03-12 RX ADMIN — Medication 500 MG: at 22:09

## 2019-03-12 RX ADMIN — Medication 10 ML: at 22:10

## 2019-03-12 RX ADMIN — CALCIUM GLUCONATE 1 G: 98 INJECTION, SOLUTION INTRAVENOUS at 12:23

## 2019-03-12 RX ADMIN — POTASSIUM CHLORIDE 10 MEQ: 10 INJECTION, SOLUTION INTRAVENOUS at 12:29

## 2019-03-12 NOTE — PROGRESS NOTES
Admission Medication Reconciliation:    Information obtained from: Medication list from [facility]    Significant PMH/Disease States:   Past Medical History:   Diagnosis Date    Arthritis     History of non-ST elevation myocardial infarction (NSTEMI) 11/28/2016       Chief Complaint for this Admission:  Abnormal lab rsults    Allergies:  Codeine and Prednisone    Prior to Admission Medications:   Prior to Admission Medications   Prescriptions Last Dose Informant Patient Reported? Taking?   acetaminophen (TYLENOL) 325 mg tablet   No yes   Sig: Take 2 Tabs by mouth every six (6) hours. bumetanide (BUMEX) 0.5 mg tablet   Yes Yes   Sig: Take 0.5 mg by mouth daily. calcium carbonate (OS-DAVIAN) 500 mg calcium (1,250 mg) tablet   No yes   Sig: Take 1 Tab by mouth two (2) times a day. carvedilol (COREG) 3.125 mg tablet   Yes yes   Sig: Take  by mouth two (2) times daily (with meals). cetirizine (ZYRTEC) 10 mg tablet   Yes Yes   Sig: Take 10 mg by mouth daily. cholecalciferol (VITAMIN D3) 1,000 unit tablet   Yes yes   Sig: Take 1,000 Units by mouth daily. diclofenac (VOLTAREN) 1 % gel   Yes yes   Sig: Apply  to affected area three (3) times daily. folic acid (FOLVITE) 1 mg tablet   No yes   Sig: Take 1 Tab by mouth daily. latanoprost (XALATAN) 0.005 % ophthalmic solution   Yes yes   Sig: Administer 1 Drop to both eyes nightly. melatonin tab tablet   Yes yes   Sig: Take 5 mg by mouth nightly. menthol-zinc oxide (CALMOSEPTINE) 0.44-20.6 % oint   Yes yes   Sig: Apply  to affected area daily. mirtazapine (REMERON) 30 mg tablet   Yes yes   Sig: Take 30 mg by mouth nightly. mupirocin (BACTROBAN) 2 % ointment   Yes yes   Sig: Apply  to affected area daily. omeprazole (PRILOSEC) 20 mg capsule   Yes yes   Sig: Take 20 mg by mouth daily. ondansetron (ZOFRAN ODT) 4 mg disintegrating tablet   Yes Yes   Sig: Take 4 mg by mouth every six (6) hours as needed for Nausea.    promethazine (PHENERGAN) 12.5 mg tablet   Yes yes   Sig: Take  by mouth three (3) times daily as needed for Nausea. thiamine (B-1) 100 mg tablet   Yes yes   Sig: Take  by mouth daily. traMADol (ULTRAM) 50 mg tablet   No yes   Sig: Take 1 Tab by mouth every six (6) hours as needed. Max Daily Amount: 200 mg.   traMADol (ULTRAM) 50 mg tablet   Yes yes   Sig: Take 50 mg by mouth nightly. triamcinolone acetonide (KENALOG) 0.1 % ointment   Yes yes   Sig: Apply  to affected area as needed for Skin Irritation. use thin layer      Facility-Administered Medications: None     Comments/Recommendations:  Added Zofran, Bumex, Zyrtec

## 2019-03-12 NOTE — ED TRIAGE NOTES
TRIAGE: Patient arrives by EMS for \"abnormal labs\". Patient lives at [facility]. P Patient has no complaints other than bilateral hand numbness, Patient reports this happened before. Staff did not know the labs. According to paperwork it looks like patient has LOW Calcium (6.2) and Mag (0.8).

## 2019-03-12 NOTE — ED PROVIDER NOTES
80 y.o. female with past medical history significant for NSTEMI who presents from [facility] via EMS with chief complaint of numbness. Patient was admitted here 1/28/19-2/1/19 after presenting for numbness and cramping in her hands and feet, found to have severe electrolyte derangement including hypokalemia, hypocalcemia, and hypomagnesemia requiring KCl, magnesium sulfate, and calcium carbonate tabs/supplementation. Patient had been having some chronic diarrhea, and this was suspected to be the cause of her derangement. Patient was discharged back to her facility and has been doing well reportedly. Patient has been having increased numbness, tingling, and cramping intermittently in her hands and head over the last week or so. Patient had labs drawn yesterday, which found a calcium of 6.2 and a magnesium of 0.8, and was sent here for further treatment. Patient states her diarrhea has since resolved. Patient also endorses chronic bilateral leg swelling. Patient denies any vomiting or appetite changes. There are no other acute medical concerns at this time. Social hx: Nonsmoker; Current EtOH use  PCP: Corey Daniels MD    Note written by Corrinne Alto, Scribe, as dictated by Alicja Gutiérrez MD 10:35 AM      The history is provided by the patient, the EMS personnel and medical records. No  was used. Past Medical History:   Diagnosis Date    Arthritis     History of non-ST elevation myocardial infarction (NSTEMI) 11/28/2016       Past Surgical History:   Procedure Laterality Date    HX CHOLECYSTECTOMY           History reviewed. No pertinent family history.     Social History     Socioeconomic History    Marital status:      Spouse name: Not on file    Number of children: Not on file    Years of education: Not on file    Highest education level: Not on file   Social Needs    Financial resource strain: Not on file    Food insecurity - worry: Not on file   Ophiem-Frankie insecurity - inability: Not on file    Transportation needs - medical: Not on file   Redicam needs - non-medical: Not on file   Occupational History    Not on file   Tobacco Use    Smoking status: Never Smoker    Smokeless tobacco: Never Used   Substance and Sexual Activity    Alcohol use: Yes     Comment: a couple of scotch drinks daily    Drug use: No    Sexual activity: Not on file   Other Topics Concern    Not on file   Social History Narrative    Not on file         ALLERGIES: Codeine and Prednisone    Review of Systems   Constitutional: Negative for activity change, appetite change and fever. HENT: Negative for facial swelling and nosebleeds. Eyes: Negative for pain. Respiratory: Negative for cough, chest tightness and shortness of breath. Cardiovascular: Negative for chest pain and leg swelling. Gastrointestinal: Negative for abdominal pain, diarrhea (Resolved), nausea and vomiting. Endocrine: Negative for polyuria. Genitourinary: Negative for difficulty urinating and flank pain. Musculoskeletal: Negative for arthralgias and back pain. Skin: Negative for color change. Allergic/Immunologic: Negative for immunocompromised state. Neurological: Positive for numbness. Negative for dizziness and headaches. (+) Tingling   Hematological: Does not bruise/bleed easily. Psychiatric/Behavioral: Negative for agitation. All other systems reviewed and are negative. Vitals:    03/12/19 1034   BP: 122/63   Pulse: 85   Resp: 16   Temp: 98.4 °F (36.9 °C)   SpO2: 98%   Weight: 59.9 kg (132 lb)   Height: 5' (1.524 m)            Physical Exam   Constitutional: She is oriented to person, place, and time. She appears well-developed and well-nourished. No distress. Elderly, frail, NAD. HENT:   Head: Normocephalic and atraumatic.    Right Ear: External ear normal.   Left Ear: External ear normal.   Nose: Nose normal.   Mouth/Throat: Oropharynx is clear and moist.   Eyes: EOM are normal. Pupils are equal, round, and reactive to light. No scleral icterus. Neck: Normal range of motion. Neck supple. No JVD present. No tracheal deviation present. No thyromegaly present. Cardiovascular: Normal rate, regular rhythm, normal heart sounds and intact distal pulses. Exam reveals no friction rub. No murmur heard. Pulmonary/Chest: Effort normal and breath sounds normal. No stridor. No respiratory distress. She has no wheezes. She has no rales. She exhibits no tenderness. Abdominal: Soft. Bowel sounds are normal. She exhibits no distension. There is no tenderness. There is no rebound and no guarding. Musculoskeletal: Normal range of motion. She exhibits edema. She exhibits no tenderness. 2+ edema to bilateral lower extremities that are wrapped. Lymphadenopathy:     She has no cervical adenopathy. Neurological: She is alert and oriented to person, place, and time. She has normal reflexes. No cranial nerve deficit. Coordination normal.   Skin: Skin is warm and dry. No rash noted. No erythema. Psychiatric: She has a normal mood and affect. Her behavior is normal. Judgment and thought content normal.   Nursing note and vitals reviewed. Note written by Markus Slater, as dictated by Maninder Rodas MD 10:35 AM    MDM  Number of Diagnoses or Management Options  Bilateral numbness and tingling of arms and legs:   Hypocalcemia:   Hypokalemia:   Hypomagnesemia:   Weakness:   Diagnosis management comments: 45-year-old white female presents to the emergency department with electrolyte abnormalities. Patient was admitted here a couple of months ago with electrolyte abnormalities and diarrhea. She denies having diarrhea currently. Patient had labs checked apparently yesterday that showed low magnesium and low calcium. We'll recheck these labs. We'll reassess. EKG is pending. IV fluids are running.           Amount and/or Complexity of Data Reviewed  Clinical lab tests: ordered and reviewed  Tests in the medicine section of CPT®: ordered and reviewed  Decide to obtain previous medical records or to obtain history from someone other than the patient: yes  Review and summarize past medical records: yes  Independent visualization of images, tracings, or specimens: yes           Procedures    11:44 AM  Labs returned showing Mag is 0.7, potassium is 3.1, and Ca is 6.3. Will replete with magnesium, calcium, and potassium and admit to the hospitalist.         ED EKG interpretation:  Rhythm: normal sinus rhythm; and regular . Rate (approx.): 75 bpm; Left axis deviation; RBBB; No ST elevations or depressions. Note written by Markus Allison, as dictated by Sapna Ha MD 12:05 PM    CONSULT NOTE:  12:14 PM Sapna Ha MD communicated with Dr. Chelita Beasley, Consult for Hospitalist via [facility] Text. Discussed available diagnostic tests and clinical findings. She will see and admit the patient.

## 2019-03-12 NOTE — ROUTINE PROCESS
TRANSFER - OUT REPORT:    Verbal report given to Constance Viera RN (name) on Megan Suarez  being transferred to 538(unit) for routine progression of care       Report consisted of patients Situation, Background, Assessment and   Recommendations(SBAR). Information from the following report(s) SBAR and MAR was reviewed with the receiving nurse. Lines:   Peripheral IV 03/12/19 Left Antecubital (Active)   Site Assessment Clean, dry, & intact 3/12/2019 10:35 AM   Phlebitis Assessment 0 3/12/2019 10:35 AM   Infiltration Assessment 0 3/12/2019 10:35 AM   Dressing Status Clean, dry, & intact 3/12/2019 10:35 AM   Dressing Type Tape;Transparent 3/12/2019 10:35 AM   Hub Color/Line Status Pink;Capped;Flushed 3/12/2019 10:35 AM       Peripheral IV 03/12/19 Left Forearm (Active)   Site Assessment Clean, dry, & intact 3/12/2019 12:39 PM   Phlebitis Assessment 0 3/12/2019 12:39 PM   Infiltration Assessment 0 3/12/2019 12:39 PM   Dressing Status Clean, dry, & intact 3/12/2019 12:39 PM   Dressing Type Transparent 3/12/2019 12:39 PM        Opportunity for questions and clarification was provided.       Patient transported with:   [employer]

## 2019-03-12 NOTE — ED NOTES
Patient was admitted to the hospital for similar symptoms a few weeks back, she was prescribed magnesium oxide to help with her deficits. Patient has been billed for medication but it does not appear to be on her MAR from the facility.

## 2019-03-12 NOTE — H&P
[facility]  HISTORY AND PHYSICAL    Name:  Liban Nuñez  MR#:  [id number]  :  10/17/1927  ACCOUNT #:  [de-identified]  ADMIT DATE:  2019    TIME OF DICTATION:  1313 hours. PRIMARY CARE PHYSICIAN:  Dr. Saw Edwards. CHIEF COMPLAINT:  Numbness of the upper extremities. HISTORY OF PRESENTING ILLNESS:  Please note most of the history was obtained from the old records and the emergency room physician with the records, as the patient is somewhat of a poor historian. A 80-year-old female with a past medical history significant for coronary artery disease, non-ST-elevation myocardial infarction, electrolyte abnormalities including hypocalcemia, hypomagnesemia, hypokalemia, transient ischemic attack, GI bleed,  was brought to the emergency room by EMS from [address] [place] [address] for complaints of upper extremity numbness. Of note, the patient was hospitalized from 2019 to 2019 for similar complaints at which time the patient had numbness and cramp in hands and feet. At that time, the patient was noted to have severe electrolyte abnormalities including hypokalemia, hypocalcemia, and hypomagnesemia requiring aggressive IV and oral supplementation. It was noted that the patient's electrolyte abnormalities were probably due to  chronic diarrhea. The patient was discharged back to the facility and reportedly doing well with oral supplementation. However, due to the increase in the numbness and tingling in the bilateral upper extremities in the past week the patient was evaluated with a metabolic panel  showing a calcium of 6.2 and a magnesium of 0.8. Thereafter, the patient was referred to the emergency room for further evaluation. The patient denied any associated headaches, dizziness, visual deficits, chest pains, palpitations, nausea, vomiting, cough, fevers, chills, abdominal pain or bladder irregularities.     PAST MEDICAL HISTORY:  1.  Non-ST-elevation myocardial infarction. 2.  TIA. 3.  Hypokalemia. 4.  Hypocalcemia. 5.  Hypomagnesemia. 6.  Coronary artery disease. 7.  GI bleed. PAST SURGICAL HISTORY:  Cholecystectomy. MEDICATIONS AT THE FACILITY:  1. Tylenol 650 mg one tablet p.o. every 6 hours as needed. 2.  Bumex 0.5 mg p.o. daily. 3.  Calcium carbonate 500 mg two tablets p.o. daily. 4.  Coreg 3.125 mg p.o. twice daily with meals. 5.  Zyrtec 10 mg p.o. daily. 6.  Cholecalciferol 1000 units p.o. daily. 7.  Diclofenac 1% gel to be applied to the affected areas three times a day. 8.  Folic acid 1 mg p.o. daily. 9.  Latanoprost 0.005% ophthalmic solution one drop to both eyes nightly. 10.  Melatonin 5 mg tablet p.o. nightly. 11.  Calmoseptine ointment to be applied to the affected areas daily. 12.  Remeron 30 mg p.o. nightly. 13.  Bactroban 2% ointment to the affected areas daily. 14.  Prilosec 20 mg p.o. daily. 15.  Zofran 4 mg p.o. every 6 hours as needed for nausea. 16.  Phenergan 12.5 mg p.o. three times a day as needed for nausea. 17.  Thiamine 100 mg p.o. daily. 18.  Tramadol 50 mg one tablet p.o. every 6 hours as needed. 19.  Tramadol 50 mg p.o. nightly as needed. 20.  Kenalog 0.1% ointment to be applied to affected area for irritation as needed. ALLERGIES:  1.  CODEINE. 2.  PREDNISONE.    SOCIAL HISTORY:  Significant for residing at the [address] [place] [address]. No history of alcohol use disorder. No history of tobacco abuse. FAMILY HISTORY:  Per the old records, negative for any premature coronary artery disease or death. Negative for malignancy. Negative for diabetes mellitus. REVIEW OF SYSTEMS:  CONSTITUTIONAL:  Negative for any changes in appetite or weight loss. Negative for fevers or chills. HEAD:  Negative for any facial swelling, sinus pressure, nose bleeds or difficulty swallowing. EYES:  Negative for any eye pain. RESPIRATORY:  Negative for cough, chest pains or shortness of breath.   CARDIOVASCULAR: Negative for any chest pains or leg swelling. GASTROINTESTINAL:  Negative for nausea, vomiting or abdominal pain. Positive for diarrhea. ENDOCRINE:  Negative for polyuria or polydipsia. GENITOURINARY:  Negative for difficulty in urination or hematuria. MUSCULOSKELETAL:  Negative for arthralgias or back pains. SKIN:  Negative for any rashes, petechiae or ecchymosis. NEUROLOGIC:  Positive for numbness of the upper extremities. Negative for dizziness, headaches or syncope. HEMATOLOGY:  Negative for anemia or blood dyscrasias. PSYCHIATRY:  Negative for any hallucinations, psychosis or delusions. PHYSICAL EXAMINATION:  GENERAL:  On exam, the patient was awake, alert, not in any distress. VITAL SIGNS:  Blood pressure of 122/63, heart rate 85, respiratory rate 16, afebrile, saturating 98% on room air. HEENT:   Head, normocephalic. Pupils are equal and reactive to light. Ear, nose, and throat clear. NECK:  No jugular venous distention or carotid bruits. CARDIOVASCULAR:  S1, S2 present. RESPIRATORY:  Lungs with decreased air entry at both bases without any crepitations or rhonchi. GI:  Bowel sounds present. The abdomen is soft, nontender. No rebound, no guarding. GENITOURINARY:  Nil of note. MUSCULOSKELETAL:  No asymmetry of the extremities. Numbness of the bilateral upper extremities. Power 5/5 in bilateral upper and lower extremities. CNS:  The patient is awake, alert, and oriented to time, place, and person. LAB/RADIOGRAPHY FINDINGS:  As follows. CBC with a WBC of 8, hemoglobin 11.3, hematocrit 36.6, and platelet count 202. MCV 89.1, 65% neutrophils. Metabolic panel with a sodium of 140, potassium 3.1, chloride 100, bicarb of 29, BUN of 14, creatinine 1.05, glucose of 104, calcium 6.3, albumin 3.1, phosphorus of 5.2, magnesium 0.7, and troponin less than 0.05.    ASSESSMENT AND PLAN  1. Electrolytes.   The patient will be admitted to the inpatient level monitored floor for management of acute on chronic recurrent symptomatic electrolyte abnormalities including hypokalemia, hypomagnesemia, hypocalcemia. This is most likely due to the patient's recurrent diarrhea. We will review a 12-lead EKG for any dysrhythmias. We will initiate IV hydration with IV and oral repletion of electrolytes. 2.  Renal: Dehydration most likely due to the recurrent diarrhea. IV fluids with monitoring of BUN and creatinine. 3.  Cardiovascular:  Coronary artery disease. History of non-ST elevation myocardial infarction. No acute ischemia noted by high sensitivity cardiac markers. 4.  CNS:  History of transient ischemic attack. Bilateral upper extremity numbness most likely due to the recurrent electrolyte abnormalities. 5.  Prophylaxis for deep venous thrombosis. 6.  Directives:  Full code with a guarded prognosis. Discussed with the patient. In summary, a 77-year-old female with a few medical problems and resident at the [facility], is being admitted to the hospital for recurrent symptomatic multi-electrolyte abnormalities including due to hypokalemia, hypocalcemia, and hypomagnesemia. This patient is at increased risk of further decompensation including dysrhythmias and will benefit from close inpatient management including with IV repletion with followup levels and as-needed Nephrology consult. The entire admission plan was discussed in detail with the patient. All questions and concerns were addressed. FUNCTIONAL STATUS:  The patient's functional status prior to hospitalization significant for the patient being able to ambulate with an assistive device. Total time for admission 50 minutes of which at least 50% of the time was spent in plan of care and counseling of the patient.         Dmitriy Dial MD      [id number]  D:  03/12/2019 13:26  T:  03/12/2019 15:14  JOB #:  [id number]

## 2019-03-13 LAB
ALBUMIN SERPL-MCNC: 2.7 G/DL (ref 3.5–5)
ALBUMIN/GLOB SERPL: 0.9 {RATIO} (ref 1.1–2.2)
ALP SERPL-CCNC: 126 U/L (ref 45–117)
ALT SERPL-CCNC: 9 U/L (ref 12–78)
ANION GAP SERPL CALC-SCNC: 5 MMOL/L (ref 5–15)
ANION GAP SERPL CALC-SCNC: 9 MMOL/L (ref 5–15)
AST SERPL-CCNC: 13 U/L (ref 15–37)
BASOPHILS # BLD: 0.1 K/UL (ref 0–0.1)
BASOPHILS NFR BLD: 1 % (ref 0–1)
BILIRUB SERPL-MCNC: 0.5 MG/DL (ref 0.2–1)
BUN SERPL-MCNC: 11 MG/DL (ref 6–20)
BUN SERPL-MCNC: 11 MG/DL (ref 6–20)
BUN/CREAT SERPL: 12 (ref 12–20)
BUN/CREAT SERPL: 13 (ref 12–20)
CALCIUM SERPL-MCNC: 6.2 MG/DL (ref 8.5–10.1)
CALCIUM SERPL-MCNC: 7.3 MG/DL (ref 8.5–10.1)
CHLORIDE SERPL-SCNC: 103 MMOL/L (ref 97–108)
CHLORIDE SERPL-SCNC: 108 MMOL/L (ref 97–108)
CO2 SERPL-SCNC: 26 MMOL/L (ref 21–32)
CO2 SERPL-SCNC: 27 MMOL/L (ref 21–32)
CREAT SERPL-MCNC: 0.84 MG/DL (ref 0.55–1.02)
CREAT SERPL-MCNC: 0.9 MG/DL (ref 0.55–1.02)
DIFFERENTIAL METHOD BLD: ABNORMAL
EOSINOPHIL # BLD: 0.3 K/UL (ref 0–0.4)
EOSINOPHIL NFR BLD: 4 % (ref 0–7)
ERYTHROCYTE [DISTWIDTH] IN BLOOD BY AUTOMATED COUNT: 13.6 % (ref 11.5–14.5)
GLOBULIN SER CALC-MCNC: 2.9 G/DL (ref 2–4)
GLUCOSE SERPL-MCNC: 112 MG/DL (ref 65–100)
GLUCOSE SERPL-MCNC: 88 MG/DL (ref 65–100)
HCT VFR BLD AUTO: 34.2 % (ref 35–47)
HGB BLD-MCNC: 10.8 G/DL (ref 11.5–16)
IMM GRANULOCYTES # BLD AUTO: 0 K/UL (ref 0–0.04)
IMM GRANULOCYTES NFR BLD AUTO: 0 % (ref 0–0.5)
LYMPHOCYTES # BLD: 2 K/UL (ref 0.8–3.5)
LYMPHOCYTES NFR BLD: 26 % (ref 12–49)
MCH RBC QN AUTO: 28.1 PG (ref 26–34)
MCHC RBC AUTO-ENTMCNC: 31.6 G/DL (ref 30–36.5)
MCV RBC AUTO: 89.1 FL (ref 80–99)
MONOCYTES # BLD: 0.6 K/UL (ref 0–1)
MONOCYTES NFR BLD: 7 % (ref 5–13)
NEUTS SEG # BLD: 4.9 K/UL (ref 1.8–8)
NEUTS SEG NFR BLD: 62 % (ref 32–75)
NRBC # BLD: 0 K/UL (ref 0–0.01)
NRBC BLD-RTO: 0 PER 100 WBC
PHOSPHATE SERPL-MCNC: 4.2 MG/DL (ref 2.6–4.7)
PLATELET # BLD AUTO: 208 K/UL (ref 150–400)
PMV BLD AUTO: 9.7 FL (ref 8.9–12.9)
POTASSIUM SERPL-SCNC: 3.2 MMOL/L (ref 3.5–5.1)
POTASSIUM SERPL-SCNC: 4 MMOL/L (ref 3.5–5.1)
PROT SERPL-MCNC: 5.6 G/DL (ref 6.4–8.2)
RBC # BLD AUTO: 3.84 M/UL (ref 3.8–5.2)
SODIUM SERPL-SCNC: 139 MMOL/L (ref 136–145)
SODIUM SERPL-SCNC: 139 MMOL/L (ref 136–145)
WBC # BLD AUTO: 7.9 K/UL (ref 3.6–11)

## 2019-03-13 PROCEDURE — 74011250636 HC RX REV CODE- 250/636: Performed by: INTERNAL MEDICINE

## 2019-03-13 PROCEDURE — 74011250637 HC RX REV CODE- 250/637: Performed by: INTERNAL MEDICINE

## 2019-03-13 PROCEDURE — 74011000258 HC RX REV CODE- 258: Performed by: INTERNAL MEDICINE

## 2019-03-13 PROCEDURE — 36415 COLL VENOUS BLD VENIPUNCTURE: CPT

## 2019-03-13 PROCEDURE — 84100 ASSAY OF PHOSPHORUS: CPT

## 2019-03-13 PROCEDURE — 85025 COMPLETE CBC W/AUTO DIFF WBC: CPT

## 2019-03-13 PROCEDURE — 80053 COMPREHEN METABOLIC PANEL: CPT

## 2019-03-13 PROCEDURE — 65660000000 HC RM CCU STEPDOWN

## 2019-03-13 RX ORDER — POTASSIUM CHLORIDE 750 MG/1
40 TABLET, FILM COATED, EXTENDED RELEASE ORAL
Status: COMPLETED | OUTPATIENT
Start: 2019-03-13 | End: 2019-03-13

## 2019-03-13 RX ORDER — MAGNESIUM SULFATE HEPTAHYDRATE 40 MG/ML
2 INJECTION, SOLUTION INTRAVENOUS ONCE
Status: COMPLETED | OUTPATIENT
Start: 2019-03-13 | End: 2019-03-13

## 2019-03-13 RX ORDER — CALCIUM CARBONATE 500(1250)
1000 TABLET ORAL
Status: DISCONTINUED | OUTPATIENT
Start: 2019-03-13 | End: 2019-03-15 | Stop reason: HOSPADM

## 2019-03-13 RX ADMIN — MIRTAZAPINE 30 MG: 15 TABLET, FILM COATED ORAL at 20:19

## 2019-03-13 RX ADMIN — TRAMADOL HYDROCHLORIDE 50 MG: 50 TABLET, FILM COATED ORAL at 20:20

## 2019-03-13 RX ADMIN — VITAMIN D 1000 UNITS: 25 TAB ORAL at 09:00

## 2019-03-13 RX ADMIN — CARVEDILOL 3.12 MG: 3.12 TABLET, FILM COATED ORAL at 09:00

## 2019-03-13 RX ADMIN — Medication 10 ML: at 20:21

## 2019-03-13 RX ADMIN — POTASSIUM CHLORIDE 40 MEQ: 750 TABLET, EXTENDED RELEASE ORAL at 11:18

## 2019-03-13 RX ADMIN — CARVEDILOL 3.12 MG: 3.12 TABLET, FILM COATED ORAL at 17:36

## 2019-03-13 RX ADMIN — FOLIC ACID 1 MG: 1 TABLET ORAL at 09:00

## 2019-03-13 RX ADMIN — Medication 50 MG: at 08:59

## 2019-03-13 RX ADMIN — Medication 500 MG: at 09:00

## 2019-03-13 RX ADMIN — PANTOPRAZOLE SODIUM 40 MG: 40 TABLET, DELAYED RELEASE ORAL at 09:00

## 2019-03-13 RX ADMIN — CALCIUM GLUCONATE 2 G: 98 INJECTION, SOLUTION INTRAVENOUS at 10:07

## 2019-03-13 RX ADMIN — LATANOPROST 1 DROP: 50 SOLUTION OPHTHALMIC at 20:34

## 2019-03-13 RX ADMIN — ENOXAPARIN SODIUM 30 MG: 30 INJECTION SUBCUTANEOUS at 20:20

## 2019-03-13 RX ADMIN — MAGNESIUM SULFATE HEPTAHYDRATE 2 G: 40 INJECTION, SOLUTION INTRAVENOUS at 09:00

## 2019-03-13 RX ADMIN — Medication 1000 MG: at 17:36

## 2019-03-13 RX ADMIN — MUPIROCIN: 20 OINTMENT TOPICAL at 10:07

## 2019-03-13 RX ADMIN — Medication 10 ML: at 06:47

## 2019-03-13 RX ADMIN — DEXTROSE MONOHYDRATE, SODIUM CHLORIDE, AND POTASSIUM CHLORIDE 75 ML/HR: 50; 4.5; 1.49 INJECTION, SOLUTION INTRAVENOUS at 15:12

## 2019-03-13 NOTE — PROGRESS NOTES
Care Management Interventions  PCP Verified by CM: Yes  Palliative Care Criteria Met (RRAT>21 & CHF Dx)?: No  Mode of Transport at Discharge: Other (see comment)  MyChart Signup: No  Discharge Durable Medical Equipment: No  Health Maintenance Reviewed: Yes  Physical Therapy Consult: No  Occupational Therapy Consult: No  Speech Therapy Consult: No  Current Support Network: Assisted Living  Confirm Follow Up Transport: Family  Plan discussed with Pt/Family/Caregiver: Yes  [place] Resource Information Provided?: No  Discharge Location  Discharge Placement: Home with family assistance    Reason for Admission: Electrolyte abnormality                  RRAT Score:     32             Resources/supports as identified by patient/family:   [address] [place], [place] [address]t and her daughter, Dora Yeung facing patient (as identified by patient/family and CM): Patient daughter is concerned about her calcium and magnesium levels, would like to discuss with MD.                     Finances/Medication cost?      Patient listed as having Medicare and [employer] insurance, did not have any medication cost concerns. Transportation? [facility] provides transportation              Support system or lack thereof? See above. Living arrangements? Patient lives at [address] [place] [address]. Self-care/ADLs/Cognition? Patient needs assistance with ADLs and IADLs        Current Advanced Directive/Advance Care Plan: On file                          Plan for utilizing home health: Therapy is recommending home health                      Likelihood of readmission:  moderate                 Transition of Care Plan:                  Chart reviewed for transitions of care, and discussed in rounds. Cm met with patient at bedside to explain role and offer support. She is alert and oriented x4,and confirmed demographics.  Patient lives at [address] [place] [address], and request that cm contact her daughter. Daughter concerns are that her mother continues to come to the hospital for calcium and magnesium levels, however is on medication for both. Cm advised that MD could contact her to discuss concerns. Therapy is recommending home health, in which [facility] can provide. Cm to contact DV to update.      [facility]

## 2019-03-13 NOTE — PROGRESS NOTES
Problem: Falls - Risk of  Goal: *Absence of Falls  Document Marli Fall Risk and appropriate interventions in the flowsheet.   Outcome: Progressing Towards Goal  Fall Risk Interventions:  Mobility Interventions: Communicate number of staff needed for ambulation/transfer, Patient to call before getting OOB         Medication Interventions: Patient to call before getting OOB, Teach patient to arise slowly    Elimination Interventions: Call light in reach, Patient to call for help with toileting needs, Toileting schedule/hourly rounds

## 2019-03-13 NOTE — PROGRESS NOTES
03/13/19 1417   Vital Signs   Temp 98.6 °F (37 °C)   Temp Source Oral   Pulse (Heart Rate) (!) 113   Resp Rate 18   O2 Sat (%) 95 %   Level of Consciousness Alert   /74   MAP (Calculated) 87   BP 1 Method Automatic   BP 1 Location Left arm   BP Patient Position At rest   MEWS Score 3     Notified MD,No order received. Advised to monitor.

## 2019-03-13 NOTE — PROGRESS NOTES
Hospitalist Progress Note  Joey Mai DO  Answering service: [phone] OR [phone] from in house phone        Date of Service:  3/13/2019  NAME:  Phu Pike  :  10/17/1927  MRN:  [id number]      Admission Summary:   80year old female with recent admission for diarrhea and electrolyte abnormalities, presenting to [facility] with numbness/tingling and found to have multiple electrolyte abnormalities. Interval history / Subjective:   Patient seen and examined. Continues to have numbness in bilateral hand. Electrolytes replaced this AM, will recheck this afternoon. Previous hospitalization reviewed and patient had significant diarrhea with normal stool studies. Patient states now has \"loose stools. \"Daughter reports last week at facility, patient had viral gastroenteritis      Assessment & Plan:     Electrolyte abnormalities including hypocalcemia/hypomag/hypophos/hypokalemia with EKG findings  -replace as needed, serial BMPs  -increase oral supplemental calcium  -PTH appropriately elevated at 112.4, Vit D within normal limits   -EKG with prolonged Qtc, will recheck with correction   -continue Vit D supplement    Diarrhea: prior stool studies negative  -reports ongoing IBS  -consult GI for possible colonoscopy due to recent readmission  -symptomatic treatment    Code status: full  DVT prophylaxis: lovenox    Care Plan discussed with: Patient/Family  Disposition: TBD     Hospital Problems  Date Reviewed: 3/12/2019          Codes Class Noted POA    Electrolyte abnormality ICD-10-CM: E87.8  ICD-9-CM: 276.9  3/12/2019 Unknown                Review of Systems:   Negative unless stated above    Vital Signs:    Last 24hrs VS reviewed since prior progress note.  Most recent are:  Visit Vitals  /74 (BP 1 Location: Left arm, BP Patient Position: At rest)   Pulse (!) 113   Temp 98.6 °F (37 °C)   Resp 18   Ht 5' (1.524 m)   Wt 59.9 kg (132 lb) SpO2 95%   BMI 25.78 kg/m²       No intake or output data in the 24 hours ending 03/13/19 1533     Physical Examination:             Constitutional:  No acute distress, cooperative, pleasant, elderly    ENT:  Oral mucous moist, oropharynx benign. Neck supple,    Resp:  CTA bilaterally. No wheezing/rhonchi/rales. No accessory muscle use   CV:  Regular rhythm, normal rate, no murmurs, gallops, rubs    GI:  Soft, non distended, non tender. normoactive bowel sounds, no hepatosplenomegaly     Musculoskeletal:  No edema, warm, 2+ pulses throughout    Neurologic:  Moves all extremities. Data Review:    Review and/or order of clinical lab test      Labs:     Recent Labs     03/13/19  0356 03/12/19  1036   WBC 7.9 8.0   HGB 10.8* 11.3*   HCT 34.2* 36.6    231     Recent Labs     03/13/19  0356 03/12/19  1036    140   K 3.2* 3.1*    100   CO2 27 29   BUN 11 14   CREA 0.84 1.05*   GLU 88 104*   CA 6.2* 6.3*   MG  --  0.7*   PHOS 4.2 5.2*     Recent Labs     03/13/19  0356 03/12/19  1036   SGOT 13*  --    ALT 9*  --    *  --    TBILI 0.5  --    TP 5.6*  --    ALB 2.7* 3.1*   GLOB 2.9  --      No results for input(s): INR, PTP, APTT in the last 72 hours. No lab exists for component: INREXT   No results for input(s): FE, TIBC, PSAT, FERR in the last 72 hours. Lab Results   Component Value Date/Time    Folate 5.8 03/08/2017 06:00 PM      No results for input(s): PH, PCO2, PO2 in the last 72 hours.   Recent Labs     03/12/19  1036   TROIQ <0.05     Lab Results   Component Value Date/Time    Cholesterol, total 182 03/08/2017 06:00 PM    HDL Cholesterol 64 03/08/2017 06:00 PM    LDL, calculated 91.2 03/08/2017 06:00 PM    Triglyceride 134 03/08/2017 06:00 PM    CHOL/HDL Ratio 2.8 03/08/2017 06:00 PM     Lab Results   Component Value Date/Time    Glucose (POC) 94 11/20/2016 07:00 AM     Lab Results   Component Value Date/Time    Color YELLOW/STRAW 10/31/2018 04:55 AM    Appearance CLOUDY (A) 10/31/2018 04:55 AM    Specific gravity 1.014 10/31/2018 04:55 AM    pH (UA) 5.5 10/31/2018 04:55 AM    Protein TRACE (A) 10/31/2018 04:55 AM    Glucose NEGATIVE  10/31/2018 04:55 AM    Ketone NEGATIVE  10/31/2018 04:55 AM    Bilirubin NEGATIVE  10/31/2018 04:55 AM    Urobilinogen 0.2 10/31/2018 04:55 AM    Nitrites NEGATIVE  10/31/2018 04:55 AM    Leukocyte Esterase LARGE (A) 10/31/2018 04:55 AM    Epithelial cells FEW 10/31/2018 04:55 AM    Bacteria NEGATIVE  10/31/2018 04:55 AM    WBC >100 (H) 10/31/2018 04:55 AM    RBC 0-5 10/31/2018 04:55 AM         Medications Reviewed:     Current Facility-Administered Medications   Medication Dose Route Frequency    sodium chloride (NS) flush 5-40 mL  5-40 mL IntraVENous Q8H    sodium chloride (NS) flush 5-40 mL  5-40 mL IntraVENous PRN    dextrose 5% - 0.45% NaCl with KCl 20 mEq/L infusion  75 mL/hr IntraVENous CONTINUOUS    ondansetron (ZOFRAN) injection 4 mg  4 mg IntraVENous Q6H PRN    enoxaparin (LOVENOX) injection 30 mg  30 mg SubCUTAneous Q24H    calcium carbonate (OS-DAVIAN) tablet 500 mg [elemental]  500 mg Oral BID    carvedilol (COREG) tablet 3.125 mg  3.125 mg Oral BID WITH MEALS    cholecalciferol (VITAMIN D3) tablet 1,000 Units  1,000 Units Oral DAILY    folic acid (FOLVITE) tablet 1 mg  1 mg Oral DAILY    . PHARMACY TO SUBSTITUTE PER PROTOCOL (Reordered from: diclofenac (VOLTAREN) 1 % gel)    Per Protocol    latanoprost (XALATAN) 0.005 % ophthalmic solution 1 Drop  1 Drop Both Eyes QHS    . PHARMACY TO SUBSTITUTE PER PROTOCOL (Reordered from: menthol-zinc oxide (CALMOSEPTINE) 0.44-20.6 % oint)    Per Protocol    mirtazapine (REMERON) tablet 30 mg  30 mg Oral QHS    mupirocin (BACTROBAN) 2 % ointment   Topical DAILY    pantoprazole (PROTONIX) tablet 40 mg  40 mg Oral DAILY    thiamine HCL (B-1) tablet 50 mg  50 mg Oral DAILY    traMADol (ULTRAM) tablet 50 mg  50 mg Oral QHS    traMADol (ULTRAM) tablet 50 mg  50 mg Oral Q6H PRN    triamcinolone acetonide (KENALOG) 0.1 % ointment   Topical PRN     ______________________________________________________________________  EXPECTED LENGTH OF STAY: 2d 14h  ACTUAL LENGTH OF STAY:          [address],

## 2019-03-13 NOTE — PROGRESS NOTES
Lovenox Monitoring  Indication: DVT Prophylaxis  Recent Labs     03/12/19  1036   HGB 11.3*      CREA 1.05*     Current Weight: 59.9 kg  Est. CrCl = 28.3 ml/min  Current Dose: 40 mg subcutaneously every 24 hours.   Plan: Change to 30 mg Q 24 H for CrCl < 30 ml/min

## 2019-03-14 ENCOUNTER — APPOINTMENT (OUTPATIENT)
Dept: GENERAL RADIOLOGY | Age: 84
DRG: 641 | End: 2019-03-14
Attending: INTERNAL MEDICINE
Payer: MEDICARE

## 2019-03-14 LAB
ALBUMIN SERPL-MCNC: 2.7 G/DL (ref 3.5–5)
ALBUMIN/GLOB SERPL: 0.8 {RATIO} (ref 1.1–2.2)
ALP SERPL-CCNC: 126 U/L (ref 45–117)
ALT SERPL-CCNC: 10 U/L (ref 12–78)
ANION GAP SERPL CALC-SCNC: 10 MMOL/L (ref 5–15)
AST SERPL-CCNC: 8 U/L (ref 15–37)
ATRIAL RATE: 86 BPM
BILIRUB SERPL-MCNC: 0.5 MG/DL (ref 0.2–1)
BUN SERPL-MCNC: 9 MG/DL (ref 6–20)
BUN/CREAT SERPL: 10 (ref 12–20)
CALCIUM SERPL-MCNC: 7.7 MG/DL (ref 8.5–10.1)
CALCULATED P AXIS, ECG09: 24 DEGREES
CALCULATED R AXIS, ECG10: -26 DEGREES
CALCULATED T AXIS, ECG11: -9 DEGREES
CHLORIDE SERPL-SCNC: 103 MMOL/L (ref 97–108)
CO2 SERPL-SCNC: 27 MMOL/L (ref 21–32)
CREAT SERPL-MCNC: 0.89 MG/DL (ref 0.55–1.02)
DIAGNOSIS, 93000: NORMAL
GLOBULIN SER CALC-MCNC: 3.3 G/DL (ref 2–4)
GLUCOSE SERPL-MCNC: 93 MG/DL (ref 65–100)
MAGNESIUM SERPL-MCNC: 1.6 MG/DL (ref 1.6–2.4)
P-R INTERVAL, ECG05: 178 MS
POTASSIUM SERPL-SCNC: 4.5 MMOL/L (ref 3.5–5.1)
PROT SERPL-MCNC: 6 G/DL (ref 6.4–8.2)
Q-T INTERVAL, ECG07: 402 MS
QRS DURATION, ECG06: 120 MS
QTC CALCULATION (BEZET), ECG08: 481 MS
SODIUM SERPL-SCNC: 140 MMOL/L (ref 136–145)
VENTRICULAR RATE, ECG03: 86 BPM

## 2019-03-14 PROCEDURE — 80053 COMPREHEN METABOLIC PANEL: CPT

## 2019-03-14 PROCEDURE — 74011250637 HC RX REV CODE- 250/637: Performed by: INTERNAL MEDICINE

## 2019-03-14 PROCEDURE — 83735 ASSAY OF MAGNESIUM: CPT

## 2019-03-14 PROCEDURE — 65270000029 HC RM PRIVATE

## 2019-03-14 PROCEDURE — 93005 ELECTROCARDIOGRAM TRACING: CPT

## 2019-03-14 PROCEDURE — 73562 X-RAY EXAM OF KNEE 3: CPT

## 2019-03-14 PROCEDURE — 74011250636 HC RX REV CODE- 250/636: Performed by: INTERNAL MEDICINE

## 2019-03-14 PROCEDURE — 36415 COLL VENOUS BLD VENIPUNCTURE: CPT

## 2019-03-14 RX ORDER — LANOLIN ALCOHOL/MO/W.PET/CERES
400 CREAM (GRAM) TOPICAL DAILY
Status: DISCONTINUED | OUTPATIENT
Start: 2019-03-14 | End: 2019-03-15 | Stop reason: HOSPADM

## 2019-03-14 RX ORDER — MAGNESIUM SULFATE 1 G/100ML
1 INJECTION INTRAVENOUS ONCE
Status: COMPLETED | OUTPATIENT
Start: 2019-03-14 | End: 2019-03-14

## 2019-03-14 RX ORDER — ENOXAPARIN SODIUM 100 MG/ML
40 INJECTION SUBCUTANEOUS EVERY 24 HOURS
Status: DISCONTINUED | OUTPATIENT
Start: 2019-03-14 | End: 2019-03-15 | Stop reason: HOSPADM

## 2019-03-14 RX ORDER — HYDROCODONE BITARTRATE AND ACETAMINOPHEN 5; 325 MG/1; MG/1
1 TABLET ORAL
Status: DISCONTINUED | OUTPATIENT
Start: 2019-03-14 | End: 2019-03-15 | Stop reason: HOSPADM

## 2019-03-14 RX ADMIN — ACETAMINOPHEN 650 MG: 650 SOLUTION ORAL at 10:40

## 2019-03-14 RX ADMIN — Medication 1000 MG: at 08:29

## 2019-03-14 RX ADMIN — Medication 10 ML: at 20:29

## 2019-03-14 RX ADMIN — Medication 1000 MG: at 17:49

## 2019-03-14 RX ADMIN — Medication 50 MG: at 08:29

## 2019-03-14 RX ADMIN — TRAMADOL HYDROCHLORIDE 50 MG: 50 TABLET, FILM COATED ORAL at 08:29

## 2019-03-14 RX ADMIN — HYDROCODONE BITARTRATE AND ACETAMINOPHEN 1 TABLET: 5; 325 TABLET ORAL at 18:48

## 2019-03-14 RX ADMIN — LATANOPROST 1 DROP: 50 SOLUTION OPHTHALMIC at 20:29

## 2019-03-14 RX ADMIN — VITAMIN D 1000 UNITS: 25 TAB ORAL at 08:29

## 2019-03-14 RX ADMIN — HYDROCODONE BITARTRATE AND ACETAMINOPHEN 1 TABLET: 5; 325 TABLET ORAL at 12:57

## 2019-03-14 RX ADMIN — MIRTAZAPINE 30 MG: 15 TABLET, FILM COATED ORAL at 20:29

## 2019-03-14 RX ADMIN — Medication 10 ML: at 14:34

## 2019-03-14 RX ADMIN — PANTOPRAZOLE SODIUM 40 MG: 40 TABLET, DELAYED RELEASE ORAL at 08:29

## 2019-03-14 RX ADMIN — TRAMADOL HYDROCHLORIDE 50 MG: 50 TABLET, FILM COATED ORAL at 01:19

## 2019-03-14 RX ADMIN — Medication 1000 MG: at 12:21

## 2019-03-14 RX ADMIN — FOLIC ACID 1 MG: 1 TABLET ORAL at 08:29

## 2019-03-14 RX ADMIN — MAGNESIUM SULFATE HEPTAHYDRATE 1 G: 1 INJECTION, SOLUTION INTRAVENOUS at 10:40

## 2019-03-14 RX ADMIN — CARVEDILOL 3.12 MG: 3.12 TABLET, FILM COATED ORAL at 17:49

## 2019-03-14 RX ADMIN — ENOXAPARIN SODIUM 40 MG: 40 INJECTION SUBCUTANEOUS at 20:28

## 2019-03-14 RX ADMIN — CARVEDILOL 3.12 MG: 3.12 TABLET, FILM COATED ORAL at 08:29

## 2019-03-14 RX ADMIN — Medication 400 MG: at 08:29

## 2019-03-14 NOTE — PROGRESS NOTES
Cm continuing to follow for transitions of care, discussed patient during rounds. Cm informed that patients' daughter had some questions. Cm contacted Dewey Randall ([phone]) who wanted to confirm that we had the patients' most recent Advanced Directive in the chart. Cm reviewed the one we had, and she stated that she had one that was more up to date, indicating Dewey Randall ([phone]) and Liban Montenegro ([phone]) as the only two health care agents. Cm asked her to bring a copy of the new advanced directive when she could.     Jonathan MCKEON, ACM

## 2019-03-14 NOTE — CONSULTS
[address] [place] [address] NOTE  [facility] office  [phone] NP in-hospital cell phone M-F until 4:30  After 5pm or on weekends, please call  for physician on call        NAME:  Johnathan Jimenez   :   10/17/1927   MRN:   [id number]       Referring Physician: Leanna Trejo Date: 3/14/2019 10:48 AM    Chief Complaint: persistent diarrhea with severe electrolyte abnormalities     History of Present Illness:  Patient is a 80 y.o. who is seen in consultation at the request of Dr. Dorsie Primrose for persistent diarrhea with severe electrolyte abnormalities. Medical history as listed below includes CAD. Patient was admitted 3/12/19 for electrolyte abnormalities with an admission 19-19 for diarrhea with the same. She reports her bowel movements are the same as they've always been - she has 1-3 loose bowel movements in the mornings. Patient denies recent tropical travel, sick contacts, recent antibiotic use, or similar illness in others eating same meal. She reports occasional blood on wiping (none recently) with a known hemorrhoid, she uses tux wipes as needed. She denies dysphagia, reflux, nausea, abdominal pain, change in bowel habits, or melena. She reports colonoscopy over 20 years ago reportedly normal.    I have reviewed the emergency room note, hospital admission note, notes by all other clinicians who have seen the patient during this hospitalization to date. I have reviewed the problem list and the reason for this hospitalization. I have reviewed the allergies and the medications the patient was taking at home prior to this hospitalization. PMH:  Past Medical History:   Diagnosis Date    Arthritis     History of non-ST elevation myocardial infarction (NSTEMI) 2016       PSH:  Past Surgical History:   Procedure Laterality Date    HX CHOLECYSTECTOMY         Allergies:   Allergies Allergen Reactions    Codeine Unknown (comments)    Prednisone Unknown (comments)       Home Medications:  Prior to Admission Medications   Prescriptions Last Dose Informant Patient Reported? Taking?   acetaminophen (TYLENOL) 325 mg tablet 3/12/2019  No Yes   Sig: Take 2 Tabs by mouth every six (6) hours. bumetanide (BUMEX) 0.5 mg tablet 3/12/2019  Yes Yes   Sig: Take 0.5 mg by mouth daily. calcium carbonate (OS-DAVIAN) 500 mg calcium (1,250 mg) tablet 3/12/2019  No Yes   Sig: Take 1 Tab by mouth two (2) times a day. carvedilol (COREG) 3.125 mg tablet 3/12/2019  Yes Yes   Sig: Take 3.125 mg by mouth two (2) times daily (with meals). cetirizine (ZYRTEC) 10 mg tablet 3/12/2019  Yes Yes   Sig: Take 10 mg by mouth daily. cholecalciferol (VITAMIN D3) 1,000 unit tablet 3/12/2019  Yes Yes   Sig: Take 1,000 Units by mouth daily. diclofenac (VOLTAREN) 1 % gel 3/12/2019  Yes Yes   Sig: Apply  to affected area three (3) times daily. folic acid (FOLVITE) 1 mg tablet 3/12/2019  No Yes   Sig: Take 1 Tab by mouth daily. latanoprost (XALATAN) 0.005 % ophthalmic solution 3/11/2019 at Unknown time  Yes Yes   Sig: Administer 1 Drop to both eyes nightly. melatonin tab tablet 3/11/2019 at Unknown time  Yes Yes   Sig: Take 5 mg by mouth nightly. menthol-zinc oxide (CALMOSEPTINE) 0.44-20.6 % oint 3/12/2019  Yes Yes   Sig: Apply  to affected area daily. mirtazapine (REMERON) 30 mg tablet 3/11/2019 at Unknown time  Yes Yes   Sig: Take 30 mg by mouth nightly. mupirocin (BACTROBAN) 2 % ointment 3/12/2019  Yes Yes   Sig: Apply  to affected area daily. omeprazole (PRILOSEC) 20 mg capsule 3/12/2019  Yes Yes   Sig: Take 20 mg by mouth daily. ondansetron (ZOFRAN ODT) 4 mg disintegrating tablet   Yes Yes   Sig: Take 4 mg by mouth every six (6) hours as needed for Nausea. promethazine (PHENERGAN) 12.5 mg tablet   Yes Yes   Sig: Take 12.5 mg by mouth three (3) times daily as needed for Nausea.    thiamine (B-1) 100 mg tablet 3/12/2019  Yes Yes   Sig: Take 100 mg by mouth daily. traMADol (ULTRAM) 50 mg tablet   No Yes   Sig: Take 1 Tab by mouth every six (6) hours as needed. Max Daily Amount: 200 mg.   traMADol (ULTRAM) 50 mg tablet 3/11/2019 at Unknown time  Yes Yes   Sig: Take 50 mg by mouth nightly. triamcinolone acetonide (KENALOG) 0.1 % ointment   Yes Yes   Sig: Apply  to affected area as needed for Skin Irritation. use thin layer      Facility-Administered Medications: None       Hospital Medications:  Current Facility-Administered Medications   Medication Dose Route Frequency    magnesium oxide (MAG-OX) tablet 400 mg  400 mg Oral DAILY    acetaminophen (TYLENOL) solution 650 mg  650 mg Oral Q4H PRN    magnesium sulfate 1 g/100 ml IVPB (premix or compounded)  1 g IntraVENous ONCE    calcium carbonate (OS-DAVIAN) tablet 1,000 mg [elemental]  1,000 mg Oral TID WITH MEALS    sodium chloride (NS) flush 5-40 mL  5-40 mL IntraVENous Q8H    sodium chloride (NS) flush 5-40 mL  5-40 mL IntraVENous PRN    ondansetron (ZOFRAN) injection 4 mg  4 mg IntraVENous Q6H PRN    enoxaparin (LOVENOX) injection 30 mg  30 mg SubCUTAneous Q24H    carvedilol (COREG) tablet 3.125 mg  3.125 mg Oral BID WITH MEALS    cholecalciferol (VITAMIN D3) tablet 1,000 Units  1,000 Units Oral DAILY    folic acid (FOLVITE) tablet 1 mg  1 mg Oral DAILY    . PHARMACY TO SUBSTITUTE PER PROTOCOL (Reordered from: diclofenac (VOLTAREN) 1 % gel)    Per Protocol    latanoprost (XALATAN) 0.005 % ophthalmic solution 1 Drop  1 Drop Both Eyes QHS    . PHARMACY TO SUBSTITUTE PER PROTOCOL (Reordered from: menthol-zinc oxide (CALMOSEPTINE) 0.44-20.6 % oint)    Per Protocol    mirtazapine (REMERON) tablet 30 mg  30 mg Oral QHS    mupirocin (BACTROBAN) 2 % ointment   Topical DAILY    pantoprazole (PROTONIX) tablet 40 mg  40 mg Oral DAILY    thiamine HCL (B-1) tablet 50 mg  50 mg Oral DAILY    traMADol (ULTRAM) tablet 50 mg  50 mg Oral QHS    traMADol (ULTRAM) tablet 50 mg  50 mg Oral Q6H PRN    triamcinolone acetonide (KENALOG) 0.1 % ointment   Topical PRN       Social History:  Social History     Tobacco Use    Smoking status: Never Smoker    Smokeless tobacco: Never Used   Substance Use Topics    Alcohol use: Yes     Comment: a couple of scotch drinks daily       Family History:  History reviewed. No pertinent family history. Review of Systems:    Constitutional: negative fever, negative chills, +weight loss (10pounds since hip surgery in November 2018)  Eyes:   negative visual changes  ENT:   negative sore throat, tongue or lip swelling  Respiratory:  negative cough, negative dyspnea  Cards:  negative for chest pain, palpitations, +lower extremity edema  GI:   See HPI  :  negative for frequency, dysuria  Integument:  negative for rash and pruritus  Heme:  +for easy bruising and gum/nose bleeding  Musculoskeletal: negative for myalgias, back pain and muscle weakness  Neuro:             negative for headaches, dizziness, vertigo  Psych:  +for feelings of anxiety, depression     Objective:     Patient Vitals for the past 8 hrs:   BP Temp Pulse Resp SpO2   03/14/19 0825 (!) 172/96 97.8 °F (36.6 °C) 97 17 92 %   03/14/19 0254 144/85 98 °F (36.7 °C) 96 16 97 %     No intake/output data recorded. No intake/output data recorded.     EXAM:     CONST:  Pleasant lady lying in bed, no acute distress   NEURO:  alert and oriented    HEENT: EOMI, no scleral icterus   LUNGS: clear to ausculation, (-) wheeze   CARD:  S1 S2   ABD:  soft, no tenderness, no rebound, bowel sounds (+) all 4 quadrants, no masses, non distended   EXT:   warm   PSYCH: full, not anxious     Data Review     Recent Labs     03/13/19  0356 03/12/19  1036   WBC 7.9 8.0   HGB 10.8* 11.3*   HCT 34.2* 36.6    231     Recent Labs     03/14/19  0543 03/13/19  1514 03/13/19  0356 03/12/19  1036    139 139 140   K 4.5 4.0 3.2* 3.1*    108 103 100   CO2 27 26 27 29   BUN 9 11 11 14   CREA 0.89 0. 90 0.84 1.05*   GLU 93 112* 88 104*   PHOS  --   --  4.2 5.2*   CA 7.7* 7.3* 6.2* 6.3*     Recent Labs     03/14/19  0543 03/13/19  0356   SGOT 8* 13*   * 126*   TP 6.0* 5.6*   ALB 2.7* 2.7*   GLOB 3.3 2.9     No results for input(s): INR, PTP, APTT in the last 72 hours. No lab exists for component: INREXT       Assessment:   · Loose bowel movements, only in the mornings 1-3 times, chronic for many years: 1/30/19 stool culture heavy yeast, WBC 0-4  · Electrolyte abnormalities  · CAD     Patient Active Problem List   Diagnosis Code    Hypokalemia E87.6    NSTEMI (non-ST elevated myocardial infarction) ([facility] [address] I21.4    Diarrhea R19.7    Hypocalcemia E83.51    Hypomagnesemia E83.42    CAD in native artery I25.10    History of non-ST elevation myocardial infarction (NSTEMI) I25.2    TIA (transient ischemic attack) G45.9    GI bleed K92.2    Numbness and tingling R20.0, R20.2    Chest pain R07.9    Electrolyte abnormality E87.8     Plan:   · Oral mag maybe contributing to loose stools, but patient denies change in bowel habits  · Discussed with daughter Timi Roberto [phone], per patient request; both patient and daughter agree that colonoscopy risks outweigh any benefit   · Discussed with and will be seen by Dr. Maddi Puente  · Will sign off, please call for questions   · Thank you for allowing me to participate in care of Raymond Mccall     Signed By: Deedee Betancourt NP     3/14/2019  10:48 AM     Patient seen and examined, agree with plans, I think we are dealing with long term IBS. No GI workup is necessary. We will see again on request, thanks.     Franchesca Peterson MD

## 2019-03-14 NOTE — PROGRESS NOTES
Hospitalist Progress Note  Jessenia Veronica DO  Answering service: [phone] OR [phone] from in house phone        Date of Service:  3/14/2019  NAME:  Roberto Trujillo  :  10/17/1927  MRN:  [id number]      Admission Summary:   80year old female with recent admission for diarrhea and electrolyte abnormalities, presenting to [facility] with numbness/tingling and found to have multiple electrolyte abnormalities. Interval history / Subjective:   Patient seen and examined. Complains of right knee pain this AM, states has severe OA. Electrolytes improved, states still has some hand numbness although calcium is normal when corrected for albumin. GI consulted- colonoscopy deferred. Continue to optimize electrolytes. Assessment & Plan:     Electrolyte abnormalities including hypocalcemia/hypomag/hypophos/hypokalemia with EKG findings  -replace as needed, serial BMPs  -increase oral supplemental calcium, add oral magnesium   -PTH appropriately elevated at 112.4, Vit D within normal limits   -EKG with prolonged Qtc, will recheck   -continue Vit D supplement    Diarrhea: prior stool studies negative  -reports ongoing IBS  -appreciate input from GI, colonoscopy deferred   -symptomatic treatment    Right knee pain:   -home tylenol and tramadol  -norco for break through pain     Code status: full  DVT prophylaxis: lovenox    Care Plan discussed with: Patient/Family  Disposition: TBD     Hospital Problems  Date Reviewed: 3/12/2019          Codes Class Noted POA    Electrolyte abnormality ICD-10-CM: E87.8  ICD-9-CM: 276.9  3/12/2019 Unknown                Review of Systems:   Negative unless stated above    Vital Signs:    Last 24hrs VS reviewed since prior progress note.  Most recent are:  Visit Vitals  BP (!) 172/96 (BP 1 Location: Left arm, BP Patient Position: At rest)   Pulse 97   Temp 97.8 °F (36.6 °C)   Resp 17   Ht 5' (1.524 m)   Wt 59.9 kg (132 lb) SpO2 92%   BMI 25.78 kg/m²       No intake or output data in the 24 hours ending 03/14/19 1331     Physical Examination:             Constitutional:  No acute distress, cooperative, pleasant, elderly    ENT:  Oral mucous moist, oropharynx benign. Neck supple,    Resp:  CTA bilaterally. No wheezing/rhonchi/rales. No accessory muscle use   CV:  Regular rhythm, normal rate, no murmurs, gallops, rubs    GI:  Soft, non distended, non tender. normoactive bowel sounds, no hepatosplenomegaly     Musculoskeletal:  1+ edema, warm, 2+ pulses throughout    Neurologic:  Moves all extremities. Data Review:    Review and/or order of clinical lab test      Labs:     Recent Labs     03/13/19  0356 03/12/19  1036   WBC 7.9 8.0   HGB 10.8* 11.3*   HCT 34.2* 36.6    231     Recent Labs     03/14/19  0543 03/13/19  1514 03/13/19  0356 03/12/19  1036    139 139 140   K 4.5 4.0 3.2* 3.1*    108 103 100   CO2 27 26 27 29   BUN 9 11 11 14   CREA 0.89 0.90 0.84 1.05*   GLU 93 112* 88 104*   CA 7.7* 7.3* 6.2* 6.3*   MG 1.6  --   --  0.7*   PHOS  --   --  4.2 5.2*     Recent Labs     03/14/19  0543 03/13/19  0356 03/12/19  1036   SGOT 8* 13*  --    ALT 10* 9*  --    * 126*  --    TBILI 0.5 0.5  --    TP 6.0* 5.6*  --    ALB 2.7* 2.7* 3.1*   GLOB 3.3 2.9  --      No results for input(s): INR, PTP, APTT in the last 72 hours. No lab exists for component: INREXT, INREXT   No results for input(s): FE, TIBC, PSAT, FERR in the last 72 hours. Lab Results   Component Value Date/Time    Folate 5.8 03/08/2017 06:00 PM      No results for input(s): PH, PCO2, PO2 in the last 72 hours.   Recent Labs     03/12/19  1036   TROIQ <0.05     Lab Results   Component Value Date/Time    Cholesterol, total 182 03/08/2017 06:00 PM    HDL Cholesterol 64 03/08/2017 06:00 PM    LDL, calculated 91.2 03/08/2017 06:00 PM    Triglyceride 134 03/08/2017 06:00 PM    CHOL/HDL Ratio 2.8 03/08/2017 06:00 PM     Lab Results   Component Value Date/Time    Glucose (POC) 94 11/20/2016 07:00 AM     Lab Results   Component Value Date/Time    Color YELLOW/STRAW 10/31/2018 04:55 AM    Appearance CLOUDY (A) 10/31/2018 04:55 AM    Specific gravity 1.014 10/31/2018 04:55 AM    pH (UA) 5.5 10/31/2018 04:55 AM    Protein TRACE (A) 10/31/2018 04:55 AM    Glucose NEGATIVE  10/31/2018 04:55 AM    Ketone NEGATIVE  10/31/2018 04:55 AM    Bilirubin NEGATIVE  10/31/2018 04:55 AM    Urobilinogen 0.2 10/31/2018 04:55 AM    Nitrites NEGATIVE  10/31/2018 04:55 AM    Leukocyte Esterase LARGE (A) 10/31/2018 04:55 AM    Epithelial cells FEW 10/31/2018 04:55 AM    Bacteria NEGATIVE  10/31/2018 04:55 AM    WBC >100 (H) 10/31/2018 04:55 AM    RBC 0-5 10/31/2018 04:55 AM         Medications Reviewed:     Current Facility-Administered Medications   Medication Dose Route Frequency    magnesium oxide (MAG-OX) tablet 400 mg  400 mg Oral DAILY    acetaminophen (TYLENOL) solution 650 mg  650 mg Oral Q4H PRN    HYDROcodone-acetaminophen (NORCO) 5-325 mg per tablet 1 Tab  1 Tab Oral Q6H PRN    calcium carbonate (OS-DAVIAN) tablet 1,000 mg [elemental]  1,000 mg Oral TID WITH MEALS    sodium chloride (NS) flush 5-40 mL  5-40 mL IntraVENous Q8H    sodium chloride (NS) flush 5-40 mL  5-40 mL IntraVENous PRN    ondansetron (ZOFRAN) injection 4 mg  4 mg IntraVENous Q6H PRN    enoxaparin (LOVENOX) injection 30 mg  30 mg SubCUTAneous Q24H    carvedilol (COREG) tablet 3.125 mg  3.125 mg Oral BID WITH MEALS    cholecalciferol (VITAMIN D3) tablet 1,000 Units  1,000 Units Oral DAILY    folic acid (FOLVITE) tablet 1 mg  1 mg Oral DAILY    . PHARMACY TO SUBSTITUTE PER PROTOCOL (Reordered from: diclofenac (VOLTAREN) 1 % gel)    Per Protocol    latanoprost (XALATAN) 0.005 % ophthalmic solution 1 Drop  1 Drop Both Eyes QHS    . PHARMACY TO SUBSTITUTE PER PROTOCOL (Reordered from: menthol-zinc oxide (CALMOSEPTINE) 0.44-20.6 % oint)    Per Protocol    mirtazapine (REMERON) tablet 30 mg  30 mg Oral QHS    pantoprazole (PROTONIX) tablet 40 mg  40 mg Oral DAILY    thiamine HCL (B-1) tablet 50 mg  50 mg Oral DAILY    traMADol (ULTRAM) tablet 50 mg  50 mg Oral QHS    traMADol (ULTRAM) tablet 50 mg  50 mg Oral Q6H PRN    triamcinolone acetonide (KENALOG) 0.1 % ointment   Topical PRN     ______________________________________________________________________  EXPECTED LENGTH OF STAY: 2d 14h  ACTUAL LENGTH OF STAY:          2                 [address] [facility]

## 2019-03-14 NOTE — PROGRESS NOTES
Lovenox Monitoring  Indication: DVT Prophylaxis  Recent Labs     03/14/19  0543 03/13/19  1514 03/13/19  0356 03/12/19  1036   HGB  --   --  10.8* 11.3*   PLT  --   --  208 231   CREA 0.89 0.90 0.84 1.05*   Current Weight: 59.9 kg  Est. CrCl = 33.3 ml/min  Current Dose: 30 mg subcutaneously every 24 hours.     Plan: Change to 40 mg Q 24 H for CrCl < 30 ml/min

## 2019-03-14 NOTE — WOUND CARE
Wound Care Note:     New consult placed by nurse request for LL wound    Chart shows:  Admitted for electrolyte abnormality  Past Medical History:   Diagnosis Date    Arthritis     History of non-ST elevation myocardial infarction (NSTEMI) 11/28/2016     WBC = 7.9 on 3/13/19  Admitted from [address] [place] [address] Cir:   Patient is alert and talking, incontinent with moderate assistance needed in repositioning. Bed: Total Care  Diet: Cardiac regular  Patient pre-medicated for pain by RN. Bilateral heels skin intact and without erythema. Buttocks and sacrum were not assessed due to pain; staff nurse stated skin intact without erythema. Palpable DP pulses bilaterally    1. POA left lower leg wound measures 0.7 cm x 2 cm x 0.2 cm, wound bed is 70% pink and 30% scattered slough, small serous drainage, gabriel-wound with edema and dry skin; small crusted wound distally, wound edges are open. Wound has been there since November and it has gotten smaller. Iodosorb and foam dressing applied. ACE wraps were in place and were removed, indenture in top of lower leg from wraps however, there is not skin breakdown. We will leave off ACE wraps while in hospital.    Spoke with Dr. Kierra Joshi, wound care orders obtained. Patient remained supine. Heels offloaded on pillows. Recommendations:    Left lower- Every other day cleanse with normal saline, wipe wound bed clean and dry, apply Iodosorb (located on 5E near ostomy supplies) to wound bed and cover with Mepilex border. Skin Care & Pressure Prevention:  Minimize layers of linen/pads under patient to optimize support surface. Turn/reposition approximately every 2 hours and offload heels.   Manage incontinence / promote continence     Discussed above plan with patient & [facility], RN    Transition of Care: Plan to follow as needed while admitted to hospital.    BOBO Ashley, RN, [facility].  office [phone]  pager [phone] or call  to page

## 2019-03-14 NOTE — PROGRESS NOTES
A Spiritual Care Partner Volunteer visited patient in Rm 538 on 3/14/2019.   Documented by:  Chaplain Donnelly MDiv, MS, [address] ([id number])

## 2019-03-15 ENCOUNTER — APPOINTMENT (OUTPATIENT)
Dept: GENERAL RADIOLOGY | Age: 84
DRG: 641 | End: 2019-03-15
Attending: INTERNAL MEDICINE
Payer: MEDICARE

## 2019-03-15 ENCOUNTER — APPOINTMENT (OUTPATIENT)
Dept: VASCULAR SURGERY | Age: 84
DRG: 641 | End: 2019-03-15
Attending: INTERNAL MEDICINE
Payer: MEDICARE

## 2019-03-15 VITALS
SYSTOLIC BLOOD PRESSURE: 116 MMHG | HEIGHT: 60 IN | HEART RATE: 98 BPM | WEIGHT: 132 LBS | RESPIRATION RATE: 17 BRPM | OXYGEN SATURATION: 96 % | TEMPERATURE: 98.9 F | DIASTOLIC BLOOD PRESSURE: 92 MMHG | BODY MASS INDEX: 25.91 KG/M2

## 2019-03-15 LAB
ALBUMIN SERPL-MCNC: 2.6 G/DL (ref 3.5–5)
ALBUMIN/GLOB SERPL: 0.7 {RATIO} (ref 1.1–2.2)
ALP SERPL-CCNC: 131 U/L (ref 45–117)
ALT SERPL-CCNC: 9 U/L (ref 12–78)
ANION GAP SERPL CALC-SCNC: 5 MMOL/L (ref 5–15)
AST SERPL-CCNC: 9 U/L (ref 15–37)
BILIRUB SERPL-MCNC: 1 MG/DL (ref 0.2–1)
BUN SERPL-MCNC: 12 MG/DL (ref 6–20)
BUN/CREAT SERPL: 13 (ref 12–20)
CALCIUM SERPL-MCNC: 8.7 MG/DL (ref 8.5–10.1)
CHLORIDE SERPL-SCNC: 105 MMOL/L (ref 97–108)
CO2 SERPL-SCNC: 26 MMOL/L (ref 21–32)
CREAT SERPL-MCNC: 0.91 MG/DL (ref 0.55–1.02)
GLOBULIN SER CALC-MCNC: 3.8 G/DL (ref 2–4)
GLUCOSE SERPL-MCNC: 101 MG/DL (ref 65–100)
MAGNESIUM SERPL-MCNC: 1.7 MG/DL (ref 1.6–2.4)
POTASSIUM SERPL-SCNC: 5.3 MMOL/L (ref 3.5–5.1)
PROT SERPL-MCNC: 6.4 G/DL (ref 6.4–8.2)
SODIUM SERPL-SCNC: 136 MMOL/L (ref 136–145)

## 2019-03-15 PROCEDURE — 93971 EXTREMITY STUDY: CPT

## 2019-03-15 PROCEDURE — 97161 PT EVAL LOW COMPLEX 20 MIN: CPT

## 2019-03-15 PROCEDURE — 36415 COLL VENOUS BLD VENIPUNCTURE: CPT

## 2019-03-15 PROCEDURE — 74011250637 HC RX REV CODE- 250/637: Performed by: INTERNAL MEDICINE

## 2019-03-15 PROCEDURE — 71045 X-RAY EXAM CHEST 1 VIEW: CPT

## 2019-03-15 PROCEDURE — 97165 OT EVAL LOW COMPLEX 30 MIN: CPT

## 2019-03-15 PROCEDURE — 83735 ASSAY OF MAGNESIUM: CPT

## 2019-03-15 PROCEDURE — 80053 COMPREHEN METABOLIC PANEL: CPT

## 2019-03-15 PROCEDURE — 97530 THERAPEUTIC ACTIVITIES: CPT

## 2019-03-15 PROCEDURE — 97535 SELF CARE MNGMENT TRAINING: CPT

## 2019-03-15 RX ORDER — LANOLIN ALCOHOL/MO/W.PET/CERES
400 CREAM (GRAM) TOPICAL DAILY
Qty: 20 TAB | Refills: 0 | Status: SHIPPED | OUTPATIENT
Start: 2019-03-16

## 2019-03-15 RX ORDER — CALCIUM CARBONATE 500(1250)
2 TABLET ORAL
Qty: 30 TAB | Refills: 0 | Status: SHIPPED | OUTPATIENT
Start: 2019-03-15

## 2019-03-15 RX ADMIN — Medication 50 MG: at 09:13

## 2019-03-15 RX ADMIN — Medication 400 MG: at 09:14

## 2019-03-15 RX ADMIN — VITAMIN D 1000 UNITS: 25 TAB ORAL at 09:13

## 2019-03-15 RX ADMIN — CARVEDILOL 3.12 MG: 3.12 TABLET, FILM COATED ORAL at 09:13

## 2019-03-15 RX ADMIN — HYDROCODONE BITARTRATE AND ACETAMINOPHEN 1 TABLET: 5; 325 TABLET ORAL at 01:59

## 2019-03-15 RX ADMIN — FOLIC ACID 1 MG: 1 TABLET ORAL at 09:14

## 2019-03-15 RX ADMIN — Medication 1000 MG: at 12:24

## 2019-03-15 RX ADMIN — PANTOPRAZOLE SODIUM 40 MG: 40 TABLET, DELAYED RELEASE ORAL at 09:14

## 2019-03-15 RX ADMIN — Medication 1000 MG: at 09:14

## 2019-03-15 NOTE — PROGRESS NOTES
Cm informed that patient is ready for discharge today, will be returning to [facility]. Matteo spoke with [facility], the [address] ([phone], [phone]: [phone]) who confirmed she could return without them having to evaluate. Cm faxed discharge instructions and scripts to them. Report can be called to [phone]. Matteo called patients' daughter Chiquis Langston, [phone]) to inform of discharge. She asked that I make sure patients' legs are wrapped before the patient leaves. Cm sent resumption orders for home health to [facility]. Referral sent to [facility] transport requesting a 2:30 pm .      Alesia MOSESW, ACM

## 2019-03-15 NOTE — PROGRESS NOTES
Problem: Mobility Impaired (Adult and Pediatric)  Goal: *Acute Goals and Plan of Care (Insert Text)  Physical Therapy Goals  Initiated 3/15/2019  1. Patient will move from supine to sit and sit to supine  in bed with minimal assistance/contact guard assist within 7 day(s). 2.  Patient will transfer from bed to chair and chair to bed with supervision/set-up using the least restrictive device within 7 day(s). 3.  Patient will perform sit to stand with supervision/set-up within 7 day(s). 4.  Patient will ambulate with supervision/set-up for 5 feet safely with the least restrictive device within 7 day(s). physical Therapy EVALUATION  Patient: Dani Contreras (80 y.o. female)  Date: 3/15/2019  Primary Diagnosis: Electrolyte abnormality [E87.8]       Precautions:  Fall    ASSESSMENT :   Based on the objective data described below, the patient presented with Minimum assistance and Moderate Assistance level overall for transfers. Gait training completed 3-4 feet using a rolling walker (pt uses a rollator unsafely at home in order to transfer to the wheelchair, uses a WC throughout room and facility) and at the Minimum assistance and Moderate Assistance level of assistance.    The following are barriers to independence while in acute care:   -Cognitive and/or behavioral: attention to task, command following for safe movement, safety awareness, insight into deficits, insight into abilities and fear of falling  -Medical condition: strength, standing balance and pain tolerance    -Other:       The patient will benefit from skilled acute intervention to address the above impairments and their rehabilitation potential is considered to be Osman Mello    Discharge recommendations: Home health (to increase independence and safety)  24 supervision  If above is not an option then recommend: 24 hour skilled services    Patient's barriers to discharging home, in addition to above impairments: history of falls  level of physical assist required to maintain patient safety. Equipment recommendations for successful discharge (if) home: rolling walker, pt refuses     PLAN :  Recommendations and Planned Interventions: bed mobility training, transfer training, gait training, exercises, patient and family training/education and sit<>stand      Frequency/Duration: Patient will be followed by physical therapy  5 times a week to address goals. SUBJECTIVE:   Patient stated You're making me very upset.  Regarding safety instruction. Safety instruction abandoned and pt acknowledged that she is aware of risks. OBJECTIVE DATA SUMMARY:   HISTORY:    Past Medical History:   Diagnosis Date    Arthritis     History of non-ST elevation myocardial infarction (NSTEMI) 11/28/2016     Past Surgical History:   Procedure Laterality Date    HX CHOLECYSTECTOMY       Prior Level of Function/Home Situation: Pt requires assistance for mobility, ADLs, pivot transfers to [facility]  Personal factors and/or comorbidities impacting plan of care: self limiting behaviors, advanced age    Home Situation  [facility] [address] [facility]: [place]  One/Two Story Residence: One story  Current DME Used/Available at Home: Walker, rolling, Safety frame toliet, Raised toilet seat, Grab bars, Shower chair  Tub or Shower Type: Shower    EXAMINATION/PRESENTATION/DECISION MAKING:   Critical Behavior:  Neurologic State: Alert  Orientation Level: Oriented X4  Cognition: Appropriate for age attention/concentration  Safety/Judgement: Insight into deficits  Hearing:   Auditory  Auditory Impairment: None    Range Of Motion:  AROM: Generally decreased, functional           PROM: Generally decreased, functional           Strength:    Strength: Generally decreased, functional                    Tone & Sensation:   Tone: Normal              Sensation: Impaired               Coordination:  Coordination: Generally decreased, functional  Vision:   Corrective Lenses: Glasses  Functional Mobility:  Bed Mobility:  Rolling: Minimum assistance  Supine to Sit: Minimum assistance; Additional time  Sit to Supine: Moderate assistance; Additional time     Transfers:  Sit to Stand: Minimum assistance; Moderate assistance  Stand to Sit: Minimum assistance; Moderate assistance  Stand Pivot Transfers: Moderate assistance                    Balance:   Sitting: Intact; Without support  Standing: Impaired; With support;Pull to stand  Standing - Static: Fair;Constant support  Standing - Dynamic : Poor;Fair;Constant support  Ambulation/Gait Training:  Distance (ft): 4 Feet (ft)  Assistive Device: Walker, rolling  Ambulation - Level of Assistance: Moderate assistance;Minimal assistance     Gait Description (WDL): Exceptions to WDL  Gait Abnormalities: Shuffling gait; Antalgic(see below)        Base of Support: Narrowed  Stance: Left decreased;Right decreased  Speed/Oralia: Other (comment); Slow(mainly pivot)  Step Length: Left shortened;Right shortened  Swing Pattern: Right asymmetrical;Left asymmetrical     Pt c/o extreme difficulty/duress from using RW instead of rollator. Pt places forearms along RW like it is a platform attachment B. Pt puts all weight through forearms and becomes distressed when RW does not smoothly advance as it is 2 wheels/2 pegs. Discussed technique for walking/pivoting very short distances at home (residential) and upon identifying technique pt uses informed pt that hanging body from rollator handles in this manner is unsafe. Pt states that she is aware and that she \"doesn't want to hear it\". Acknowledged pt choices.         Physical Therapy Evaluation Charge Determination   History Examination Presentation Decision-Making   HIGH Complexity :3+ comorbidities / personal factors will impact the outcome/ POC  HIGH Complexity : 4+ Standardized tests and measures addressing body structure, function, activity limitation and / or participation in recreation  LOW Complexity : Stable, uncomplicated  LOW Complexity : FOTO score of       Based on the above components, the patient evaluation is determined to be of the following complexity level: LOW     Pain:  C/o pain, but will not rate. Pt pain located primarily in OA R knee, and generalized with movement. Activity Tolerance:   Fair, Poor and requires rest breaks  Please refer to the flowsheet for vital signs taken during this treatment. After treatment patient left:   Supine in bed     COMMUNICATION/EDUCATION:   The patients plan of care was discussed with: Registered Nurse and . Fall prevention education was provided and the patient/caregiver indicated understanding. and Patient understands intent and goals of therapy, but is neutral about his/her participation.     Thank you for this referral.  Katrin Rodriguez, PT   Time Calculation: 29 mins

## 2019-03-15 NOTE — PROGRESS NOTES
Problem: Self Care Deficits Care Plan (Adult)  Goal: *Acute Goals and Plan of Care (Insert Text)  Occupational Therapy Goals  Initiated 3/15/2019  1. Patient will perform upper body dressing with modified independence within 7 day(s). 2.  Patient will perform lower body dressing with supervision/set-up within 7 day(s). 3.  Patient will perform bathing with supervision/set-up within 7 day(s). 4.  Patient will perform toilet transfers with supervision/set-up within 7 day(s). 5.  Patient will perform all aspects of toileting with supervision/set-up within 7 day(s). 6.  Patient will participate in upper extremity therapeutic exercise/activities with supervision/set-up for 5 minutes within 7 day(s). 7.  Patient will utilize energy conservation techniques during functional activities with verbal cues within 7 day(s). Occupational Therapy EVALUATION  Patient: Phu Pike (80 y.o. female)  Date: 3/15/2019  Primary Diagnosis: Electrolyte abnormality [E87.8]       Precautions: Fall    ASSESSMENT :  Based on the objective data described below, the patient presents with largely MIN A for LB ADLs and setup for UB ADLs with mobility limited this session due to RN report of questionable DVT. Patient known to this OT and noted PTA, patient was living at [address] [place] [address] and receiving assistance for bathing tasks. Patient performed stand pivot transfers using walker from bed > wheelchair >toilet however primarily used wheelchair for all functional mobility. Patient close to functional baseline at this time and states she wants to return to her [facility]. Noted patient has received HHOT and HHPT at [facility] before and patient is agreeable to doing this again. Recommend HHOT to maximize patient safety and independence with ADL tasks pending medical stability. Patient will benefit from skilled intervention to address the above impairments.   Patients rehabilitation potential is considered to be Fair  Factors which may influence rehabilitation potential include:   []             None noted  []             Mental ability/status  []             Medical condition  []             Home/family situation and support systems  [x]             Safety awareness  []             Pain tolerance/management  []             Other:      PLAN :  Recommendations and Planned Interventions:  [x]               Self Care Training                  [x]        Therapeutic Activities  [x]               Functional Mobility Training    []        Cognitive Retraining  [x]               Therapeutic Exercises           [x]        Endurance Activities  [x]               Balance Training                   []        Neuromuscular Re-Education  []               Visual/Perceptual Training     [x]   Home Safety Training  [x]               Patient Education                 [x]        Family Training/Education  []               Other (comment):    Frequency/Duration: Patient will be followed by occupational therapy 3 times a week to address goals. Discharge Recommendations: Home Health  Further Equipment Recommendations for Discharge: none at this time     SUBJECTIVE:   Patient stated I use my wheelchair for everything.     OBJECTIVE DATA SUMMARY:   HISTORY:   Past Medical History:   Diagnosis Date    Arthritis     History of non-ST elevation myocardial infarction (NSTEMI) 11/28/2016     Past Surgical History:   Procedure Laterality Date    HX CHOLECYSTECTOMY         Prior Level of Function/Environment/Context: Patient was largely MOD I for functional mobility and ADLs at wheelchair level however received assistance for aspects of bathing, dressing, medication management, and all meal preparation.     Expanded or extensive additional review of patient history:     Home Situation  [address] [facility] [address]: [facility] [address]  One/Two Story Residence: One story  Current DME Used/Available at Home: Walker, rolling, Safety frame Radha friedman toilet seat, Grab bars, Shower chair  Tub or Shower Type: Shower    Hand dominance: Right    EXAMINATION OF PERFORMANCE DEFICITS:  Cognitive/Behavioral Status:  Neurologic State: Alert  Orientation Level: Oriented X4  Cognition: Appropriate for age attention/concentration  Perception: Appears intact  Perseveration: No perseveration noted  Safety/Judgement: Insight into deficits    Skin: redness/purple skin BLE    Edema: BLE    Hearing: Auditory  Auditory Impairment: None    Vision/Perceptual:                                Corrective Lenses: Glasses    Range of Motion:  BUE  AROM: Generally decreased, functional  PROM: Generally decreased, functional                      Strength:  BUE  Strength: Generally decreased, functional                Coordination:  Coordination: Generally decreased, functional  Fine Motor Skills-Upper: Left Intact; Right Intact    Gross Motor Skills-Upper: Left Intact; Right Intact    Tone & Sensation:  BUE  Tone: Normal  Sensation: Impaired                      Balance:  Sitting: Intact; Without support  Standing: Impaired; With support;Pull to stand  Standing - Static: Fair;Constant support  Standing - Dynamic : Poor;Fair;Constant support    Functional Mobility and Transfers for ADLs:  Bed Mobility:  Rolling: Minimum assistance  Supine to Sit: Minimum assistance; Additional time  Sit to Supine: Moderate assistance; Additional time    Transfers:  Sit to Stand: Minimum assistance; Moderate assistance  Stand to Sit: Minimum assistance; Moderate assistance    ADL Assessment:  Feeding: Modified independent(per patient report)    Oral Facial Hygiene/Grooming: Setup(infer 2* decreased mobility)    Bathing: Moderate assistance(infer A for BLE)    Upper Body Dressing: Setup(infer 2* decreased mobility)    Lower Body Dressing:  Moderate assistance(infer A for threading)    Toileting: Minimum assistance(infer in bed 2* decreased mobility at this time)                ADL Intervention and task modifications:     OT facilitated functional bed mobility and supine > sit transfer with patient requiring A for LLE and requiring A for BLE to return to bed. Sit <> stand transfers not attempted due to RN report of possible DVT. Patient donned bilateral slip-on shoes seated EOB. Patient also educated on safety with functional transfers. Lower Body Dressing Assistance  Slip on Shoes Without Back: Supervision/set-up         Cognitive Retraining  Safety/Judgement: Insight into deficits      Functional Measure:  Barthel Index:    Bathin  Bladder: 5  Bowels: 5  Groomin  Dressin  Feedin  Mobility: 5  Stairs: 0  Toilet Use: 5  Transfer (Bed to Chair and Back): 10  Total: 45/100        Percentage of impairment   0%   1-19%   20-39%   40-59%   60-79%   80-99%   100%   Barthel Score 0-100 100 99-80 79-60 59-40 20-39 1-19   0     The Barthel ADL Index: Guidelines  1. The index should be used as a record of what a patient does, not as a record of what a patient could do. 2. The main aim is to establish degree of independence from any help, physical or verbal, however minor and for whatever reason. 3. The need for supervision renders the patient not independent. 4. A patient's performance should be established using the best available evidence. Asking the patient, friends/relatives and nurses are the usual sources, but direct observation and common sense are also important. However direct testing is not needed. 5. Usually the patient's performance over the preceding 24-48 hours is important, but occasionally longer periods will be relevant. 6. Middle categories imply that the patient supplies over 50 per cent of the effort. 7. Use of aids to be independent is allowed. Dagoberto Gordon., Barthel, D.W. ([id number]). Functional evaluation: the Barthel Index. [facility] (14)2. IRISH Panda, Zafar Adkins.Radha., [place], [address] ().  Measuring the change indisability after inpatient rehabilitation; comparison of the responsiveness of the Barthel Index and Functional [place] Measure. Journal of Neurology, Neurosurgery, and Psychiatry, 66(4), 149-420. COLBY Crockett, MELIA Post, & Lilly Sanabria M.A. (2004.) Assessment of post-stroke quality of life in cost-effectiveness studies: The usefulness of the Barthel Index and the EuroQoL-5D. Quality of Life Research, 15, 507-36       Occupational Therapy Evaluation Charge Determination   History Examination Decision-Making   LOW Complexity : Brief history review  MEDIUM Complexity : 3-5 performance deficits relating to physical, cognitive , or psychosocial skils that result in activity limitations and / or participation restrictions MEDIUM Complexity : Patient may present with comorbidities that affect occupational performnce. Miniml to moderate modification of tasks or assistance (eg, physical or verbal ) with assesment(s) is necessary to enable patient to complete evaluation       Based on the above components, the patient evaluation is determined to be of the following complexity level: LOW   Pain:  Pain Scale 1: Numeric (0 - 10)  Pain Intensity 1: 0              Activity Tolerance:   VSS  Please refer to the flowsheet for vital signs taken during this treatment. After treatment:   [] Patient left in no apparent distress sitting up in chair  [x] Patient left in no apparent distress in bed  [x] Call bell left within reach  [x] Nursing notified  [] Caregiver present  [] Bed alarm activated    COMMUNICATION/EDUCATION:   The patients plan of care was discussed with: Physical Therapist and Registered Nurse. [x] Home safety education was provided and the patient/caregiver indicated understanding. [x] Patient/family have participated as able in goal setting and plan of care. [x] Patient/family agree to work toward stated goals and plan of care.   [] Patient understands intent and goals of therapy, but is neutral about his/her participation. [] Patient is unable to participate in goal setting and plan of care. This patients plan of care is appropriate for delegation to [facility] [place].     Thank you for this referral.  Morales Mckenzie  Time Calculation: 15 mins

## 2019-03-15 NOTE — DISCHARGE SUMMARY
Discharge Summary       PATIENT ID: Joseph Mercedes  MRN: [id number]   YOB: 1927    DATE OF ADMISSION: 3/12/2019 10:27 AM    DATE OF DISCHARGE: 3/15/2019  PRIMARY CARE PROVIDER: Nestor Van MD     ATTENDING PHYSICIAN: Marco Granger DO   DISCHARGING PROVIDER: [address], DO    To contact this individual call [phone] and ask the  to page. If unavailable ask to be transferred the Adult Hospitalist Department. CONSULTATIONS: IP CONSULT TO HOSPITALIST  IP CONSULT TO GASTROENTEROLOGY    PROCEDURES/SURGERIES: * No surgery found *    ADMITTING DIAGNOSES & HOSPITAL COURSE:     80year old female with recent admission for electrolyte deficiencies and diarrhea, presenting to [facility] with numbness/tingling in bilateral hands. Found to have severe hypocalcemia with hypomagnesemia and hypokalemia. Also with EKG Qtc prolongation. Patient admitted for electrolyte replacement. PTH appropriately elevated at 112.4, Vit D within normal limits. Per patient's daughter, had recent gastroenteritis prior week. Patient has IBS with loose bowels at baseline. Given recurrent hospitalization, GI consulted for possible colonoscopy and deferred due to age. Patient's electrolytes improved and stable on oral supplementation. She will need repeat BMP in 1 week and titration of supplements. Patient also complained of right knee pain. X-ray demonstrated severe OA with effusion. Lower extremity doppler showed non-occlusive thrombus. Per daughter, patient known to have DVT post right hip surgery and had IVC placed. Patient previously on anticoagulation and discontinued. Discussed with daughter and decision made to hold further anticoagulation. Discussed signs/symptoms of DVTs. Doppler:   · Non-occlusive thrombus present in the right saphenofemoral junction.       DISCHARGE DIAGNOSES / PLAN:      Electrolyte abnormalities including hypocalcemia/hypomag/hypokalemia with EKG findings  -oral supplemental calcium, oral magnesium   -PTH appropriately elevated at 112.4, Vit D within normal limits   -EKG with prolonged Qtc, improved with electrolyte replacement  -continue Vit D supplement     Diarrhea: prior stool studies negative  -reports ongoing IBS  -appreciate input from GI, colonoscopy deferred   -symptomatic treatment     Right knee pain with non-occlusive thrombus in right saphenofemoral junction:   -home tylenol and tramadol  -has IVC filter, hold anticoagulation      ADDITIONAL CARE RECOMMENDATIONS:     Prescriptions given:  1. Calcium: 1000 mg three times a day  2. Magnesium: 400 mg once daily    Please have BMP (electrolytes) checked in 1 week and have physician adjust medications as indicated    Please always have BMP checked in future during episodes of diarrhea    Return to the hospital for any new or worsening symptoms    PENDING TEST RESULTS:   At the time of discharge the following test results are still pending: none    FOLLOW UP APPOINTMENTS:    Follow-up Information     Follow up With Specialties Details Why Contact Info    Araceli Nielson MD [place] [phone]      [address]    [phone]           DIET: Cardiac Diet    ACTIVITY: Activity as tolerated    WOUND CARE: none    EQUIPMENT needed: none    DISCHARGE MEDICATIONS:  Discharge Medication List as of 3/15/2019  2:17 PM      START taking these medications    Details   magnesium oxide (MAG-OX) 400 mg tablet Take 1 Tab by mouth daily. , Print, Disp-20 Tab, R-0         CONTINUE these medications which have CHANGED    Details   calcium carbonate (OS-DAVIAN) 500 mg calcium (1,250 mg) tablet Take 2 Tabs by mouth three (3) times daily (with meals). , Print, Disp-30 Tab, R-0         CONTINUE these medications which have NOT CHANGED    Details   bumetanide (BUMEX) 0.5 mg tablet Take 0.5 mg by mouth daily. , Historical Med      cetirizine (ZYRTEC) 10 mg tablet Take 10 mg by mouth daily. , Historical Med      ondansetron (ZOFRAN ODT) 4 mg disintegrating tablet Take 4 mg by mouth every six (6) hours as needed for Nausea., Historical Med      !! traMADol (ULTRAM) 50 mg tablet Take 50 mg by mouth nightly., Historical Med      mupirocin (BACTROBAN) 2 % ointment Apply  to affected area daily. , Historical Med      menthol-zinc oxide (CALMOSEPTINE) 0.44-20.6 % oint Apply  to affected area daily. , Historical Med      acetaminophen (TYLENOL) 325 mg tablet Take 2 Tabs by mouth every six (6) hours. , No Print, Disp-10 Tab, R-0      folic acid (FOLVITE) 1 mg tablet Take 1 Tab by mouth daily. , No Print, Disp-10 Tab, R-0      !! traMADol (ULTRAM) 50 mg tablet Take 1 Tab by mouth every six (6) hours as needed. Max Daily Amount: 200 mg., Print, Disp-10 Tab, R-0      diclofenac (VOLTAREN) 1 % gel Apply  to affected area three (3) times daily. , Historical Med      melatonin tab tablet Take 5 mg by mouth nightly., Historical Med      mirtazapine (REMERON) 30 mg tablet Take 30 mg by mouth nightly., Historical Med      promethazine (PHENERGAN) 12.5 mg tablet Take 12.5 mg by mouth three (3) times daily as needed for Nausea., Historical Med      triamcinolone acetonide (KENALOG) 0.1 % ointment Apply  to affected area as needed for Skin Irritation. use thin layer, Historical Med      carvedilol (COREG) 3.125 mg tablet Take 3.125 mg by mouth two (2) times daily (with meals). , Historical Med      thiamine (B-1) 100 mg tablet Take 100 mg by mouth daily. , Historical Med      cholecalciferol (VITAMIN D3) 1,000 unit tablet Take 1,000 Units by mouth daily. , Historical Med      latanoprost (XALATAN) 0.005 % ophthalmic solution Administer 1 Drop to both eyes nightly., Historical Med      omeprazole (PRILOSEC) 20 mg capsule Take 20 mg by mouth daily. , Historical Med       !! - Potential duplicate medications found. Please discuss with provider.             NOTIFY YOUR PHYSICIAN FOR ANY OF THE FOLLOWING:   Fever over 101 degrees for 24 hours. Chest pain, shortness of breath, fever, chills, nausea, vomiting, diarrhea, change in mentation, falling, weakness, bleeding. Severe pain or pain not relieved by medications. Or, any other signs or symptoms that you may have questions about. DISPOSITION:    Home With:   OT  PT  HH  RN      x Long term SNF/Inpatient Rehab    Independent/assisted living    Hospice    Other:       PATIENT CONDITION AT DISCHARGE:     Functional status    Poor    x Deconditioned     Independent      Cognition     Lucid    x Forgetful     Dementia      Catheters/lines (plus indication)    Burris     PICC     PEG    x None      Code status     Full code    x DNR      PHYSICAL EXAMINATION AT DISCHARGE:  Constitutional:  No acute distress, cooperative, pleasant, elderly    ENT:  Oral mucous moist, oropharynx benign. Neck supple,    Resp:  CTA bilaterally. No wheezing/rhonchi/rales. No accessory muscle use   CV:  Regular rhythm, normal rate, no murmurs, gallops, rubs    GI:  Soft, non distended, non tender.  normoactive bowel sounds, no hepatosplenomegaly     Musculoskeletal:  1+ edema, warm, 2+ pulses throughout    Neurologic:  Moves all extremities.                CHRONIC MEDICAL DIAGNOSES:  Problem List as of 3/15/2019 Date Reviewed: 3/12/2019          Codes Class Noted - Resolved    Electrolyte abnormality ICD-10-CM: E87.8  ICD-9-CM: 276.9  3/12/2019 - Present        Chest pain ICD-10-CM: R07.9  ICD-9-CM: 786.50  9/12/2017 - Present        TIA (transient ischemic attack) ICD-10-CM: G45.9  ICD-9-CM: 435.9  3/8/2017 - Present        GI bleed ICD-10-CM: K92.2  ICD-9-CM: 578.9  3/8/2017 - Present        Numbness and tingling ICD-10-CM: R20.0, R20.2  ICD-9-CM: 782.0  3/8/2017 - Present        CAD in native artery ICD-10-CM: I25.10  ICD-9-CM: 414.01  11/28/2016 - Present        History of non-ST elevation myocardial infarction (NSTEMI) ICD-10-CM: I25.2  ICD-9-CM: 412  11/28/2016 - Present NSTEMI (non-ST elevated myocardial infarction) [facility]) ICD-10-CM: I21.4  ICD-9-CM: 410.70  11/22/2016 - Present        Diarrhea ICD-10-CM: R19.7  ICD-9-CM: 787.91  11/22/2016 - Present        Hypocalcemia ICD-10-CM: E83.51  ICD-9-CM: 275.41  11/22/2016 - Present        Hypomagnesemia ICD-10-CM: E83.42  ICD-9-CM: 275.2  11/22/2016 - Present        Hypokalemia ICD-10-CM: E87.6  ICD-9-CM: 276.8  11/20/2016 - Present        RESOLVED: Pneumonia ICD-10-CM: J18.9  ICD-9-CM: 486  10/31/2018 - 11/6/2018        RESOLVED: Other fracture of right femur, initial encounter for closed fracture ([facility], [address] ICD-10-CM: E33.2G6U  ICD-9-CM: 821.00  10/31/2018 - 11/6/2018              Greater than 30 minutes were spent with the patient on counseling and coordination of care    Signed:   Claudia Davis DO  3/15/2019  4:21 PM

## 2019-03-15 NOTE — PROGRESS NOTES
Called to give report at [facility] the nurse was given message to call back and never returned phone call back.

## 2019-03-15 NOTE — DISCHARGE INSTRUCTIONS
Discharge Instructions       PATIENT ID: Johnathan Jimenez  MRN: [id number]   YOB: 1927    DATE OF ADMISSION: 3/12/2019 10:27 AM    DATE OF DISCHARGE: 3/15/2019    PRIMARY CARE PROVIDER: Joaquin Cook MD     ATTENDING PHYSICIAN: Adelaida Moser DO  DISCHARGING PROVIDER: [address] [facility]     To contact this individual call [phone] and ask the  to page. If unavailable ask to be transferred the Adult Hospitalist Department. DISCHARGE DIAGNOSES     Hypocalcemia    CONSULTATIONS: IP CONSULT TO HOSPITALIST  IP CONSULT TO GASTROENTEROLOGY    PROCEDURES/SURGERIES: * No surgery found *    PENDING TEST RESULTS:   At the time of discharge the following test results are still pending: none    FOLLOW UP APPOINTMENTS:   Follow-up Information     Follow up With Specialties Details Why Contact Info    Joaquin Cook MD [address]  [phone]             ADDITIONAL CARE RECOMMENDATIONS:     Prescriptions given:  1. Calcium: 1000 mg three times a day  2. Magnesium: 400 mg once daily    Please have BMP (electrolytes) checked in 1 week and have physician adjust medications as indicated    Please always have BMP checked in future during episodes of diarrhea    Return to the hospital for any new or worsening symptoms      DIET: Cardiac Diet    ACTIVITY: Activity as tolerated    WOUND CARE: none    EQUIPMENT needed: none      DISCHARGE MEDICATIONS:   See Medication Reconciliation Form    · It is important that you take the medication exactly as they are prescribed. · Keep your medication in the bottles provided by the pharmacist and keep a list of the medication names, dosages, and times to be taken in your wallet. · Do not take other medications without consulting your doctor. NOTIFY YOUR PHYSICIAN FOR ANY OF THE FOLLOWING:   Fever over 101 degrees for 24 hours.    Chest pain, shortness of breath, fever, chills, nausea, vomiting, diarrhea, change in mentation, falling, weakness, bleeding. Severe pain or pain not relieved by medications. Or, any other signs or symptoms that you may have questions about.       DISPOSITION:   x Home With:   OT  PT  HH  RN       SNF/Inpatient Rehab/LTAC    Independent/assisted living    Hospice    Other:     CDMP Checked:   Yes x     PROBLEM LIST Updated:  Yes x       Signed:   Joey Mai DO  3/15/2019  9:39 AM

## 2019-03-15 NOTE — PROGRESS NOTES
Pt chart review performed. Attempted to begin evaluation, informed to have DVT upon start of evaluation.  Eval deferred until stable

## 2019-03-15 NOTE — PROGRESS NOTES
03/15/19 0905   Vital Signs   Temp 98.9 °F (37.2 °C)   Temp Source Oral   Pulse (Heart Rate) (!) 120   Heart Rate Source Monitor   Resp Rate 17   O2 Sat (%) 96 %   Level of Consciousness Alert   BP (!) 141/92   MAP (Calculated) 108   BP 1 Method Automatic   BP 1 Location Right arm   BP Patient Position At rest   MEWS Score 3   Pain 1   Pain Scale 1 Numeric (0 - 10)   Pain Intensity 1 0   Patient Observation   Patient Turned Turns self   Activity In bed   Mode of Transportation Stretcher     0920-Notified MD,No order received. 1400-Legs were wrapped per order.

## 2019-08-19 ENCOUNTER — HOSPITAL ENCOUNTER (OUTPATIENT)
Dept: WOUND CARE | Age: 84
Discharge: HOME | End: 2019-08-19
Payer: MEDICARE

## 2019-08-19 VITALS
DIASTOLIC BLOOD PRESSURE: 76 MMHG | RESPIRATION RATE: 18 BRPM | SYSTOLIC BLOOD PRESSURE: 125 MMHG | TEMPERATURE: 98 F | HEART RATE: 99 BPM

## 2019-08-19 PROCEDURE — 99214 OFFICE O/P EST MOD 30 MIN: CPT

## 2019-08-19 PROCEDURE — 74011000250 HC RX REV CODE- 250: Performed by: PODIATRIST

## 2019-08-19 RX ORDER — FLUCONAZOLE 100 MG/1
200 TABLET ORAL DAILY
COMMUNITY

## 2019-08-19 RX ORDER — OMEPRAZOLE 20 MG/1
20 CAPSULE, DELAYED RELEASE ORAL DAILY
COMMUNITY

## 2019-08-19 RX ORDER — GABAPENTIN 300 MG/1
300 CAPSULE ORAL 3 TIMES DAILY
COMMUNITY

## 2019-08-19 RX ADMIN — Medication: at 15:00

## 2019-08-19 NOTE — WOUND CARE
08/19/19 1507   Wound Pretibial Right;Posterior; Anterior; Inner; Outer wraps around entire calf   Date First Assessed/Time First Assessed: 08/19/19 1421   Primary Wound Type: Venous Ulcer  Location: Pretibial  Wound Location Orientation: Right;Posterior; Anterior; Inner; Outer  Wound Description: wraps around entire calf   Dressing Type Applied Silver products;Gauze;ABD pad     Discharge Condition:stable  Ambulatory Status:wheelchair  Discharge Destination:[facility]  Transportation: private  Accompanied by: family

## 2019-08-19 NOTE — PROGRESS NOTES
08/19/19 1422   Wound Pretibial Right;Posterior; Anterior; Inner; Outer wraps around entire calf   Date First Assessed/Time First Assessed: 08/19/19 1421   Primary Wound Type: Venous Ulcer  Location: Pretibial  Wound Location Orientation: Right;Posterior; Anterior; Inner; Outer  Wound Description: wraps around entire calf   Dressing Status Removed   Dressing Type Compression dressing   Wound Length (cm) 28 cm   Wound Width (cm) 12 cm   Wound Depth (cm) 0.1 cm   Wound Volume (cm^3) 33.6 cm^3   Condition of Base Pink;Slough   Condition of Edges Calloused   Assessment Pink;Painful;Red;Excoriated;Dry   Drainage Amount Large   Drainage Color Serosanguinous   Wound Odor None   Jazz-wound Assessment Blanchable erythema   Cleansing and Cleansing Agents  Normal saline   Wound Leg Left;Medial   Date First Assessed/Time First Assessed: 08/19/19 1424   Primary Wound Type: Venous Ulcer  Location: Leg  Wound Location Orientation: Left;Medial   Dressing Status Removed   Dressing Type Compression dressing  (calcium alginate)   Wound Length (cm) 8.5 cm   Wound Width (cm) 6 cm   Wound Depth (cm) 0.1 cm   Wound Volume (cm^3) 5.1 cm^3   Condition of Edges Calloused   Assessment Dry;Edema;Pink   Drainage Amount Large   Drainage Color Serosanguinous   Wound Odor None   Jazz-wound Assessment Blanchable erythema   Cleansing and Cleansing Agents  Normal saline   Wound Leg Left;Lateral;Lower   Date First Assessed/Time First Assessed: 08/19/19 1425   Primary Wound Type: Venous Ulcer  Location: Leg  Wound Location Orientation: Left;Lateral;Lower   Dressing Status Removed   Dressing Type Compression dressing  (calcium alginate)   Wound Width (cm) 7 cm   Wound Depth (cm) 2.5 cm   Assessment Dry;Pink;Painful;Red;Drainage   Drainage Amount Large   Drainage Color Serosanguinous   Wound Odor None   Jazz-wound Assessment Blanchable erythema   Cleansing and Cleansing Agents  Normal saline     Visit Vitals  /76   Pulse 99   Temp 98 °F (36.7 °C) Resp 18

## 2019-08-19 NOTE — DISCHARGE INSTRUCTIONS
Wound Care Instructions for Erich [address] [place] [address]. [address] [facility], [address] [place] [address] Avenue  Telephone: [phone]    NAME:  Gabby Avery OF BIRTH:  10/17/1927  MEDICAL RECORD NUMBER:  [id number]  DATE:  8/19/2019    Wound Cleansing:   Do not scrub or use excessive force. Cleanse wound prior to applying a clean dressing with:  [x] Normal Saline [] Keep Wound Dry in Shower    [] Wound Cleanser   [] Cleanse wound with Mild Soap & Water  [] May Shower at Discharge   [] Other:       Topical Treatments:  Do not apply lotions, creams, or ointments to wound bed unless directed. [x] Apply moisturizing lotion to skin surrounding the wound prior to dressing change.  [] Apply antifungal ointment to skin surrounding the wound prior to dressing change.  [] Apply thin film of moisture barrier ointment to skin immediately around wound. [] Other:       Dressings:           Wound Location Right and left lower legs  [] Apply Primary Dressing:       [] MediHoney Gel [x] Alginate with Silver [] Alginate   [] Collagen [] Collagen with Silver   [] Santyl with Moisten saline gauze     [] Hydrocolloid   [] MediHoney Alginate [] Foam with Silver   [] Foam   [] Hydrofera Blue    [] Mepilex Border    [] Moisten with Saline [] Hydrogel [] Mepitel     [] Bactroban/Mupirocin [] Polysporin  [] Other:    [] Pack wound loosely with  [] Iodoform   [] Plain Packing  [] Other   [x] Cover and Secure with:     [x] Gauze [] Kylie [x] Kerlix   [] Ace Wrap [] Cover Roll Tape [x] ABD     [] Other:    Avoid contact of tape with skin.   [] Change dressing: [] Daily    [] Every Other Day [x] Three times per week   [] Once a week [] Do Not Change Dressing   [] Other:   [] Once a week [] Do Not Change Dressing   [] Other:       Edema Control:  Apply: [] Compression Stocking []Right Leg []Left Leg   [x] Tubigrip []Right Leg Double Layer []Left Leg Double Layer       [x]Right Leg Single Layer [x]Left Leg Single Layer   [] SpandaGrip []Right Leg  []Left Leg      []Low compression 5-10 mm/Hg      []Medium com    [x] Elevate leg(s) above the level of the heart when sitting. Dietary:  [x] Diet as tolerated: [] Calorie Diabetic Diet: [] No Added Salt:  [] Increase Protein: [] Other:   Activity:  [x] Activity as tolerated:  [] Patient has no activity restrictions     [] Strict Bedrest: [] Remain off Work:     [] May return to full duty work:                                   [] Return to work with restrictions:             Return Appointment:  [] Wound and dressing supply provider:   [] ECF or Home Healthcare:  [] Wound Assessment: [] Physician or NP scheduled for Wound Assessment:   [] Return Appointment: With [employer]  in 1- 3 Week(s)  [x] Ordered tests: KATHRYN and PVRS     Electronically signed on 8/19/2019 at 2:24 PM     [address] [place] [address] Information: Should you experience any significant changes in your wound(s) or have questions about your wound care, please contact the [facility] Main at [address] [place] [address] 8:00 am - 4:30. If you need help with your wound outside these hours and cannot wait until we are again available, contact your PCP or go to the hospital emergency room. PLEASE NOTE: IF YOU ARE UNABLE TO OBTAIN WOUND SUPPLIES, CONTINUE TO USE THE SUPPLIES YOU HAVE AVAILABLE UNTIL YOU ARE ABLE TO REACH US. IT IS MOST IMPORTANT TO KEEP THE WOUND COVERED AT ALL TIMES.      Physician Signature:_______________________    Date: __08/19/19_________ Time:  ____________

## 2019-08-20 NOTE — CONSULTS
[address]    Name:  Katia Conde  MR#:  [id number]  :  10/17/1927  ACCOUNT #:  [de-identified]  DATE OF SERVICE:  2019      WOUND CARE CONSULTATION    TREATING PHYSICIAN:  Jose R Kennedy DPM    HISTORY OF PRESENT ILLNESS:  This is a 70-year-old female who presents with her family with a chief complaint of wounds on both legs. Her family states that the patient has been dealing with swelling in both of her legs for many months. States this happened after she had her hip replaced back in November. The patient currently resides at an assisted living facility where she gets wound care done 3 times a week. Family states that the wounds are the worst they have ever been. The patient denies any nausea, vomiting, fevers, night sweats, or chills. PAST MEDICAL HISTORY:  Positive for history of blood clots in the left lower extremity, non-ST-elevation myocardial infarction, and arthritis. PAST SURGICAL HISTORY:  Positive for hip replacement, IVC filter placement, hysterectomy, and cholecystectomy. MEDICATIONS:  1. Tylenol 325.  2.  Bumex 0.5. 3.  Calcium or Os-Andi.  4.  Coreg 3.125.  5.  Zyrtec 10 mg.  6.  Vitamin D3.  7.  Voltaren gel. 8.  Diflucan 632 mg.  9.  Folic acid 1 mg. 10.  Neurontin 300 mg three times a day. 11.  Latanoprost 0.005% eyedrops. 12.  Magnesium oxide. 13.  Melatonin. 14.  Zinc oxide. 15.  Remeron 30 mg. 16.  Bactroban. 17.  Prilosec 20 mg. 18.  Thiamine 100 mg. 19.  Tramadol 50 mg. ALLERGIES:  INCLUDE CODEINE AND PREDNISONE. REVIEW OF SYSTEMS:  Negative except for what is mentioned in the HPI. SOCIAL HISTORY:  No tobacco use. Resides in an assisted living facility. WOUND EXAMINATION:  Bilateral lower extremity exams include a circumferential wound measuring 28 x 12 x 0.1 cm granular wound circumferentially along the right lower extremity. There is no acute sign of infection.   There is pitting edema in the right lower extremity. DP and PT pulses are nonpalpable. Left lower extremity exam, there is an 8.5 x 6 x 0.1 cm circumferential wound around the left lower extremity. The wound has a granular base. No acute signs of infection. There is pitting edema in the left lower extremity. DP and PT pulses are nonpalpable. There is a medial left leg wound measuring 8.5 x 6 and there is a lateral wound measuring 7 x 2.5 with a granular base. No acute signs of infection. ASSESSMENT:  1.  Bilateral leg ulcerations. 2.  Bilateral lower extremity edema. 3.  Peripheral vascular disease. PLAN:  I had a long discussion with the patient and her family this afternoon. I informed them that the reason she has the wounds has to do with the fact that she has uncontrolled swelling in both legs. With the uncontrolled swelling, her skin and soft tissues stretch out leading to wound formation. I informed them that the only way to eliminate the wound is to control her swelling via compression therapy. However, because of her history of peripheral vascular disease, we need to get circulation studies to see how good her circulation is. We will send the patient out for ABIs and PVRs of both legs. If her circulation studies come back adequate, we will start compression therapy on her right lower extremity, and because of her history of blood clots in the left lower extremity, we will have to resort to mild compression on that side. If her circulation studies come back poor, then we will have to do bilateral Tubigrip dressings on her and also see if her circulation can be improved if at all possible. The patient and her family are in agreement with the plan. For now, we will start Aquacel Ag and bilateral Tubigrip dressings on each leg that will be changed every other day at her assisted living facility. The patient is to elevate when at rest.  She is to follow up in 1 week.         Jaswant Carrera DPM      [id number]  D:  08/19/2019 16:34  T:  08/20/2019 1:12  JOB #:  [id number]

## 2019-08-26 ENCOUNTER — HOSPITAL ENCOUNTER (OUTPATIENT)
Dept: WOUND CARE | Age: 84
Discharge: HOME | End: 2019-08-26
Payer: MEDICARE

## 2019-08-26 VITALS
HEART RATE: 81 BPM | TEMPERATURE: 97.4 F | DIASTOLIC BLOOD PRESSURE: 84 MMHG | SYSTOLIC BLOOD PRESSURE: 137 MMHG | RESPIRATION RATE: 16 BRPM

## 2019-08-26 PROCEDURE — 99215 OFFICE O/P EST HI 40 MIN: CPT

## 2019-08-26 NOTE — WOUND CARE
08/26/19 1512   Wound Leg Left;Lateral;Lower   Date First Assessed/Time First Assessed: 08/19/19 1425   Primary Wound Type: Venous Ulcer  Location: Leg  Wound Location Orientation: Left;Lateral;Lower   Dressing Status Clean, dry, and intact   Dressing Type ABD pad;Alginate; Compression Wrap/Venous Stasis;Gauze wrap (nathan)   Non-staged Wound Description Partial thickness   Wound Length (cm) 1 cm   Wound Width (cm) 1 cm   Wound Depth (cm) 0.1 cm   Wound Volume (cm^3) 0.1 cm^3   Condition of Base Pink   Assessment Excoriated   Drainage Amount Large   Drainage Color Serous   Wound Odor None   Jazz-wound Assessment Blanchable erythema   Cleansing and Cleansing Agents  Dermal wound cleanser   Dressing Type Applied ABD pad;Alginate; Compression Wrap/Venous Stasis;Gauze wrap (nathan)   Procedure Tolerated Well   Wound Leg Left;Medial   Date First Assessed/Time First Assessed: 08/19/19 1424   Primary Wound Type: Venous Ulcer  Location: Leg  Wound Location Orientation: Left;Medial   Dressing Status Breakthrough drainage   Dressing Type ABD pad;Compression Wrap/Venous Stasis;Gauze wrap (nathan)   Non-staged Wound Description Partial thickness   Wound Length (cm) 4.2 cm   Wound Width (cm) 0.6 cm   Wound Depth (cm) 0.1 cm   Wound Volume (cm^3) 0.25 cm^3   Condition of Base Pink   Assessment Excoriated   Drainage Amount Large   Drainage Color Serous   Wound Odor None   Jazz-wound Assessment Blanchable erythema   Cleansing and Cleansing Agents  Dermal wound cleanser   Dressing Type Applied 4 x 4;Alginate; Compression Wrap/Venous Stasis;Gauze wrap (nathan)   Procedure Tolerated Well   Wound Pretibial Right;Posterior; Anterior; Inner; Outer wraps around entire calf   Date First Assessed/Time First Assessed: 08/19/19 1421   Primary Wound Type: Venous Ulcer  Location: Pretibial  Wound Location Orientation: Right;Posterior; Anterior; Inner; Outer  Wound Description: wraps around entire calf   Dressing Status Breakthrough drainage   Dressing Type ABD pad;Compression Wrap/Venous Stasis;Gauze wrap (nathan)   Non-staged Wound Description Partial thickness   Wound Length (cm) 3.3 cm   Wound Width (cm) 5 cm   Wound Depth (cm) 0.1 cm   Wound Volume (cm^3) 1.65 cm^3   Condition of Base Pink   Assessment Excoriated   Drainage Amount Large   Drainage Color Serous   Wound Odor None   Jazz-wound Assessment Blanchable erythema   Cleansing and Cleansing Agents  Dermal wound cleanser   Dressing Type Applied ABD pad;Alginate; Compression Wrap/Venous Stasis;Gauze wrap (nathan)   Procedure Tolerated Well     Visit Vitals  /84   Pulse 81   Temp 97.4 °F (36.3 °C)   Resp 16                 Discharge Condition:stable  Ambulatory Status:wheelchair  Discharge Destination:home  Transportation: private auto  Accompanied by: family

## 2019-08-26 NOTE — DISCHARGE INSTRUCTIONS
Discharge Instructions for  [address] [facility], [address] [place] [address] Avenue  Telephone: [phone]    NAME:  Allan Marie OF BIRTH:  10/17/1927  MEDICAL RECORD NUMBER:  [id number]  DATE:  8/26/2019    Wound Cleansing:   Do not scrub or use excessive force. Cleanse wound prior to applying a clean dressing with:  [] Normal Saline [] Keep Wound Dry in Shower    [] Wound Cleanser   [x] Cleanse wound with Mild Soap & Water  [] May Shower at Discharge   [] Other:       Topical Treatments:  Do not apply lotions, creams, or ointments to wound bed unless directed. [x] Apply moisturizing lotion to skin surrounding the wound prior to dressing change.  [] Apply antifungal ointment to skin surrounding the wound prior to dressing change.  [] Apply thin film of moisture barrier ointment to skin immediately around wound. [] Other:       Dressings:           Wound Location bilateral lower legs  [x] Apply Primary Dressing:       [] MediHoney Gel [x] Alginate with Silver aquacel ag [] Alginate   [] Collagen [] Collagen with Silver   [] Santyl with Moisten saline gauze     [] Hydrocolloid   [] MediHoney Alginate [] Foam with Silver   [] Foam   [] Hydrofera Blue    [] Mepilex Border    [] Moisten with Saline [] Hydrogel [] Mepitel     [] Bactroban/Mupirocin [] Polysporin  [] Other:    [] Cover and Secure with:     [] Gauze [] Kylie [x] Kerlix   [] Ace Wrap [x] Cover Roll Tape [x] ABD     [] Other:    Avoid contact of tape with skin.   [x] Change dressing: [] Daily    [x] Every Other Day [] Three times per week   [] Once a week [] Do Not Change Dressing   [] Other:     Edema Control:  Apply: [] Bilateral ace wraps per patient request []Right Leg []Left Leg   [] Tubigrip []Right Leg Double Layer []Left Leg Double Layer       []Right Leg Single Layer []Left Leg Single Layer   [] SpandaGrip []Right Leg  []Left Leg      []Low compression 5-10 mm/Hg      []Medium compression 10-20 mm/Hg     []High compression  20-30 mm/Hg   every morning immediately when getting up should be applied to affected leg(s) from mid foot to knee making sure to cover the heel. Remove every night before going to bed. [x] Elevate leg(s) above the level of the heart when sitting. [x] Avoid prolonged standing in one place. [] Elevate arm/hand above the level of the heart []RightArm []LeftArm       Dietary:  [x] Diet as tolerated: [] Calorie Diabetic Diet: [] No Added Salt:  [x] Increase Protein: [] Other:   Activity:  [x] Activity as tolerated:  [] Patient has no activity restrictions     [] Strict Bedrest: [] Remain off Work:     [] May return to full duty work:                                   [] Return to work with restrictions:             Return Appointment:  [] Wound and dressing supply provider:   [x] ECF or Home Healthcare: [facility]  [] Wound Assessment: [] Physician or NP scheduled for Wound Assessment:   [x] Return Appointment: With Dr. Uyen Coburn  in  [address] [place] [address])  [] Ordered tests:      Electronically signed on 8/26/2019 at 3:15 PM     Rosa Maki [address]: Should you experience any significant changes in your wound(s) or have questions about your wound care, please contact the [facility] Main at [address] [place] [address] 8:00 am - 4:30. If you need help with your wound outside these hours and cannot wait until we are again available, contact your PCP or go to the hospital emergency room. PLEASE NOTE: IF YOU ARE UNABLE TO OBTAIN WOUND SUPPLIES, CONTINUE TO USE THE SUPPLIES YOU HAVE AVAILABLE UNTIL YOU ARE ABLE TO REACH US. IT IS MOST IMPORTANT TO KEEP THE WOUND COVERED AT ALL TIMES.      Physician Signature:_______________________    Date: ___________ Time:  ____________

## 2019-08-27 NOTE — PROGRESS NOTES
[address] [place] [address] PROGRESS NOTE    Name:  Camron Osorio  MR#:  [id number]  :  10/17/1927  ACCOUNT #:  [de-identified]  DATE OF SERVICE:  2019      TREATING PHYSICIAN:  Jin Rouse DPM    SUBJECTIVE:  The patient presents today with her family for followup to bilateral leg ulcerations. The patient denies any nausea, vomiting, fevers, night sweats, or chills. She states that Tubigrip dressings that she had were too tight and they were cutting into her leg and foot and she refuses to wear anything like that ever again. OBJECTIVE:  VITAL SIGNS:  Stable. The patient is afebrile. WOUND EXAMINATION:  Bilateral Lower Extremity Exam:  The patient presents with circumferential wounds along both legs. The wounds are granular and not acutely infected. There is pitting edema in both lower extremities. DP and PT pulses are nonpalpable. ASSESSMENT:  1.  Bilateral leg ulceration. 2.  Bilateral lower extremity edema. 3.  Peripheral vascular disease. PLAN:  1.  I had a long discussion with the patient and her family members this afternoon. I reviewed the results of her circulation studies, which revealed adequate circulation in the left lower extremity; however, revealed decreased circulation in the right lower extremity. I informed the patient that because of the weak circulation in her right lower extremity, I do not put a true 3-layer compression wrap on that side and given the fact that she has had a history of blood clots in the left leg, I cannot put a 3-layer compression wrap on that side either. At minimum, the patient would benefit from a mild compression dressing consisting of double layer Tubigrip. Since the patient is refusing to have any type of compression dressing on either lower extremity, there is not much I can offer at this time.   I informed the family that the best option then is to keep the wounds clean and prevent infection if that is possible with consistent wound care. Family and the patient are in agreement. 2.  Orders were written for wound care consisting of Aquacel Ag and has dry sterile dressing to each leg to be changed by home care every other day. The patient is to follow up with me in three weeks for a followup visit and if she is still doing well at that visit, I will switch her to an as needed basis. I reiterated that as long as the swelling exists, these wounds will not get better and the likelihood of that healing will be low. The patient and family understands.         SANDRA Shaw/MARII_CHICHI/  D:  08/26/2019 15:24  T:  08/27/2019 5:48  JOB #:  [id number]

## 2019-09-30 ENCOUNTER — HOSPITAL ENCOUNTER (OUTPATIENT)
Dept: WOUND CARE | Age: 84
Discharge: HOME | End: 2019-09-30
Payer: MEDICARE

## 2019-09-30 VITALS
HEART RATE: 65 BPM | TEMPERATURE: 97.8 F | SYSTOLIC BLOOD PRESSURE: 147 MMHG | DIASTOLIC BLOOD PRESSURE: 79 MMHG | RESPIRATION RATE: 16 BRPM

## 2019-09-30 PROCEDURE — 99212 OFFICE O/P EST SF 10 MIN: CPT

## 2019-09-30 RX ORDER — CEPHALEXIN 500 MG/1
500 CAPSULE ORAL 3 TIMES DAILY
COMMUNITY

## 2019-09-30 NOTE — WOUND CARE
09/30/19 1329 Wound Pretibial Right;Posterior; Anterior; Inner; Outer wraps around entire calf Date First Assessed/Time First Assessed: 08/19/19 1421   Primary Wound Type: Venous Ulcer  Location: Pretibial  Wound Location Orientation: Right;Posterior; Anterior; Inner; Outer  Wound Description: wraps around entire calf Dressing Status Breakthrough drainage Dressing Type ABD pad;Compression dressing Wound Length (cm) 3.3 cm Wound Width (cm) 5 cm Wound Depth (cm) 0.1 cm Wound Volume (cm^3) 1.65 cm^3 Condition of Base Pink Assessment Excoriated Drainage Amount Large Drainage Color Serous Wound Odor None Jazz-wound Assessment Blanchable erythema Cleansing and Cleansing Agents  Soap and water Wound Leg Left;Medial  
Date First Assessed/Time First Assessed: 08/19/19 1424   Primary Wound Type: Venous Ulcer  Location: Leg  Wound Location Orientation: Left;Medial  
Dressing Status Breakthrough drainage Dressing Type ABD pad;Compression dressing Wound Length (cm) 4.2 cm Wound Width (cm) 0.6 cm Wound Depth (cm) 0.1 cm Wound Volume (cm^3) 0.25 cm^3 Condition of Base Pink Assessment Excoriated Drainage Amount Large Drainage Color Serous Wound Odor None Jazz-wound Assessment Blanchable erythema Cleansing and Cleansing Agents  Soap and water Wound Leg Left;Lateral;Lower Date First Assessed/Time First Assessed: 08/19/19 1425   Primary Wound Type: Venous Ulcer  Location: Leg  Wound Location Orientation: Left;Lateral;Lower Dressing Status Removed Dressing Type ABD pad;Gauze;Gauze wrap (nathan) Non-staged Wound Description Partial thickness Wound Length (cm) 1 cm Wound Width (cm) 1 cm Wound Depth (cm) 0.1 cm Wound Volume (cm^3) 0.1 cm^3 Condition of Base Pink Assessment Excoriated Drainage Amount Large Drainage Color Serous Wound Odor None Visit Vitals /79 Pulse 65 Temp 97.8 °F (36.6 °C) Resp 16

## 2019-09-30 NOTE — PROGRESS NOTES
Clinic Level of Care Assessment    NAME:  Karma Aranda OF BIRTH:  10/17/1927 GENDER: female  MEDICAL RECORD NUMBER:  [id number]   DATE:  9/30/2019      Wound Count Document in [address] [place] [address]  Number of Wounds Assessed Points   No Wounds/Ulcers [x]   0   Less than Three Wounds/Ulcers []   1   3-6 Wounds/Ulcers []   2   Greater than 6 Wounds/Ulcers []   3     Ambulation Status Document in Coord/LISA/Mobility tab  Status Definition Points   Independent Independently able to ambulate. Fully able (without any assistance) to get on/off exam table/chair. []   0   Minimal Physical Assistance Requires physical assistance of one person to ambulate and/or position patient to be examined. Includes necessary physical assistance to position lower extremities on/off stool. [x]   1   Moderate Physical Assistance Requires at least one staff member to physically assist patient in ambulating into treatment room, and on/off exam table. []   2   Full Assistance Requires assistance of at least two staff members to transfer patient into treatment room and/or on/off exam table/chair. \"Total Transfer\". []   3     Dressing Complexity Document in LDA and Write Appropriate Order  Complexity Definition Points   No Dressing  []   0   Simple Minimal, simple dressing. i.e. Band-aid, gauze, simple wrap. [x]   1   Intermediate Moderately complicated requiring licensed personnel to apply i.e. collagen matrix, ointments, gels, alginates. []   2   Complex Complicated requiring licensed personnel to apply dressings 6 or more wounds. []   3     Teaching Effort Document in Education Tab   Effort Definition Points   No Teaching  []   0   Simple Reinforce two or less topics. Document in Education navigator. [x]   1   Intermediate Reinforce three to five topics and/or one additional   new topic. Document in Education navigator. []   2   Complex Teach more than one new topic.  New patient information   packet reviewed and/or reinforce more than three topics. Document in Education navigator. HBO initial instruction. []   3       Patient Assessment and Planning  Planning Definition Points   Simple Multiple System Simple: Simple follow-up with routine assessment and planning. If Discharged, instructions and long term/follow-up care given to patient/caregiver. Discharged, instructions and/or After Visit Summary given to patient/caregiver and instructions completed. [x]   1   Intermediate Multiple System Intermediate: Contact with outside resources; i.e. Telephone calls to home health, [facility]. May include filling out forms and writing letters, arranging transportation, communication with insurance , vendors, etc.  Discharged, instructions and/or After Visit Summary given to patient/caregiver and instructions completed. []   2   Complex Multiple System Complex: Full, comprehensive assessment and planning. Follow the entire navigator under Wound Visit charting filling out each tab which includes OP Adm Database Screening, Education and CarePlan  HBO risk assessment completed. Discharged, instructions and/or After Visit Summary given to patient/caregiver and instructions completed.    []   3           Is this the Patient's First Visit to the [address] [place] [address] Road  No      Is this Patient Established @ [facility]  Yes             Clinical Level of Care      Points  0-2  Level 1 []     Points  3-5  Level 2 [x]     Points  6-9  Level 3 []     Points  10-12  Level 4 []     Points  13-15  Level 5 []       Electronically signed by Diann Nuñez RN on 9/30/2019 at 4:29 PM

## 2019-09-30 NOTE — DISCHARGE INSTRUCTIONS
Discharge Instructions for  [address] [facility], [address] [place] [address] Avenue  Telephone: [phone]    NAME:  Deborah Cooney OF BIRTH:  10/17/1927  MEDICAL RECORD NUMBER:  [id number]  DATE:  9/30/2019    Wound Cleansing:   Do not scrub or use excessive force. Cleanse wound prior to applying a clean dressing with:  [] Normal Saline [] Keep Wound Dry in Shower    [] Wound Cleanser   [x] Cleanse wound with Mild Soap & Water  [] May Shower at Discharge   [] Other:       Topical Treatments:  Do not apply lotions, creams, or ointments to wound bed unless directed. [x] Apply moisturizing lotion to skin surrounding the wound prior to dressing change.  [] Apply antifungal ointment to skin surrounding the wound prior to dressing change.  [] Apply thin film of moisture barrier ointment to skin immediately around wound. [] Other:       Dressings:           Wound Location bilateral lower legs  [x] Apply Primary Dressing:       [] MediHoney Gel [] Alginate with Silver [] Alginate   [] Collagen [] Collagen with Silver   [] Santyl with Moisten saline gauze     [] Hydrocolloid   [] MediHoney Alginate [] Foam with Silver   [] Foam   [] Hydrofera Blue    [] Mepilex Border    [] Moisten with Saline [] Hydrogel [] Mepitel     [] Bactroban/Mupirocin [] Polysporin  [x] Other:  xeroform   [x] Cover and Secure with:     [] Gauze [] Kylie [x] Kerlix   [] Ace Wrap [x] Cover Roll Tape [x] ABD     [] Other:    Avoid contact of tape with skin. [x] Change dressing: [] Daily    [x] Every Other Day [] Three times per week   [] Once a week [] Do Not Change Dressing   [] Other:     Edema Control:   [x] Elevate leg(s) above the level of the heart when sitting. [x] Avoid prolonged standing in one place.    [] Elevate arm/hand above the level of the heart []RightArm []LeftArm   Dietary:  [x] Diet as tolerated: [] Calorie Diabetic Diet: [] No Added Salt:  [x] Increase Protein: [] Other:   Activity:  [x] Activity as tolerated:  [] Patient has no activity restrictions     [] Strict Bedrest: [] Remain off Work:     [] May return to full duty work:                                   [] Return to work with restrictions:             Return Appointment:  [] Wound and dressing supply provider:   [x] ECF or Home Healthcare: [facility]  [] Wound Assessment: [] Physician or NP scheduled for Wound Assessment:   [x] Return Appointment: With Dr. Dennis Tobias as needed  [] Ordered tests:      Electronically signed on 9/30/2019 at [address]: Should you experience any significant changes in your wound(s) or have questions about your wound care, please contact the [facility] Main at [address] [place] [address] 8:00 am - 4:30. If you need help with your wound outside these hours and cannot wait until we are again available, contact your PCP or go to the hospital emergency room. PLEASE NOTE: IF YOU ARE UNABLE TO OBTAIN WOUND SUPPLIES, CONTINUE TO USE THE SUPPLIES YOU HAVE AVAILABLE UNTIL YOU ARE ABLE TO REACH US. IT IS MOST IMPORTANT TO KEEP THE WOUND COVERED AT ALL TIMES.       Physician Signature:_______________________    Date: ___________ Time:  ____________

## 2019-10-01 NOTE — PROGRESS NOTES
[address] [place] [address] PROGRESS NOTE    Name:  Sahil Snyder  MR#:  [id number]  :  10/17/1927  ACCOUNT #:  [de-identified]  DATE OF SERVICE:  2019      TREATING PHYSICIAN:  Hoda Mello DPM    SUBJECTIVE:  The patient presents today for followup to bilateral leg ulcerations. She arrived with her family. She denies any nausea, vomiting, fever, night sweats, or chills. OBJECTIVE:  VITAL SIGNS:  Stable. The patient is afebrile. BILATERAL LOWER EXTREMITY EXAM:  The patient presents with bilateral circumferential wounds along both legs. The wounds have a granular base and appeared to be superficial in nature. There are no signs of acute infection. There is pitting edema in both lower extremities. DP and PT pulses are nonpalpable. ASSESSMENT:  1.  Bilateral leg ulceration. 2.  Bilateral lower extremity edema. 3.  Peripheral vascular disease. PLAN:  I had a long discussion with the patient and her family members this afternoon. Clinically, her wounds are about the same in appearance compared to when I last saw her about a month ago. As I mentioned to her at that visit, I reiterated that as long as her swelling is present, these wounds are not likely going to heal and will have the potential to get worse. The family understands and the patient understands, but the patient is still adamant that she could not tolerate any type of compressive dressing on either leg and she states that she refuses to wear any. We will put a new wound care orders to be done by home care three times a week consisting of Xeroform and dry gauze dressings to both leg wounds. The patient is also complaining of some pain in the tips of both big toes. It appears that she is getting jamming of her toes in her shoes. The patient tends to wear slip-on shoes because those are easier to put on and they can accommodate her swollen feet.   I gave the family different shoe options for the patient to help take the pressure off her toes so that the trauma at the tips can be avoided. The patient was discharged from the wound care center. She will follow up as needed.       Suzanna Delcid DPM      [id number]  D:  09/30/2019 13:56  T:  10/01/2019 3:49  JOB #:  [id number]

## 2020-07-03 NOTE — ED TRIAGE NOTES
Pt presents with right leg swellig, bruising, and pain after a fall sustained on 10/4/17.  Steri strips intact on knee abrasion None known

## 2021-02-01 NOTE — ED PROVIDER NOTES
HPI Comments: 79yo F with pmh sig for CAD and arthritis who presents from home with rectal pain and bleeding. Pt states she has had rectal pain and constipation for 6 months. This morning she was strainging to have BM and noticed blood on toilet paper upon wiping. No blood in stool or in toilet. + rectal pain. No n/v, c/d. Does not take medication at home for constipation and has never seen a doctor for the rectal pain. Denies CP, sob, dysuria. Patient is a 80 y.o. female presenting with anal bleeding. The history is provided by the patient and a friend. Rectal Bleeding    Pertinent negatives include no abdominal pain, no dysuria and no fever. Past Medical History:   Diagnosis Date    Arthritis     History of non-ST elevation myocardial infarction (NSTEMI) 11/28/2016       Past Surgical History:   Procedure Laterality Date    HX CHOLECYSTECTOMY           History reviewed. No pertinent family history. Social History     Social History    Marital status:      Spouse name: N/A    Number of children: N/A    Years of education: N/A     Occupational History    Not on file. Social History Main Topics    Smoking status: Never Smoker    Smokeless tobacco: Never Used    Alcohol use Yes      Comment: a couple of scotch drinks daily    Drug use: No    Sexual activity: Not on file     Other Topics Concern    Not on file     Social History Narrative         ALLERGIES: Codeine    Review of Systems   Constitutional: Negative for fever. HENT: Negative for facial swelling. Eyes: Negative for visual disturbance. Respiratory: Negative for chest tightness. Cardiovascular: Negative for chest pain. Gastrointestinal: Positive for anal bleeding. Negative for abdominal pain. Genitourinary: Negative for dysuria. Musculoskeletal: Negative for arthralgias. Skin: Negative for rash. Neurological: Negative for dizziness. Hematological: Negative for adenopathy. Malnutrition Findings:   Adult Malnutrition type: Chronic illness  Adult Degree of Malnutrition: Malnutrition of moderate degree    BMI Findings: Body mass index is 26 31 kg/m²       Nutrition consulted Psychiatric/Behavioral: Negative for suicidal ideas. Vitals:    07/10/17 0508   BP: (!) 116/92   Pulse: 88   Resp: 22   Temp: 97.8 °F (36.6 °C)   SpO2: 95%   Weight: 58.5 kg (129 lb)   Height: 5' (1.524 m)            Physical Exam   Constitutional: She is oriented to person, place, and time. She appears well-developed and well-nourished. No distress. HENT:   Head: Normocephalic and atraumatic. Mouth/Throat: Oropharynx is clear and moist.   Eyes: Pupils are equal, round, and reactive to light. No scleral icterus. Neck: Normal range of motion. Neck supple. No thyromegaly present. Cardiovascular: Normal rate, regular rhythm, normal heart sounds and intact distal pulses. No murmur heard. Pulmonary/Chest: Effort normal and breath sounds normal. No respiratory distress. Abdominal: Soft. Bowel sounds are normal. She exhibits no distension. There is no tenderness. Genitourinary:   Genitourinary Comments: Brown stool. Excoriated skin around anus. No active bleeding. Occult stool sent to lab. Musculoskeletal: Normal range of motion. She exhibits no edema. Neurological: She is alert and oriented to person, place, and time. Skin: Skin is warm and dry. No rash noted. She is not diaphoretic. Nursing note and vitals reviewed. MDM  Number of Diagnoses or Management Options  Constipation, unspecified constipation type:   Rectal bleeding:   Diagnosis management comments: A:  Rectal pain and constipation for 6 months. Rectal bleeding this AM.  VS normal.  Exam shows brown stool and excoriated skin which is most likely source of blood. No active bleeding at this time. The pt's neighbor spoke with me outside room and expressed concern that patient was not fit to be taking care of herself.   Neighbor suspects patient is not taking medications appropriately, not eating, not caring for herself in general and does not have family or resources available to assist.      P:  Labs  ivf  Senior service consult    ED Course       Procedures    ED EKG interpretation:  Rhythm: normal sinus rhythm. Rate (approx.): 75. Axis: normal.  ST segment:  No concerning ST elevations or depressions. RBBB. This EKG was interpreted by Laz Terry MD,ED Provider. Labs unremarkable except for K=2.7  Stool neg for blood. 6:45 AM  Patient resting comfortably with no complaints at this time. VS remain stable. Repeat physical exam is unremarkable. Pt ambulatory without difficulty and tolerating po's well. Consult placed for senior services but patient no willing to wait until 9:30. She would prefer to go home and speak with someone over the phone. Will discharge with Rx for miralax and colace. Neighbor will take home. Senior Services referral placed.

## 2022-03-19 PROBLEM — K92.2 GI BLEED: Status: ACTIVE | Noted: 2017-03-08

## 2022-03-19 PROBLEM — R20.2 NUMBNESS AND TINGLING: Status: ACTIVE | Noted: 2017-03-08

## 2022-03-19 PROBLEM — R07.9 CHEST PAIN: Status: ACTIVE | Noted: 2017-09-12

## 2022-03-19 PROBLEM — G45.9 TIA (TRANSIENT ISCHEMIC ATTACK): Status: ACTIVE | Noted: 2017-03-08

## 2022-03-19 PROBLEM — R20.0 NUMBNESS AND TINGLING: Status: ACTIVE | Noted: 2017-03-08

## 2022-03-19 PROBLEM — E87.8 ELECTROLYTE ABNORMALITY: Status: ACTIVE | Noted: 2019-03-12

## 2022-11-30 ENCOUNTER — APPOINTMENT (OUTPATIENT)
Dept: CT IMAGING | Age: 87
End: 2022-11-30
Attending: FAMILY MEDICINE
Payer: MEDICARE

## 2022-11-30 ENCOUNTER — APPOINTMENT (OUTPATIENT)
Dept: GENERAL RADIOLOGY | Age: 87
End: 2022-11-30
Attending: STUDENT IN AN ORGANIZED HEALTH CARE EDUCATION/TRAINING PROGRAM
Payer: MEDICARE

## 2022-11-30 ENCOUNTER — HOSPITAL ENCOUNTER (INPATIENT)
Age: 87
LOS: 9 days | Discharge: HOME | End: 2022-12-09
Attending: STUDENT IN AN ORGANIZED HEALTH CARE EDUCATION/TRAINING PROGRAM | Admitting: FAMILY MEDICINE
Payer: MEDICARE

## 2022-11-30 ENCOUNTER — APPOINTMENT (OUTPATIENT)
Dept: CT IMAGING | Age: 87
End: 2022-11-30
Attending: ORTHOPAEDIC SURGERY
Payer: MEDICARE

## 2022-11-30 DIAGNOSIS — R09.02 HYPOXIA: ICD-10-CM

## 2022-11-30 DIAGNOSIS — S43.005A DISLOCATION OF LEFT SHOULDER JOINT, INITIAL ENCOUNTER: Primary | ICD-10-CM

## 2022-11-30 DIAGNOSIS — N17.9 AKI (ACUTE KIDNEY INJURY) (HCC): ICD-10-CM

## 2022-11-30 LAB
ALBUMIN SERPL-MCNC: 3.1 G/DL (ref 3.5–5)
ALBUMIN/GLOB SERPL: 0.8 {RATIO} (ref 1.1–2.2)
ALP SERPL-CCNC: 135 U/L (ref 45–117)
ALT SERPL-CCNC: 10 U/L (ref 12–78)
ANION GAP SERPL CALC-SCNC: 5 MMOL/L (ref 5–15)
AST SERPL-CCNC: 16 U/L (ref 15–37)
BASOPHILS # BLD: 0 K/UL (ref 0–0.1)
BASOPHILS NFR BLD: 0 % (ref 0–1)
BILIRUB SERPL-MCNC: 1.2 MG/DL (ref 0.2–1)
BUN SERPL-MCNC: 38 MG/DL (ref 6–20)
BUN/CREAT SERPL: 25 (ref 12–20)
CALCIUM SERPL-MCNC: 9.2 MG/DL (ref 8.5–10.1)
CHLORIDE SERPL-SCNC: 98 MMOL/L (ref 97–108)
CO2 SERPL-SCNC: 35 MMOL/L (ref 21–32)
COVID-19 RAPID TEST, COVR: NOT DETECTED
CREAT SERPL-MCNC: 1.54 MG/DL (ref 0.55–1.02)
DIFFERENTIAL METHOD BLD: ABNORMAL
EOSINOPHIL # BLD: 0.1 K/UL (ref 0–0.4)
EOSINOPHIL NFR BLD: 1 % (ref 0–7)
ERYTHROCYTE [DISTWIDTH] IN BLOOD BY AUTOMATED COUNT: 13.9 % (ref 11.5–14.5)
FLUAV AG NPH QL IA: NEGATIVE
FLUBV AG NOSE QL IA: NEGATIVE
GLOBULIN SER CALC-MCNC: 4 G/DL (ref 2–4)
GLUCOSE SERPL-MCNC: 112 MG/DL (ref 65–100)
HCT VFR BLD AUTO: 39.1 % (ref 35–47)
HGB BLD-MCNC: 12.7 G/DL (ref 11.5–16)
IMM GRANULOCYTES # BLD AUTO: 0.1 K/UL (ref 0–0.04)
IMM GRANULOCYTES NFR BLD AUTO: 0 % (ref 0–0.5)
LYMPHOCYTES # BLD: 1 K/UL (ref 0.8–3.5)
LYMPHOCYTES NFR BLD: 9 % (ref 12–49)
MCH RBC QN AUTO: 30.6 PG (ref 26–34)
MCHC RBC AUTO-ENTMCNC: 32.5 G/DL (ref 30–36.5)
MCV RBC AUTO: 94.2 FL (ref 80–99)
MONOCYTES # BLD: 0.8 K/UL (ref 0–1)
MONOCYTES NFR BLD: 7 % (ref 5–13)
NEUTS SEG # BLD: 9.5 K/UL (ref 1.8–8)
NEUTS SEG NFR BLD: 83 % (ref 32–75)
NRBC # BLD: 0 K/UL (ref 0–0.01)
NRBC BLD-RTO: 0 PER 100 WBC
PLATELET # BLD AUTO: 209 K/UL (ref 150–400)
PMV BLD AUTO: 9.3 FL (ref 8.9–12.9)
POTASSIUM SERPL-SCNC: 3.5 MMOL/L (ref 3.5–5.1)
PROT SERPL-MCNC: 7.1 G/DL (ref 6.4–8.2)
RBC # BLD AUTO: 4.15 M/UL (ref 3.8–5.2)
SODIUM SERPL-SCNC: 138 MMOL/L (ref 136–145)
SOURCE, COVRS: NORMAL
WBC # BLD AUTO: 11.5 K/UL (ref 3.6–11)

## 2022-11-30 PROCEDURE — 96360 HYDRATION IV INFUSION INIT: CPT

## 2022-11-30 PROCEDURE — 74011250636 HC RX REV CODE- 250/636: Performed by: EMERGENCY MEDICINE

## 2022-11-30 PROCEDURE — 87635 SARS-COV-2 COVID-19 AMP PRB: CPT

## 2022-11-30 PROCEDURE — 74011000250 HC RX REV CODE- 250: Performed by: PHYSICIAN ASSISTANT

## 2022-11-30 PROCEDURE — 74011250636 HC RX REV CODE- 250/636: Performed by: STUDENT IN AN ORGANIZED HEALTH CARE EDUCATION/TRAINING PROGRAM

## 2022-11-30 PROCEDURE — 71046 X-RAY EXAM CHEST 2 VIEWS: CPT

## 2022-11-30 PROCEDURE — 73030 X-RAY EXAM OF SHOULDER: CPT

## 2022-11-30 PROCEDURE — 65270000046 HC RM TELEMETRY

## 2022-11-30 PROCEDURE — 85025 COMPLETE CBC W/AUTO DIFF WBC: CPT

## 2022-11-30 PROCEDURE — 80053 COMPREHEN METABOLIC PANEL: CPT

## 2022-11-30 PROCEDURE — 99285 EMERGENCY DEPT VISIT HI MDM: CPT

## 2022-11-30 PROCEDURE — 36415 COLL VENOUS BLD VENIPUNCTURE: CPT

## 2022-11-30 PROCEDURE — 73200 CT UPPER EXTREMITY W/O DYE: CPT

## 2022-11-30 PROCEDURE — 75810000301 HC ER LEVEL 1 CLOSED TREATMNT FRACTURE/DISLOCATION

## 2022-11-30 PROCEDURE — 71275 CT ANGIOGRAPHY CHEST: CPT

## 2022-11-30 PROCEDURE — 87804 INFLUENZA ASSAY W/OPTIC: CPT

## 2022-11-30 PROCEDURE — 74011000636 HC RX REV CODE- 636: Performed by: RADIOLOGY

## 2022-11-30 PROCEDURE — 94761 N-INVAS EAR/PLS OXIMETRY MLT: CPT

## 2022-11-30 PROCEDURE — 74011250637 HC RX REV CODE- 250/637: Performed by: STUDENT IN AN ORGANIZED HEALTH CARE EDUCATION/TRAINING PROGRAM

## 2022-11-30 RX ORDER — ENOXAPARIN SODIUM 100 MG/ML
40 INJECTION SUBCUTANEOUS DAILY
Status: DISCONTINUED | OUTPATIENT
Start: 2022-12-01 | End: 2022-11-30

## 2022-11-30 RX ORDER — ACETAMINOPHEN 650 MG/1
650 SUPPOSITORY RECTAL
Status: DISCONTINUED | OUTPATIENT
Start: 2022-11-30 | End: 2022-12-02

## 2022-11-30 RX ORDER — SODIUM CHLORIDE 0.9 % (FLUSH) 0.9 %
5-40 SYRINGE (ML) INJECTION EVERY 8 HOURS
Status: DISCONTINUED | OUTPATIENT
Start: 2022-11-30 | End: 2022-12-09 | Stop reason: HOSPADM

## 2022-11-30 RX ORDER — ONDANSETRON 2 MG/ML
4 INJECTION INTRAMUSCULAR; INTRAVENOUS
Status: DISCONTINUED | OUTPATIENT
Start: 2022-11-30 | End: 2022-12-09 | Stop reason: HOSPADM

## 2022-11-30 RX ORDER — FENTANYL CITRATE 50 UG/ML
25 INJECTION, SOLUTION INTRAMUSCULAR; INTRAVENOUS ONCE
Status: COMPLETED | OUTPATIENT
Start: 2022-11-30 | End: 2022-11-30

## 2022-11-30 RX ORDER — SODIUM CHLORIDE 9 MG/ML
75 INJECTION, SOLUTION INTRAVENOUS CONTINUOUS
Status: DISPENSED | OUTPATIENT
Start: 2022-11-30 | End: 2022-12-01

## 2022-11-30 RX ORDER — SODIUM CHLORIDE 9 MG/ML
1000 INJECTION, SOLUTION INTRAVENOUS ONCE
Status: COMPLETED | OUTPATIENT
Start: 2022-11-30 | End: 2022-11-30

## 2022-11-30 RX ORDER — OXYCODONE AND ACETAMINOPHEN 5; 325 MG/1; MG/1
1 TABLET ORAL
Status: COMPLETED | OUTPATIENT
Start: 2022-11-30 | End: 2022-11-30

## 2022-11-30 RX ORDER — LIDOCAINE HYDROCHLORIDE 10 MG/ML
20 INJECTION, SOLUTION EPIDURAL; INFILTRATION; INTRACAUDAL; PERINEURAL ONCE
Status: COMPLETED | OUTPATIENT
Start: 2022-11-30 | End: 2022-11-30

## 2022-11-30 RX ORDER — ACETAMINOPHEN 325 MG/1
650 TABLET ORAL
Status: DISCONTINUED | OUTPATIENT
Start: 2022-11-30 | End: 2022-12-02

## 2022-11-30 RX ORDER — SODIUM CHLORIDE 0.9 % (FLUSH) 0.9 %
5-40 SYRINGE (ML) INJECTION AS NEEDED
Status: DISCONTINUED | OUTPATIENT
Start: 2022-11-30 | End: 2022-12-09 | Stop reason: HOSPADM

## 2022-11-30 RX ORDER — ONDANSETRON 4 MG/1
4 TABLET, ORALLY DISINTEGRATING ORAL
Status: DISCONTINUED | OUTPATIENT
Start: 2022-11-30 | End: 2022-12-09 | Stop reason: HOSPADM

## 2022-11-30 RX ADMIN — OXYCODONE HYDROCHLORIDE AND ACETAMINOPHEN 1 TABLET: 5; 325 TABLET ORAL at 13:51

## 2022-11-30 RX ADMIN — LIDOCAINE HYDROCHLORIDE 20 ML: 10 INJECTION, SOLUTION EPIDURAL; INFILTRATION; INTRACAUDAL; PERINEURAL at 19:38

## 2022-11-30 RX ADMIN — SODIUM CHLORIDE 1000 ML: 9 INJECTION, SOLUTION INTRAVENOUS at 15:00

## 2022-11-30 RX ADMIN — IOPAMIDOL 70 ML: 755 INJECTION, SOLUTION INTRAVENOUS at 21:10

## 2022-11-30 RX ADMIN — FENTANYL CITRATE 25 MCG: 50 INJECTION, SOLUTION INTRAMUSCULAR; INTRAVENOUS at 19:38

## 2022-11-30 NOTE — ED NOTES
[facility] at the bedside to transport patient to [facility] ED

## 2022-11-30 NOTE — ED PROVIDER NOTES
Transfer from outlying facility. Patient is due to be admitted to the hospital for hypoxia and acute kidney injury. Patient also had a nonreducible shoulder dislocation. Moderate sedation is contraindicated at this time due to hypoxia. Orthopedics is the patient and is going to go see her shortly    The [facility] is at patient's bedside, they are attempting reduction. Patient's pulse oximetry is 99 to 100% on 2 L/min we will give small amount of fentanyl to help alleviate her discomfort. 26-year-old female presents emergency department with chief complaint of shoulder pain. She has a history of coronary artery disease prior subdural hematoma in DVT. Initially was advised that she fell 2 days ago, this has since been changed to a fall of 2 weeks ago. Reports moderate pain with movement of her left shoulder. The work-up was in an [facility] freestanding ER, she was found to be in acute kidney injury, acute hypoxia as well as nonreducible shoulder dislocation. She was a transfer to [facility] emergency department for ultimate admission to the hospital    Notified hospitalist at [facility] much older than stated age, no acute distress, noted pulse entry of anywhere from 94 to 98% on 2 L/min moving all extremities except for left upper extremity secondary to shoulder dislocation.   Mildly coarse breath sounds at bases admit

## 2022-11-30 NOTE — ED NOTES
Pt returned from Radiology via stretcher with [employer]. Eye Protection Verbiage: Before proceeding with the stage, a plastic scleral shield was inserted. The globe was anesthetized with a few drops of 1% lidocaine with 1:100,000 epinephrine. Then, an appropriate sized scleral shield was chosen and coated with lacrilube ointment. The shield was gently inserted and left in place for the duration of each stage. After the stage was completed, the shield was gently removed.

## 2022-11-30 NOTE — ED PROVIDER NOTES
The patient is a 40-year-old female history of coronary disease, prior subdural hematoma, prior blood clots presenting today secondary to dislocated left shoulder. She fell 2 days ago, denies any other injury other than the left shoulder. She has mild pain in the left shoulder, worse with movement. When it is moving it is severe. Denies numbness. She had an x-ray showing dislocation. Denies prior history of surgery or trauma to this shoulder before recent injury. Upon arrival patient noted to have cough and hypoxic to the 80s. Denies fever. Denies chest pain. She denies any other complaints at this time. Past Medical History:   Diagnosis Date    Arthritis     History of non-ST elevation myocardial infarction (NSTEMI) 11/28/2016    Ill-defined condition     suderal hematoma    Thromboembolus ([facility] [address]     multiple       Past Surgical History:   Procedure Laterality Date    HX CHOLECYSTECTOMY      hysterectomy, gallbladder    HX ORTHOPAEDIC      hip replacement    VASCULAR SURGERY PROCEDURE UNLIST      ivc filter         Family History:   Problem Relation Age of Onset    Cancer Mother     Cancer Father        Social History     Socioeconomic History    Marital status:      Spouse name: Not on file    Number of children: Not on file    Years of education: Not on file    Highest education level: Not on file   Occupational History    Not on file   Tobacco Use    Smoking status: Never    Smokeless tobacco: Never   Substance and Sexual Activity    Alcohol use: Yes     Comment: a couple of scotch drinks daily.     Drug use: No    Sexual activity: Not Currently   Other Topics Concern     Service Not Asked    Blood Transfusions Not Asked    Caffeine Concern Not Asked    Occupational Exposure Not Asked    Hobby Hazards Not Asked    Sleep Concern Not Asked    Stress Concern Not Asked    Weight Concern Not Asked    Special Diet Not Asked    Back Care Not Asked    Exercise Not Asked    Bike Helmet Not Asked    Seat Belt Not Asked    Self-Exams Not Asked   Social History Narrative    Not on file     Social Determinants of Health     Financial Resource Strain: Not on file   Food Insecurity: Not on file   Transportation Needs: Not on file   Physical Activity: Not on file   Stress: Not on file   Social Connections: Not on file   Intimate Partner Violence: Not on file   Housing Stability: Not on file         ALLERGIES: Codeine and Prednisone    Review of Systems   Constitutional:  Negative for chills and fever. HENT:  Negative for congestion and rhinorrhea. Eyes:  Negative for redness and visual disturbance. Respiratory:  Positive for cough. Negative for shortness of breath. Cardiovascular:  Negative for chest pain and leg swelling. Gastrointestinal:  Negative for abdominal pain, diarrhea, nausea and vomiting. Genitourinary:  Negative for dysuria, flank pain, frequency, hematuria and urgency. Musculoskeletal:  Positive for arthralgias. Negative for back pain, myalgias and neck pain. Skin:  Negative for rash and wound. Allergic/Immunologic: Negative for immunocompromised state. Neurological:  Negative for dizziness and headaches. Vitals:    11/30/22 1254   BP: (!) 105/55   Pulse: 89   Resp: 20   Temp: 99.1 °F (37.3 °C)   SpO2: (!) 89%   Weight: 65.2 kg (143 lb 11.8 oz)            Physical Exam  Vitals and nursing note reviewed. Constitutional:       General: She is not in acute distress. Appearance: She is well-developed. She is not diaphoretic. HENT:      Head: Normocephalic. Mouth/Throat:      Pharynx: No oropharyngeal exudate. Eyes:      General:         Right eye: No discharge. Left eye: No discharge. Pupils: Pupils are equal, round, and reactive to light. Cardiovascular:      Rate and Rhythm: Normal rate and regular rhythm. Heart sounds: Normal heart sounds. No murmur heard. No friction rub. No gallop.    Pulmonary:      Effort: Pulmonary effort is normal. No respiratory distress. Breath sounds: Normal breath sounds. No stridor. No wheezing or rales. Abdominal:      General: Bowel sounds are normal. There is no distension. Palpations: Abdomen is soft. Tenderness: There is no abdominal tenderness. There is no guarding or rebound. Musculoskeletal:      Cervical back: Normal range of motion and neck supple. Comments: Left upper extremity: There is swelling noted with findings consistent with dislocation. Palpable radial pulse. Motor and sensory intact in the hand. No skin tenting. Skin:     General: Skin is warm and dry. Capillary Refill: Capillary refill takes less than 2 seconds. Findings: No rash. Neurological:      Mental Status: She is alert and oriented to person, place, and time. Psychiatric:         Behavior: Behavior normal.        MDM     Amount and/or Complexity of Data Reviewed  Clinical lab tests: reviewed  Tests in the radiology section of CPT®: reviewed  Decide to obtain previous medical records or to obtain history from someone other than the patient: yes           Reduction of Joint    Date/Time: 11/30/2022 3:25 PM  Performed by: Kathryn Lehman DO  Authorized by: Kathryn Lehman DO     Consent:     Consent obtained:  Verbal    Consent given by:  Patient    Risks discussed:  Nerve damage, vascular damage and pain    Alternatives discussed:  No treatment  Injury:     Injury location:  Shoulder    Shoulder injury location:  L shoulder    Hill-Sachs deformity: no    Pre-procedure details:     Distal neurologic exam:  Normal    Distal perfusion: distal pulses strong and brisk capillary refill    Sedation:     Sedation type:  None  Anesthesia:     Anesthesia method:  None  Post-procedure details:     Distal neurologic exam:  Normal    Range of motion: unchanged    Comments:      Reduction attempted x3 with longitudinal traction, external rotation without success.         Upon arrival patient noted to be hypoxic. She was given 2 L nasal cannula oxygen    Chest x-ray clear  Mild leukocytosis with negative COVID and flu      Left shoulder x-ray does confirm dislocation    I discussed with orthopedics who will attempt in the ED at [facility]. I will also admit the patient to the hospitalist service that she has an YASH and new oxygen requirement        [facility] Consult for Admission  2:50 PM    ED Room Number: SER02/02  Patient Name and age: Madison Rizo 80 y.o.  female  Working Diagnosis:   1. Dislocation of left shoulder joint, initial encounter    2. Hypoxia    3. YASH (acute kidney injury) ([facility] [address]        COVID-19 Suspicion:  no  Sepsis present:  no  Reassessment needed: no  Code Status:  Full Code  Readmission: no  Isolation Requirements:  no  Recommended Level of Care:  med/surg  Department:[facility] - [phone]  Other:  80 y.o. female here with L shoulder dislocation which has been out for 2 days now. Noted to be hypoxic upon arrival. Also with YASH. Lungs clear but does have cough. I was unable to get her shoulder back in and am uncomfortable sedating her here. Ortho to see once at [facility].               Андрей Ramos, DO

## 2022-11-30 NOTE — ED NOTES
Pt arrives with LEFT shoulder dislocation after xray at [facility] post fall on Monday. Pt reports pain to L shoulder. o2 sats on RA 89% and pt does have a cough. o2 placed @ 2L NC at this time.

## 2022-11-30 NOTE — ED NOTES
Report called to Devan Mcclain RN at [facility] ER. Dr Romero Jones accepting. [facility] ETA [id number].

## 2022-12-01 ENCOUNTER — ANESTHESIA EVENT (OUTPATIENT)
Dept: SURGERY | Age: 87
DRG: 483 | End: 2022-12-01
Payer: MEDICARE

## 2022-12-01 ENCOUNTER — APPOINTMENT (OUTPATIENT)
Dept: GENERAL RADIOLOGY | Age: 87
End: 2022-12-01
Attending: ORTHOPAEDIC SURGERY
Payer: MEDICARE

## 2022-12-01 ENCOUNTER — ANESTHESIA (OUTPATIENT)
Dept: SURGERY | Age: 87
DRG: 483 | End: 2022-12-01
Payer: MEDICARE

## 2022-12-01 LAB
ANION GAP SERPL CALC-SCNC: 7 MMOL/L (ref 5–15)
BASOPHILS # BLD: 0 K/UL (ref 0–0.1)
BASOPHILS NFR BLD: 0 % (ref 0–1)
BUN SERPL-MCNC: 30 MG/DL (ref 6–20)
BUN/CREAT SERPL: 25 (ref 12–20)
CALCIUM SERPL-MCNC: 8.1 MG/DL (ref 8.5–10.1)
CHLORIDE SERPL-SCNC: 103 MMOL/L (ref 97–108)
CO2 SERPL-SCNC: 31 MMOL/L (ref 21–32)
CREAT SERPL-MCNC: 1.2 MG/DL (ref 0.55–1.02)
DIFFERENTIAL METHOD BLD: ABNORMAL
EOSINOPHIL # BLD: 0 K/UL (ref 0–0.4)
EOSINOPHIL NFR BLD: 0 % (ref 0–7)
ERYTHROCYTE [DISTWIDTH] IN BLOOD BY AUTOMATED COUNT: 13.8 % (ref 11.5–14.5)
GLUCOSE SERPL-MCNC: 91 MG/DL (ref 65–100)
HCT VFR BLD AUTO: 35.8 % (ref 35–47)
HGB BLD-MCNC: 11.2 G/DL (ref 11.5–16)
IMM GRANULOCYTES # BLD AUTO: 0.1 K/UL (ref 0–0.04)
IMM GRANULOCYTES NFR BLD AUTO: 1 % (ref 0–0.5)
LYMPHOCYTES # BLD: 1.4 K/UL (ref 0.8–3.5)
LYMPHOCYTES NFR BLD: 15 % (ref 12–49)
MCH RBC QN AUTO: 30.9 PG (ref 26–34)
MCHC RBC AUTO-ENTMCNC: 31.3 G/DL (ref 30–36.5)
MCV RBC AUTO: 98.6 FL (ref 80–99)
MONOCYTES # BLD: 0.8 K/UL (ref 0–1)
MONOCYTES NFR BLD: 9 % (ref 5–13)
NEUTS SEG # BLD: 7 K/UL (ref 1.8–8)
NEUTS SEG NFR BLD: 75 % (ref 32–75)
NRBC # BLD: 0 K/UL (ref 0–0.01)
NRBC BLD-RTO: 0 PER 100 WBC
PLATELET # BLD AUTO: 176 K/UL (ref 150–400)
PMV BLD AUTO: 9.4 FL (ref 8.9–12.9)
POTASSIUM SERPL-SCNC: 3.6 MMOL/L (ref 3.5–5.1)
RBC # BLD AUTO: 3.63 M/UL (ref 3.8–5.2)
SODIUM SERPL-SCNC: 141 MMOL/L (ref 136–145)
WBC # BLD AUTO: 9.3 K/UL (ref 3.6–11)

## 2022-12-01 PROCEDURE — 76060000035 HC ANESTHESIA 2 TO 2.5 HR: Performed by: ORTHOPAEDIC SURGERY

## 2022-12-01 PROCEDURE — 74011250636 HC RX REV CODE- 250/636: Performed by: STUDENT IN AN ORGANIZED HEALTH CARE EDUCATION/TRAINING PROGRAM

## 2022-12-01 PROCEDURE — 77030018791 HC NDL SUT CARM -A: Performed by: ORTHOPAEDIC SURGERY

## 2022-12-01 PROCEDURE — 74011250636 HC RX REV CODE- 250/636: Performed by: NURSE ANESTHETIST, CERTIFIED REGISTERED

## 2022-12-01 PROCEDURE — 77030026438 HC STYL ET INTUB CARD -A: Performed by: NURSE ANESTHETIST, CERTIFIED REGISTERED

## 2022-12-01 PROCEDURE — 77030006822 HC BLD SAW SAG BRSM -B: Performed by: ORTHOPAEDIC SURGERY

## 2022-12-01 PROCEDURE — C1713 ANCHOR/SCREW BN/BN,TIS/BN: HCPCS | Performed by: ORTHOPAEDIC SURGERY

## 2022-12-01 PROCEDURE — 77030011264 HC ELECTRD BLD EXT COVD -A: Performed by: ORTHOPAEDIC SURGERY

## 2022-12-01 PROCEDURE — 76010000171 HC OR TIME 2 TO 2.5 HR INTENSV-TIER 1: Performed by: ORTHOPAEDIC SURGERY

## 2022-12-01 PROCEDURE — 97530 THERAPEUTIC ACTIVITIES: CPT

## 2022-12-01 PROCEDURE — 76210000001 HC OR PH I REC 2.5 TO 3 HR: Performed by: ORTHOPAEDIC SURGERY

## 2022-12-01 PROCEDURE — 77030008462 HC STPLR SKN PROX J&J -A: Performed by: ORTHOPAEDIC SURGERY

## 2022-12-01 PROCEDURE — 77030002933 HC SUT MCRYL J&J -A: Performed by: ORTHOPAEDIC SURGERY

## 2022-12-01 PROCEDURE — 77030020471 HC BIT DRL CREV ZIMM -C: Performed by: ORTHOPAEDIC SURGERY

## 2022-12-01 PROCEDURE — 77030003890 HC BIT DRL TWST MCRA -A: Performed by: ORTHOPAEDIC SURGERY

## 2022-12-01 PROCEDURE — 74011250637 HC RX REV CODE- 250/637: Performed by: FAMILY MEDICINE

## 2022-12-01 PROCEDURE — 80048 BASIC METABOLIC PNL TOTAL CA: CPT

## 2022-12-01 PROCEDURE — 73030 X-RAY EXAM OF SHOULDER: CPT

## 2022-12-01 PROCEDURE — 97530 THERAPEUTIC ACTIVITIES: CPT | Performed by: PHYSICAL THERAPIST

## 2022-12-01 PROCEDURE — 77030040361 HC SLV COMPR DVT MDII -B: Performed by: ORTHOPAEDIC SURGERY

## 2022-12-01 PROCEDURE — 77030031139 HC SUT VCRL2 J&J -A: Performed by: ORTHOPAEDIC SURGERY

## 2022-12-01 PROCEDURE — 74011250636 HC RX REV CODE- 250/636: Performed by: ANESTHESIOLOGY

## 2022-12-01 PROCEDURE — 77030013079 HC BLNKT BAIR HGGR 3M -A: Performed by: ANESTHESIOLOGY

## 2022-12-01 PROCEDURE — 77030036560 HC SHLDR ARM PAD ABDUCTN S2SG -B: Performed by: ORTHOPAEDIC SURGERY

## 2022-12-01 PROCEDURE — 77030018547 HC SUT ETHBND1 J&J -B: Performed by: ORTHOPAEDIC SURGERY

## 2022-12-01 PROCEDURE — 2709999900 HC NON-CHARGEABLE SUPPLY: Performed by: ORTHOPAEDIC SURGERY

## 2022-12-01 PROCEDURE — 85025 COMPLETE CBC W/AUTO DIFF WBC: CPT

## 2022-12-01 PROCEDURE — 74011250636 HC RX REV CODE- 250/636: Performed by: FAMILY MEDICINE

## 2022-12-01 PROCEDURE — 97165 OT EVAL LOW COMPLEX 30 MIN: CPT

## 2022-12-01 PROCEDURE — C1776 JOINT DEVICE (IMPLANTABLE): HCPCS | Performed by: ORTHOPAEDIC SURGERY

## 2022-12-01 PROCEDURE — 97161 PT EVAL LOW COMPLEX 20 MIN: CPT | Performed by: PHYSICAL THERAPIST

## 2022-12-01 PROCEDURE — 77030010507 HC ADH SKN DERMBND J&J -B: Performed by: ORTHOPAEDIC SURGERY

## 2022-12-01 PROCEDURE — 0RRK00Z REPLACEMENT OF LEFT SHOULDER JOINT WITH REVERSE BALL AND SOCKET SYNTHETIC SUBSTITUTE, OPEN APPROACH: ICD-10-PCS | Performed by: ORTHOPAEDIC SURGERY

## 2022-12-01 PROCEDURE — 77030008684 HC TU ET CUF COVD -B: Performed by: ANESTHESIOLOGY

## 2022-12-01 PROCEDURE — 36415 COLL VENOUS BLD VENIPUNCTURE: CPT

## 2022-12-01 PROCEDURE — P9045 ALBUMIN (HUMAN), 5%, 250 ML: HCPCS | Performed by: ANESTHESIOLOGY

## 2022-12-01 PROCEDURE — 74011000250 HC RX REV CODE- 250: Performed by: FAMILY MEDICINE

## 2022-12-01 PROCEDURE — 65270000046 HC RM TELEMETRY

## 2022-12-01 PROCEDURE — 77030002922 HC SUT FBRWRE ARTH -B: Performed by: ORTHOPAEDIC SURGERY

## 2022-12-01 PROCEDURE — 74011000250 HC RX REV CODE- 250: Performed by: NURSE ANESTHETIST, CERTIFIED REGISTERED

## 2022-12-01 DEVICE — BASEPLT W/ADAPTER GLEN 25MM -- COMPREHENSIVE: Type: IMPLANTABLE DEVICE | Site: SHOULDER | Status: FUNCTIONAL

## 2022-12-01 DEVICE — IMPLANTABLE DEVICE
Type: IMPLANTABLE DEVICE | Site: SHOULDER | Status: FUNCTIONAL
Brand: COMPREHENSIVE REVERSE SHOULDER

## 2022-12-01 DEVICE — TRAY HUM STD 40MM DIA -- COMPREHENSIVE: Type: IMPLANTABLE DEVICE | Site: SHOULDER | Status: FUNCTIONAL

## 2022-12-01 DEVICE — BEARING HUM STD 36 MM SHLDR VIVACIT-E PROLONG COMPHSVE: Type: IMPLANTABLE DEVICE | Site: SHOULDER | Status: FUNCTIONAL

## 2022-12-01 DEVICE — IMPLANTABLE DEVICE
Type: IMPLANTABLE DEVICE | Site: SHOULDER | Status: FUNCTIONAL
Brand: COMPREHENSIVE® REVERSE SHOULDER

## 2022-12-01 DEVICE — SHOULDR S3 TOT ADV REVRS IMPL CAPPED S3: Type: IMPLANTABLE DEVICE | Status: FUNCTIONAL

## 2022-12-01 DEVICE — IMPLANTABLE DEVICE: Type: IMPLANTABLE DEVICE | Site: SHOULDER | Status: FUNCTIONAL

## 2022-12-01 RX ORDER — LIDOCAINE HYDROCHLORIDE 20 MG/ML
INJECTION, SOLUTION EPIDURAL; INFILTRATION; INTRACAUDAL; PERINEURAL AS NEEDED
Status: DISCONTINUED | OUTPATIENT
Start: 2022-12-01 | End: 2022-12-01 | Stop reason: HOSPADM

## 2022-12-01 RX ORDER — LANOLIN ALCOHOL/MO/W.PET/CERES
100 CREAM (GRAM) TOPICAL DAILY
Status: DISCONTINUED | OUTPATIENT
Start: 2022-12-01 | End: 2022-12-01

## 2022-12-01 RX ORDER — NYSTATIN 100000 U/G
CREAM TOPICAL DAILY
COMMUNITY

## 2022-12-01 RX ORDER — COLCHICINE 0.6 MG/1
0.6 TABLET ORAL DAILY
COMMUNITY
End: 2022-12-09

## 2022-12-01 RX ORDER — ONDANSETRON 2 MG/ML
4 INJECTION INTRAMUSCULAR; INTRAVENOUS AS NEEDED
Status: DISCONTINUED | OUTPATIENT
Start: 2022-12-01 | End: 2022-12-02 | Stop reason: HOSPADM

## 2022-12-01 RX ORDER — ACETAMINOPHEN 325 MG/1
650 TABLET ORAL ONCE
Status: DISCONTINUED | OUTPATIENT
Start: 2022-12-01 | End: 2022-12-01 | Stop reason: HOSPADM

## 2022-12-01 RX ORDER — POLYETHYLENE GLYCOL 3350 17 G/17G
17 POWDER, FOR SOLUTION ORAL DAILY
Status: DISCONTINUED | OUTPATIENT
Start: 2022-12-02 | End: 2022-12-09 | Stop reason: HOSPADM

## 2022-12-01 RX ORDER — ONDANSETRON 2 MG/ML
INJECTION INTRAMUSCULAR; INTRAVENOUS AS NEEDED
Status: DISCONTINUED | OUTPATIENT
Start: 2022-12-01 | End: 2022-12-01 | Stop reason: HOSPADM

## 2022-12-01 RX ORDER — SODIUM CHLORIDE, SODIUM LACTATE, POTASSIUM CHLORIDE, CALCIUM CHLORIDE 600; 310; 30; 20 MG/100ML; MG/100ML; MG/100ML; MG/100ML
INJECTION, SOLUTION INTRAVENOUS
Status: DISCONTINUED | OUTPATIENT
Start: 2022-12-01 | End: 2022-12-01 | Stop reason: HOSPADM

## 2022-12-01 RX ORDER — FENTANYL CITRATE 50 UG/ML
25 INJECTION, SOLUTION INTRAMUSCULAR; INTRAVENOUS
Status: DISCONTINUED | OUTPATIENT
Start: 2022-12-01 | End: 2022-12-02 | Stop reason: HOSPADM

## 2022-12-01 RX ORDER — LORATADINE 10 MG/1
10 TABLET ORAL
COMMUNITY

## 2022-12-01 RX ORDER — NORETHINDRONE AND ETHINYL ESTRADIOL 0.5-0.035
KIT ORAL AS NEEDED
Status: DISCONTINUED | OUTPATIENT
Start: 2022-12-01 | End: 2022-12-01 | Stop reason: HOSPADM

## 2022-12-01 RX ORDER — FLUTICASONE PROPIONATE 50 MCG
2 SPRAY, SUSPENSION (ML) NASAL
COMMUNITY

## 2022-12-01 RX ORDER — SODIUM CHLORIDE 0.9 % (FLUSH) 0.9 %
5-40 SYRINGE (ML) INJECTION EVERY 8 HOURS
Status: DISCONTINUED | OUTPATIENT
Start: 2022-12-01 | End: 2022-12-02 | Stop reason: HOSPADM

## 2022-12-01 RX ORDER — FAMOTIDINE 20 MG/1
20 TABLET, FILM COATED ORAL DAILY
COMMUNITY

## 2022-12-01 RX ORDER — LIDOCAINE HYDROCHLORIDE 10 MG/ML
0.1 INJECTION, SOLUTION EPIDURAL; INFILTRATION; INTRACAUDAL; PERINEURAL AS NEEDED
Status: DISCONTINUED | OUTPATIENT
Start: 2022-12-01 | End: 2022-12-01 | Stop reason: HOSPADM

## 2022-12-01 RX ORDER — SODIUM CHLORIDE, SODIUM LACTATE, POTASSIUM CHLORIDE, CALCIUM CHLORIDE 600; 310; 30; 20 MG/100ML; MG/100ML; MG/100ML; MG/100ML
1000 INJECTION, SOLUTION INTRAVENOUS CONTINUOUS
Status: DISCONTINUED | OUTPATIENT
Start: 2022-12-01 | End: 2022-12-01 | Stop reason: HOSPADM

## 2022-12-01 RX ORDER — SODIUM CHLORIDE 0.9 % (FLUSH) 0.9 %
5-40 SYRINGE (ML) INJECTION AS NEEDED
Status: DISCONTINUED | OUTPATIENT
Start: 2022-12-01 | End: 2022-12-09 | Stop reason: HOSPADM

## 2022-12-01 RX ORDER — PREDNISONE 10 MG/1
10 TABLET ORAL DAILY
COMMUNITY

## 2022-12-01 RX ORDER — FAMOTIDINE 20 MG/1
20 TABLET, FILM COATED ORAL 2 TIMES DAILY
Status: DISCONTINUED | OUTPATIENT
Start: 2022-12-01 | End: 2022-12-02

## 2022-12-01 RX ORDER — OXYCODONE HYDROCHLORIDE 5 MG/1
2.5 TABLET ORAL
Status: DISCONTINUED | OUTPATIENT
Start: 2022-12-01 | End: 2022-12-09 | Stop reason: HOSPADM

## 2022-12-01 RX ORDER — ROPIVACAINE HYDROCHLORIDE 2 MG/ML
INJECTION, SOLUTION EPIDURAL; INFILTRATION; PERINEURAL
Status: COMPLETED | OUTPATIENT
Start: 2022-12-01 | End: 2022-12-01

## 2022-12-01 RX ORDER — MIDAZOLAM HYDROCHLORIDE 1 MG/ML
1 INJECTION, SOLUTION INTRAMUSCULAR; INTRAVENOUS AS NEEDED
Status: DISCONTINUED | OUTPATIENT
Start: 2022-12-01 | End: 2022-12-01 | Stop reason: HOSPADM

## 2022-12-01 RX ORDER — GUAIFENESIN 600 MG/1
600 TABLET, EXTENDED RELEASE ORAL 2 TIMES DAILY
COMMUNITY

## 2022-12-01 RX ORDER — HYDROXYZINE HYDROCHLORIDE 10 MG/1
10 TABLET, FILM COATED ORAL
Status: COMPLETED | OUTPATIENT
Start: 2022-12-01 | End: 2022-12-04

## 2022-12-01 RX ORDER — AMOXICILLIN 250 MG
1 CAPSULE ORAL 2 TIMES DAILY
Status: DISCONTINUED | OUTPATIENT
Start: 2022-12-01 | End: 2022-12-09 | Stop reason: HOSPADM

## 2022-12-01 RX ORDER — ROPIVACAINE HYDROCHLORIDE 2 MG/ML
40 INJECTION, SOLUTION EPIDURAL; INFILTRATION; PERINEURAL
Status: DISPENSED | OUTPATIENT
Start: 2022-12-01 | End: 2022-12-02

## 2022-12-01 RX ORDER — SODIUM CHLORIDE 9 MG/ML
25 INJECTION, SOLUTION INTRAVENOUS AS NEEDED
Status: DISCONTINUED | OUTPATIENT
Start: 2022-12-01 | End: 2022-12-09 | Stop reason: HOSPADM

## 2022-12-01 RX ORDER — SODIUM CHLORIDE 0.9 % (FLUSH) 0.9 %
5-40 SYRINGE (ML) INJECTION AS NEEDED
Status: DISCONTINUED | OUTPATIENT
Start: 2022-12-01 | End: 2022-12-01 | Stop reason: HOSPADM

## 2022-12-01 RX ORDER — ALBUMIN HUMAN 50 G/1000ML
SOLUTION INTRAVENOUS
Status: DISPENSED
Start: 2022-12-01 | End: 2022-12-02

## 2022-12-01 RX ORDER — MENTHOL 40 MG/ML
GEL TOPICAL 4 TIMES DAILY
COMMUNITY

## 2022-12-01 RX ORDER — ACETAMINOPHEN 325 MG/1
650 TABLET ORAL 2 TIMES DAILY
COMMUNITY

## 2022-12-01 RX ORDER — PRAMIPEXOLE DIHYDROCHLORIDE 0.12 MG/1
0.12 TABLET ORAL 2 TIMES DAILY
COMMUNITY

## 2022-12-01 RX ORDER — CARVEDILOL 6.25 MG/1
3.12 TABLET ORAL 2 TIMES DAILY WITH MEALS
Status: DISCONTINUED | OUTPATIENT
Start: 2022-12-01 | End: 2022-12-09 | Stop reason: HOSPADM

## 2022-12-01 RX ORDER — LANOLIN ALCOHOL/MO/W.PET/CERES
800 CREAM (GRAM) TOPICAL
COMMUNITY

## 2022-12-01 RX ORDER — TRIAMCINOLONE ACETONIDE 1 MG/G
CREAM TOPICAL
COMMUNITY

## 2022-12-01 RX ORDER — GLYCOPYRROLATE 0.2 MG/ML
INJECTION INTRAMUSCULAR; INTRAVENOUS AS NEEDED
Status: DISCONTINUED | OUTPATIENT
Start: 2022-12-01 | End: 2022-12-01 | Stop reason: HOSPADM

## 2022-12-01 RX ORDER — CEFAZOLIN SODIUM 1 G/3ML
INJECTION, POWDER, FOR SOLUTION INTRAMUSCULAR; INTRAVENOUS AS NEEDED
Status: DISCONTINUED | OUTPATIENT
Start: 2022-12-01 | End: 2022-12-01 | Stop reason: HOSPADM

## 2022-12-01 RX ORDER — DIPHENHYDRAMINE HCL 25 MG
25 CAPSULE ORAL
COMMUNITY

## 2022-12-01 RX ORDER — FENTANYL CITRATE 50 UG/ML
50 INJECTION, SOLUTION INTRAMUSCULAR; INTRAVENOUS AS NEEDED
Status: DISCONTINUED | OUTPATIENT
Start: 2022-12-01 | End: 2022-12-01 | Stop reason: HOSPADM

## 2022-12-01 RX ORDER — LANOLIN ALCOHOL/MO/W.PET/CERES
1200 CREAM (GRAM) TOPICAL DAILY
COMMUNITY
End: 2022-12-09

## 2022-12-01 RX ORDER — PROCHLORPERAZINE EDISYLATE 5 MG/ML
5 INJECTION INTRAMUSCULAR; INTRAVENOUS
Status: ACTIVE | OUTPATIENT
Start: 2022-12-01 | End: 2022-12-02

## 2022-12-01 RX ORDER — FACIAL-BODY WIPES
10 EACH TOPICAL DAILY PRN
Status: DISCONTINUED | OUTPATIENT
Start: 2022-12-03 | End: 2022-12-09 | Stop reason: HOSPADM

## 2022-12-01 RX ORDER — ROCURONIUM BROMIDE 10 MG/ML
INJECTION, SOLUTION INTRAVENOUS AS NEEDED
Status: DISCONTINUED | OUTPATIENT
Start: 2022-12-01 | End: 2022-12-01 | Stop reason: HOSPADM

## 2022-12-01 RX ORDER — SODIUM CHLORIDE 0.9 % (FLUSH) 0.9 %
5-40 SYRINGE (ML) INJECTION EVERY 8 HOURS
Status: DISCONTINUED | OUTPATIENT
Start: 2022-12-01 | End: 2022-12-01 | Stop reason: HOSPADM

## 2022-12-01 RX ORDER — ACETAMINOPHEN 325 MG/1
325 TABLET ORAL
COMMUNITY

## 2022-12-01 RX ORDER — NALOXONE HYDROCHLORIDE 0.4 MG/ML
0.4 INJECTION, SOLUTION INTRAMUSCULAR; INTRAVENOUS; SUBCUTANEOUS AS NEEDED
Status: DISCONTINUED | OUTPATIENT
Start: 2022-12-01 | End: 2022-12-09 | Stop reason: HOSPADM

## 2022-12-01 RX ORDER — OXYCODONE HYDROCHLORIDE 5 MG/1
5 TABLET ORAL
Status: DISCONTINUED | OUTPATIENT
Start: 2022-12-01 | End: 2022-12-06

## 2022-12-01 RX ORDER — HYDROMORPHONE HYDROCHLORIDE 1 MG/ML
0.2 INJECTION, SOLUTION INTRAMUSCULAR; INTRAVENOUS; SUBCUTANEOUS
Status: DISCONTINUED | OUTPATIENT
Start: 2022-12-01 | End: 2022-12-02 | Stop reason: HOSPADM

## 2022-12-01 RX ORDER — SODIUM CHLORIDE 0.9 % (FLUSH) 0.9 %
5-40 SYRINGE (ML) INJECTION EVERY 8 HOURS
Status: DISCONTINUED | OUTPATIENT
Start: 2022-12-01 | End: 2022-12-09 | Stop reason: HOSPADM

## 2022-12-01 RX ORDER — HYDROMORPHONE HYDROCHLORIDE 1 MG/ML
0.5 INJECTION, SOLUTION INTRAMUSCULAR; INTRAVENOUS; SUBCUTANEOUS
Status: DISPENSED | OUTPATIENT
Start: 2022-12-01 | End: 2022-12-02

## 2022-12-01 RX ORDER — FENTANYL CITRATE 50 UG/ML
INJECTION, SOLUTION INTRAMUSCULAR; INTRAVENOUS AS NEEDED
Status: DISCONTINUED | OUTPATIENT
Start: 2022-12-01 | End: 2022-12-01 | Stop reason: HOSPADM

## 2022-12-01 RX ORDER — SUCCINYLCHOLINE CHLORIDE 20 MG/ML
INJECTION INTRAMUSCULAR; INTRAVENOUS AS NEEDED
Status: DISCONTINUED | OUTPATIENT
Start: 2022-12-01 | End: 2022-12-01 | Stop reason: HOSPADM

## 2022-12-01 RX ORDER — BUMETANIDE 1 MG/1
1 TABLET ORAL DAILY
COMMUNITY

## 2022-12-01 RX ORDER — LATANOPROST 50 UG/ML
1 SOLUTION/ DROPS OPHTHALMIC
Status: DISCONTINUED | OUTPATIENT
Start: 2022-12-01 | End: 2022-12-09 | Stop reason: HOSPADM

## 2022-12-01 RX ORDER — SODIUM CHLORIDE 0.9 % (FLUSH) 0.9 %
5-40 SYRINGE (ML) INJECTION AS NEEDED
Status: DISCONTINUED | OUTPATIENT
Start: 2022-12-01 | End: 2022-12-02 | Stop reason: HOSPADM

## 2022-12-01 RX ORDER — ALBUMIN HUMAN 50 G/1000ML
12.5 SOLUTION INTRAVENOUS ONCE
Status: COMPLETED | OUTPATIENT
Start: 2022-12-02 | End: 2022-12-01

## 2022-12-01 RX ORDER — TORSEMIDE 5 MG/1
5 TABLET ORAL
COMMUNITY

## 2022-12-01 RX ORDER — ACETAMINOPHEN 325 MG/1
650 TABLET ORAL
COMMUNITY

## 2022-12-01 RX ORDER — PROPOFOL 10 MG/ML
INJECTION, EMULSION INTRAVENOUS AS NEEDED
Status: DISCONTINUED | OUTPATIENT
Start: 2022-12-01 | End: 2022-12-01 | Stop reason: HOSPADM

## 2022-12-01 RX ORDER — OXYCODONE HYDROCHLORIDE 5 MG/1
5 TABLET ORAL AS NEEDED
Status: DISCONTINUED | OUTPATIENT
Start: 2022-12-01 | End: 2022-12-02 | Stop reason: HOSPADM

## 2022-12-01 RX ADMIN — ROCURONIUM BROMIDE 5 MG: 10 SOLUTION INTRAVENOUS at 20:35

## 2022-12-01 RX ADMIN — SODIUM CHLORIDE, POTASSIUM CHLORIDE, SODIUM LACTATE AND CALCIUM CHLORIDE 1000 ML: 600; 310; 30; 20 INJECTION, SOLUTION INTRAVENOUS at 18:15

## 2022-12-01 RX ADMIN — FENTANYL CITRATE 25 MCG: 50 INJECTION INTRAMUSCULAR; INTRAVENOUS at 20:54

## 2022-12-01 RX ADMIN — PHENYLEPHRINE HYDROCHLORIDE 40 MCG/MIN: 10 INJECTION INTRAVENOUS at 20:38

## 2022-12-01 RX ADMIN — SUCCINYLCHOLINE CHLORIDE 100 MG: 20 INJECTION, SOLUTION INTRAMUSCULAR; INTRAVENOUS at 20:35

## 2022-12-01 RX ADMIN — ONDANSETRON HYDROCHLORIDE 4 MG: 2 INJECTION, SOLUTION INTRAMUSCULAR; INTRAVENOUS at 20:45

## 2022-12-01 RX ADMIN — PROPOFOL 80 MG: 10 INJECTION, EMULSION INTRAVENOUS at 20:35

## 2022-12-01 RX ADMIN — HYDROMORPHONE HYDROCHLORIDE 0.4 MG: 1 INJECTION, SOLUTION INTRAMUSCULAR; INTRAVENOUS; SUBCUTANEOUS at 22:54

## 2022-12-01 RX ADMIN — Medication 10 ML: at 14:00

## 2022-12-01 RX ADMIN — LIDOCAINE HYDROCHLORIDE 40 MG: 20 INJECTION, SOLUTION EPIDURAL; INFILTRATION; INTRACAUDAL; PERINEURAL at 20:35

## 2022-12-01 RX ADMIN — FENTANYL CITRATE 25 MCG: 50 INJECTION INTRAMUSCULAR; INTRAVENOUS at 20:35

## 2022-12-01 RX ADMIN — ACETAMINOPHEN 650 MG: 325 TABLET ORAL at 01:47

## 2022-12-01 RX ADMIN — FENTANYL CITRATE 100 MCG: 50 INJECTION, SOLUTION INTRAMUSCULAR; INTRAVENOUS at 18:25

## 2022-12-01 RX ADMIN — GLYCOPYRROLATE 0.2 MG: 0.2 INJECTION INTRAMUSCULAR; INTRAVENOUS at 20:49

## 2022-12-01 RX ADMIN — SODIUM CHLORIDE, POTASSIUM CHLORIDE, SODIUM LACTATE AND CALCIUM CHLORIDE: 600; 310; 30; 20 INJECTION, SOLUTION INTRAVENOUS at 19:57

## 2022-12-01 RX ADMIN — ALBUMIN (HUMAN) 12.5 G: 12.5 INJECTION, SOLUTION INTRAVENOUS at 23:32

## 2022-12-01 RX ADMIN — ROPIVACAINE HYDROCHLORIDE 40 MG: 2 INJECTION, SOLUTION EPIDURAL; INFILTRATION at 18:30

## 2022-12-01 RX ADMIN — Medication 10 ML: at 06:19

## 2022-12-01 RX ADMIN — EPHEDRINE SULFATE 5 MG: 50 INJECTION INTRAVENOUS at 21:30

## 2022-12-01 RX ADMIN — ROCURONIUM BROMIDE 35 MG: 10 SOLUTION INTRAVENOUS at 20:51

## 2022-12-01 RX ADMIN — CEFAZOLIN 2 G: 330 INJECTION, POWDER, FOR SOLUTION INTRAMUSCULAR; INTRAVENOUS at 20:38

## 2022-12-01 RX ADMIN — GLYCOPYRROLATE 0.1 MG: 0.2 INJECTION INTRAMUSCULAR; INTRAVENOUS at 21:29

## 2022-12-01 RX ADMIN — SODIUM CHLORIDE 75 ML/HR: 9 INJECTION, SOLUTION INTRAVENOUS at 00:02

## 2022-12-01 RX ADMIN — SUGAMMADEX 150 MG: 100 INJECTION, SOLUTION INTRAVENOUS at 22:13

## 2022-12-01 NOTE — ANESTHESIA PREPROCEDURE EVALUATION
Anesthetic History   No history of anesthetic complications            Review of Systems / Medical History  Patient summary reviewed, nursing notes reviewed and pertinent labs reviewed    Pulmonary  Within defined limits                 Neuro/Psych         TIA     Cardiovascular  Within defined limits            Past MI and CAD    Exercise tolerance: <4 METS  Comments:      GI/Hepatic/Renal                Endo/Other        Arthritis     Other Findings              Physical Exam    Airway  Mallampati: III  TM Distance: 4 - 6 cm  Neck ROM: normal range of motion   Mouth opening: Normal     Cardiovascular    Rhythm: regular  Rate: normal         Dental    Dentition: Upper dentition intact and Lower dentition intact     Pulmonary  Breath sounds clear to auscultation               Abdominal  GI exam deferred       Other Findings            Anesthetic Plan    ASA: 3  Anesthesia type: general      Post-op pain plan if not by surgeon: peripheral nerve block single    Induction: Intravenous  Anesthetic plan and risks discussed with: Patient

## 2022-12-01 NOTE — ANESTHESIA PROCEDURE NOTES
Peripheral Block    Start time: 12/1/2022 6:30 PM  End time: 12/1/2022 6:40 PM  Performed by: Nikhil Ortega MD  Authorized by: Nikhil Ortega MD       Pre-procedure: Indications: at surgeon's request and post-op pain management    Preanesthetic Checklist: patient identified, risks and benefits discussed, site marked, timeout performed and patient being monitored    Timeout Time: 18:30 EST      Block Type:   Block Type:   Interscalene  Laterality:  Left  Monitoring:  Standard ASA monitoring, continuous pulse ox, frequent vital sign checks, heart rate, responsive to questions and oxygen  Injection Technique:  Single shot  Procedures: ultrasound guided    Patient Position: supine  Prep: chlorhexidine    Location:  Interscalene  Needle Type:  Stimuplex  Needle Gauge:  21 G  Needle Localization:  Ultrasound guidance and anatomical landmarks  Medication Injected:  Ropivacaine (NAROPIN) 2 mg/mL (0.2 %) injection - Peripheral Nerve Block   40 mg - 12/1/2022 6:30:00 PM  Med Admin Time: 12/1/2022 6:30 PM    Assessment:  Number of attempts:  1  Injection Assessment:  Incremental injection every 5 mL, local visualized surrounding nerve on ultrasound, negative aspiration for blood, no paresthesia, no intravascular symptoms and ultrasound image on chart  Patient tolerance:  Patient tolerated the procedure well with no immediate complications

## 2022-12-01 NOTE — PROGRESS NOTES
ORTHO FRACTURE PROGRESS NOTE    2022  Admit Date:   2022    Post Op day: * No surgery found *    Subjective: Charlee Webster states she continues with left shoulder pain following unsuccessful reduction attempt in the emergency department last night. Maintains that her injury was approximately 2 weeks ago and she only now has been sent for treatment. Denies any new radiating pain or tingling or numbness. CT performed last night shows large Hill-Sachs deformity but no obvious Bankart lesion. She has significant joint swelling as well as significant joint degeneration.   Is n.p.o. at this time  Denies N/V/SOB or CP    PT/OT:   Gait:                    Vital Signs:    Patient Vitals for the past 8 hrs:   BP Temp Pulse Resp SpO2   22 1000 -- -- 70 -- --   22 0816 109/61 98 °F (36.7 °C) 72 16 99 %   22 0600 -- -- 86 -- --   22 0400 -- -- 79 -- --     Temp (24hrs), Av.3 °F (36.8 °C), Min:97.4 °F (36.3 °C), Max:99.3 °F (37.4 °C)      Pain Control:   Pain Assessment  Pain Scale 1: Numeric (0 - 10)  Pain Intensity 1: 8  Pain Onset 1: monday  Pain Location 1: Arm  Pain Orientation 1: Left  Pain Description 1: Aching, Constant  Pain Intervention(s) 1: Position    Meds:    Current Facility-Administered Medications   Medication Dose Route Frequency    carvediloL (COREG) tablet 3.125 mg  3.125 mg Oral BID WITH MEALS    latanoprost (XALATAN) 0.005 % ophthalmic solution 1 Drop  1 Drop Both Eyes QHS    sodium chloride (NS) flush 5-40 mL  5-40 mL IntraVENous Q8H    sodium chloride (NS) flush 5-40 mL  5-40 mL IntraVENous PRN    acetaminophen (TYLENOL) tablet 650 mg  650 mg Oral Q6H PRN    Or    acetaminophen (TYLENOL) suppository 650 mg  650 mg Rectal Q6H PRN    ondansetron (ZOFRAN ODT) tablet 4 mg  4 mg Oral Q8H PRN    Or    ondansetron (ZOFRAN) injection 4 mg  4 mg IntraVENous Q6H PRN    0.9% sodium chloride infusion  75 mL/hr IntraVENous CONTINUOUS       LAB:    Recent Labs 12/01/22  0259   HCT 35.8   HGB 11.2*       Transfuse PRBC's:      Assessment & Physician's Comment:  Neurovascular checks within normal limits  Orientation:  Oriented  Examination of left shoulder reveals palpable dislocation with sulcus and swelling about the joint; any movement of the joint with internal and external rotation or attempts at flexion or abduction increase her pain. Even movement through the elbow increases pain at the shoulder. Intact for sensory function distally; 5 5 strength for wrist flexion as well as hand intrinsic function. Distal pulses palpable    Active Problems:    Hypoxia (11/30/2022)        Plan:    Patient with persistent left shoulder dislocation. Attempted reduction in the emergency department was unsuccessful but patient was unable to be sedated due to hypoxia concerns. It is unclear whether a further close reduction attempt under general anesthesia would be beneficial.  Joint has been dislocated for an extended period of time and may be too swollen and unstable to remain reduced in that fashion. She has extensive degenerative changes in the region as well. She may ultimately require a reverse total shoulder arthroplasty for return of function. Plans for this will be discussed with surgeon. Also discussed with medicine risks of taking her for operative fixation.   Medical management per primary team  Pain management as needed  We will keep n.p.o. for now but may convert if operative fixation does not plan for today  Following    Patient to be further discussed with Dr. Sonya Devries [id number]

## 2022-12-01 NOTE — PROGRESS NOTES
Problem: Self Care Deficits Care Plan (Adult)  Goal: *Acute Goals and Plan of Care (Insert Text)  Description: FUNCTIONAL STATUS PRIOR TO ADMISSION: Pt reports she is Moderate A for ADLs, seated shower chair or seated W/C level and briefly standing at RW. HOME SUPPORT: The patient lived with group home staffing to provide Moderate ADL assist.    Occupational Therapy Goals  Initiated 12/1/2022  1. Patient will perform seated self-feeding with supervision/set-up within 7 day(s). 2.  Patient will perform seated grooming with minimal assistance within 7 day(s). 3.  Patient will perform seated upper body bathing with moderate assistance  within 7 day(s). 4.  Patient will perform toilet transfers, EOB to [facility], with maximal assistance within 7 day(s). Outcome: Not Met    OCCUPATIONAL THERAPY EVALUATION  Patient: Aris Aguirre (89 y.o. female)  Date: 12/1/2022  Primary Diagnosis: Hypoxia [R09.02]  Procedure(s) (LRB):  SHOULDER ARTHROPLASTY/TOTAL (Left)     Precautions:  Fall, NWB (L UE)    ASSESSMENT  Based on the objective data described below, the patient presents with high c/o LUE pain with movement, decreased strength/endurance, decreased mobility/balance, decreased activity tolerance, decreased full body reaching and noted kyphotic posture, all of which limit pt's ability to safely complete self-care routine at level congruent with PLOF. Currently, pt is Min for self-feeding, Mod A for grooming, Max A for bathing and Total A for UE and LE dressing/toileting. Pt noted to require heavy assist to transfer to EOB and declined attempts at standing this date secondary to L shoulder pain. Pt provided with sling and education on donning, requiring Total A. Chart indicates possible shoulder surgery pending. Pt benefits from skilled OT to address functional deficits during acute hospitalization, with reporting therapist believing pt would benefit from SNF rehab upon discharge.     Current Level of Function Impacting Discharge (ADLs/self-care): Min for self-feeding, Mod A for grooming, Max A for bathing and Total A for UE and LE dressing/toileting    Functional Outcome Measure: The patient scored 15/100 on the Barthel Index outcome measure. Other factors to consider for discharge: Mod A for W/C level ADLs at baseline     Patient will benefit from skilled therapy intervention to address the above noted impairments. PLAN :  Recommendations and Planned Interventions: self care training, functional mobility training, therapeutic exercise, balance training, therapeutic activities, endurance activities, and patient education    Frequency/Duration: Patient will be followed by occupational therapy 5 times a week to address goals. Recommendation for discharge: (in order for the patient to meet his/her long term goals)  Therapy up to 5 days/week in SNF setting    This discharge recommendation:  Has been made in collaboration with the attending provider and/or case management    IF patient discharges home will need the following DME: patient owns DME required for discharge       SUBJECTIVE:   Patient stated I want to know if I need to have surgery or not, I just want to get it over with.     OBJECTIVE DATA SUMMARY:   HISTORY:   Past Medical History:   Diagnosis Date    Arthritis     History of non-ST elevation myocardial infarction (NSTEMI) 11/28/2016    Ill-defined condition     suderal hematoma    Thromboembolus ([facility] [address]     multiple     Past Surgical History:   Procedure Laterality Date    HX CHOLECYSTECTOMY      hysterectomy, gallbladder    HX ORTHOPAEDIC      hip replacement    VASCULAR SURGERY PROCEDURE UNLIST      ivc filter       Expanded or extensive additional review of patient history:     Home Situation  Home Environment: Assisted living  [address] [place] [address] Jose Name: [facility]  One/Two Story Residence: One story  Living Alone: Yes  Support Systems: Child(rhonda)  Patient Expects to be Discharged to[de-identified] Skilled nursing facility  Current DME Used/Available at Home: Wheelchair  Tub or Shower Type: Shower    Hand dominance: Right    EXAMINATION OF PERFORMANCE DEFICITS:  Cognitive/Behavioral Status:  Neurologic State: Alert  Orientation Level: Oriented X4  Cognition: Appropriate decision making; Appropriate for age attention/concentration; Appropriate safety awareness; Follows commands;Recognition of people/places  Perception: Appears intact  Perseveration: No perseveration noted  Safety/Judgement: Awareness of environment    Hearing: Auditory  Auditory Impairment: None    Vision/Perceptual:                                Corrective Lenses: Glasses    Range of Motion:  AROM: Generally decreased, functional                         Strength:  Strength: Generally decreased, functional                Coordination:     Fine Motor Skills-Upper: Left Intact; Right Intact    Gross Motor Skills-Upper: Left Impaired;Right Intact      Balance:  Sitting: Impaired  Sitting - Static: Fair (occasional)  Sitting - Dynamic: Fair (occasional)  Standing:  (not tested)    Functional Mobility and Transfers for ADLs:  Bed Mobility:  Rolling: Moderate assistance;Assist x2  Supine to Sit: Moderate assistance;Assist x2  Sit to Supine: Maximum assistance;Assist x2  Scooting: Maximum assistance;Assist x2;Bed Modified    Transfers:  Sit to Stand:  (not tested)    ADL Assessment:  Feeding: Minimum assistance    Oral Facial Hygiene/Grooming: Moderate assistance    Bathing: Maximum assistance    Type of Bath: Chlorhexidine (CHG); Full    Upper Body Dressing: Total assistance; Total A to kirill sling    Lower Body Dressing: Total assistance    Toileting:  Total assistance        ADL Intervention and task modifications:    Cognitive Retraining  Safety/Judgement: Awareness of environment    Functional Measure:    Barthel Index:  Bathin  Bladder: 0  Bowels: 5  Groomin  Dressin  Feedin  Mobility: 0  Stairs: 0  Toilet Use: 5  Transfer (Bed to Chair and Back): 0  Total: 15/100      The Barthel ADL Index: Guidelines  1. The index should be used as a record of what a patient does, not as a record of what a patient could do. 2. The main aim is to establish degree of independence from any help, physical or verbal, however minor and for whatever reason. 3. The need for supervision renders the patient not independent. 4. A patient's performance should be established using the best available evidence. Asking the patient, friends/relatives and nurses are the usual sources, but direct observation and common sense are also important. However direct testing is not needed. 5. Usually the patient's performance over the preceding 24-48 hours is important, but occasionally longer periods will be relevant. 6. Middle categories imply that the patient supplies over 50 per cent of the effort. 7. Use of aids to be independent is allowed. Score Interpretation (from [address] [facility])    Independent   60-79 Minimally independent   40-59 Partially dependent   20-39 Very dependent   <20 Totally dependent     -Rian Rose., Barthel, D.W. (1965). Functional evaluation: the Barthel Index. [facility] ([address] (1997). The Barthel activities of daily living index: self-reporting versus actual performance in the old (> or = 75 years). Journal [address] 45(7), [address] [facility], ...F, Marina Tan., Hilary Aguirre. (1999). Measuring the change in disability after inpatient rehabilitation; comparison of the responsiveness of the Barthel Index and Functional [place] Measure. Journal of Neurology, Neurosurgery, and Psychiatry, 66(4), 838-281. Aaron Lundberg N.TAMMY.A, MELIA Post, & Lashon Larsen M.A. (2004) Assessment of post-stroke quality of life in cost-effectiveness studies: The usefulness of the Barthel Index and the EuroQoL-5D.  Quality of Life Research, 13, 562-36        Occupational Therapy Evaluation Charge Determination   History Examination Decision-Making   LOW Complexity : Brief history review  HIGH Complexity : 5 or more performance deficits relating to physical, cognitive , or psychosocial skils that result in activity limitations and / or participation restrictions LOW Complexity : No comorbidities that affect functional and no verbal or physical assistance needed to complete eval tasks       Based on the above components, the patient evaluation is determined to be of the following complexity level: LOW   Pain Rating:  High c/o L shoulder pain with movement, did not quantify; RN aware and following    Activity Tolerance:   Fair, Poor, and requires frequent rest breaks    After treatment patient left in no apparent distress:    Supine in bed, Call bell within reach, Bed / chair alarm activated, and Side rails x 3    COMMUNICATION/EDUCATION:   The patients plan of care was discussed with: Physical therapist, Registered nurse, and Case management. Home safety education was provided and the patient/caregiver indicated understanding., Patient/family have participated as able in goal setting and plan of care. , and Patient/family agree to work toward stated goals and plan of care. This patients plan of care is appropriate for delegation to [facility].     Thank you for this referral.  Marlon Taveras, OT  Time Calculation: 22 mins

## 2022-12-01 NOTE — ED NOTES
Has a final transfer review been performed?  Yes    Reason for Admission: dislocated shoulder   Patient comes from: [place]  Mental Status: Alert and oriented  ADL:partial assistance  Ambulation:mild difficulty or ambulates with: cane and walker  Pertinent Info/Safety Concerns: none    COVID Status: Not Tested  MEWS Score: 2  Sinus Rhythm  Vitals:    11/30/22 1721 11/30/22 1753 11/30/22 1841 12/01/22 0002   BP: (!) 108/54 114/62 116/66 130/75   Pulse:  85 96 88   Resp:  16 24 22   Temp:  97.4 °F (36.3 °C) 97.8 °F (36.6 °C) 97.9 °F (36.6 °C)   SpO2:  96% 96% 98%   Weight:         Lines:   Peripheral IV 11/30/22 Right Wrist (Active)   Site Assessment Clean, dry, & intact 11/30/22 1317   Phlebitis Assessment 0 11/30/22 1317   Infiltration Assessment 0 11/30/22 1317   Dressing Status Clean, dry, & intact 11/30/22 1317   Dressing Type Transparent 11/30/22 1317       Peripheral IV 11/30/22 Right Antecubital (Active)      Transport mode:frances Ward  being transferred to (unit) for routine progression of care     \"Notification of etransfer note given to (Name) Bhaskar Dove (Title) RN

## 2022-12-01 NOTE — PROGRESS NOTES
Admission Medication Reconciliation:    Information obtained from:  [facility] from [facility]  RxQuery data available¹:  YES    Comments/Recommendations: Updated PTA meds/reviewed patient's allergies. 1)  Multiple changes since Med Rec last completed    2)  Medication changes (since last review): Added  - BioFreeze  - APAP scheduled + prn  - Colchicine  - Eucerin  - Famotidine  - Loratadine   - Guaifenesin  - Nystatin cream  - Pramipexole  - Prednisone  - Torsemide  - Xarelto   - Acetanin  - Benadryl PRN  - Flonase PRN  - Triamcinolone QID PRN    Adjusted  - Bumex 1 mg daily (from 0.5 daily)  - Magnesium 1200 mg AM, 800 PM (vs. 400 mg daily)    Removed  - Diflucan  - Gabapentin  - Calmoseptine  - mupirocin  - Prilosec  - Tramadol    3)  Attending notified of changes. ¹RxQuery pharmacy benefit data reflects medications filled and processed through the patient's insurance, however   this data does NOT capture whether the medication was picked up or is currently being taken by the patient. Allergies:  Codeine and Prednisone    Significant PMH/Disease States:   Past Medical History:   Diagnosis Date    Arthritis     History of non-ST elevation myocardial infarction (NSTEMI) 11/28/2016    Ill-defined condition     suderal hematoma    Thromboembolus [facility])     multiple     Chief Complaint for this Admission:    Chief Complaint   Patient presents with    Shoulder Dislocation     Prior to Admission Medications:   Prior to Admission Medications   Prescriptions Last Dose Informant Taking? OTHER,NON-FORMULARY,   Yes   Sig: Alcohol Beverage 6oz. Daily at 1600   acetaminophen (TYLENOL) 325 mg tablet   Yes   Sig: Take 650 mg by mouth nightly. Acetaminophen Maximum 3 Grams/24 Hours   acetaminophen (TYLENOL) 325 mg tablet   Yes   Sig: Take 650 mg by mouth two (2) times a day.  1200, 1800  -  Acetaminophen Maximum 3 Grams/24 Hours   acetaminophen (TYLENOL) 325 mg tablet   Yes   Sig: Take 325 mg by mouth two (2) times daily as needed for Pain. Okay to give 4 hours apart from scheduled doses   bumetanide (BUMEX) 1 mg tablet   Yes   Sig: Take 1 mg by mouth daily. calcium carbonate (OS-DAVIAN) 500 mg calcium (1,250 mg) tablet   Yes   Sig: Take 2 Tabs by mouth three (3) times daily (with meals). carvedilol (COREG) 3.125 mg tablet   Yes   Sig: Take 3.125 mg by mouth two (2) times daily (with meals). cetirizine (ZYRTEC) 10 mg tablet   No   Sig: Take 10 mg by mouth daily. cholecalciferol (VITAMIN D3) 1,000 unit tablet   Yes   Sig: Take 1,000 Units by mouth daily. colchicine 0.6 mg tablet   Yes   Sig: Take 0.6 mg by mouth daily. diclofenac (VOLTAREN) 1 % gel   No   Sig: Apply  to affected area three (3) times daily. diphenhydrAMINE (BENADRYL) 25 mg capsule   Yes   Sig: Take 25 mg by mouth every six (6) hours as needed for Itching. famotidine (PEPCID) 20 mg tablet   Yes   Sig: Take 20 mg by mouth daily. fluticasone propionate (Flonase Allergy Relief) 50 mcg/actuation nasal spray   Yes   Si Sprays by Both Nostrils route daily as needed (Sneezing). folic acid (FOLVITE) 1 mg tablet   Yes   Sig: Take 1 Tab by mouth daily. guaiFENesin ER (MUCINEX) 600 mg ER tablet   Yes   Sig: Take 600 mg by mouth two (2) times a day. For 10 days - Start    latanoprost (XALATAN) 0.005 % ophthalmic solution   Yes   Sig: Administer 1 Drop to both eyes nightly. loratadine (CLARITIN) 10 mg tablet   Yes   Sig: Take 10 mg by mouth nightly. Indications: PRURITIS   magnesium oxide (MAG-OX) 400 mg tablet   Yes   Sig: Take 1,200 mg by mouth daily. 1200 mg in the AM, 800 mg at night (see additional order)   magnesium oxide (MAG-OX) 400 mg tablet   Yes   Sig: Take 800 mg by mouth nightly. 1200 mg in the AM (see additional order), 800 mg at night   melatonin tab tablet   Yes   Sig: Take 5 mg by mouth nightly. menthol (Biofreeze, menthol,) 4 % gel   Yes   Sig: by Apply Externally route four (4) times daily.  APPLY TO Neck for pain    Nursing, document site in comments   mineral oil-isopropyl myristat (EUCERIN) lotion   Yes   Sig: Apply  to affected area daily. APPLY TO Left Lower Extremities    Nursing, document site in comments   mirtazapine (REMERON) 30 mg tablet   Yes   Sig: Take 30 mg by mouth nightly. nystatin (MYCOSTATIN) topical cream   Yes   Sig: Apply  to affected area daily. APPLY TO Groin every day for prevention    Nursing, document site in comments   pramipexole (MIRAPEX) 0.125 mg tablet   Yes   Sig: Take 0.125 mg by mouth two (2) times a day. 1200, 2000   predniSONE (DELTASONE) 10 mg tablet   Yes   Sig: Take 10 mg by mouth daily. For 7 days starting 11/28/2022   rivaroxaban (Xarelto) 10 mg tablet   Yes   Sig: Take 10 mg by mouth daily (with dinner). Indications: blood clot in a deep vein of the extremities   thiamine (B-1) 100 mg tablet   Yes   Sig: Take 100 mg by mouth daily. torsemide (DEMADEX) 5 mg tablet   Yes   Sig: Take 5 mg by mouth every Monday and Thursday. triamcinolone acetonide (KENALOG) 0.1 % topical cream   Yes   Sig: Apply  to affected area two (2) times daily as needed for Itching. use thin layer  APPLY TO rash on forearms and legs twice daily as needed    Nursing, document site in comments      Facility-Administered Medications: None     Please contact the main inpatient pharmacy with any questions or concerns at [phone] and we will direct you to the clinical pharmacist covering this patient's care while in-house.    Willie Lowery, CANDACED

## 2022-12-01 NOTE — CONSULTS
FRACTURE CONSULT NOTE    Subjective:     Date of Consultation:  November 30, 2022  Referring Physician:  Brock Kearney is a 80 y.o. female who is being seen for left arm. Injury occurred  2 week(s) ago Workup has revealed left glenohumeral dislocation. . Patient resides in assisted living. Reports a fall in her bathroom nearly 2 weeks ago with resulting shoulder pain. She reports trying to [facility] it out\" over the course of at least a week. She finally reported her pain to a provider in her facility who ordered an xray. Xray was performed at the facility 2 days ago. She was taken to the [facility] ER earlier today. The ER physician attempted a reduction with pain medication but no sedation which was unsuccessful. Transferred to the [facility] ER for further care. She reports no pain at rest.  Has significant pain with ROM of the shoulder. Patient Active Problem List    Diagnosis Date Noted    Hypoxia 11/30/2022    Electrolyte abnormality 03/12/2019    Chest pain 09/12/2017    TIA (transient ischemic attack) 03/08/2017    GI bleed 03/08/2017    Numbness and tingling 03/08/2017    CAD in native artery 11/28/2016    History of non-ST elevation myocardial infarction (NSTEMI) 11/28/2016    NSTEMI (non-ST elevated myocardial infarction) ([facility] [address] 11/22/2016    Diarrhea 11/22/2016    Hypocalcemia 11/22/2016    Hypomagnesemia 11/22/2016    Hypokalemia 11/20/2016     Family History   Problem Relation Age of Onset    Cancer Mother     Cancer Father       Social History     Tobacco Use    Smoking status: Never    Smokeless tobacco: Never   Substance Use Topics    Alcohol use: Yes     Comment: a couple of scotch drinks daily.      Past Medical History:   Diagnosis Date    Arthritis     History of non-ST elevation myocardial infarction (NSTEMI) 11/28/2016    Ill-defined condition     suderal hematoma    Thromboembolus ([facility] [address]     multiple      Past Surgical History:   Procedure Laterality Date    HX CHOLECYSTECTOMY hysterectomy, gallbladder    HX ORTHOPAEDIC      hip replacement    VASCULAR SURGERY PROCEDURE UNLIST      ivc filter      Prior to Admission medications    Medication Sig Start Date End Date Taking? Authorizing Provider   cephALEXin (KEFLEX) 500 mg capsule Take 500 mg by mouth three (3) times daily. Provider, Historical   fluconazole (DIFLUCAN) 100 mg tablet Take 200 mg by mouth daily. FDA advises cautious prescribing of oral fluconazole in pregnancy. Provider, Historical   gabapentin (NEURONTIN) 300 mg capsule Take 300 mg by mouth three (3) times daily. Provider, Historical   omeprazole (PRILOSEC) 20 mg capsule Take 20 mg by mouth daily. Provider, Historical   magnesium oxide (MAG-OX) 400 mg tablet Take 1 Tab by mouth daily. 3/16/19   Tad Plunk M, DO   calcium carbonate (OS-DAVIAN) 500 mg calcium (1,250 mg) tablet Take 2 Tabs by mouth three (3) times daily (with meals). 3/15/19   Regan Dixon, Idalia M, DO   bumetanide (BUMEX) 0.5 mg tablet Take 0.5 mg by mouth daily. Provider, Historical   cetirizine (ZYRTEC) 10 mg tablet Take 10 mg by mouth daily. Provider, Historical   traMADol (ULTRAM) 50 mg tablet Take 50 mg by mouth nightly. Provider, Historical   mupirocin (BACTROBAN) 2 % ointment Apply  to affected area daily. Provider, Historical   menthol-zinc oxide (CALMOSEPTINE) 0.44-20.6 % oint Apply  to affected area daily. Provider, Historical   acetaminophen (TYLENOL) 325 mg tablet Take 2 Tabs by mouth every six (6) hours. 11/6/18   Guillermo Carter NP   folic acid (FOLVITE) 1 mg tablet Take 1 Tab by mouth daily. 11/6/18   Guillermo Carter NP   traMADol (ULTRAM) 50 mg tablet Take 1 Tab by mouth every six (6) hours as needed. Max Daily Amount: 200 mg. 11/6/18   Guillermo Carter NP   diclofenac (VOLTAREN) 1 % gel Apply  to affected area three (3) times daily. Provider, Historical   melatonin tab tablet Take 5 mg by mouth nightly.     Provider, Historical   mirtazapine (REMERON) 30 mg tablet Take 30 mg by mouth nightly. Provider, Historical   carvedilol (COREG) 3.125 mg tablet Take 3.125 mg by mouth two (2) times daily (with meals). Provider, Historical   thiamine (B-1) 100 mg tablet Take 100 mg by mouth daily. Provider, Historical   cholecalciferol (VITAMIN D3) 1,000 unit tablet Take 1,000 Units by mouth daily. Provider, Historical   latanoprost (XALATAN) 0.005 % ophthalmic solution Administer 1 Drop to both eyes nightly. Provider, Historical     Current Facility-Administered Medications   Medication Dose Route Frequency    sodium chloride (NS) flush 5-40 mL  5-40 mL IntraVENous Q8H    sodium chloride (NS) flush 5-40 mL  5-40 mL IntraVENous PRN    acetaminophen (TYLENOL) tablet 650 mg  650 mg Oral Q6H PRN    Or    acetaminophen (TYLENOL) suppository 650 mg  650 mg Rectal Q6H PRN    ondansetron (ZOFRAN ODT) tablet 4 mg  4 mg Oral Q8H PRN    Or    ondansetron (ZOFRAN) injection 4 mg  4 mg IntraVENous Q6H PRN    0.9% sodium chloride infusion  75 mL/hr IntraVENous CONTINUOUS    iopamidoL (ISOVUE-370) 76 % injection 100 mL  100 mL IntraVENous RAD ONCE     Current Outpatient Medications   Medication Sig    cephALEXin (KEFLEX) 500 mg capsule Take 500 mg by mouth three (3) times daily. fluconazole (DIFLUCAN) 100 mg tablet Take 200 mg by mouth daily. FDA advises cautious prescribing of oral fluconazole in pregnancy. gabapentin (NEURONTIN) 300 mg capsule Take 300 mg by mouth three (3) times daily. omeprazole (PRILOSEC) 20 mg capsule Take 20 mg by mouth daily. magnesium oxide (MAG-OX) 400 mg tablet Take 1 Tab by mouth daily. calcium carbonate (OS-DAVIAN) 500 mg calcium (1,250 mg) tablet Take 2 Tabs by mouth three (3) times daily (with meals). bumetanide (BUMEX) 0.5 mg tablet Take 0.5 mg by mouth daily. cetirizine (ZYRTEC) 10 mg tablet Take 10 mg by mouth daily. traMADol (ULTRAM) 50 mg tablet Take 50 mg by mouth nightly.     mupirocin (BACTROBAN) 2 % ointment Apply  to affected area daily.    menthol-zinc oxide (CALMOSEPTINE) 0.44-20.6 % oint Apply  to affected area daily. acetaminophen (TYLENOL) 325 mg tablet Take 2 Tabs by mouth every six (6) hours. folic acid (FOLVITE) 1 mg tablet Take 1 Tab by mouth daily. traMADol (ULTRAM) 50 mg tablet Take 1 Tab by mouth every six (6) hours as needed. Max Daily Amount: 200 mg.    diclofenac (VOLTAREN) 1 % gel Apply  to affected area three (3) times daily. melatonin tab tablet Take 5 mg by mouth nightly. mirtazapine (REMERON) 30 mg tablet Take 30 mg by mouth nightly. carvedilol (COREG) 3.125 mg tablet Take 3.125 mg by mouth two (2) times daily (with meals). thiamine (B-1) 100 mg tablet Take 100 mg by mouth daily. cholecalciferol (VITAMIN D3) 1,000 unit tablet Take 1,000 Units by mouth daily. latanoprost (XALATAN) 0.005 % ophthalmic solution Administer 1 Drop to both eyes nightly. Allergies   Allergen Reactions    Codeine Unknown (comments)    Prednisone Unknown (comments)        Review of Systems:  A comprehensive review of systems was negative except for that written in the HPI.     Mental Status: no dementia    Objective:     Patient Vitals for the past 8 hrs:   BP Temp Pulse Resp SpO2 Weight   22 1841 116/66 97.8 °F (36.6 °C) 96 24 96 % --   22 1753 114/62 97.4 °F (36.3 °C) 85 16 96 % --   22 1721 (!) 108/54 -- -- -- -- --   22 1706 (!) 117/58 -- -- -- 95 % --   22 1647 (!) 105/57 98.7 °F (37.1 °C) 73 16 97 % --   22 1617 (!) 108/54 -- 75 14 95 % --   22 1604 (!) 106/52 -- 73 17 93 % --   22 1549 (!) 109/56 -- 73 14 96 % --   22 1534 (!) 108/55 -- 88 16 96 % --   22 1519 (!) 113/57 -- 81 16 96 % --   22 1505 117/69 99.3 °F (37.4 °C) 88 20 95 % --   22 1254 (!) 105/55 99.1 °F (37.3 °C) 89 20 (!) 89 % 65.2 kg (143 lb 11.8 oz)     Temp (24hrs), Av.5 °F (36.9 °C), Min:97.4 °F (36.3 °C), Max:99.3 °F (37.4 °C)      EXAM:   General:  Alert, cooperative, well noursished, well developed, appears stated age   Eyes:  Sclera anicteric. PERRL   Mouth/Throat: Mucous membranes normal, oral pharynx clear   Neck: Supple   Lungs:      CV:     Abdomen:   Soft, non-tender. bowel sounds normal. non-distended   Extremities: No cyanosis or edema   Skin: Skin color, texture, turgor normal. no acute rash or lesions   Lymph nodes: Cervical and supraclavicular normal   Musculoskeletal:  Mild to moderate swelling left shoulder girdle. Mild deformity. NVI. Intact lateral shoulder sensibility. Full elbow, wrist and hand ROM. Pain with any attempts at shoulder ROM   Lines/Devices:  Intact, no erythema, drainage or tenderness   Psych: Alert and oriented, normal mood affect given the setting         Imaging Review: anteroinferior glenohumeral dislocation without evidence of fracture. Labs:   Recent Results (from the past 24 hour(s))   CBC WITH AUTOMATED DIFF    Collection Time: 11/30/22  1:15 PM   Result Value Ref Range    WBC 11.5 (H) 3.6 - 11.0 K/uL    RBC 4.15 3.80 - 5.20 M/uL    HGB 12.7 11.5 - 16.0 g/dL    HCT 39.1 35.0 - 47.0 %    MCV 94.2 80.0 - 99.0 FL    MCH 30.6 26.0 - 34.0 PG    MCHC 32.5 30.0 - 36.5 g/dL    RDW 13.9 11.5 - 14.5 %    PLATELET 488 726 - 949 K/uL    MPV 9.3 8.9 - 12.9 FL    NRBC 0.0 0  WBC    ABSOLUTE NRBC 0.00 0.00 - 0.01 K/uL    NEUTROPHILS 83 (H) 32 - 75 %    LYMPHOCYTES 9 (L) 12 - 49 %    MONOCYTES 7 5 - 13 %    EOSINOPHILS 1 0 - 7 %    BASOPHILS 0 0 - 1 %    IMMATURE GRANULOCYTES 0 0.0 - 0.5 %    ABS. NEUTROPHILS 9.5 (H) 1.8 - 8.0 K/UL    ABS. LYMPHOCYTES 1.0 0.8 - 3.5 K/UL    ABS. MONOCYTES 0.8 0.0 - 1.0 K/UL    ABS. EOSINOPHILS 0.1 0.0 - 0.4 K/UL    ABS. BASOPHILS 0.0 0.0 - 0.1 K/UL    ABS. IMM.  GRANS. 0.1 (H) 0.00 - 0.04 K/UL    DF AUTOMATED     METABOLIC PANEL, COMPREHENSIVE    Collection Time: 11/30/22  1:15 PM   Result Value Ref Range    Sodium 138 136 - 145 mmol/L    Potassium 3.5 3.5 - 5.1 mmol/L    Chloride 98 97 - 108 mmol/L    CO2 35 (H) 21 - 32 mmol/L    Anion gap 5 5 - 15 mmol/L    Glucose 112 (H) 65 - 100 mg/dL    BUN 38 (H) 6 - 20 MG/DL    Creatinine 1.54 (H) 0.55 - 1.02 MG/DL    BUN/Creatinine ratio 25 (H) 12 - 20      eGFR 31 (L) >60 ml/min/1.73m2    Calcium 9.2 8.5 - 10.1 MG/DL    Bilirubin, total 1.2 (H) 0.2 - 1.0 MG/DL    ALT (SGPT) 10 (L) 12 - 78 U/L    AST (SGOT) 16 15 - 37 U/L    Alk. phosphatase 135 (H) 45 - 117 U/L    Protein, total 7.1 6.4 - 8.2 g/dL    Albumin 3.1 (L) 3.5 - 5.0 g/dL    Globulin 4.0 2.0 - 4.0 g/dL    A-G Ratio 0.8 (L) 1.1 - 2.2     INFLUENZA A+B VIRAL AGS    Collection Time: 11/30/22  1:55 PM   Result Value Ref Range    Influenza A Antigen Negative NEG      Influenza B Antigen Negative NEG     COVID-19 RAPID TEST    Collection Time: 11/30/22  1:55 PM   Result Value Ref Range    Specimen source Nasopharyngeal      COVID-19 rapid test Not detected NOTD           Impression:     Active Problems:    Hypoxia (11/30/2022)    Left subacute glenohumeral dislocation. Plan:     ER does not feel comfortable giving sedation 2/2 hypoxia and advanced age. Dislocation is subacute and may not be able to be reduced closed. Discussed with patient. Elected to attempt reduction at bedside with IV pain meds and intraarticular lidocaine injection with understanding that if this were unsuccessful then she will likely require operative management with one of my partners. Operative management may need to consist of closed reduction, open reduction or arthroplasty. PROCEDURE:  After verbal informed consent and under sterile conditions, the left glenohumeral joint was injected with 20 cc of 1% lidocaine. She was also given a small dose of fentanyl by the ER physician. She reported no pain with shoulder ROM after the injection. An attempt at closed reduction was then performed. Multiple techniques were utilized without significant success. The patient tolerated these well.   Her pain was controlled and she was relaxed which allowed reasonable attempts at reduction. We then attempted reduction with countertraction via a sheet through the axilla. Crepitus was felt and the shoulder appeared to be reduced. However it appeared to be quite unstable. She was placed into a comfortable position. Post reduction CT scan was ordered.         Nikko Quan MD

## 2022-12-01 NOTE — H&P
[facility] Adult  Hospitalist Group  History and Physical    Date of Service:  11/30/2022  Primary Care Provider: Corey Batres MD  Source of information: The patient and Chart review    Chief Complaint: Shoulder Dislocation      History of Presenting Illness: Anca Ward is a 80 y.o. female with apmhx CAD, SDH, and past VTE who presents from [address] with dislocated left shoulder status post fall 2 weeks ago, and hypoxic respiratory failure. She had a mechanical fall two weeks ago, and had resultant shoulder pain that she attempted to treat herself. When the pain did not improve, she let her facility know, and was brought to the ED for further evaluation. In the ED, she was hypoxic to 89% with improvement to 96% on 2Lnc. Other VSS. Labs showed WBC 11.5, CO2 35, BUN 38, and creatinine 1.54 (b/l 0.8-0.9). XR left shoulder shows anterior glenohumeral dislocation, and OA. CXR shows no acute intrathoracic disease. In the ED, she received 1Lns, fentanyl, lidocaine prior to attempted closed reduction by ortho, and percocet. REVIEW OF SYSTEMS:  A comprehensive review of systems was negative except for that written in the History of Present Illness. Past Medical History:   Diagnosis Date    Arthritis     History of non-ST elevation myocardial infarction (NSTEMI) 11/28/2016    Ill-defined condition     suderal hematoma    Thromboembolus ([facility] [address]     multiple      Past Surgical History:   Procedure Laterality Date    HX CHOLECYSTECTOMY      hysterectomy, gallbladder    HX ORTHOPAEDIC      hip replacement    VASCULAR SURGERY PROCEDURE UNLIST      ivc filter     Prior to Admission medications    Medication Sig Start Date End Date Taking? Authorizing Provider   cephALEXin (KEFLEX) 500 mg capsule Take 500 mg by mouth three (3) times daily. Provider, Historical   fluconazole (DIFLUCAN) 100 mg tablet Take 200 mg by mouth daily.  FDA advises cautious prescribing of oral fluconazole in pregnancy. Provider, Historical   gabapentin (NEURONTIN) 300 mg capsule Take 300 mg by mouth three (3) times daily. Provider, Historical   omeprazole (PRILOSEC) 20 mg capsule Take 20 mg by mouth daily. Provider, Historical   magnesium oxide (MAG-OX) 400 mg tablet Take 1 Tab by mouth daily. 3/16/19   Cathy Amaya M, DO   calcium carbonate (OS-DAVIAN) 500 mg calcium (1,250 mg) tablet Take 2 Tabs by mouth three (3) times daily (with meals). 3/15/19   Idalia Coyle M, DO   bumetanide (BUMEX) 0.5 mg tablet Take 0.5 mg by mouth daily. Provider, Historical   cetirizine (ZYRTEC) 10 mg tablet Take 10 mg by mouth daily. Provider, Historical   traMADol (ULTRAM) 50 mg tablet Take 50 mg by mouth nightly. Provider, Historical   mupirocin (BACTROBAN) 2 % ointment Apply  to affected area daily. Provider, Historical   menthol-zinc oxide (CALMOSEPTINE) 0.44-20.6 % oint Apply  to affected area daily. Provider, Historical   acetaminophen (TYLENOL) 325 mg tablet Take 2 Tabs by mouth every six (6) hours. 11/6/18   Minor, Lennox [place], NP   folic acid (FOLVITE) 1 mg tablet Take 1 Tab by mouth daily. 11/6/18   Minor, [place]ox [place], NP   traMADol (ULTRAM) 50 mg tablet Take 1 Tab by mouth every six (6) hours as needed. Max Daily Amount: 200 mg. 11/6/18   Minor, [place]ox [place], NP   diclofenac (VOLTAREN) 1 % gel Apply  to affected area three (3) times daily. Provider, Historical   melatonin tab tablet Take 5 mg by mouth nightly. Provider, Historical   mirtazapine (REMERON) 30 mg tablet Take 30 mg by mouth nightly. Provider, Historical   carvedilol (COREG) 3.125 mg tablet Take 3.125 mg by mouth two (2) times daily (with meals). Provider, Historical   thiamine (B-1) 100 mg tablet Take 100 mg by mouth daily. Provider, Historical   cholecalciferol (VITAMIN D3) 1,000 unit tablet Take 1,000 Units by mouth daily.     Provider, Historical   latanoprost (XALATAN) 0.005 % ophthalmic solution Administer 1 Drop to both eyes nightly. Provider, Historical     Allergies   Allergen Reactions    Codeine Unknown (comments)    Prednisone Unknown (comments)      Family History   Problem Relation Age of Onset    Cancer Mother     Cancer Father       Social History:  reports that she has never smoked. She has never used smokeless tobacco. She reports current alcohol use. She reports that she does not use drugs. Family and social history were personally reviewed, all pertinent and relevant details are outlined as above. Objective:   Visit Vitals  /66 (BP 1 Location: Left upper arm)   Pulse 96   Temp 97.8 °F (36.6 °C)   Resp 24   Wt 65.2 kg (143 lb 11.8 oz)   SpO2 96%   BMI 28.07 kg/m²    O2 Flow Rate (L/min): 2 l/min O2 Device: Nasal cannula, None (Room air)    PHYSICAL EXAM:   General: Alert x oriented x 3, awake, no acute distress, resting in bed, pleasant female, appears to be stated age  HEENT: PEERL, EOMI, moist mucus membranes  Neck: Supple, no JVD, no meningeal signs  Chest: Clear to auscultation bilaterally   CVS: RRR, S1 S2 heard, no murmurs/rubs/gallops  Abd: Soft, non-tender, non-distended, +bowel sounds   Ext: No clubbing, no cyanosis, Trace b/l LE edema. LUE neurovascularly intact  Neuro/Psych: Pleasant mood and affect, CN 2-12 grossly intact, sensory grossly within normal limit, Strength 5/5 in all extremities  Cap refill: Brisk, less than 3 seconds  Pulses: 2+radial pulses  Skin: Warm, dry, without rashes or lesions    Data Review: All diagnostic labs and studies have been reviewed. Abnormal Labs Reviewed   CBC WITH AUTOMATED DIFF - Abnormal; Notable for the following components:       Result Value    WBC 11.5 (*)     NEUTROPHILS 83 (*)     LYMPHOCYTES 9 (*)     ABS. NEUTROPHILS 9.5 (*)     ABS. IMM.  GRANS. 0.1 (*)     All other components within normal limits   METABOLIC PANEL, COMPREHENSIVE - Abnormal; Notable for the following components:    CO2 35 (*)     Glucose 112 (*) BUN 38 (*)     Creatinine 1.54 (*)     BUN/Creatinine ratio 25 (*)     eGFR 31 (*)     Bilirubin, total 1.2 (*)     ALT (SGPT) 10 (*)     Alk. phosphatase 135 (*)     Albumin 3.1 (*)     A-G Ratio 0.8 (*)     All other components within normal limits       All Micro Results       Procedure Component Value Units Date/Time    COVID-19 RAPID TEST [[id number]] Collected: 11/30/22 1354    Order Status: Completed Specimen: Nasopharyngeal Updated: 11/30/22 1432     Specimen source Nasopharyngeal        COVID-19 rapid test Not detected        Comment: Rapid Abbott ID Now       Rapid NAAT:  The specimen is NEGATIVE for SARS-CoV-2, the novel coronavirus associated with COVID-19. Negative results should be treated as presumptive and, if inconsistent with clinical signs and symptoms or necessary for patient management, should be tested with an alternative molecular assay. Negative results do not preclude SARS-CoV-2 infection and should not be used as the sole basis for patient management decisions. This test has been authorized by the FDA under an Emergency Use Authorization (EUA) for use by authorized laboratories. Fact sheet for Healthcare Providers:  http://www.fabrice-lam.biarleen/  Fact sheet for Patients: http://www.avina-fritz.biz/       Methodology: Isothermal Nucleic Acid Amplification                 IMAGING:   XR SHOULDER LT AP/LAT MIN 2 V   Final Result      Anterior glenohumeral dislocation. Osteoarthritis. XR CHEST PA LAT   Final Result   No acute intrathoracic disease.                ECG/ECHO:    Results for orders placed or performed during the hospital encounter of 03/12/19   EKG, 12 LEAD, INITIAL   Result Value Ref Range    Ventricular Rate 86 BPM    Atrial Rate 86 BPM    P-R Interval 178 ms    QRS Duration 120 ms    Q-T Interval 402 ms    QTC Calculation (Bezet) 481 ms    Calculated P Axis 24 degrees    Calculated R Axis -26 degrees    Calculated T Axis -9 degrees Diagnosis       Normal sinus rhythm  Right bundle branch block  Left anterior kade-block  Nonspecific T wave abnormality  When compared with ECG of 12-MAR-2019 12:02,  QT has shortened  Confirmed by Haris Higgins MD, Monica Pinzon ([id number]) on 3/14/2019 4:06:13 PM          Assessment:   Given the patient's current clinical presentation, there is a high level of concern for decompensation if discharged from the emergency department. Complex decision making was performed, which includes reviewing the patient's available past medical records, laboratory results, and imaging studies. Active Problems:    * No active hospital problems. *    Plan:     #left shoulder dislocation  -left shoulder XR with anterior glenohumeral dislocation  -pain control  -consult ortho, appreciate rec's>attempted closed reduction in ED, but not     #Hypoxic Respiratory Failure  -spo2 89% on RA with improvement to 96% on 2Lnc  -CTA thorax unremarkable  -wean O2 as tolerated    #CAD  -no chest pain or shortness of breath  -continue home carvedilol    #past SDH  -SCD    #glaucoma  -continue home xalatan    #pharmacy consulted for help with med red. DIET: DIET NPO   ISOLATION PRECAUTIONS: There are currently no Active Isolations  CODE STATUS: Prior   DVT PROPHYLAXIS: SCDs  ANTICIPATED DISCHARGE: 24-48 hours. ANTICIPATED DISPOSITION: LTC  EMERGENCY CONTACT/SURROGATE DECISION MAKER: Quang Vera (daughter)      Signed By: Caty Shaikh MD     November 30, 2022         Please note that this dictation may have been completed with Dragon, the [email or web address] voice recognition software. Quite often unanticipated grammatical, syntax, homophones, and other interpretive errors are inadvertently transcribed by the computer software. Please disregard these errors. Please excuse any errors that have escaped final proofreading.

## 2022-12-01 NOTE — PROGRESS NOTES
Progress Note    Subjective:     No acute events over night. Charlee Webster states that she is doing ok. Her pain is currently tolerable. We had a discussion regarding her living location, status, activity level. She is wheelchair-bound but pushes her self up to a standing position to transfer to a chair. She requires the use of her shoulder. We talked about benign neglect, but she says the pain is too significant and she does not want to have the mobility of the shoulder. She would like to proceed with further treatment. Denies CP, SOB, nausea/vomitting, parasthesias.      Objective:   Visit Vitals  /63 (BP 1 Location: Right lower arm, BP Patient Position: At rest)   Pulse 84   Temp 98.7 °F (37.1 °C)   Resp 16   Wt 65.2 kg (143 lb 11.8 oz)   SpO2 96%   BMI 28.07 kg/m²       Patient Vitals for the past 24 hrs:   Temp Pulse Resp BP SpO2   12/01/22 1730 98.7 °F (37.1 °C) 84 16 122/63 96 %   12/01/22 1442 98.1 °F (36.7 °C) 79 20 116/64 96 %   12/01/22 1400 -- 91 -- -- --   12/01/22 1200 -- 86 -- -- --   12/01/22 1000 -- 70 -- -- --   12/01/22 0816 98 °F (36.7 °C) 72 16 109/61 99 %   12/01/22 0600 -- 86 -- -- --   12/01/22 0400 -- 79 -- -- --   12/01/22 0200 -- 87 -- -- --   12/01/22 0059 98.2 °F (36.8 °C) 77 22 123/64 94 %   12/01/22 0013 -- -- -- -- 98 %   12/01/22 0002 97.9 °F (36.6 °C) 88 22 130/75 98 %   11/30/22 1841 97.8 °F (36.6 °C) 96 24 116/66 96 %      Past Medical History:   Diagnosis Date    Arthritis     History of non-ST elevation myocardial infarction (NSTEMI) 11/28/2016    Ill-defined condition     suderal hematoma    Thromboembolus (HCC)     multiple        Physical Exam:  Gen: NAD, AAOx3  Resp: No acute respiratory distress  MSK:  LUE:  Ecchymosis anteriorly over the shoulder  Tender to palpation anteriorly  Pain to range of motion of the shoulder  Motor intact distally  Sensation is intact to light touch distally      No intake or output data in the 24 hours ending 12/01/22 5914    LABS :  Recent Results (from the past 24 hour(s))   METABOLIC PANEL, BASIC    Collection Time: 12/01/22  2:59 AM   Result Value Ref Range    Sodium 141 136 - 145 mmol/L    Potassium 3.6 3.5 - 5.1 mmol/L    Chloride 103 97 - 108 mmol/L    CO2 31 21 - 32 mmol/L    Anion gap 7 5 - 15 mmol/L    Glucose 91 65 - 100 mg/dL    BUN 30 (H) 6 - 20 MG/DL    Creatinine 1.20 (H) 0.55 - 1.02 MG/DL    BUN/Creatinine ratio 25 (H) 12 - 20      eGFR 42 (L) >60 ml/min/1.73m2    Calcium 8.1 (L) 8.5 - 10.1 MG/DL   CBC WITH AUTOMATED DIFF    Collection Time: 12/01/22  2:59 AM   Result Value Ref Range    WBC 9.3 3.6 - 11.0 K/uL    RBC 3.63 (L) 3.80 - 5.20 M/uL    HGB 11.2 (L) 11.5 - 16.0 g/dL    HCT 35.8 35.0 - 47.0 %    MCV 98.6 80.0 - 99.0 FL    MCH 30.9 26.0 - 34.0 PG    MCHC 31.3 30.0 - 36.5 g/dL    RDW 13.8 11.5 - 14.5 %    PLATELET 827 970 - 556 K/uL    MPV 9.4 8.9 - 12.9 FL    NRBC 0.0 0  WBC    ABSOLUTE NRBC 0.00 0.00 - 0.01 K/uL    NEUTROPHILS 75 32 - 75 %    LYMPHOCYTES 15 12 - 49 %    MONOCYTES 9 5 - 13 %    EOSINOPHILS 0 0 - 7 %    BASOPHILS 0 0 - 1 %    IMMATURE GRANULOCYTES 1 (H) 0.0 - 0.5 %    ABS. NEUTROPHILS 7.0 1.8 - 8.0 K/UL    ABS. LYMPHOCYTES 1.4 0.8 - 3.5 K/UL    ABS. MONOCYTES 0.8 0.0 - 1.0 K/UL    ABS. EOSINOPHILS 0.0 0.0 - 0.4 K/UL    ABS. BASOPHILS 0.0 0.0 - 0.1 K/UL    ABS. IMM. GRANS. 0.1 (H) 0.00 - 0.04 K/UL    DF AUTOMATED        Imaging:  X-rays and CT scan show a persistently dislocated left shoulder joint. Review of the CT scan shows a large Hill-Sachs deformity of the humeral head. There is also a flake in the glenohumeral joint which appears to be the anterior half cartilage to the glenoid. There is baseline osteoarthritis with osteophyte formation of both the humeral head and glenoid.     Assessment:   Camryn Hernández is a 80y.o. year-old female with left Shoulder irreducible dislocation, left shoulder osteoarthritis    Plan:   -Weighbearing: NWB LUE  -DVT prophylaxis: SCDs, frequent ambulation, held for surgery  -Antibiotics: Postoperatively  -Pain control: Continue as needed pain management, ice to operative area, elevate  -PT/OT for mobilization  -Dispo:   -An unsuccessful closed reduction was attempted in the ER yesterday by my partner Dr. Markos Darby  -I discussed with the patient her diagnosis and her treatment options. We discussed benign neglect, attempted closed reduction in the operating room without further management if unsuccessful, closed reduction possible open reduction, or reverse total shoulder arthroplasty. I discussed with her that reducing the shoulder may be successful under anesthesia, but with her large Hill-Sachs deformity and the anterior glenoid deformity that I feel she would dislocate again. She says that she cannot live with her arm the way it is and wants the best long-term treatment. I explained to her why reverse total shoulder arthroplasty is and she agreed to proceed forth with surgery. We talked about the risk, benefits, complications, convalescence regarding the surgery. All of her questions were answered.  -The appropriate surgical site was marked  -A consent form was filled out  -To the OR this evening        Mora Willard MD    12/01/22  6:19 PM        Mora Willard M.D.   [address] [facility] [place] Office  Cell: [phone]  Office: [phone]  Medical Staff: Alycia Douglass MA

## 2022-12-01 NOTE — PROGRESS NOTES
Physician Progress Note      PATIENT:               Fausto Price  [facility] #:                  [id number]  :                       10/17/1927  ADMIT DATE:       2022 12:48 PM  DISCH DATE:  RESPONDING  PROVIDER #:        Frandy PEREZ NP          QUERY TEXT:    Patient admitted with shoulder dislocation. Documentation reflects YASH in ED note dated . It is noted patient had a decrease in SCr by greater than or equal to 0.3 mg/dl within 48 hours. If possible, please document in the progress notes and discharge summary if YASH was: The medical record reflects the following:  Risk Factors: 89yo    Clinical Indicators:  Creatinine: 1.54 -- 1.20    ED  I will also admit the patient to the hospitalist service that she has an YASH and new oxygen requirement    Treatment: trend creatinine, IV fluids 75 mL/hr, 1000 mL IV fluid bolus    Thank you,  David  [phone]  I am also available via Perfect Serve. Options provided:  -- YASH confirmed after study  -- YASH ruled out after study  -- Other - I will add my own diagnosis  -- Disagree - Not applicable / Not valid  -- Disagree - Clinically unable to determine / Unknown  -- Refer to Clinical Documentation Reviewer    PROVIDER RESPONSE TEXT:    YASH confirmed after study. Query created by: Karin Crespo on 2022 1:52 PM      QUERY TEXT:    Patient admitted with shoulder dislocation. Noted documentation of acute respiratory failure in H&P. Patient is noted to be in no acute distress, with no shortness of breath per PNs. Nursing, however, notes patient has dyspnea with exertion and at rest on admission. In order to support the diagnosis of acute respiratory failure, please include additional clinical indicators in your documentation. Or please document if the diagnosis of acute respiratory failure has been ruled out after further study.     The medical record reflects the following:  Risk Factors: 89yo    Clinical Indicators:  ED  Respiratory: Positive for cough. Negative for shortness of breath. H&P  General: Alert x oriented x 3, awake, no acute distress, resting in bed, pleasant female, appears to be stated age  Chest: Clear to auscultation bilaterally    Nursing assessment  -admission: dyspnea with exertion; dyspnea at rest  -12/1/2022 0939: dyspnea with exertion    Treatment: supplemental O2    Acute Respiratory Failure Clinical Indicators per  MS-DRG Training Guide and Quick Reference Guide:  pO2 < 60 mmHg or SpO2 (pulse oximetry) < 91% breathing room air  pCO2 > 50 and pH < 7.35  P/F ratio (pO2 / FIO2) < 300  pO2 decrease or pCO2 increase by 10 mmHg from baseline (if known)  Supplemental oxygen of 40% or more  Presence of respiratory distress, tachypnea, dyspnea, shortness of breath, wheezing  Unable to speak in complete sentences  Use of accessory muscles to breathe  Extreme anxiety and feeling of impending doom  Tripod position  Confusion/altered mental status/obtunded    Thank you,  Bokee  [phone]  I am also available via Perfect Serve. Options provided:  -- Acute Respiratory Failure as evidenced by, Please document evidence.   -- Acute Respiratory Failure ruled out after study  -- Other - I will add my own diagnosis  -- Disagree - Not applicable / Not valid  -- Disagree - Clinically unable to determine / Unknown  -- Refer to Clinical Documentation Reviewer    PROVIDER RESPONSE TEXT:    This patient is in acute respiratory failure as evidenced by dyspnea and O2 sat 89% on room air    Query created by: Kenisha Singh on 12/1/2022 1:52 PM      Electronically signed by:  Sierra Dick NP 12/1/2022 2:00 PM

## 2022-12-01 NOTE — PROGRESS NOTES
Bedside and Verbal shift change report given to Zulay Hardy RN (oncoming nurse) by Chayito Campa RN (offgoing nurse). Report included the following information SBAR, Kardex, ED Summary, Intake/Output, MAR, Accordion, and Recent Results.

## 2022-12-01 NOTE — PROGRESS NOTES
Problem: Mobility Impaired (Adult and Pediatric)  Goal: *Acute Goals and Plan of Care (Insert Text)  Description: FUNCTIONAL STATUS PRIOR TO ADMISSION: Patient uses  w/c at baseline and is not ambulatory. Able to stand and transfer to her w/c on her own. Gets meals in her apartment. HOME SUPPORT PRIOR TO ADMISSION: The patient lived alone with family in the area to provide assistance. Physical Therapy Goals  Initiated 12/1/2022  1. Patient will move from supine to sit and sit to supine  in bed with supervision/set-up within 7 day(s). 2.  Patient will transfer from bed to chair and chair to bed with supervision/set-up using the least restrictive device within 7 day(s). 3.  Patient will perform sit to stand with supervision/set-up within 7 day(s). Outcome: Progressing Towards Goal   PHYSICAL THERAPY EVALUATION  Patient: Daniel Zafar (85 y.o. female)  Date: 12/1/2022  Primary Diagnosis: Hypoxia [R09.02]  Procedure(s) (LRB):  SHOULDER ARTHROPLASTY/TOTAL (Left)     Precautions:   Fall, NWB (L UE)    ASSESSMENT  Based on the objective data described below, the patient presents with decreased functional mobility from baseline level of function. Patient currently limited by pain, impaired balance, impaired ability to transfer and decreased activity tolerance. Currently needing modA to come to EOB. Has fair sitting balance on EOB. Reports pain with L shld with all movement and utilized sling to assist with pain management. Patient not agreeable to attempt standing today. Noted there are potential plans for surgery but nothing has been finalized yet. May need rehab pending surgery and mobility progression. Other factors to consider for discharge: at risk for falls, below baseline     Patient will benefit from skilled therapy intervention to address the above noted impairments.        PLAN :  Recommendations and Planned Interventions: bed mobility training, transfer training, gait training, therapeutic exercises, neuromuscular re-education, patient and family training/education, and therapeutic activities      Frequency/Duration: Patient will be followed by physical therapy:  5 times a week to address goals. Recommendation for discharge: (in order for the patient to meet his/her long term goals)  Therapy up to 5 days/week in SNF setting        IF patient discharges home will need the following DME: gait belt and wheelchair         SUBJECTIVE:   Patient stated I can't do anything with this arm.     OBJECTIVE DATA SUMMARY:   HISTORY:    Past Medical History:   Diagnosis Date    Arthritis     History of non-ST elevation myocardial infarction (NSTEMI) 11/28/2016    Ill-defined condition     suderal hematoma    Thromboembolus ([facility] [address]     multiple     Past Surgical History:   Procedure Laterality Date    HX CHOLECYSTECTOMY      hysterectomy, gallbladder    HX ORTHOPAEDIC      hip replacement    VASCULAR SURGERY PROCEDURE UNLIST      ivc filter       Personal factors and/or comorbidities impacting plan of care:     Home Situation  Home Environment: [facility] [address] Name: [email or web address]  One/Two Story Residence: One story  Living Alone: Yes  Support Systems: Child(rhonda)  Patient Expects to be Discharged to[de-identified] Skilled nursing facility  Current DME Used/Available at Home: Wheelchair  Tub or Shower Type: Shower    EXAMINATION/PRESENTATION/DECISION MAKING:   Critical Behavior:  Neurologic State: Alert  Orientation Level: Oriented X4  Cognition: Appropriate decision making, Appropriate for age attention/concentration, Appropriate safety awareness, Follows commands, Recognition of people/places  Safety/Judgement: Awareness of environment  Hearing:   Auditory  Auditory Impairment: None    Range Of Motion:  AROM: Generally decreased, functional                       Strength:    Strength: Generally decreased, functional          Vision:   Corrective Lenses: Glasses  Functional Mobility:  Bed Mobility:  Rolling: Moderate assistance;Assist x2  Supine to Sit: Moderate assistance;Assist x2  Sit to Supine: Maximum assistance;Assist x2  Scooting: Maximum assistance;Assist x2;Bed Modified  Transfers:  Sit to Stand:  (not tested)                          Balance:   Sitting: Impaired  Sitting - Static: Fair (occasional)  Sitting - Dynamic: Fair (occasional)  Standing:  (not tested)  Ambulation/Gait Training:              Gait Description (WDL):  (not tested)          Pain Rating:  Reports pain but does not rate    Activity Tolerance:   Fair and requires rest breaks    After treatment patient left in no apparent distress:   Sitting in chair, Call bell within reach, and Caregiver / family present    COMMUNICATION/EDUCATION:   The patients plan of care was discussed with: Physical therapist, Occupational therapist, and Registered nurse. Fall prevention education was provided and the patient/caregiver indicated understanding., Patient/family have participated as able in goal setting and plan of care. , and Patient/family agree to work toward stated goals and plan of care.     Thank you for this referral.  Dave Mello, PT, DPT   Time Calculation: 23 mins

## 2022-12-01 NOTE — PROGRESS NOTES
[address] [facility]                                                                                          Hospitalist Progress Note  Jessica Perez NP  Answering service: [phone] from in house phone        Date of Service:  2022  NAME:  Natale Duane  :  10/17/1927  MRN:  [id number]      Admission Summary:   Per H&P, Natale Duane is a 80 y.o. female with apmhx CAD, SDH, and past VTE who presents from [address] with dislocated left shoulder status post fall 2 weeks ago, and hypoxic respiratory failure. She had a mechanical fall two weeks ago, and had resultant shoulder pain that she attempted to treat herself. When the pain did not improve, she let her facility know, and was brought to the ED for further evaluation. In the ED, she was hypoxic to 89% with improvement to 96% on 2Lnc. Other VSS. Labs showed WBC 11.5, CO2 35, BUN 38, and creatinine 1.54 (b/l 0.8-0.9). XR left shoulder shows anterior glenohumeral dislocation, and OA. CXR shows no acute intrathoracic disease. In the ED, she received 1Lns, fentanyl, lidocaine prior to attempted closed reduction by ortho, and percocet. Interval history / Subjective:   I saw the patient this morning on rounds. With complaints of significant pain of the left shoulder. Assessment & Plan:         left shoulder dislocation  Glenohumeral dislocation as revealed on imaging  Continuing multimodal pain control  Orthopedics following, appreciate recommendations. Likely to the OR today versus tomorrow for total shoulder    Cording to the revised cardiac risk index, patient with class II risk     Hypoxic Respiratory Failure  Oxygen saturations were 89% on room air, improvement to 96% on 2 L  CTA was unremarkable  Wean oxygen as tolerated  Incentive spirometry    Acute kidney injury  It is unclear what her baseline creatinine is. Last creatinine in our system was in 2019. Creatinine at presentation 1.54, this has improved with IV hydration down to 1.20. Will avoid nephrotoxins  Continuing gentle IV hydration       CAD  Denies chest pain or shortness of breath  She denies having had heart attack in the past  Continuing carvedilol       H/O SDH  Stable     glaucoma  Stable  Continue latanoprost           Code status: DNR  Prophylaxis: SCD  Care Plan discussed with: Patient, nurse, care manager  Anticipated Disposition: I will have physical therapy evaluate postop to determine possible needs. Likely discharge in a couple of days     Hospital Problems  Date Reviewed: 3/12/2019            Codes Class Noted POA    Hypoxia ICD-10-CM: R09.02  ICD-9-CM: 799.02  11/30/2022 Unknown             Review of Systems:   A comprehensive review of systems was negative except for that written in the HPI. Vital Signs:    Last 24hrs VS reviewed since prior progress note. Most recent are:  Visit Vitals  /64 (BP 1 Location: Right upper arm, BP Patient Position: At rest;Semi fowlers)   Pulse 86   Temp 98.2 °F (36.8 °C)   Resp 22   Wt 65.2 kg (143 lb 11.8 oz)   SpO2 94%   BMI 28.07 kg/m²         Intake/Output Summary (Last 24 hours) at 12/1/2022 3334  Last data filed at 11/30/2022 1620  Gross per 24 hour   Intake 1000 ml   Output --   Net 1000 ml        Physical Examination:     I had a face to face encounter with this patient and independently examined them on 12/1/2022 as outlined below:          Constitutional:  No acute distress, cooperative, pleasant    ENT:  Oral mucosa moist, oropharynx benign. Resp:  CTA bilaterally. No wheezing/rhonchi/rales. No accessory muscle use. CV:  Regular rhythm, normal rate, no murmurs, gallops, rubs    GI:  Soft, non distended, non tender. normoactive bowel sounds, no hepatosplenomegaly     Musculoskeletal:  No edema, warm, 2+ pulses throughout    Neurologic:  Moves all extremities left upper extremity.   AAOx3, CN II-XII reviewed            Data Review: Review and/or order of clinical lab test  Review and/or order of tests in the radiology section of [facility]      Labs:     Recent Labs     12/01/22  0259 11/30/22  1315   WBC 9.3 11.5*   HGB 11.2* 12.7   HCT 35.8 39.1    209     Recent Labs     12/01/22  0259 11/30/22  1315    138   K 3.6 3.5    98   CO2 31 35*   BUN 30* 38*   CREA 1.20* 1.54*   GLU 91 112*   CA 8.1* 9.2     Recent Labs     11/30/22  1315   ALT 10*   *   TBILI 1.2*   TP 7.1   ALB 3.1*   GLOB 4.0     No results for input(s): INR, PTP, APTT, INREXT in the last 72 hours. No results for input(s): FE, TIBC, PSAT, FERR in the last 72 hours. Lab Results   Component Value Date/Time    Folate 5.8 03/08/2017 06:00 PM      No results for input(s): PH, PCO2, PO2 in the last 72 hours. No results for input(s): CPK, CKNDX, TROIQ in the last 72 hours.     No lab exists for component: CPKMB  Lab Results   Component Value Date/Time    Cholesterol, total 182 03/08/2017 06:00 PM    HDL Cholesterol 64 03/08/2017 06:00 PM    LDL, calculated 91.2 03/08/2017 06:00 PM    Triglyceride 134 03/08/2017 06:00 PM    CHOL/HDL Ratio 2.8 03/08/2017 06:00 PM     Lab Results   Component Value Date/Time    Glucose (POC) 94 11/20/2016 07:00 AM     Lab Results   Component Value Date/Time    Color YELLOW/STRAW 10/31/2018 04:55 AM    Appearance CLOUDY (A) 10/31/2018 04:55 AM    Specific gravity 1.014 10/31/2018 04:55 AM    pH (UA) 5.5 10/31/2018 04:55 AM    Protein TRACE (A) 10/31/2018 04:55 AM    Glucose NEGATIVE 10/31/2018 04:55 AM    Ketone NEGATIVE 10/31/2018 04:55 AM    Bilirubin NEGATIVE 10/31/2018 04:55 AM    Urobilinogen 0.2 10/31/2018 04:55 AM    Nitrites NEGATIVE 10/31/2018 04:55 AM    Leukocyte Esterase LARGE (A) 10/31/2018 04:55 AM    Epithelial cells FEW 10/31/2018 04:55 AM    Bacteria NEGATIVE 10/31/2018 04:55 AM    WBC >100 (H) 10/31/2018 04:55 AM    RBC 0-5 10/31/2018 04:55 AM         Medications Reviewed:     Current Facility-Administered Medications   Medication Dose Route Frequency    carvediloL (COREG) tablet 3.125 mg  3.125 mg Oral BID WITH MEALS    latanoprost (XALATAN) 0.005 % ophthalmic solution 1 Drop  1 Drop Both Eyes QHS    sodium chloride (NS) flush 5-40 mL  5-40 mL IntraVENous Q8H    sodium chloride (NS) flush 5-40 mL  5-40 mL IntraVENous PRN    acetaminophen (TYLENOL) tablet 650 mg  650 mg Oral Q6H PRN    Or    acetaminophen (TYLENOL) suppository 650 mg  650 mg Rectal Q6H PRN    ondansetron (ZOFRAN ODT) tablet 4 mg  4 mg Oral Q8H PRN    Or    ondansetron (ZOFRAN) injection 4 mg  4 mg IntraVENous Q6H PRN    0.9% sodium chloride infusion  75 mL/hr IntraVENous CONTINUOUS     ______________________________________________________________________  EXPECTED LENGTH OF STAY: - - -  ACTUAL LENGTH OF STAY:          1                 Citlaly Dangelo NP

## 2022-12-01 NOTE — ED NOTES
Verbal shift change report given to Milli Thomson RN (oncoming nurse) by Manuel Mason RN  (offgoing nurse). Report included the following information SBAR, ED Summary, Intake/Output, MAR and Recent Results.

## 2022-12-01 NOTE — PROGRESS NOTES
Problem: Pressure Injury - Risk of  Goal: *Prevention of pressure injury  Description: Document Judson Scale and appropriate interventions in the flowsheet.   Outcome: Progressing Towards Goal  Note: Pressure Injury Interventions:  Sensory Interventions: Keep linens dry and wrinkle-free    Moisture Interventions: Absorbent underpads, Apply protective barrier, creams and emollients, Check for incontinence Q2 hours and as needed, Moisture barrier    Activity Interventions: Pressure redistribution bed/mattress(bed type)    Mobility Interventions: Pressure redistribution bed/mattress (bed type)    Nutrition Interventions: Offer support with meals,snacks and hydration    Friction and Shear Interventions: Apply protective barrier, creams and emollients, Transferring/repositioning devices                Problem: Patient Education: Go to Patient Education Activity  Goal: Patient/Family Education  Outcome: Progressing Towards Goal

## 2022-12-01 NOTE — PROGRESS NOTES
Bedside and Verbal shift change report given to Lynn Echeverria RN (oncoming nurse) by Zulay Hardy RN (offgoing nurse). Report included the following information SBAR and MAR.

## 2022-12-01 NOTE — PROGRESS NOTES
Transition Of Care: Disposition pending. Orthopedics noted possible need for surgery. Return to [facility] vs SNF likely. The patient is from [address] [place] [address] ([phone]), CM called and left message with Ankit Valencia. BLS to transport. RUR: 14%    The patient is from [address] [place] [address]. Orthopedics, PT/OT, hospitalist following. Disposition pending. Care Management Interventions  PCP Verified by CM: Yes (NP Thierry Zhou)  Palliative Care Criteria Met (RRAT>21 & CHF Dx)?: No  Mode of Transport at Discharge: Other (see comment)  Transition of Care Consult (CM Consult): Discharge Planning  MyChart Signup: No  Discharge Durable Medical Equipment: No  Health Maintenance Reviewed: Yes  Physical Therapy Consult: Yes  Occupational Therapy Consult: Yes  Speech Therapy Consult: No  Support Systems: Child(rhonda)  The Plan for Transition of Care is Related to the Following Treatment Goals : Disposition pending. [place] Resource Information Provided?: No  Discharge Location  Patient Expects to be Discharged to[de-identified] Skilled nursing facility     Reason for Admission:  [place]                     RUR Score:   14%                  Plan for utilizing home health:    Dispo pending. No indications at this time. PCP: First and Last name:  Thierry Zhou     Name of Practice:    Are you a current patient: Yes/No: Y   Approximate date of last visit: Nov, 2022   Can you participate in a virtual visit with your PCP:                     Current Advanced Directive/Advance Care Plan: DNR      Healthcare Decision Maker:   Click here to complete [place] including selection of the Healthcare Decision Maker Relationship (ie \"Primary\")           Daughter POA: Kelly Love: [phone]                  Transition of Care Plan:                       CM met with the patient in room 577 and spoke with her daughter and Mayra Nissa ([phone]).  The patient is from [address] [place] [address] [place] end. CM called confirmed the patient is from there and left message with Vickie Bolaños to return call. Pending disposition with Orthopedics and possible need for surgery. CM will continue to follow for discharge needs.     Devika San RN/CRM

## 2022-12-02 LAB
ANION GAP SERPL CALC-SCNC: 4 MMOL/L (ref 5–15)
BASOPHILS # BLD: 0 K/UL (ref 0–0.1)
BASOPHILS NFR BLD: 0 % (ref 0–1)
BUN SERPL-MCNC: 22 MG/DL (ref 6–20)
BUN/CREAT SERPL: 19 (ref 12–20)
CALCIUM SERPL-MCNC: 7.7 MG/DL (ref 8.5–10.1)
CHLORIDE SERPL-SCNC: 108 MMOL/L (ref 97–108)
CO2 SERPL-SCNC: 27 MMOL/L (ref 21–32)
CREAT SERPL-MCNC: 1.15 MG/DL (ref 0.55–1.02)
DIFFERENTIAL METHOD BLD: ABNORMAL
EOSINOPHIL # BLD: 0.1 K/UL (ref 0–0.4)
EOSINOPHIL NFR BLD: 1 % (ref 0–7)
ERYTHROCYTE [DISTWIDTH] IN BLOOD BY AUTOMATED COUNT: 14.1 % (ref 11.5–14.5)
GLUCOSE SERPL-MCNC: 103 MG/DL (ref 65–100)
HCT VFR BLD AUTO: 34.8 % (ref 35–47)
HGB BLD-MCNC: 10.9 G/DL (ref 11.5–16)
IMM GRANULOCYTES # BLD AUTO: 0.1 K/UL (ref 0–0.04)
IMM GRANULOCYTES NFR BLD AUTO: 1 % (ref 0–0.5)
LYMPHOCYTES # BLD: 1.1 K/UL (ref 0.8–3.5)
LYMPHOCYTES NFR BLD: 11 % (ref 12–49)
MCH RBC QN AUTO: 31.1 PG (ref 26–34)
MCHC RBC AUTO-ENTMCNC: 31.3 G/DL (ref 30–36.5)
MCV RBC AUTO: 99.1 FL (ref 80–99)
MONOCYTES # BLD: 0.9 K/UL (ref 0–1)
MONOCYTES NFR BLD: 9 % (ref 5–13)
NEUTS SEG # BLD: 7.8 K/UL (ref 1.8–8)
NEUTS SEG NFR BLD: 78 % (ref 32–75)
NRBC # BLD: 0 K/UL (ref 0–0.01)
NRBC BLD-RTO: 0 PER 100 WBC
PLATELET # BLD AUTO: 171 K/UL (ref 150–400)
PMV BLD AUTO: 8.9 FL (ref 8.9–12.9)
POTASSIUM SERPL-SCNC: 3.6 MMOL/L (ref 3.5–5.1)
RBC # BLD AUTO: 3.51 M/UL (ref 3.8–5.2)
SODIUM SERPL-SCNC: 139 MMOL/L (ref 136–145)
WBC # BLD AUTO: 10 K/UL (ref 3.6–11)

## 2022-12-02 PROCEDURE — 65270000046 HC RM TELEMETRY

## 2022-12-02 PROCEDURE — 74011250636 HC RX REV CODE- 250/636: Performed by: NURSE PRACTITIONER

## 2022-12-02 PROCEDURE — 74011250636 HC RX REV CODE- 250/636: Performed by: ORTHOPAEDIC SURGERY

## 2022-12-02 PROCEDURE — 74011000250 HC RX REV CODE- 250: Performed by: FAMILY MEDICINE

## 2022-12-02 PROCEDURE — 80048 BASIC METABOLIC PNL TOTAL CA: CPT

## 2022-12-02 PROCEDURE — 97530 THERAPEUTIC ACTIVITIES: CPT

## 2022-12-02 PROCEDURE — 74011000250 HC RX REV CODE- 250

## 2022-12-02 PROCEDURE — 97164 PT RE-EVAL EST PLAN CARE: CPT

## 2022-12-02 PROCEDURE — 74011250637 HC RX REV CODE- 250/637: Performed by: FAMILY MEDICINE

## 2022-12-02 PROCEDURE — 97168 OT RE-EVAL EST PLAN CARE: CPT

## 2022-12-02 PROCEDURE — 74011250637 HC RX REV CODE- 250/637: Performed by: ORTHOPAEDIC SURGERY

## 2022-12-02 PROCEDURE — 74011000250 HC RX REV CODE- 250: Performed by: ORTHOPAEDIC SURGERY

## 2022-12-02 PROCEDURE — 36415 COLL VENOUS BLD VENIPUNCTURE: CPT

## 2022-12-02 PROCEDURE — 85025 COMPLETE CBC W/AUTO DIFF WBC: CPT

## 2022-12-02 PROCEDURE — 74011250637 HC RX REV CODE- 250/637: Performed by: NURSE PRACTITIONER

## 2022-12-02 RX ORDER — NORETHINDRONE AND ETHINYL ESTRADIOL 0.5-0.035
KIT ORAL
Status: COMPLETED
Start: 2022-12-02 | End: 2022-12-02

## 2022-12-02 RX ORDER — ACETAMINOPHEN 325 MG/1
650 TABLET ORAL EVERY 6 HOURS
Status: DISCONTINUED | OUTPATIENT
Start: 2022-12-02 | End: 2022-12-06

## 2022-12-02 RX ORDER — SODIUM CHLORIDE 9 MG/ML
75 INJECTION, SOLUTION INTRAVENOUS CONTINUOUS
Status: DISCONTINUED | OUTPATIENT
Start: 2022-12-02 | End: 2022-12-06

## 2022-12-02 RX ORDER — FAMOTIDINE 20 MG/1
20 TABLET, FILM COATED ORAL DAILY
Status: DISCONTINUED | OUTPATIENT
Start: 2022-12-03 | End: 2022-12-09 | Stop reason: HOSPADM

## 2022-12-02 RX ORDER — NORETHINDRONE AND ETHINYL ESTRADIOL 0.5-0.035
10 KIT ORAL ONCE
Status: COMPLETED | OUTPATIENT
Start: 2022-12-02 | End: 2022-12-02

## 2022-12-02 RX ADMIN — HYDROXYZINE HYDROCHLORIDE 10 MG: 10 TABLET ORAL at 22:35

## 2022-12-02 RX ADMIN — HYDROMORPHONE HYDROCHLORIDE 0.5 MG: 1 INJECTION, SOLUTION INTRAMUSCULAR; INTRAVENOUS; SUBCUTANEOUS at 08:34

## 2022-12-02 RX ADMIN — LATANOPROST 1 DROP: 50 SOLUTION OPHTHALMIC at 02:40

## 2022-12-02 RX ADMIN — ACETAMINOPHEN 650 MG: 325 TABLET ORAL at 17:16

## 2022-12-02 RX ADMIN — SODIUM CHLORIDE, PRESERVATIVE FREE 10 ML: 5 INJECTION INTRAVENOUS at 14:00

## 2022-12-02 RX ADMIN — Medication 10 ML: at 21:29

## 2022-12-02 RX ADMIN — CARVEDILOL 3.12 MG: 6.25 TABLET, FILM COATED ORAL at 10:01

## 2022-12-02 RX ADMIN — SODIUM CHLORIDE, PRESERVATIVE FREE 10 ML: 5 INJECTION INTRAVENOUS at 05:19

## 2022-12-02 RX ADMIN — SODIUM CHLORIDE 75 ML/HR: 900 INJECTION, SOLUTION INTRAVENOUS at 02:40

## 2022-12-02 RX ADMIN — Medication 10 ML: at 14:00

## 2022-12-02 RX ADMIN — SODIUM CHLORIDE, PRESERVATIVE FREE 10 ML: 5 INJECTION INTRAVENOUS at 21:29

## 2022-12-02 RX ADMIN — EPHEDRINE SULFATE 10 MG: 50 INJECTION INTRAVENOUS at 00:39

## 2022-12-02 RX ADMIN — NORETHINDRONE AND ETHINYL ESTRADIOL 10 MG: KIT ORAL at 00:39

## 2022-12-02 RX ADMIN — CEFAZOLIN 2 G: 1 INJECTION, POWDER, FOR SOLUTION INTRAMUSCULAR; INTRAVENOUS at 05:11

## 2022-12-02 RX ADMIN — OXYCODONE HYDROCHLORIDE 5 MG: 5 TABLET ORAL at 03:55

## 2022-12-02 RX ADMIN — OXYCODONE HYDROCHLORIDE 5 MG: 5 TABLET ORAL at 14:35

## 2022-12-02 RX ADMIN — SENNOSIDES AND DOCUSATE SODIUM 1 TABLET: 50; 8.6 TABLET ORAL at 10:01

## 2022-12-02 RX ADMIN — POLYETHYLENE GLYCOL 3350 17 G: 17 POWDER, FOR SOLUTION ORAL at 10:01

## 2022-12-02 RX ADMIN — OXYCODONE HYDROCHLORIDE 5 MG: 5 TABLET ORAL at 10:04

## 2022-12-02 RX ADMIN — OXYCODONE HYDROCHLORIDE 5 MG: 5 TABLET ORAL at 22:35

## 2022-12-02 RX ADMIN — FAMOTIDINE 20 MG: 20 TABLET, FILM COATED ORAL at 10:01

## 2022-12-02 RX ADMIN — RIVAROXABAN 10 MG: 10 TABLET, FILM COATED ORAL at 17:17

## 2022-12-02 RX ADMIN — LATANOPROST 1 DROP: 50 SOLUTION OPHTHALMIC at 22:00

## 2022-12-02 RX ADMIN — ACETAMINOPHEN 650 MG: 325 TABLET ORAL at 12:37

## 2022-12-02 RX ADMIN — CARVEDILOL 3.12 MG: 6.25 TABLET, FILM COATED ORAL at 17:17

## 2022-12-02 RX ADMIN — CEFAZOLIN 2 G: 1 INJECTION, POWDER, FOR SOLUTION INTRAMUSCULAR; INTRAVENOUS at 13:38

## 2022-12-02 RX ADMIN — SENNOSIDES AND DOCUSATE SODIUM 1 TABLET: 50; 8.6 TABLET ORAL at 17:17

## 2022-12-02 RX ADMIN — SODIUM CHLORIDE 75 ML/HR: 900 INJECTION, SOLUTION INTRAVENOUS at 16:13

## 2022-12-02 NOTE — PROGRESS NOTES
Bedside and Verbal shift change report given to JIMY Cabello (oncoming nurse) by Shital Frazier RN (offgoing nurse). Report included the following information SBAR, Kardex, Procedure Summary, Intake/Output, MAR, Accordion, and Recent Results.

## 2022-12-02 NOTE — PERIOP NOTES
2315 Noted pt's vitals:  Hr 95, bp 81/39, resp 22, and sat 100%. Repeated vitals: Hr 93, bp 100/43, resp 12. Pt A&Ox4 and asymptomatic. Called and informed Dr. Yariel Summers of the following. New order for albumin IVP. Ordered noted. 0030 Vitals: Hr 88, bp 103/38, resp 111, and sats 99%. Repeated vitals:Hr 89. Resp 13, bp 114/47, and sats 97%. Pt A&Ox4 and asymptomatic. Called and informed Dr. Yariel Summers of the following. New order for ephedrine IM. Ordered noted. 0059 Vitals:Hr 99, bp 108/86, resp 19, sats 94%. Pt is now resting in bed no s/s distress. Continue to monitor. 0110 TRANSFER - OUT REPORT:    Verbal report given to Rosalidn Andrea(name) on Walter Kang  being transferred to [facility](unit) for routine post - op       Report consisted of patients Situation, Background, Assessment and   Recommendations(SBAR). Time Pre op antibiotic given 2038  Anesthesia Stop time: 0100    Information from the following report(s) SBAR, OR Summary, Intake/Output, Recent Results, and Cardiac Rhythm NSR/SINUS [place]  was reviewed with the receiving nurse. Opportunity for questions and clarification was provided. Is the patient on 02? YES       L/Min 2       Other 0    Is the patient on a monitor? NO    Is the nurse transporting with the patient? YES    Surgical Waiting Area notified of patient's transfer from PACU? NO      The following personal items collected during your admission accompanied patient upon transfer:   Dental Appliance: Dental Appliances: None  Vision: Visual Aid: None  Hearing Aid: Hearing Aid: None  Jewelry: Jewelry: None  Clothing: Clothing:  (no belongings to preop)  Other Valuables:  Other Valuables: None  Valuables sent to safe:

## 2022-12-02 NOTE — PROGRESS NOTES
Problem: Mobility Impaired (Adult and Pediatric)  Goal: *Acute Goals and Plan of Care (Insert Text)  Description: FUNCTIONAL STATUS PRIOR TO ADMISSION: Patient uses  w/c at baseline and is not ambulatory. Able to stand and transfer to her w/c on her own. Gets meals in her apartment. HOME SUPPORT PRIOR TO ADMISSION: The patient lived alone with family in the area to provide assistance. Physical Therapy  Reassessed after TSA surgery 12/2/2022  1. Patient will move from supine to sit and sit to supine  in bed with moderate assistance of 1 within 7 day(s). 2.  Patient will transfer from bed to chair and chair to bed with moderate assistance of 1 using the least restrictive device within 7 day(s). 3.  Patient will perform sit to stand with moderate assistance of 1 within 7 day(s). Physical Therapy Goals  Initiated 12/1/2022  1. Patient will move from supine to sit and sit to supine  in bed with supervision/set-up within 7 day(s). 2.  Patient will transfer from bed to chair and chair to bed with supervision/set-up using the least restrictive device within 7 day(s). 3.  Patient will perform sit to stand with supervision/set-up within 7 day(s). Outcome: Not Progressing Towards Goal   PHYSICAL THERAPY REEVALUATION  Patient: Silvia Metzger (25 y.o. female)  Date: 12/2/2022  Primary Diagnosis: Hypoxia [R09.02]  Procedure(s) (LRB):  SHOULDER ARTHROPLASTY/TOTAL (Left) 1 Day Post-Op   Precautions:   Fall, WBAT      ASSESSMENT  Based on the objective data described below, the patient presents with  impairment in functional mobility, activity tolerance and balance s/p left Shoulder Arthroplasty due to dislocation from GLF. Patient lives in [address] [place] [address] and was functional at wheelchair level. Closed Reduction was successful temporarily but then shoulder dislocated again.       Current Level of Function Impacting Discharge (mobility/balance): Performed rolling, supine-sidelying, sidelying-sit, sitting on edge of bed. Sling reapplied for better fit. Patient able to maintain sitting with use of RUE. Returned to supine, then repositioned. Bed mobility required maximal assistance of 2 due to pain and inability to use LUE. Had a difficult time getting patient comfortable back in the bed. Used many pillows and towel rolls. Functional Outcome Measure: The patient scored 10/100 on the Barthel outcome measure which is indicative of maximal amimpaired ability to care for basic self-needs/dependency on others. .      Other factors to consider for discharge: Far from PLOF (wheelchair level mobility)/Unable to use LUE for some time    Patient will benefit from skilled therapy intervention to address the above noted impairments. PLAN :  Recommendations and Planned Interventions: bed mobility training, transfer training, patient and family training/education, and therapeutic activities      Frequency/Duration: Patient will be followed by physical therapy:  5 times a week to address goals. Recommendation for discharge: (in order for the patient to meet his/her long term goals)  Therapy up to 5 days/week in SNF setting    This discharge recommendation:  Has been made in collaboration with the attending provider and/or case management    Equipment recommendations for successful discharge (if) home: none         SUBJECTIVE:   Patient stated My arm hurts and my back hurts.     OBJECTIVE DATA SUMMARY:   HISTORY:    Past Medical History:   Diagnosis Date    Arthritis     History of non-ST elevation myocardial infarction (NSTEMI) 11/28/2016    Ill-defined condition     suderal hematoma    Thromboembolus ([facility] [address]     multiple     Past Surgical History:   Procedure Laterality Date    HX CHOLECYSTECTOMY      hysterectomy, gallbladder    HX ORTHOPAEDIC      hip replacement    VASCULAR SURGERY PROCEDURE UNLIST      ivc filter     Hospital course since last seen and reason for reevaluation: Patient had TSA yesterday.  As she can no longer use LUE, goals were downgraded and reset. Personal factors and/or comorbidities impacting plan of care: Far from PLOF (wheelchair level mobility)/Unable to use LUE for some time    Home Situation  Home Environment: [address] [place] [address] Road Name: [address] [place] [address]  # Steps to Enter: 0  Rails to Enter: No  [employer] :  (N/A)  Wheelchair Ramp: Yes  One/Two Story Residence: One story  Living Alone: No  Support Systems: Assisted Living  Patient Expects to be Discharged to[de-identified] Assisted Living (vs SNF)  Current DME Used/Available at Home: Wheelchair  Tub or Shower Type: Shower    EXAMINATION/PRESENTATION/DECISION MAKING:   Critical Behavior:  Neurologic State: Alert  Orientation Level: Oriented X4  Cognition: Follows commands  Safety/Judgement: Awareness of environment  Hearing: Auditory  Auditory Impairment: None    Range Of Motion:  AROM: Generally decreased, functional           PROM: Generally decreased, functional           Strength:    Strength: Generally decreased, functional                    Tone & Sensation:   Tone: Abnormal              Sensation: Intact               Coordination:  Coordination: Generally decreased, functional  Vision:      Functional Mobility:  Bed Mobility:  Rolling: Maximum assistance;Assist x2  Supine to Sit: Maximum assistance;Assist x2  Sit to Supine: Maximum assistance;Assist x2  Scooting: Total assistance;Assist x2 (use of bed pad)  Transfers:                             Balance:   Sitting: Impaired; Without support  Sitting - Static: Fair (occasional)  Sitting - Dynamic: Poor (constant support)  Standing:  (unable)  Ambulation/Gait Training:              Gait Description (WDL):  (Non-Ambulatory)        Left Side Weight Bearing: Non-weight bearing (L UE)                           Therapeutic Exercises:    Ankle Pumps  Heel Slides    Functional Measure:  Barthel Index:    Bathin  Bladder: 0  Bowels: 5  Groomin  Dressin  Feedin  Mobility: 0  Stairs: 0  Toilet Use: 0  Transfer (Bed to Chair and Back): 0  Total: 10/100       The Barthel ADL Index: Guidelines  1. The index should be used as a record of what a patient does, not as a record of what a patient could do. 2. The main aim is to establish degree of independence from any help, physical or verbal, however minor and for whatever reason. 3. The need for supervision renders the patient not independent. 4. A patient's performance should be established using the best available evidence. Asking the patient, friends/relatives and nurses are the usual sources, but direct observation and common sense are also important. However direct testing is not needed. 5. Usually the patient's performance over the preceding 24-48 hours is important, but occasionally longer periods will be relevant. 6. Middle categories imply that the patient supplies over 50 per cent of the effort. 7. Use of aids to be independent is allowed. Score Interpretation (from [facility])    Independent   60-79 Minimally independent   40-59 Partially dependent   20-39 Very dependent   <20 Totally dependent     -Rian Rose., Barthel, D.W. (1965). Functional evaluation: the Barthel Index. [facility] ([address] (1997). The Barthel activities of daily living index: self-reporting versus actual performance in the old (> or = 75 years). Journal of [address] [place] [address] 45(7), 14 [facility], J.HEATHER, Lillie River., Beny Turcios. (1999). Measuring the change in disability after inpatient rehabilitation; comparison of the responsiveness of the Barthel Index and Functional [place] Measure. Journal of Neurology, Neurosurgery, and Psychiatry, 66(4), 549-267. Melecio Wing, N.[place].A, MELIA Post, & Diego Dick, MTimurA. (2004) Assessment of post-stroke quality of life in cost-effectiveness studies: The usefulness of the Barthel Index and the EuroQoL-5D.  Quality of Life Research, 13, 427-43             Pain Rating:  Unable to rate but cries out frequently during mobility    Activity Tolerance:   Fair--limited by pain    After treatment patient left in no apparent distress:   Supine in bed, Call bell within reach, Bed / chair alarm activated, Side rails x 3, and nurse notified. COMMUNICATION/EDUCATION:   The patients plan of care was discussed with: Occupational therapist, Registered nurse, and Case management. Fall prevention education was provided and the patient/caregiver indicated understanding., Patient/family have participated as able in goal setting and plan of care. , and Patient/family agree to work toward stated goals and plan of care.     Thank you for this referral.  Chelita Ring   Time Calculation: 22 mins

## 2022-12-02 NOTE — PROGRESS NOTES
Problem: Self Care Deficits Care Plan (Adult)  Goal: *Acute Goals and Plan of Care (Insert Text)  Description: FUNCTIONAL STATUS PRIOR TO ADMISSION: Pt reports she is Moderate A for ADLs, seated shower chair or seated W/C level and briefly standing at RW. HOME SUPPORT: The patient lived with [facility] staffing to provide Moderate ADL assist.      Occupational Therapy Goals  Initiated 12/2/2022   1. Patient will don/doff arm sling at maximal assistance level within 7 days. 2.  Patient will perform [facility] and pendulum exercises per physician order with moderate assistance  within 7 days. 3.  Patient will perform upper body ADLs with moderate assistance   within 7 days. 4. Patient will perform supine <> sit with minimal assistance to participate with ADL tasks within 7 days. 5.  Patient will perform toilet transfers with moderate assistance  using  Walkers, Type: Ion Walker within 7 days. 6.  Patient will verbalize/demonstrate shoulder precautions during ADLs with 100% accuracy within 7 days. Initiated 12/1/2022  1. Patient will perform seated self-feeding with supervision/set-up within 7 day(s). 2.  Patient will perform seated grooming with minimal assistance within 7 day(s). 3.  Patient will perform seated upper body bathing with moderate assistance  within 7 day(s). 4.  Patient will perform toilet transfers, EOB to [facility], with maximal assistance within 7 day(s). Outcome: Not Met   OCCUPATIONAL THERAPY RE-EVALUATION  Patient: Marcelina Nugent (89 y.o. female)  Date: 12/2/2022  Diagnosis: Hypoxia [R09.02] <principal problem not specified>  Procedure(s) (LRB):  SHOULDER ARTHROPLASTY/TOTAL (Left) 1 Day Post-Op  Precautions: Fall, LUE NWB, LUE no IR/ER, EWH and pendulum exercises,  Chart, occupational therapy assessment, plan of care, and goals were reviewed.     ASSESSMENT  Based on the objective data described below, with impaired functional mobility, activity tolerance, sitting balance, L shoulder pain, L reverse TSA precautions impacting her ability to complete ADL tasks s/p POD 1 from reverse TSA from recent dislocation from [facility]. Nursing cleared for therapy. Received in bed, agreeable to therapy to EOB. Supine > sit with max A x2 with forward flexed posture in sitting. She sleeps in lift chair recliner at home. Total A for sling repositioning and ed on no shoulder IR/ER, NWB. Brief ed on [facility] exercises. Pendulums will be difficult as patient with limited standing tolerance or balance at baseline. Returned to bed with max Ax2, with HOB elevated and multiple pillows at trunk. Patient with difficulty with finding comfortable position, discussed with nursing and ortho PA. Current Level of Function Impacting Discharge (ADLs): drinking min A, grooming mod A, LB care total A    Other factors to consider for discharge: Patient now requiring x2 assist with mobility and limited with L UE restrictions and LUE pain. Recommend SNF vs return to shelter with additional assistance         PLAN :  Recommendations and Planned Interventions: self care training, functional mobility training, therapeutic exercise, balance training, therapeutic activities, endurance activities, patient education, home safety training, and family training/education    Frequency/Duration: Patient will be followed by occupational therapy 5 times a week to address goals. Recommend with staff: bed in chair as tolerated for meals, reminder of shoulder precautions, setup/assist with feeding    Recommend next OT session: progress POC    Recommendation for discharge: (in order for the patient to meet his/her long term goals)  Therapy up to 5 days/week in SNF setting    This discharge recommendation:  Has been made in collaboration with the attending provider and/or case management    Equipment recommendations for successful discharge (if) home: TBD SNF       SUBJECTIVE:   Patient stated I dont want to stand.     OBJECTIVE DATA SUMMARY:   Hospital course since last seen and reason for reevaluation: POD 1 L reverese TKA    Cognitive/Behavioral Status:  Neurologic State: Alert; Appropriate for age  Orientation Level: Oriented X4  Cognition: Appropriate for age attention/concentration          Hearing: Auditory  Auditory Impairment: None    Vision/Perceptual:                                     Range of Motion:  AROM: Generally decreased, functional, LE hand/wrist WDL  PROM: Generally decreased, functional                      Strength:  Strength: Generally decreased, functional                Coordination:  Coordination: Generally decreased, functional  Fine Motor Skills-Upper: Left Intact; Right Intact    Gross Motor Skills-Upper: Left Intact; Right Intact    Tone & Sensation:  Tone: Abnormal  Sensation: Intact                        Functional Mobility and Transfers for ADLs:  Bed Mobility:  Rolling: Maximum assistance;Assist x2  Supine to Sit: Maximum assistance;Assist x2  Sit to Supine: Maximum assistance;Assist x2  Scooting: Total assistance;Assist x2 (use of bed pad)      Balance:  Sitting: Impaired; Without support  Sitting - Static: Fair (occasional)  Sitting - Dynamic: Poor (constant support)  Standing:  (unable)    ADL Assessment:  Feeding: Minimum assistance  Oral Facial Hygiene/Grooming: Maximum assistance  Bathing: Maximum assistance  Type of Bath: Patient refused  Upper Body Dressing: Total assistance  Lower Body Dressing: Total assistance  Toileting: Total assistance                ADL Intervention and task modifications:       Drinking with RUE min A  Sling total A       Functional Measure:    Barthel Index:  Bathin  Bladder: 0  Bowels: 5  Groomin  Dressin  Feedin  Mobility: 0  Stairs: 0  Toilet Use: 0  Transfer (Bed to Chair and Back): 0  Total: 10/100      The Barthel ADL Index: Guidelines  1. The index should be used as a record of what a patient does, not as a record of what a patient could do.   2. The main aim is to establish degree of independence from any help, physical or verbal, however minor and for whatever reason. 3. The need for supervision renders the patient not independent. 4. A patient's performance should be established using the best available evidence. Asking the patient, friends/relatives and nurses are the usual sources, but direct observation and common sense are also important. However direct testing is not needed. 5. Usually the patient's performance over the preceding 24-48 hours is important, but occasionally longer periods will be relevant. 6. Middle categories imply that the patient supplies over 50 per cent of the effort. 7. Use of aids to be independent is allowed. Score Interpretation (from [address] [facility] 83)    Independent   60-79 Minimally independent   40-59 Partially dependent   20-39 Very dependent   <20 Totally dependent     -Rian Rose., Barthel, D.W. (1965). Functional evaluation: the Barthel Index. [address] [facility] ([address] (1997). The Barthel activities of daily living index: self-reporting versus actual performance in the old (> or = 75 years). Journal [address] 45(7), 14 [facility], J.GURPREETF, Dakota Lyons., Evi Aguirre. (1999). Measuring the change in disability after inpatient rehabilitation; comparison of the responsiveness of the Barthel Index and Functional [place] Measure. Journal of Neurology, Neurosurgery, and Psychiatry, 66(4), 812-590. Benita Pressley, [place].TATYANA, MELIA Post, & Milvia Perdomo, MTimurA. (2004) Assessment of post-stroke quality of life in cost-effectiveness studies: The usefulness of the Barthel Index and the EuroQoL-5D.  Quality of Life Research, 13, 427-43         Pain:  Severe pain in L shoulder    Activity Tolerance:   Poor, supplemental O2 2L    After treatment patient left in no apparent distress:   Supine in bed, Call bell within reach, Bed / chair alarm activated, and Side rails x 3    COMMUNICATION/COLLABORATION:   The patients plan of care was discussed with: Physical therapist and Registered nurse.      Karissa De Leon OT  Time Calculation: 36 mins

## 2022-12-02 NOTE — OP NOTES
Operative Report    Patient Name: Elsie Toledo    Patient YOB: 1927    [facility] MRN: [id number]    Date of Procedure: 12/01/22    Surgical Location: [facility]    Pre-operative Diagnosis: left shoulder irreducible dislocation, left shoulder osteoarthritis, left shoulder cuff tear arthropathy    Post-operative Diagnosis: left shoulder irreducible dislocation, left shoulder osteoarthritis, left shoulder cuff tear arthropathy    Procedure: left reverse total shoulder arthroplasty    Surgeon: Dixon De La Cruz MD    Assistant/Staff: Circ-1: Yesenia Zurita RN  Circ-Relief: Thomas Kelsey RN  Scrub RN-1: Leandro Reed RN  Surg Asst-1: Rozann Fleischer, Robert  Surg Asst-2: Abril Pierre Staff: Pamella Harrison RN    Anesthesia: General    Preoperative IV Antibiotics: Ancef 2g    Findings: osteoarthritis, rotator cuff tear    Estimated Blood Loss: 150 ml    Implants: Flip Reverse total shoulder arthroplasty, 36mm head, size 11mm micro stem, standard polyethylene and tray    Specimens: None    Complications: None    Disposition: PACU - hemodynamically stable. Condition: stable      Indication for Procedure: The patient presented to the Emergency department complaining of 3 weeks of shoulder pain. She had a fall at her assisted living facility and her pain has been constant and worsening. She was found to have a shoulder dislocation. A closed reduction was attempted in the emergency department by my partner which was unsuccessful. CT scan showed persistent dislocation with a large Hill-Sachs deformity. There are also bony flakes throughout the shoulder. There was a massive cuff tear. we discussed the spectrum of treatment options, including nonoperative benign neglect, closed reduction in the operating room, open reduction in the operating room if closed failed, and reverse total shoulder arthroplasty.   She elected to proceed with reverse shoulder arthroplasty as definitive management. At baseline she is wheelchair-bound. She uses the arm to move around her wheelchair and also to stand from her wheelchair to transition to a different seat or toileting. Procedure in Detail:  The patient was met in the preoperative holding area and the left shoulder was marked. A consent form was signed to proceed forth with surgery. The patient was given a preoperative scalene block. The patient was brought into the operating room, given general anesthesia and placed in the beach chair position. I performed a closed reduction of the joint with palpable reduction. With any motion to the shoulder there would be crepitus and recurrent dislocation. The left shoulder was prepped and draped in the usual sterile fashion. A multidisciplinary time-out was performed. Prophylactic antibiotics were dosed. A deltopectoral approach to the shoulder was taken extending from 5 cm medial to the [facility] joint towards the deltoidpectoral interval. The cephalic vein was identified, dissected medially and later cauterized due to tearing. She had a very large bursal swelling. I released the subdeltoid adhesions and identified the biceps groove. She had a complete rotator cuff tear of the supraspinatus, infraspinatus and teres minor. The biceps tendon was also torn. I peeled the subscapularis tendon from the lesser tuberosity and exposed the humeral head. Using the 30 degree guide, I cut the humeral head at the level of the articular surface. I began with canal reamers followed by broaches. We broached up to the appropriate size. A cover was placed for humeral head protection. I then placed retractors into the glenoid and exposed the subscapularis tendon. I released anterior and posterior subscapularis tendon adhesions. The labrum was then removed circumferentially around the glenoid. A central pin was placed through the guide bicortically. Next we used the central reamer.   The baseplate was then inserted. A central compression screw was inserted with great bite. I then used the locking screw guide to place 4 peripheral 5.0 mm locking screws. The glenoid sphere was placed over the base plate. I placed the trial polyethylene at the top of the broach. The shoulder was reduced, and trial range of motion revealed forward flexion of 150 degrees, internal rotation to 80 degrees, external rotation to 70 degrees in abduction and external rotation to 50 degrees in neutral position without subluxation or dislocation. I was also able to simulate vertical load from weight bearing without instability. She had excellent motion without any sign of instability. I was able to dislocate with strong traction of a bone hook on the humeral side. The trial components were removed. two #2 FiberWire sutures through the anterior humeral cortex. The final humeral stem was placed. The final polyethylene was inserted and the shoulder reduced. Again I placed it through a trial range of motion and was satisfied with the range of motion and showed no sign of instability. The FiberWire sutures were then passed through the subscapularis tendon and secured. A 3 min Betadine soak was performed and the shoulder was lavaged with irrigation. The fascia was closed with 0 Vicryl simple interrupted suture. The skin was closed with 2-0 Vicryl subcutaneously, followed by staples superficially. The wound was covered with AquacelAg. Surgical count was correct at the end of the procedure. The patient was placed in a postoperative shoulder sling. The patient was awakened from anesthesia and brought to the PACU in stable condition. Postoperative plan:  The patient will return to her room. She will work with occupational and physical therapy. The left arm will be okay for baseline use of pushing herself up from her wheelchair, and is ok for pendulums and elbow and wrist motion.  We will perform physical therapy on an outpatient basis.      Nolvia Garcia MD     12/01/22

## 2022-12-02 NOTE — PROGRESS NOTES
12/01/22 2056   Family Communication   Family Update Message Procedure started   Delivery Origin Nurse    Relationship to Patient Other relative    Phone Number Rolando Locke   Family/Significant Other Update Called

## 2022-12-02 NOTE — PROGRESS NOTES
TRANSFER - IN REPORT:    Verbal report received from JIMY IRVING(name) on Anastasia Nolan  being received from [facility] PACU(unit) for routine post - op      Report consisted of patients Situation, Background, Assessment and   Recommendations(SBAR). Information from the following report(s) SBAR, Kardex, Procedure Summary, Intake/Output, MAR, Accordion, and Recent Results was reviewed with the receiving nurse. Opportunity for questions and clarification was provided. Assessment completed upon patients arrival to unit and care assumed.

## 2022-12-02 NOTE — PROGRESS NOTES
Progress Note    Status Post: Right reverse total shoulder arthroplasty  Date of Surgery: 12/1/22  Surgeon: Dr. Aryan Palma    Subjective:     No acute events over night. Natale Duane states that she is doing ok and in significant pain. She describes a pulling sensation in the shoulder. She also complains of pain radiating down the right lower extremity. She initially says she has numbness but she has full motion to the upper and lower extremities and has normal sensation to the touch. Denies CP, SOB, nausea/vomitting, parasthesias.      Objective:   Visit Vitals  /64   Pulse 85   Temp 97.2 °F (36.2 °C)   Resp 16   Wt 65.2 kg (143 lb 11.8 oz)   SpO2 90%   BMI 28.07 kg/m²       Patient Vitals for the past 24 hrs:   Temp Pulse Resp BP SpO2   12/02/22 0752 97.2 °F (36.2 °C) 85 16 115/64 90 %   12/02/22 0551 -- 93 -- -- --   12/02/22 0500 98.4 °F (36.9 °C) 95 18 112/63 96 %   12/02/22 0355 98 °F (36.7 °C) 99 20 (!) 106/58 96 %   12/02/22 0352 -- 97 -- -- --   12/02/22 0241 98.2 °F (36.8 °C) (!) 105 20 (!) 112/59 93 %   12/02/22 0151 -- (!) 105 -- -- --   12/02/22 0137 97.7 °F (36.5 °C) (!) 101 20 108/67 97 %   12/02/22 0115 -- (!) 109 20 (!) 114/47 95 %   12/02/22 0110 -- (!) 103 20 (!) 124/58 95 %   12/02/22 0100 98.4 °F (36.9 °C) 99 19 (!) 115/90 96 %   12/02/22 0059 -- 99 19 108/86 94 %   12/02/22 0050 -- (!) 104 22 (!) 103/45 98 %   12/02/22 0045 -- 99 13 (!) 114/47 97 %   12/02/22 0030 -- 88 11 (!) 103/38 99 %   12/02/22 0015 -- 91 16 (!) 109/45 96 %   12/02/22 0000 -- 88 15 (!) 102/40 99 %   12/01/22 2345 -- 89 12 (!) 99/50 100 %   12/01/22 2330 -- 91 13 (!) 90/43 100 %   12/01/22 2325 -- 92 12 -- 98 %   12/01/22 2320 -- 93 12 (!) 100/43 99 %   12/01/22 2315 -- 95 22 (!) 81/39 100 %   12/01/22 2300 -- 95 13 (!) 92/40 99 %   12/01/22 2250 -- 97 20 (!) 105/47 96 %   12/01/22 2245 -- 96 20 (!) 89/43 98 %   12/01/22 2240 98 °F (36.7 °C) 98 25 102/69 --   12/01/22 2235 -- 99 19 (!) 136/90 96 %   12/01/22 2234 98 °F (36.7 °C) 100 21 112/74 96 %   12/01/22 1730 98.7 °F (37.1 °C) 84 16 122/63 96 %   12/01/22 1442 98.1 °F (36.7 °C) 79 20 116/64 96 %   12/01/22 1400 -- 91 -- -- --   12/01/22 1200 -- 86 -- -- --   12/01/22 1000 -- 70 -- -- --   12/01/22 0816 98 °F (36.7 °C) 72 16 109/61 99 %        Physical Exam:  Gen: NAD, AAOx3  Resp: No acute respiratory distress  MSK:  RUE  Sling in place  Dressing has moderate bloody drainage. He has not passed the outline which was marked by the nurse when she came up from the recovery room  Motor intact ain/pin/rad/uln  Sensation is intact to light touch m/r/u/ax    BLE  Motor and sensory intact to the feet and toes.               Intake/Output Summary (Last 24 hours) at 12/2/2022 0805  Last data filed at 12/2/2022 0100  Gross per 24 hour   Intake 960 ml   Output 150 ml   Net 810 ml       LABS :  Recent Results (from the past 24 hour(s))   METABOLIC PANEL, BASIC    Collection Time: 12/02/22  5:56 AM   Result Value Ref Range    Sodium 139 136 - 145 mmol/L    Potassium 3.6 3.5 - 5.1 mmol/L    Chloride 108 97 - 108 mmol/L    CO2 27 21 - 32 mmol/L    Anion gap 4 (L) 5 - 15 mmol/L    Glucose 103 (H) 65 - 100 mg/dL    BUN 22 (H) 6 - 20 MG/DL    Creatinine 1.15 (H) 0.55 - 1.02 MG/DL    BUN/Creatinine ratio 19 12 - 20      eGFR 44 (L) >60 ml/min/1.73m2    Calcium 7.7 (L) 8.5 - 10.1 MG/DL   CBC WITH AUTOMATED DIFF    Collection Time: 12/02/22  5:56 AM   Result Value Ref Range    WBC 10.0 3.6 - 11.0 K/uL    RBC 3.51 (L) 3.80 - 5.20 M/uL    HGB 10.9 (L) 11.5 - 16.0 g/dL    HCT 34.8 (L) 35.0 - 47.0 %    MCV 99.1 (H) 80.0 - 99.0 FL    MCH 31.1 26.0 - 34.0 PG    MCHC 31.3 30.0 - 36.5 g/dL    RDW 14.1 11.5 - 14.5 %    PLATELET 099 706 - 741 K/uL    MPV 8.9 8.9 - 12.9 FL    NRBC 0.0 0  WBC    ABSOLUTE NRBC 0.00 0.00 - 0.01 K/uL    NEUTROPHILS 78 (H) 32 - 75 %    LYMPHOCYTES 11 (L) 12 - 49 %    MONOCYTES 9 5 - 13 %    EOSINOPHILS 1 0 - 7 %    BASOPHILS 0 0 - 1 %    IMMATURE GRANULOCYTES 1 (H) 0.0 - 0.5 %    ABS. NEUTROPHILS 7.8 1.8 - 8.0 K/UL    ABS. LYMPHOCYTES 1.1 0.8 - 3.5 K/UL    ABS. MONOCYTES 0.9 0.0 - 1.0 K/UL    ABS. EOSINOPHILS 0.1 0.0 - 0.4 K/UL    ABS. BASOPHILS 0.0 0.0 - 0.1 K/UL    ABS. IMM. GRANS. 0.1 (H) 0.00 - 0.04 K/UL    DF AUTOMATED        XR SHOULDER LT AP/LAT MIN 2 V    Result Date: 12/1/2022  Status post left total reverse shoulder arthroplasty. No evidence of complication. Assessment:   Aris Aguirre is a 80y.o. year-old female with Right shoulder recurrent dislocations with osteoarthritis status post reverse total shoulder replacement POD #1    Plan:   -Karoline Combsnel: Okay to push herself up from her wheelchair. NWB RUE. Use sling as needed. OK to remove at least 3 times daily to do shoulder pendulums/elbow/wrist range of motion exercises  -Shoulder precautions: No External rotation past neutral. No active internal rotation  -DVT prophylaxis: SCDs, frequent ambulation  -Antibiotics: for 24h  -Pain control: Continue as needed pain management, ice to operative area  -PT/OT for mobilization and discharge planning. No External rotation past neutral. No active internal rotation  -Incentive Spirometry  -Dispo: Discharge planning to assisted living facility when ready  Follow up with me in office in 10-14 days      Andrew Macdonald MD    12/02/22  8:05 AM        Andrew Macdonald M.D.   [address] [facility] Office  Cell: [phone]  Office: [phone]  Medical Staff: Isaac Etienne MA

## 2022-12-02 NOTE — DISCHARGE INSTRUCTIONS
Dr. Elizabeth Eddy Reverse total shoulder replacement Postoperative Instructions    Follow-Up Appointment:  Follow up in the office 2 weeks after surgery. If this appointment is not already scheduled, please call our office at [phone]. Activity: It is okay to push up from the wheelchair using this arm. You can use the operative-sided hand. You can feed yourself. You should not carry objects more than 2 pounds. Ambulate every hour. Use the incentive spirometer every half hour when resting. A sling will be used for the first 2 weeks when sleeping or ambulating. You can transition out of the sling as you can tolerate. Remove the sling three times daily to do range of motion exercises of the elbow and wrist. It is ok to do pendulum exercises to the shoulder. Do Not externally rotate the shoulder past straight forward or active inward-rotation towards the belly for 6 weeks after surgery. Application of the ice packs will prevent and treat inflammation and reduce pain and swelling. You should ice the incisional area at least 3 times a day, 20-30 minutes at a time. Prevent any falls. Clear your living environment of rugs or floor objects that may be tripping hazards. Dressings / Wound Care:  Keep dressing in place until the follow-up visit. Dermabond (skin glue) may be used to help close the incision, which appears as a thin, transparent covering over the incision. Do not pick or pull at this covering, this will fall off naturally within 2-3 weeks. Do not apply antibiotic ointment, creams or lotions to your incision. Once your waterproof dressing has been removed, you may change bandages daily if you like or leave them open to air. These can be purchased at your local pharmacy. Most incisions are closed with absorbable sutures, but if not, the sutures or staples will be removed at your 2 week follow up. Monalisa Hickman / Bathing:   If your incision is dry without drainage, you may shower following your discharge home. The dressing is waterproof if well affixed to skin. You may shower with the waterproof dressing in place, but please be sure it is adhered to the skin and does not allow water to get underneath. It is fine to have water run over the incision after the waterproof dressing has been removed. Do not vigorously scrub your incision. Do not soak or submerge in a bath, pool, jacuzzi, ocean, river or lake for 6 weeks after surgery. If there is continued drainage or you are concerned contact Dr. Montelongo [facility] office prior to showering [phone]  Diet:  You may advance to your regular diet as tolerated. Proper nutrition is crucial to healing. Make sure you are eating as healthily as possible and following a balanced. protein-rich diet after your surgery. Avoid processed foods and eat fruits and vegetables. Medications: It is our goal to keep you as comfortable as possible after your surgery. Please take your medications as prescribed without exceeding the recommended dosage. It is also important to understand that pain has a cycle. It begins and increases until medication interrupts it. The aim of good pain control is to stop the pain before it becomes intolerable. The key is to stay ahead of the pain. We encourage patients to discontinue opioid pain medications as soon as possible after surgery. The side effects of these medications can be substantial, and the narcotic medications are not mandatory. You may substitute a prescribed narcotic with over-the-counter Tylenol. Tylenol should not exceed 4000mg in a 24 hour period, including if it is in the pain medication. Pain medications may cause constipation. Over-the-counter Colace twice daily and Miralax while taking the narcotic medication should help prevent constipation. Other side effects of pain medication include dizziness, headache, nausea, vomiting, and urinary retention.     Discontinue the pain medication if you develop itching, rash, shortness of breath, or difficulties swallowing. If these symptoms become severe or are not relieved by discontinuing the medication, you should seek immediate medical attention. Refills of pain medication are authorized during office hours only (8 AM- 4 PM Monday through Friday). Our office will not prescribe narcotics beyond 6 weeks from the date of your surgery. Narcotics will not be called into the pharmacy, and, in most cases, you must be seen clinically. Driving: You should not return to driving until you are off all narcotic pain medications and able to safely control and steer a motor vehicle. This may take 6 weeks or more because of the limited shoulder motion and activity. Airports and metal detectors:  ID cards are no longer given after joint replacement, as they are not accepted by [email or web address] security. Most joint replacements do not set off metal detectors. If the detector is set off, just tell the  you have a joint replacement. Signs/Symptoms of Concern:   Contact Dr. Philipp Hartman office if any of the following signs or symptoms develop. Please be advised if a problem arises which you feel requires immediate medical attention you should seek medical attention at the closest ER. Temperature greater than 101.5 for more than 8 hrs  Fever and/or chills that persist for greater than 8 hr. A sudden increase in pain/swelling/ or tenderness in the back of your calf or thigh that is not relieved with ice/elevation and pain medication. Increased drainage from your incision, increased redness or warmth at the area of your incision or a sudden increase in swelling or redness that persists despite ice and elevation      If you have questions or concerns, please call Dr. Philipp Hartman staff    Office: Phone [phone]  Fax [phone]    Office Address: Dixie Walker M.D.     [address]    Dixie Walker MD      12/02/22

## 2022-12-02 NOTE — ANESTHESIA POSTPROCEDURE EVALUATION
Procedure(s):  SHOULDER ARTHROPLASTY/TOTAL. general    Anesthesia Post Evaluation      Multimodal analgesia: multimodal analgesia used between 6 hours prior to anesthesia start to PACU discharge  Patient location during evaluation: bedside  Patient participation: complete - patient participated  Level of consciousness: awake  Pain score: 0  Pain management: adequate  Airway patency: patent  Anesthetic complications: no  Cardiovascular status: acceptable  Respiratory status: acceptable  Hydration status: acceptable  Comments: Giving albumin & ephedrine for low BP. Otherwise stable. I feel that she appeared volume depleted but being cautious with overall volume given her age and cardiac status. Post anesthesia nausea and vomiting:  none  Final Post Anesthesia Temperature Assessment:  Normothermia (36.0-37.5 degrees C)      INITIAL Post-op Vital signs:   Vitals Value Taken Time   /38 12/02/22 0030   Temp 36.7 °C (98 °F) 12/01/22 2240   Pulse 79 12/02/22 0035   Resp 11 12/02/22 0035   SpO2 100 % 12/02/22 0035   Vitals shown include unvalidated device data.

## 2022-12-02 NOTE — PROGRESS NOTES
[facility] [facility] Adult  Hospitalist Group                                                                                          Hospitalist Progress Note  Maryam Camarillo NP  Answering service: [phone] OR 36 from in house phone        Date of Service:  2022  NAME:  Chanelle Roldan  :  10/17/1927  MRN:  [id number]      Admission Summary:   Per H&P, Chanelle Roldan is a 80 y.o. female with apmhx CAD, SDH, and past VTE who presents from [address] with dislocated left shoulder status post fall 2 weeks ago, and hypoxic respiratory failure. She had a mechanical fall two weeks ago, and had resultant shoulder pain that she attempted to treat herself. When the pain did not improve, she let her facility know, and was brought to the ED for further evaluation. In the ED, she was hypoxic to 89% with improvement to 96% on 2Lnc. Other VSS. Labs showed WBC 11.5, CO2 35, BUN 38, and creatinine 1.54 (b/l 0.8-0.9). XR left shoulder shows anterior glenohumeral dislocation, and OA. CXR shows no acute intrathoracic disease. In the ED, she received 1Lns, fentanyl, lidocaine prior to attempted closed reduction by ortho, and percocet. Interval history / Subjective:     I saw the patient this morning on rounds. There are some complaints of pain, yet to receive analgesia     Assessment & Plan:         left shoulder dislocation  Glenohumeral dislocation as revealed on imaging  Continuing multimodal pain control, ice  POD 1 TSA  Orthopedics following, appreciate recommendations  Per orthopedics weightbearing, okay to push herself up from wheelchair, nonweightbearing right upper extremity, may use sling as needed.   Shoulder pendulums/elbow/wrist range of motion         Hypoxic Respiratory Failure  Oxygen saturations were 89% on room air, improvement to 96% on 2 L  CTA was unremarkable  Wean oxygen as tolerated  Incentive spirometry    Acute kidney injury  It is unclear what her baseline creatinine is. Last creatinine in our system was in 2019. Creatinine at presentation 1.54, this has improved with IV hydration down to 1. 15. Will avoid nephrotoxins  Continuing gentle IV hydration       CAD  Denies chest pain or shortness of breath  She denies having had heart attack in the past  Continuing carvedilol       H/O SDH  Stable     glaucoma  Stable  Continue latanoprost           Code status: DNR  Prophylaxis: SCD  Care Plan discussed with: Patient, nurse, care manager  Anticipated Disposition: Will likely require SNF, physical therapy to evaluate for postop function     Hospital Problems  Date Reviewed: 3/12/2019            Codes Class Noted POA    Hypoxia ICD-10-CM: R09.02  ICD-9-CM: 799.02  11/30/2022 Unknown           Review of Systems:   A comprehensive review of systems was negative except for that written in the HPI. Vital Signs:    Last 24hrs VS reviewed since prior progress note. Most recent are:  Visit Vitals  /64   Pulse 85   Temp 97.2 °F (36.2 °C)   Resp 16   Wt 65.2 kg (143 lb 11.8 oz)   SpO2 90%   BMI 28.07 kg/m²         Intake/Output Summary (Last 24 hours) at 12/2/2022 0816  Last data filed at 12/2/2022 0100  Gross per 24 hour   Intake 960 ml   Output 150 ml   Net 810 ml          Physical Examination:     I had a face to face encounter with this patient and independently examined them on 12/2/2022 as outlined below:          Constitutional:  No acute distress, cooperative, pleasant    ENT:  Oral mucosa moist, oropharynx benign. Resp:  CTA bilaterally. No wheezing/rhonchi/rales. No accessory muscle use. CV:  Regular rhythm, normal rate, no murmurs, gallops, rubs    GI:  Soft, non distended, non tender. normoactive bowel sounds, no hepatosplenomegaly     Musculoskeletal:  No edema, warm, 2+ pulses throughout    Neurologic:  Moves all extremities left upper extremity.   AAOx3, CN II-XII reviewed            Data Review:    Review and/or order of clinical lab test  Review and/or order of tests in the radiology section of [facility]      Labs:     Recent Labs     12/02/22  0556 12/01/22  0259   WBC 10.0 9.3   HGB 10.9* 11.2*   HCT 34.8* 35.8    176       Recent Labs     12/02/22  0556 12/01/22  0259 11/30/22  1315    141 138   K 3.6 3.6 3.5    103 98   CO2 27 31 35*   BUN 22* 30* 38*   CREA 1.15* 1.20* 1.54*   * 91 112*   CA 7.7* 8.1* 9.2       Recent Labs     11/30/22  1315   ALT 10*   *   TBILI 1.2*   TP 7.1   ALB 3.1*   GLOB 4.0       No results for input(s): INR, PTP, APTT, INREXT, INREXT in the last 72 hours. No results for input(s): FE, TIBC, PSAT, FERR in the last 72 hours. Lab Results   Component Value Date/Time    Folate 5.8 03/08/2017 06:00 PM        No results for input(s): PH, PCO2, PO2 in the last 72 hours. No results for input(s): CPK, CKNDX, TROIQ in the last 72 hours.     No lab exists for component: CPKMB  Lab Results   Component Value Date/Time    Cholesterol, total 182 03/08/2017 06:00 PM    HDL Cholesterol 64 03/08/2017 06:00 PM    LDL, calculated 91.2 03/08/2017 06:00 PM    Triglyceride 134 03/08/2017 06:00 PM    CHOL/HDL Ratio 2.8 03/08/2017 06:00 PM     Lab Results   Component Value Date/Time    Glucose (POC) 94 11/20/2016 07:00 AM     Lab Results   Component Value Date/Time    Color YELLOW/STRAW 10/31/2018 04:55 AM    Appearance CLOUDY (A) 10/31/2018 04:55 AM    Specific gravity 1.014 10/31/2018 04:55 AM    pH (UA) 5.5 10/31/2018 04:55 AM    Protein TRACE (A) 10/31/2018 04:55 AM    Glucose NEGATIVE 10/31/2018 04:55 AM    Ketone NEGATIVE 10/31/2018 04:55 AM    Bilirubin NEGATIVE 10/31/2018 04:55 AM    Urobilinogen 0.2 10/31/2018 04:55 AM    Nitrites NEGATIVE 10/31/2018 04:55 AM    Leukocyte Esterase LARGE (A) 10/31/2018 04:55 AM    Epithelial cells FEW 10/31/2018 04:55 AM    Bacteria NEGATIVE 10/31/2018 04:55 AM    WBC >100 (H) 10/31/2018 04:55 AM    RBC 0-5 10/31/2018 04:55 AM         Medications Reviewed: Current Facility-Administered Medications   Medication Dose Route Frequency    0.9% sodium chloride infusion  75 mL/hr IntraVENous CONTINUOUS    carvediloL (COREG) tablet 3.125 mg  3.125 mg Oral BID WITH MEALS    latanoprost (XALATAN) 0.005 % ophthalmic solution 1 Drop  1 Drop Both Eyes QHS    sodium chloride (NS) flush 5-40 mL  5-40 mL IntraVENous Q8H    sodium chloride (NS) flush 5-40 mL  5-40 mL IntraVENous PRN    0.9% sodium chloride infusion 25 mL  25 mL IntraVENous PRN    oxyCODONE IR (ROXICODONE) tablet 2.5 mg  2.5 mg Oral Q4H PRN    oxyCODONE IR (ROXICODONE) tablet 5 mg  5 mg Oral Q4H PRN    HYDROmorphone (DILAUDID) injection 0.5 mg  0.5 mg IntraVENous Q4H PRN    ceFAZolin (ANCEF) 2 g in sterile water (preservative free) 20 mL IV syringe  2 g IntraVENous Q8H    naloxone (NARCAN) injection 0.4 mg  0.4 mg IntraVENous PRN    prochlorperazine (COMPAZINE) injection 5 mg  5 mg IntraVENous Q6H PRN    hydrOXYzine HCL (ATARAX) tablet 10 mg  10 mg Oral Q8H PRN    famotidine (PEPCID) tablet 20 mg  20 mg Oral BID    senna-docusate (PERICOLACE) 8.6-50 mg per tablet 1 Tablet  1 Tablet Oral BID    polyethylene glycol (MIRALAX) packet 17 g  17 g Oral DAILY    [START ON 12/3/2022] bisacodyL (DULCOLAX) suppository 10 mg  10 mg Rectal DAILY PRN    rivaroxaban (XARELTO) tablet 10 mg  10 mg Oral DAILY    albumin human 5% (BUMINATE) 5 % solution        sodium chloride (NS) flush 5-40 mL  5-40 mL IntraVENous Q8H    sodium chloride (NS) flush 5-40 mL  5-40 mL IntraVENous PRN    acetaminophen (TYLENOL) tablet 650 mg  650 mg Oral Q6H PRN    Or    acetaminophen (TYLENOL) suppository 650 mg  650 mg Rectal Q6H PRN    ondansetron (ZOFRAN ODT) tablet 4 mg  4 mg Oral Q8H PRN    Or    ondansetron (ZOFRAN) injection 4 mg  4 mg IntraVENous Q6H PRN     ______________________________________________________________________  EXPECTED LENGTH OF STAY: 3d 0h  ACTUAL LENGTH OF STAY:          2                 Jeramie Schofield NP

## 2022-12-02 NOTE — PROGRESS NOTES
Bedside and Verbal shift change report given to Medardo Patel (oncoming nurse) by Diego Fletcher (offgoing nurse). Report included the following information SBAR, Kardex, Intake/Output, MAR, and Recent Results.

## 2022-12-02 NOTE — PROGRESS NOTES
Transition of Care: SNF, confirmed attending in agreement with plan for SNF. SNF referrals are pending with [facility] of [facility] [employer], and [facility]. No auth required. Patient will need BLS transport at discharge. Chart reviewed. CM met with patient, daughter Radha Dee #[phone], and granddaughter Nathaly Damon #[phone] at bedside. The Plan for Transition of Care is related to the following treatment goals: SNF, family prefers [facility] of  and [facility]. The Patient and/or patient representative  was provided with a choice of provider and agrees   with the discharge plan. [x] Yes [] No    Freedom of choice list was provided with basic dialogue that supports the patient's individualized plan of care/goals, treatment preferences and shares the quality data associated with the providers.  [x] Yes [] No      MIKE Madrid/EMEKA

## 2022-12-03 ENCOUNTER — APPOINTMENT (OUTPATIENT)
Dept: GENERAL RADIOLOGY | Age: 87
End: 2022-12-03
Attending: NURSE PRACTITIONER
Payer: MEDICARE

## 2022-12-03 LAB
ANION GAP SERPL CALC-SCNC: 5 MMOL/L (ref 5–15)
BASOPHILS # BLD: 0 K/UL (ref 0–0.1)
BASOPHILS NFR BLD: 0 % (ref 0–1)
BUN SERPL-MCNC: 24 MG/DL (ref 6–20)
BUN/CREAT SERPL: 16 (ref 12–20)
CALCIUM SERPL-MCNC: 7.8 MG/DL (ref 8.5–10.1)
CHLORIDE SERPL-SCNC: 105 MMOL/L (ref 97–108)
CO2 SERPL-SCNC: 26 MMOL/L (ref 21–32)
CREAT SERPL-MCNC: 1.52 MG/DL (ref 0.55–1.02)
DIFFERENTIAL METHOD BLD: ABNORMAL
EOSINOPHIL # BLD: 0.1 K/UL (ref 0–0.4)
EOSINOPHIL NFR BLD: 1 % (ref 0–7)
ERYTHROCYTE [DISTWIDTH] IN BLOOD BY AUTOMATED COUNT: 14 % (ref 11.5–14.5)
GLUCOSE SERPL-MCNC: 155 MG/DL (ref 65–100)
HCT VFR BLD AUTO: 33.5 % (ref 35–47)
HGB BLD-MCNC: 10.6 G/DL (ref 11.5–16)
IMM GRANULOCYTES # BLD AUTO: 0.1 K/UL (ref 0–0.04)
IMM GRANULOCYTES NFR BLD AUTO: 1 % (ref 0–0.5)
LYMPHOCYTES # BLD: 0.8 K/UL (ref 0.8–3.5)
LYMPHOCYTES NFR BLD: 7 % (ref 12–49)
MCH RBC QN AUTO: 30.5 PG (ref 26–34)
MCHC RBC AUTO-ENTMCNC: 31.6 G/DL (ref 30–36.5)
MCV RBC AUTO: 96.5 FL (ref 80–99)
MONOCYTES # BLD: 1.1 K/UL (ref 0–1)
MONOCYTES NFR BLD: 10 % (ref 5–13)
NEUTS SEG # BLD: 9.8 K/UL (ref 1.8–8)
NEUTS SEG NFR BLD: 81 % (ref 32–75)
NRBC # BLD: 0 K/UL (ref 0–0.01)
NRBC BLD-RTO: 0 PER 100 WBC
PLATELET # BLD AUTO: 181 K/UL (ref 150–400)
PMV BLD AUTO: 9.3 FL (ref 8.9–12.9)
POTASSIUM SERPL-SCNC: 3.7 MMOL/L (ref 3.5–5.1)
RBC # BLD AUTO: 3.47 M/UL (ref 3.8–5.2)
SODIUM SERPL-SCNC: 136 MMOL/L (ref 136–145)
WBC # BLD AUTO: 11.9 K/UL (ref 3.6–11)

## 2022-12-03 PROCEDURE — 74011250637 HC RX REV CODE- 250/637: Performed by: FAMILY MEDICINE

## 2022-12-03 PROCEDURE — 74011250636 HC RX REV CODE- 250/636: Performed by: NURSE PRACTITIONER

## 2022-12-03 PROCEDURE — 71045 X-RAY EXAM CHEST 1 VIEW: CPT

## 2022-12-03 PROCEDURE — 74011000250 HC RX REV CODE- 250: Performed by: ORTHOPAEDIC SURGERY

## 2022-12-03 PROCEDURE — 74011250637 HC RX REV CODE- 250/637: Performed by: ORTHOPAEDIC SURGERY

## 2022-12-03 PROCEDURE — 74011000250 HC RX REV CODE- 250: Performed by: FAMILY MEDICINE

## 2022-12-03 PROCEDURE — 36415 COLL VENOUS BLD VENIPUNCTURE: CPT

## 2022-12-03 PROCEDURE — 65270000046 HC RM TELEMETRY

## 2022-12-03 PROCEDURE — 74011250637 HC RX REV CODE- 250/637: Performed by: NURSE PRACTITIONER

## 2022-12-03 PROCEDURE — 80048 BASIC METABOLIC PNL TOTAL CA: CPT

## 2022-12-03 PROCEDURE — 85025 COMPLETE CBC W/AUTO DIFF WBC: CPT

## 2022-12-03 RX ORDER — LEVOFLOXACIN 250 MG/1
500 TABLET ORAL
Status: COMPLETED | OUTPATIENT
Start: 2022-12-05 | End: 2022-12-07

## 2022-12-03 RX ORDER — LEVOFLOXACIN 250 MG/1
500 TABLET ORAL EVERY 24 HOURS
Status: DISCONTINUED | OUTPATIENT
Start: 2022-12-03 | End: 2022-12-03 | Stop reason: DRUGHIGH

## 2022-12-03 RX ORDER — LEVOFLOXACIN 250 MG/1
750 TABLET ORAL ONCE
Status: COMPLETED | OUTPATIENT
Start: 2022-12-03 | End: 2022-12-03

## 2022-12-03 RX ORDER — GUAIFENESIN 600 MG/1
600 TABLET, EXTENDED RELEASE ORAL EVERY 12 HOURS
Status: DISCONTINUED | OUTPATIENT
Start: 2022-12-03 | End: 2022-12-09 | Stop reason: HOSPADM

## 2022-12-03 RX ADMIN — Medication 10 ML: at 21:11

## 2022-12-03 RX ADMIN — GUAIFENESIN 600 MG: 600 TABLET, EXTENDED RELEASE ORAL at 21:10

## 2022-12-03 RX ADMIN — SODIUM CHLORIDE, PRESERVATIVE FREE 10 ML: 5 INJECTION INTRAVENOUS at 14:00

## 2022-12-03 RX ADMIN — SODIUM CHLORIDE, PRESERVATIVE FREE 10 ML: 5 INJECTION INTRAVENOUS at 06:04

## 2022-12-03 RX ADMIN — RIVAROXABAN 10 MG: 10 TABLET, FILM COATED ORAL at 17:01

## 2022-12-03 RX ADMIN — CARVEDILOL 3.12 MG: 6.25 TABLET, FILM COATED ORAL at 10:21

## 2022-12-03 RX ADMIN — Medication 10 ML: at 14:00

## 2022-12-03 RX ADMIN — LEVOFLOXACIN 750 MG: 250 TABLET, FILM COATED ORAL at 17:00

## 2022-12-03 RX ADMIN — ACETAMINOPHEN 650 MG: 325 TABLET ORAL at 11:52

## 2022-12-03 RX ADMIN — ACETAMINOPHEN 650 MG: 325 TABLET ORAL at 00:16

## 2022-12-03 RX ADMIN — POLYETHYLENE GLYCOL 3350 17 G: 17 POWDER, FOR SOLUTION ORAL at 10:20

## 2022-12-03 RX ADMIN — GUAIFENESIN 600 MG: 600 TABLET, EXTENDED RELEASE ORAL at 13:28

## 2022-12-03 RX ADMIN — SENNOSIDES AND DOCUSATE SODIUM 1 TABLET: 50; 8.6 TABLET ORAL at 17:01

## 2022-12-03 RX ADMIN — SODIUM CHLORIDE 75 ML/HR: 900 INJECTION, SOLUTION INTRAVENOUS at 17:24

## 2022-12-03 RX ADMIN — ACETAMINOPHEN 650 MG: 325 TABLET ORAL at 06:13

## 2022-12-03 RX ADMIN — LATANOPROST 1 DROP: 50 SOLUTION OPHTHALMIC at 22:00

## 2022-12-03 RX ADMIN — Medication 10 ML: at 06:05

## 2022-12-03 RX ADMIN — SENNOSIDES AND DOCUSATE SODIUM 1 TABLET: 50; 8.6 TABLET ORAL at 10:19

## 2022-12-03 RX ADMIN — SODIUM CHLORIDE, PRESERVATIVE FREE 10 ML: 5 INJECTION INTRAVENOUS at 21:11

## 2022-12-03 RX ADMIN — ACETAMINOPHEN 650 MG: 325 TABLET ORAL at 17:01

## 2022-12-03 RX ADMIN — OXYCODONE HYDROCHLORIDE 5 MG: 5 TABLET ORAL at 10:22

## 2022-12-03 RX ADMIN — FAMOTIDINE 20 MG: 20 TABLET, FILM COATED ORAL at 10:20

## 2022-12-03 NOTE — PROGRESS NOTES
Problem: Falls - Risk of  Goal: *Absence of Falls  Description: Document Matt Gallardo Fall Risk and appropriate interventions in the flowsheet.   Outcome: Progressing Towards Goal  Note: Fall Risk Interventions:  Mobility Interventions: Communicate number of staff needed for ambulation/transfer, Patient to call before getting OOB         Medication Interventions: Bed/chair exit alarm    Elimination Interventions: Bed/chair exit alarm    History of Falls Interventions: Door open when patient unattended         Problem: Patient Education: Go to Patient Education Activity  Goal: Patient/Family Education  Outcome: Progressing Towards Goal  Note: Pain management  Fall prevention

## 2022-12-03 NOTE — PROGRESS NOTES
Bedside shift change report given to [id number] Nevaeh Cadena, RN (oncoming nurse) by Tomi Coe RN (offgoing nurse). Report included the following information SBAR, ED Summary, Intake/Output, and MAR.

## 2022-12-03 NOTE — PROGRESS NOTES
Day #1 of Levaquin  Indication:  PNA  Current regimen:  500 mg q24h x 5 days    Recent Labs     22  0522 22  0556 22  0259   WBC 11.9* 10.0 9.3   CREA 1.52* 1.15* 1.20*   BUN 24* 22* 30*     Est CrCl: 19 ml/min   Temp (24hrs), Av.4 °F (36.9 °C), Min:98 °F (36.7 °C), Max:98.6 °F (37 °C)    Plan: Change to 750 mg x 1 dose followed by 500 mg q48h  per renal dose adjustment policy

## 2022-12-03 NOTE — PROGRESS NOTES
[address] [facility] Adult  Hospitalist Group                                                                                          Hospitalist Progress Note  Jessica Perez NP  Answering service: [phone] OR 36 from in house phone        Date of Service:  12/3/2022  NAME:  Natale Duane  :  10/17/1927  MRN:  [id number]      Admission Summary:   Per H&P, Natale Duane is a 80 y.o. female with apmhx CAD, SDH, and past VTE who presents from [address] with dislocated left shoulder status post fall 2 weeks ago, and hypoxic respiratory failure. She had a mechanical fall two weeks ago, and had resultant shoulder pain that she attempted to treat herself. When the pain did not improve, she let her facility know, and was brought to the ED for further evaluation. In the ED, she was hypoxic to 89% with improvement to 96% on 2Lnc. Other VSS. Labs showed WBC 11.5, CO2 35, BUN 38, and creatinine 1.54 (b/l 0.8-0.9). XR left shoulder shows anterior glenohumeral dislocation, and OA. CXR shows no acute intrathoracic disease. In the ED, she received 1Lns, fentanyl, lidocaine prior to attempted closed reduction by ortho, and percocet. Interval history / Subjective:          I saw the patient this morning on rounds. Denies fever, chills however did have some cough this morning. Family at the bedside at the moment of the encounter, they were updated at that time    Assessment & Plan:         left shoulder dislocation  Glenohumeral dislocation as revealed on imaging  Continuing multimodal pain control, ice  POD 2 TSA  Orthopedics following, appreciate recommendations  Per orthopedics weightbearing, okay to push herself up from wheelchair, nonweightbearing right upper extremity, may use sling as needed.   Shoulder pendulums/elbow/wrist range of motion         Hypoxic Respiratory Failure  Oxygen saturations were 89% on room air, improvement to 96% on 2 L  CTA was unremarkable  Incentive spirometry  Weaned to room air however now with cough. Also noted very mild leukocytosis  We will check a radiograph    Acute kidney injury  It is unclear what her baseline creatinine is. Last creatinine in our system was in 2019. Creatinine at presentation 1.54, this has improved with IV hydration down to 1.15 however this morning back up 1.52  Will avoid nephrotoxins  Continuing gentle IV hydration       CAD  Denies chest pain or shortness of breath  She denies having had heart attack in the past  Continuing carvedilol       H/O SDH  Stable     glaucoma  Stable  Continue latanoprost           Code status: DNR  Prophylaxis: SCD  Care Plan discussed with: Patient, nurse, care manager  Anticipated Disposition: SNF, likely Monday     Hospital Problems  Date Reviewed: 3/12/2019            Codes Class Noted POA    Hypoxia ICD-10-CM: R09.02  ICD-9-CM: 799.02  11/30/2022 Unknown         Review of Systems:   A comprehensive review of systems was negative except for that written in the HPI. Vital Signs:    Last 24hrs VS reviewed since prior progress note. Most recent are:  Visit Vitals  /70 (BP 1 Location: Left upper arm, BP Patient Position: At rest;Semi fowlers)   Pulse 83   Temp 98.5 °F (36.9 °C)   Resp 18   Ht 5' (1.524 m)   Wt 65.2 kg (143 lb 11.8 oz)   SpO2 98%   BMI 28.07 kg/m²         Intake/Output Summary (Last 24 hours) at 12/3/2022 0856  Last data filed at 12/2/2022 2039  Gross per 24 hour   Intake 660 ml   Output 250 ml   Net 410 ml          Physical Examination:     I had a face to face encounter with this patient and independently examined them on 12/3/2022 as outlined below:          Constitutional:  No acute distress, cooperative, pleasant    ENT:  Oral mucosa moist, oropharynx benign. Resp:  CTA bilaterally. No wheezing/rhonchi/rales. No accessory muscle use. CV:  Regular rhythm, normal rate, no murmurs, gallops, rubs    GI:  Soft, non distended, non tender. normoactive bowel sounds, no hepatosplenomegaly     Musculoskeletal:  No edema, warm, 2+ pulses throughout    Neurologic:  Moves all extremities left upper extremity. AAOx3, CN II-XII reviewed            Data Review:    Review and/or order of clinical lab test  Review and/or order of tests in the radiology section of [facility]      Labs:     Recent Labs     12/03/22  0522 12/02/22  0556   WBC 11.9* 10.0   HGB 10.6* 10.9*   HCT 33.5* 34.8*    171       Recent Labs     12/03/22  0522 12/02/22  0556 12/01/22  0259    139 141   K 3.7 3.6 3.6    108 103   CO2 26 27 31   BUN 24* 22* 30*   CREA 1.52* 1.15* 1.20*   * 103* 91   CA 7.8* 7.7* 8.1*       Recent Labs     11/30/22  1315   ALT 10*   *   TBILI 1.2*   TP 7.1   ALB 3.1*   GLOB 4.0       No results for input(s): INR, PTP, APTT, INREXT, INREXT in the last 72 hours. No results for input(s): FE, TIBC, PSAT, FERR in the last 72 hours. Lab Results   Component Value Date/Time    Folate 5.8 03/08/2017 06:00 PM        No results for input(s): PH, PCO2, PO2 in the last 72 hours. No results for input(s): CPK, CKNDX, TROIQ in the last 72 hours.     No lab exists for component: CPKMB  Lab Results   Component Value Date/Time    Cholesterol, total 182 03/08/2017 06:00 PM    HDL Cholesterol 64 03/08/2017 06:00 PM    LDL, calculated 91.2 03/08/2017 06:00 PM    Triglyceride 134 03/08/2017 06:00 PM    CHOL/HDL Ratio 2.8 03/08/2017 06:00 PM     Lab Results   Component Value Date/Time    Glucose (POC) 94 11/20/2016 07:00 AM     Lab Results   Component Value Date/Time    Color YELLOW/STRAW 10/31/2018 04:55 AM    Appearance CLOUDY (A) 10/31/2018 04:55 AM    Specific gravity 1.014 10/31/2018 04:55 AM    pH (UA) 5.5 10/31/2018 04:55 AM    Protein TRACE (A) 10/31/2018 04:55 AM    Glucose NEGATIVE 10/31/2018 04:55 AM    Ketone NEGATIVE 10/31/2018 04:55 AM    Bilirubin NEGATIVE 10/31/2018 04:55 AM    Urobilinogen 0.2 10/31/2018 04:55 AM    Nitrites NEGATIVE 10/31/2018 04:55 AM    Leukocyte Esterase LARGE (A) 10/31/2018 04:55 AM    Epithelial cells FEW 10/31/2018 04:55 AM    Bacteria NEGATIVE 10/31/2018 04:55 AM    WBC >100 (H) 10/31/2018 04:55 AM    RBC 0-5 10/31/2018 04:55 AM         Medications Reviewed:     Current Facility-Administered Medications   Medication Dose Route Frequency    0.9% sodium chloride infusion  75 mL/hr IntraVENous CONTINUOUS    acetaminophen (TYLENOL) tablet 650 mg  650 mg Oral Q6H    famotidine (PEPCID) tablet 20 mg  20 mg Oral DAILY    carvediloL (COREG) tablet 3.125 mg  3.125 mg Oral BID WITH MEALS    latanoprost (XALATAN) 0.005 % ophthalmic solution 1 Drop  1 Drop Both Eyes QHS    sodium chloride (NS) flush 5-40 mL  5-40 mL IntraVENous Q8H    sodium chloride (NS) flush 5-40 mL  5-40 mL IntraVENous PRN    0.9% sodium chloride infusion 25 mL  25 mL IntraVENous PRN    oxyCODONE IR (ROXICODONE) tablet 2.5 mg  2.5 mg Oral Q4H PRN    oxyCODONE IR (ROXICODONE) tablet 5 mg  5 mg Oral Q4H PRN    naloxone (NARCAN) injection 0.4 mg  0.4 mg IntraVENous PRN    hydrOXYzine HCL (ATARAX) tablet 10 mg  10 mg Oral Q8H PRN    senna-docusate (PERICOLACE) 8.6-50 mg per tablet 1 Tablet  1 Tablet Oral BID    polyethylene glycol (MIRALAX) packet 17 g  17 g Oral DAILY    bisacodyL (DULCOLAX) suppository 10 mg  10 mg Rectal DAILY PRN    rivaroxaban (XARELTO) tablet 10 mg  10 mg Oral DAILY WITH DINNER    sodium chloride (NS) flush 5-40 mL  5-40 mL IntraVENous Q8H    sodium chloride (NS) flush 5-40 mL  5-40 mL IntraVENous PRN    ondansetron (ZOFRAN ODT) tablet 4 mg  4 mg Oral Q8H PRN    Or    ondansetron (ZOFRAN) injection 4 mg  4 mg IntraVENous Q6H PRN     ______________________________________________________________________  EXPECTED LENGTH OF STAY: 1d 9h  ACTUAL LENGTH OF STAY:          3                 Gaurav [place], NP

## 2022-12-03 NOTE — PROGRESS NOTES
POD#2 s/p left reverse shoulder arthroplasty    S/  Pain controlled. Comfortable    O/  Dressing saturated. Removed and new dry dressing applied  Deltoid sensation intact  NVI distally    A/  Doing well overall. Pain controlled. Dressing changes PRN    P/  Weighbearing: Okay to push herself up from her wheelchair. NWB RUE. Use sling as needed. OK to remove at least 3 times daily to do shoulder pendulums/elbow/wrist range of motion exercises  -Shoulder precautions: No External rotation past neutral. No active internal rotation  -DVT prophylaxis: SCDs, frequent ambulation  -Antibiotics: for 24h  -Pain control: Continue as needed pain management, ice to operative area  -PT/OT for mobilization and discharge planning.  No External rotation past neutral. No active internal rotation  -Incentive Spirometry  -Dispo: Discharge planning to assisted living facility when ready  Follow up with me in office in 10-14 days

## 2022-12-03 NOTE — PROGRESS NOTES
Bedside shift change report given to joanne (oncoming nurse) by arlette (offgoing nurse). Report included the following information SBAR, Kardex, Intake/Output, and MAR.

## 2022-12-04 LAB
ANION GAP SERPL CALC-SCNC: 8 MMOL/L (ref 5–15)
BASOPHILS # BLD: 0 K/UL (ref 0–0.1)
BASOPHILS NFR BLD: 0 % (ref 0–1)
BUN SERPL-MCNC: 32 MG/DL (ref 6–20)
BUN/CREAT SERPL: 15 (ref 12–20)
CALCIUM SERPL-MCNC: 7.3 MG/DL (ref 8.5–10.1)
CHLORIDE SERPL-SCNC: 107 MMOL/L (ref 97–108)
CO2 SERPL-SCNC: 21 MMOL/L (ref 21–32)
CREAT SERPL-MCNC: 2.13 MG/DL (ref 0.55–1.02)
DIFFERENTIAL METHOD BLD: ABNORMAL
EOSINOPHIL # BLD: 0 K/UL (ref 0–0.4)
EOSINOPHIL NFR BLD: 0 % (ref 0–7)
ERYTHROCYTE [DISTWIDTH] IN BLOOD BY AUTOMATED COUNT: 13.6 % (ref 11.5–14.5)
GLUCOSE SERPL-MCNC: 105 MG/DL (ref 65–100)
HCT VFR BLD AUTO: 30 % (ref 35–47)
HGB BLD-MCNC: 9.8 G/DL (ref 11.5–16)
IMM GRANULOCYTES # BLD AUTO: 0.2 K/UL (ref 0–0.04)
IMM GRANULOCYTES NFR BLD AUTO: 1 % (ref 0–0.5)
LYMPHOCYTES # BLD: 1 K/UL (ref 0.8–3.5)
LYMPHOCYTES NFR BLD: 9 % (ref 12–49)
MCH RBC QN AUTO: 30.9 PG (ref 26–34)
MCHC RBC AUTO-ENTMCNC: 32.7 G/DL (ref 30–36.5)
MCV RBC AUTO: 94.6 FL (ref 80–99)
MONOCYTES # BLD: 1.1 K/UL (ref 0–1)
MONOCYTES NFR BLD: 10 % (ref 5–13)
NEUTS SEG # BLD: 9 K/UL (ref 1.8–8)
NEUTS SEG NFR BLD: 80 % (ref 32–75)
NRBC # BLD: 0 K/UL (ref 0–0.01)
NRBC BLD-RTO: 0 PER 100 WBC
PLATELET # BLD AUTO: 189 K/UL (ref 150–400)
PMV BLD AUTO: 9.9 FL (ref 8.9–12.9)
POTASSIUM SERPL-SCNC: 4 MMOL/L (ref 3.5–5.1)
RBC # BLD AUTO: 3.17 M/UL (ref 3.8–5.2)
SODIUM SERPL-SCNC: 136 MMOL/L (ref 136–145)
WBC # BLD AUTO: 11.4 K/UL (ref 3.6–11)

## 2022-12-04 PROCEDURE — 74011250637 HC RX REV CODE- 250/637: Performed by: ORTHOPAEDIC SURGERY

## 2022-12-04 PROCEDURE — 85025 COMPLETE CBC W/AUTO DIFF WBC: CPT

## 2022-12-04 PROCEDURE — 36415 COLL VENOUS BLD VENIPUNCTURE: CPT

## 2022-12-04 PROCEDURE — 77010033678 HC OXYGEN DAILY

## 2022-12-04 PROCEDURE — 74011250637 HC RX REV CODE- 250/637: Performed by: NURSE PRACTITIONER

## 2022-12-04 PROCEDURE — 65270000046 HC RM TELEMETRY

## 2022-12-04 PROCEDURE — 74011000250 HC RX REV CODE- 250: Performed by: ORTHOPAEDIC SURGERY

## 2022-12-04 PROCEDURE — 74011000250 HC RX REV CODE- 250: Performed by: FAMILY MEDICINE

## 2022-12-04 PROCEDURE — 94760 N-INVAS EAR/PLS OXIMETRY 1: CPT

## 2022-12-04 PROCEDURE — 80048 BASIC METABOLIC PNL TOTAL CA: CPT

## 2022-12-04 RX ORDER — PRAMIPEXOLE DIHYDROCHLORIDE 0.25 MG/1
0.12 TABLET ORAL 2 TIMES DAILY
Status: DISCONTINUED | OUTPATIENT
Start: 2022-12-04 | End: 2022-12-09 | Stop reason: HOSPADM

## 2022-12-04 RX ADMIN — OXYCODONE HYDROCHLORIDE 5 MG: 5 TABLET ORAL at 21:35

## 2022-12-04 RX ADMIN — SODIUM CHLORIDE, PRESERVATIVE FREE 10 ML: 5 INJECTION INTRAVENOUS at 14:00

## 2022-12-04 RX ADMIN — RIVAROXABAN 10 MG: 10 TABLET, FILM COATED ORAL at 17:21

## 2022-12-04 RX ADMIN — SENNOSIDES AND DOCUSATE SODIUM 1 TABLET: 50; 8.6 TABLET ORAL at 17:21

## 2022-12-04 RX ADMIN — SODIUM CHLORIDE, PRESERVATIVE FREE 20 ML: 5 INJECTION INTRAVENOUS at 21:33

## 2022-12-04 RX ADMIN — POLYETHYLENE GLYCOL 3350 17 G: 17 POWDER, FOR SOLUTION ORAL at 08:13

## 2022-12-04 RX ADMIN — ACETAMINOPHEN 650 MG: 325 TABLET ORAL at 07:17

## 2022-12-04 RX ADMIN — GUAIFENESIN 600 MG: 600 TABLET, EXTENDED RELEASE ORAL at 08:13

## 2022-12-04 RX ADMIN — PRAMIPEXOLE DIHYDROCHLORIDE 0.12 MG: 0.25 TABLET ORAL at 12:21

## 2022-12-04 RX ADMIN — GUAIFENESIN 600 MG: 600 TABLET, EXTENDED RELEASE ORAL at 21:33

## 2022-12-04 RX ADMIN — SENNOSIDES AND DOCUSATE SODIUM 1 TABLET: 50; 8.6 TABLET ORAL at 08:13

## 2022-12-04 RX ADMIN — HYDROXYZINE HYDROCHLORIDE 10 MG: 10 TABLET ORAL at 03:12

## 2022-12-04 RX ADMIN — PRAMIPEXOLE DIHYDROCHLORIDE 0.12 MG: 0.25 TABLET ORAL at 19:28

## 2022-12-04 RX ADMIN — ACETAMINOPHEN 650 MG: 325 TABLET ORAL at 17:21

## 2022-12-04 RX ADMIN — ACETAMINOPHEN 650 MG: 325 TABLET ORAL at 12:21

## 2022-12-04 RX ADMIN — ACETAMINOPHEN 650 MG: 325 TABLET ORAL at 00:52

## 2022-12-04 RX ADMIN — FAMOTIDINE 20 MG: 20 TABLET, FILM COATED ORAL at 08:13

## 2022-12-04 RX ADMIN — Medication 10 ML: at 14:00

## 2022-12-04 NOTE — PROGRESS NOTES
Bedside shift change report given to [id number] Nevaeh Cadena, RN (oncoming nurse) by Jose F Negron RN (offgoing nurse). Report included the following information SBAR, Kardex, Intake/Output, and MAR.

## 2022-12-04 NOTE — PROGRESS NOTES
[facility] [facility] Adult  Hospitalist Group                                                                                          Hospitalist Progress Note  Citlaly Dangelo NP  Answering service: [phone] from in house phone        Date of Service:  2022  NAME:  Daniel Zafar  :  10/17/1927  MRN:  [id number]      Admission Summary:   Per H&P, Daniel Zafar is a 80 y.o. female with apmhx CAD, SDH, and past VTE who presents from [address] with dislocated left shoulder status post fall 2 weeks ago, and hypoxic respiratory failure. She had a mechanical fall two weeks ago, and had resultant shoulder pain that she attempted to treat herself. When the pain did not improve, she let her facility know, and was brought to the ED for further evaluation. In the ED, she was hypoxic to 89% with improvement to 96% on 2Lnc. Other VSS. Labs showed WBC 11.5, CO2 35, BUN 38, and creatinine 1.54 (b/l 0.8-0.9). XR left shoulder shows anterior glenohumeral dislocation, and OA. CXR shows no acute intrathoracic disease. In the ED, she received 1Lns, fentanyl, lidocaine prior to attempted closed reduction by ortho, and percocet. Interval history / Subjective:          I saw the patient this morning on rounds. Family member was at the bedside at the moment of the encounter. Patient breathing okay however mainly complains of just not feeling well. Assessment & Plan:         left shoulder dislocation  Glenohumeral dislocation as revealed on imaging  Continuing multimodal pain control, ice  POD 3 TSA  Orthopedics following, appreciate recommendations  Per orthopedics weightbearing, okay to push herself up from wheelchair, nonweightbearing right upper extremity, may use sling as needed.   Shoulder pendulums/elbow/wrist range of motion         Hypoxic Respiratory Failure  Oxygen saturations were 89% on room air, improvement to 96% on 2 L  CTA was unremarkable  Incentive spirometry  Weaned to room air however now with cough. Also noted very mild leukocytosis  Radiograph yesterday with consolidation on the left  Starting antibiotics      Acute kidney injury  It is unclear what her baseline creatinine is. Last creatinine in our system was in 2019. Creatinine at presentation 1.54, this has improved with IV hydration down to 1.15 however this morning back up 2.13  Will avoid nephrotoxins  Continuing gentle IV hydration       CAD  Denies chest pain or shortness of breath  She denies having had heart attack in the past  Continuing carvedilol       H/O SDH  Stable     glaucoma  Stable  Continue latanoprost    RLS  Restarting pramipexole         Code status: DNR  Prophylaxis: Rivaroxaban  Care Plan discussed with: Patient, nurse, care manager  Anticipated Disposition: SNF, likely Monday     Hospital Problems  Date Reviewed: 3/12/2019            Codes Class Noted POA    Hypoxia ICD-10-CM: R09.02  ICD-9-CM: 799.02  11/30/2022 Unknown         Review of Systems:   A comprehensive review of systems was negative except for that written in the HPI. Vital Signs:    Last 24hrs VS reviewed since prior progress note. Most recent are:  Visit Vitals  BP (!) 95/56 (BP 1 Location: Right upper arm, BP Patient Position: Semi fowlers)   Pulse 83   Temp 97.4 °F (36.3 °C)   Resp 18   Ht 5' (1.524 m)   Wt 65.2 kg (143 lb 11.8 oz)   SpO2 96%   BMI 28.07 kg/m²         Intake/Output Summary (Last 24 hours) at 12/4/2022 0851  Last data filed at 12/3/2022 2000  Gross per 24 hour   Intake 450 ml   Output --   Net 450 ml          Physical Examination:     I had a face to face encounter with this patient and independently examined them on 12/4/2022 as outlined below:          Constitutional:  No acute distress, cooperative, pleasant    ENT:  Oral mucosa moist, oropharynx benign. Resp:  CTA bilaterally. No wheezing/rhonchi/rales. No accessory muscle use.     CV:  Regular rhythm, normal rate, no murmurs, gallops, rubs    GI:  Soft, non distended, non tender. normoactive bowel sounds, no hepatosplenomegaly     Musculoskeletal:  No edema, warm, 2+ pulses throughout    Neurologic:  Moves all extremities left upper extremity. AAOx3, CN II-XII reviewed            Data Review:    Review and/or order of clinical lab test  Review and/or order of tests in the radiology section of [facility]      Labs:     Recent Labs     12/04/22 0156 12/03/22 0522   WBC 11.4* 11.9*   HGB 9.8* 10.6*   HCT 30.0* 33.5*    181       Recent Labs     12/04/22 0156 12/03/22 0522 12/02/22  0556    136 139   K 4.0 3.7 3.6    105 108   CO2 21 26 27   BUN 32* 24* 22*   CREA 2.13* 1.52* 1.15*   * 155* 103*   CA 7.3* 7.8* 7.7*       No results for input(s): ALT, AP, TBIL, TBILI, TP, ALB, GLOB, GGT, AML, LPSE in the last 72 hours. No lab exists for component: SGOT, GPT, AMYP, HLPSE    No results for input(s): INR, PTP, APTT, INREXT, INREXT in the last 72 hours. No results for input(s): FE, TIBC, PSAT, FERR in the last 72 hours. Lab Results   Component Value Date/Time    Folate 5.8 03/08/2017 06:00 PM        No results for input(s): PH, PCO2, PO2 in the last 72 hours. No results for input(s): CPK, CKNDX, TROIQ in the last 72 hours.     No lab exists for component: CPKMB  Lab Results   Component Value Date/Time    Cholesterol, total 182 03/08/2017 06:00 PM    HDL Cholesterol 64 03/08/2017 06:00 PM    LDL, calculated 91.2 03/08/2017 06:00 PM    Triglyceride 134 03/08/2017 06:00 PM    CHOL/HDL Ratio 2.8 03/08/2017 06:00 PM     Lab Results   Component Value Date/Time    Glucose (POC) 94 11/20/2016 07:00 AM     Lab Results   Component Value Date/Time    Color YELLOW/STRAW 10/31/2018 04:55 AM    Appearance CLOUDY (A) 10/31/2018 04:55 AM    Specific gravity 1.014 10/31/2018 04:55 AM    pH (UA) 5.5 10/31/2018 04:55 AM    Protein TRACE (A) 10/31/2018 04:55 AM    Glucose NEGATIVE 10/31/2018 04:55 AM    Ketone NEGATIVE 10/31/2018 04:55 AM    Bilirubin NEGATIVE 10/31/2018 04:55 AM    Urobilinogen 0.2 10/31/2018 04:55 AM    Nitrites NEGATIVE 10/31/2018 04:55 AM    Leukocyte Esterase LARGE (A) 10/31/2018 04:55 AM    Epithelial cells FEW 10/31/2018 04:55 AM    Bacteria NEGATIVE 10/31/2018 04:55 AM    WBC >100 (H) 10/31/2018 04:55 AM    RBC 0-5 10/31/2018 04:55 AM         Medications Reviewed:     Current Facility-Administered Medications   Medication Dose Route Frequency    guaiFENesin ER (MUCINEX) tablet 600 mg  600 mg Oral Q12H    [START ON 12/5/2022] levoFLOXacin (LEVAQUIN) tablet 500 mg  500 mg Oral Q48H    0.9% sodium chloride infusion  75 mL/hr IntraVENous CONTINUOUS    acetaminophen (TYLENOL) tablet 650 mg  650 mg Oral Q6H    famotidine (PEPCID) tablet 20 mg  20 mg Oral DAILY    carvediloL (COREG) tablet 3.125 mg  3.125 mg Oral BID WITH MEALS    latanoprost (XALATAN) 0.005 % ophthalmic solution 1 Drop  1 Drop Both Eyes QHS    sodium chloride (NS) flush 5-40 mL  5-40 mL IntraVENous Q8H    sodium chloride (NS) flush 5-40 mL  5-40 mL IntraVENous PRN    0.9% sodium chloride infusion 25 mL  25 mL IntraVENous PRN    oxyCODONE IR (ROXICODONE) tablet 2.5 mg  2.5 mg Oral Q4H PRN    oxyCODONE IR (ROXICODONE) tablet 5 mg  5 mg Oral Q4H PRN    naloxone (NARCAN) injection 0.4 mg  0.4 mg IntraVENous PRN    senna-docusate (PERICOLACE) 8.6-50 mg per tablet 1 Tablet  1 Tablet Oral BID    polyethylene glycol (MIRALAX) packet 17 g  17 g Oral DAILY    bisacodyL (DULCOLAX) suppository 10 mg  10 mg Rectal DAILY PRN    rivaroxaban (XARELTO) tablet 10 mg  10 mg Oral DAILY WITH DINNER    sodium chloride (NS) flush 5-40 mL  5-40 mL IntraVENous Q8H    sodium chloride (NS) flush 5-40 mL  5-40 mL IntraVENous PRN    ondansetron (ZOFRAN ODT) tablet 4 mg  4 mg Oral Q8H PRN    Or    ondansetron (ZOFRAN) injection 4 mg  4 mg IntraVENous Q6H PRN     ______________________________________________________________________  EXPECTED LENGTH OF STAY: 1d 9h  ACTUAL LENGTH OF STAY:          1 Hang Holt, NP

## 2022-12-04 NOTE — PROGRESS NOTES
Problem: Falls - Risk of  Goal: *Absence of Falls  Description: Document Vernia Colder Fall Risk and appropriate interventions in the flowsheet.   Outcome: Progressing Towards Goal  Note: Fall Risk Interventions:  Mobility Interventions: Bed/chair exit alarm, Communicate number of staff needed for ambulation/transfer         Medication Interventions: Patient to call before getting OOB    Elimination Interventions: Call light in reach    History of Falls Interventions: Room close to nurse's station         Problem: Patient Education: Go to Patient Education Activity  Goal: Patient/Family Education  Outcome: Progressing Towards Goal  Note: Fall prevention  Pain management

## 2022-12-05 LAB
ANION GAP SERPL CALC-SCNC: 4 MMOL/L (ref 5–15)
BASOPHILS # BLD: 0 K/UL (ref 0–0.1)
BASOPHILS NFR BLD: 0 % (ref 0–1)
BUN SERPL-MCNC: 40 MG/DL (ref 6–20)
BUN/CREAT SERPL: 19 (ref 12–20)
CALCIUM SERPL-MCNC: 7.7 MG/DL (ref 8.5–10.1)
CHLORIDE SERPL-SCNC: 108 MMOL/L (ref 97–108)
CO2 SERPL-SCNC: 24 MMOL/L (ref 21–32)
CREAT SERPL-MCNC: 2.14 MG/DL (ref 0.55–1.02)
DIFFERENTIAL METHOD BLD: ABNORMAL
EOSINOPHIL # BLD: 0.1 K/UL (ref 0–0.4)
EOSINOPHIL NFR BLD: 1 % (ref 0–7)
ERYTHROCYTE [DISTWIDTH] IN BLOOD BY AUTOMATED COUNT: 13.7 % (ref 11.5–14.5)
GLUCOSE SERPL-MCNC: 108 MG/DL (ref 65–100)
HCT VFR BLD AUTO: 28.1 % (ref 35–47)
HGB BLD-MCNC: 8.8 G/DL (ref 11.5–16)
IMM GRANULOCYTES # BLD AUTO: 0.1 K/UL (ref 0–0.04)
IMM GRANULOCYTES NFR BLD AUTO: 1 % (ref 0–0.5)
LYMPHOCYTES # BLD: 1.1 K/UL (ref 0.8–3.5)
LYMPHOCYTES NFR BLD: 10 % (ref 12–49)
MCH RBC QN AUTO: 30.2 PG (ref 26–34)
MCHC RBC AUTO-ENTMCNC: 31.3 G/DL (ref 30–36.5)
MCV RBC AUTO: 96.6 FL (ref 80–99)
MONOCYTES # BLD: 1 K/UL (ref 0–1)
MONOCYTES NFR BLD: 9 % (ref 5–13)
NEUTS SEG # BLD: 8.8 K/UL (ref 1.8–8)
NEUTS SEG NFR BLD: 79 % (ref 32–75)
NRBC # BLD: 0 K/UL (ref 0–0.01)
NRBC BLD-RTO: 0 PER 100 WBC
PLATELET # BLD AUTO: 221 K/UL (ref 150–400)
PMV BLD AUTO: 9.4 FL (ref 8.9–12.9)
POTASSIUM SERPL-SCNC: 3.9 MMOL/L (ref 3.5–5.1)
RBC # BLD AUTO: 2.91 M/UL (ref 3.8–5.2)
SODIUM SERPL-SCNC: 136 MMOL/L (ref 136–145)
WBC # BLD AUTO: 11.1 K/UL (ref 3.6–11)

## 2022-12-05 PROCEDURE — 97530 THERAPEUTIC ACTIVITIES: CPT

## 2022-12-05 PROCEDURE — 85025 COMPLETE CBC W/AUTO DIFF WBC: CPT

## 2022-12-05 PROCEDURE — 36415 COLL VENOUS BLD VENIPUNCTURE: CPT

## 2022-12-05 PROCEDURE — 65270000046 HC RM TELEMETRY

## 2022-12-05 PROCEDURE — 74011000250 HC RX REV CODE- 250: Performed by: FAMILY MEDICINE

## 2022-12-05 PROCEDURE — 97535 SELF CARE MNGMENT TRAINING: CPT

## 2022-12-05 PROCEDURE — 74011000250 HC RX REV CODE- 250: Performed by: ORTHOPAEDIC SURGERY

## 2022-12-05 PROCEDURE — 51798 US URINE CAPACITY MEASURE: CPT

## 2022-12-05 PROCEDURE — 94760 N-INVAS EAR/PLS OXIMETRY 1: CPT

## 2022-12-05 PROCEDURE — 74011250637 HC RX REV CODE- 250/637: Performed by: ORTHOPAEDIC SURGERY

## 2022-12-05 PROCEDURE — 77010033678 HC OXYGEN DAILY

## 2022-12-05 PROCEDURE — 74011250637 HC RX REV CODE- 250/637: Performed by: NURSE PRACTITIONER

## 2022-12-05 PROCEDURE — 80048 BASIC METABOLIC PNL TOTAL CA: CPT

## 2022-12-05 PROCEDURE — 77030037877 HC DRSG MEPILEX >48IN BORD MOLN -A

## 2022-12-05 RX ADMIN — ACETAMINOPHEN 650 MG: 325 TABLET ORAL at 17:47

## 2022-12-05 RX ADMIN — LEVOFLOXACIN 500 MG: 250 TABLET, FILM COATED ORAL at 17:47

## 2022-12-05 RX ADMIN — ACETAMINOPHEN 650 MG: 325 TABLET ORAL at 00:17

## 2022-12-05 RX ADMIN — SENNOSIDES AND DOCUSATE SODIUM 1 TABLET: 50; 8.6 TABLET ORAL at 17:47

## 2022-12-05 RX ADMIN — PRAMIPEXOLE DIHYDROCHLORIDE 0.12 MG: 0.25 TABLET ORAL at 19:23

## 2022-12-05 RX ADMIN — ACETAMINOPHEN 650 MG: 325 TABLET ORAL at 05:36

## 2022-12-05 RX ADMIN — RIVAROXABAN 10 MG: 10 TABLET, FILM COATED ORAL at 17:47

## 2022-12-05 RX ADMIN — GUAIFENESIN 600 MG: 600 TABLET, EXTENDED RELEASE ORAL at 21:31

## 2022-12-05 RX ADMIN — POLYETHYLENE GLYCOL 3350 17 G: 17 POWDER, FOR SOLUTION ORAL at 09:07

## 2022-12-05 RX ADMIN — SENNOSIDES AND DOCUSATE SODIUM 1 TABLET: 50; 8.6 TABLET ORAL at 09:07

## 2022-12-05 RX ADMIN — FAMOTIDINE 20 MG: 20 TABLET, FILM COATED ORAL at 09:07

## 2022-12-05 RX ADMIN — SODIUM CHLORIDE, PRESERVATIVE FREE 10 ML: 5 INJECTION INTRAVENOUS at 21:32

## 2022-12-05 RX ADMIN — GUAIFENESIN 600 MG: 600 TABLET, EXTENDED RELEASE ORAL at 09:07

## 2022-12-05 RX ADMIN — SODIUM CHLORIDE, PRESERVATIVE FREE 10 ML: 5 INJECTION INTRAVENOUS at 05:36

## 2022-12-05 RX ADMIN — Medication 10 ML: at 05:36

## 2022-12-05 RX ADMIN — OXYCODONE HYDROCHLORIDE 5 MG: 5 TABLET ORAL at 21:31

## 2022-12-05 RX ADMIN — OXYCODONE HYDROCHLORIDE 5 MG: 5 TABLET ORAL at 01:06

## 2022-12-05 NOTE — PROGRESS NOTES
Orthopedic Progress Note    S: Pain well controlled, elevated mood today; Denies numbness, tingling, focal weakness, cp, sob, fever, chills    O: NAD, respirations unlabored; Dressings CDI; arm and forearm soft, moderate ecchymosis and edema; finger flexion, abdution, thumb extension, wrist extension, 5/5, SILT; Cap refill brisk, radial pulse 2+    Patient Vitals for the past 4 hrs:   Pulse BP   12/04/22 1953 73 --   12/04/22 1800 72 --   12/04/22 1720 74 (!) 91/56     Recent Labs     12/04/22  0156 12/03/22  0522   HGB 9.8* 10.6*   HCT 30.0* 33.5*    181   BUN 32* 24*   CREA 2.13* 1.52*   K 4.0 3.7    136            A/P:  Procedure: Procedure(s):  SHOULDER ARTHROPLASTY/TOTAL  Post Op day: 3 Days Post-Op    Weighbearing: Okay to push herself up from her wheelchair. NWB RUE. Use sling as needed. OK to remove at least 3 times daily to do shoulder pendulums/elbow/wrist range of motion exercises  -Shoulder precautions: No External rotation past neutral. No active internal rotation  -DVT prophylaxis: SCDs, frequent ambulation  -Antibiotics: for 24h  -Pain control: Continue as needed pain management, ice to operative area  -PT/OT for mobilization and discharge planning. No External rotation past neutral. No active internal rotation  -Incentive Spirometry  -Dispo: Discharge planning to assisted living facility when ready  Follow up with me in office in 10-14 days        Will sign off, call with questions.      DARCY Carias  Orthopedic Trauma Service  [facility]

## 2022-12-05 NOTE — PROGRESS NOTES
Transition of Care: Patient has been accepted at [address] [facility]. No auth required for SNF. Rapid covid test required for admission, test ordered. Patient will need BLS transport at discharge. Patient not medically stable for discharge today per attending. GUSTAVO spoke with daughter Sachin Lynch, [phone], she prefers [facility] , family requesting private room. GUSTAVO spoke with Brunilda with [facility] [phone], they will have a private room tomorrow. GUSTAVO met with patient and family at bedside. Family plans to tour [facility] today and then will decide which they prefer.     MIKE Mulligan/CRM

## 2022-12-05 NOTE — PROGRESS NOTES
Problem: Self Care Deficits Care Plan (Adult)  Goal: *Acute Goals and Plan of Care (Insert Text)  Description: FUNCTIONAL STATUS PRIOR TO ADMISSION: Pt reports she is Moderate A for ADLs, seated shower chair or seated W/C level and briefly standing at RW. HOME SUPPORT: The patient lived with halfway staffing to provide Moderate ADL assist.      Occupational Therapy Goals  Initiated 12/2/2022   1. Patient will don/doff arm sling at maximal assistance level within 7 days. 2.  Patient will perform [facility] and pendulum exercises per physician order with moderate assistance  within 7 days. 3.  Patient will perform upper body ADLs with moderate assistance   within 7 days. 4. Patient will perform supine <> sit with minimal assistance to participate with ADL tasks within 7 days. 5.  Patient will perform toilet transfers with moderate assistance  using  Walkers, Type: Ion Walker within 7 days. 6.  Patient will verbalize/demonstrate shoulder precautions during ADLs with 100% accuracy within 7 days. Initiated 12/1/2022  1. Patient will perform seated self-feeding with supervision/set-up within 7 day(s). 2.  Patient will perform seated grooming with minimal assistance within 7 day(s). 3.  Patient will perform seated upper body bathing with moderate assistance  within 7 day(s). 4.  Patient will perform toilet transfers, EOB to [facility], with maximal assistance within 7 day(s). Outcome: Not Met   OCCUPATIONAL THERAPY TREATMENT  Patient: Charlee Webster (47 y.o. female)  Date: 12/5/2022  Diagnosis: Hypoxia [R09.02] <principal problem not specified>  Procedure(s) (LRB):  SHOULDER ARTHROPLASTY/TOTAL (Left) 4 Days Post-Op  Precautions: Fall, WBAT  Chart, occupational therapy assessment, plan of care, and goals were reviewed. ASSESSMENT  Patient continues with skilled OT services and is not progressing towards goals. Nursing cleared for therapy. Received in bed with PCT present completing ADL tasks.   Rolling with total A x2 to complete gabriel-bathing at total A and change brief at total A. Total A to readjust sling. Noted SOB with rolling tasks with poor tolerance with HOB lowered, maintained HOB elevation through out session. SpO2 >91% on 3 L with coughing. Participated with RUE for hair combing, mod A to full complete full head of hair. Demo L hand/wrist AROM. Patient fatigued with rolling for bathing, positioned for comfort with SCD, sling adjusted, B heels elevated. Current Level of Function Impacting Discharge (ADLs): hair grooming mod A, LB care total A, bed mobility total Ax2    Other factors to consider for discharge: Patient is POD 4 from L TSA and impaired activity tolerance, mobility, generalized strength, and pain impacting her ability to complete ADL tasks. Recommend SNF. PLAN :  Patient continues to benefit from skilled intervention to address the above impairments. Continue treatment per established plan of care to address goals. Recommend with staff: repositioning every two hours    Recommend next OT session: progressing POC, EOB as tolerated    Recommendation for discharge: (in order for the patient to meet his/her long term goals)  Therapy up to 5 days/week in SNF setting    This discharge recommendation:  Has been made in collaboration with the attending provider and/or case management    IF patient discharges home will need the following DME: TBD SNF         SUBJECTIVE:   Patient stated I need to be up.     OBJECTIVE DATA SUMMARY:   Cognitive/Behavioral Status:                      Functional Mobility and Transfers for ADLs:  Bed Mobility:  Rolling: Total assistance;Assist x2  Scooting:  Total assistance;Assist x2    Transfers:             Balance:       ADL Intervention:       Grooming  Brushing/Combing Hair: Moderate assistance (completed R lateral head)    Lower Body Bathing  Perineal  : Total assistance (dependent)  Lower Body : Total assistance (dependent)    Upper Body [address] Gown: Maximum assistance    Lower Body Dressing Assistance  Socks: Total assistance (dependent)  Position Performed: Supine      Pain:  Did not quantify, reports pain in L shoulder, overall general discomfort    Activity Tolerance:   Poor, 3 L O2 [facility] bed level activity,     After treatment patient left in no apparent distress:   Heels elevated for pressure relief, Call bell within reach, Side rails x 3, and HOB elevated    COMMUNICATION/COLLABORATION:   The patients plan of care was discussed with: Physical therapist and Registered nurse.      Emily Last OT  Time Calculation: 24 mins

## 2022-12-05 NOTE — PROGRESS NOTES
[facility] [facility] Adult  Hospitalist Group                                                                                          Hospitalist Progress Note  Ernesto Roland NP  Answering service: [phone] OR [phone] from in house phone        Date of Service:  2022  NAME:  Camryn Hernández  :  10/17/1927  MRN:  [id number]      Admission Summary:   Per H&P, Camryn Hernández is a 80 y.o. female with apmhx CAD, SDH, and past VTE who presents from [address] with dislocated left shoulder status post fall 2 weeks ago, and hypoxic respiratory failure. She had a mechanical fall two weeks ago, and had resultant shoulder pain that she attempted to treat herself. When the pain did not improve, she let her facility know, and was brought to the ED for further evaluation. In the ED, she was hypoxic to 89% with improvement to 96% on 2Lnc. Other VSS. Labs showed WBC 11.5, CO2 35, BUN 38, and creatinine 1.54 (b/l 0.8-0.9). XR left shoulder shows anterior glenohumeral dislocation, and OA. CXR shows no acute intrathoracic disease. In the ED, she received 1Lns, fentanyl, lidocaine prior to attempted closed reduction by ortho, and percocet. Interval history / Subjective:        I saw the patient this morning on rounds. Assessment & Plan:         left shoulder dislocation  Glenohumeral dislocation as revealed on imaging  Continuing multimodal pain control, ice  POD 4 TSA  Orthopedics following, appreciate recommendations  Per orthopedics weightbearing, okay to push herself up from wheelchair, nonweightbearing right upper extremity, may use sling as needed. Shoulder pendulums/elbow/wrist range of motion         Hypoxic Respiratory Failure  Oxygen saturations were 89% on room air, improvement to 96% on 2 L  CTA was unremarkable  Incentive spirometry  Weaned to room air however now with cough.   Also noted very mild leukocytosis  Radiograph continuing levofloxacin with consolidation on the left  Starting antibiotics      Acute kidney injury  It is unclear what her baseline creatinine is. Last creatinine in our system was in 2019. Creatinine at presentation 1.54, this has improved with IV hydration down to 1.15 however this morning back up 2.14  Will avoid nephrotoxins  Continuing gentle IV hydration       CAD  Denies chest pain or shortness of breath  She denies having had heart attack in the past  Continuing carvedilol       H/O SDH  Stable     glaucoma  Stable  Continue latanoprost    RLS  Restarting pramipexole         Code status: DNR  Prophylaxis: Rivaroxaban  Care Plan discussed with: Patient, nurse, care manager  Anticipated Disposition: SNF, likely [address] Problems  Date Reviewed: 3/12/2019            Codes Class Noted POA    Hypoxia ICD-10-CM: R09.02  ICD-9-CM: 799.02  11/30/2022 Unknown       Review of Systems:   A comprehensive review of systems was negative except for that written in the HPI. Vital Signs:    Last 24hrs VS reviewed since prior progress note. Most recent are:  Visit Vitals  BP (!) 93/57 (BP 1 Location: Right upper arm, BP Patient Position: Semi fowlers)   Pulse 75   Temp 98.2 °F (36.8 °C)   Resp 15   Ht 5' (1.524 m)   Wt 65.2 kg (143 lb 11.8 oz)   SpO2 98%   BMI 28.07 kg/m²         Intake/Output Summary (Last 24 hours) at 12/5/2022 5850  Last data filed at 12/5/2022 0536  Gross per 24 hour   Intake --   Output 400 ml   Net -400 ml          Physical Examination:     I had a face to face encounter with this patient and independently examined them on 12/5/2022 as outlined below:          Constitutional:  No acute distress, cooperative, pleasant    ENT:  Oral mucosa moist, oropharynx benign. Resp:  CTA bilaterally. No wheezing/rhonchi/rales. No accessory muscle use. CV:  Regular rhythm, normal rate, no murmurs, gallops, rubs    GI:  Soft, non distended, non tender.  normoactive bowel sounds, no hepatosplenomegaly Musculoskeletal:  No edema, warm, 2+ pulses throughout    Neurologic:  Moves all extremities left upper extremity. AAOx3, CN II-XII reviewed            Data Review:    Review and/or order of clinical lab test  Review and/or order of tests in the radiology section of [facility]      Labs:     Recent Labs     12/05/22 0102 12/04/22 0156   WBC 11.1* 11.4*   HGB 8.8* 9.8*   HCT 28.1* 30.0*    189       Recent Labs     12/05/22 0102 12/04/22 0156 12/03/22 0522    136 136   K 3.9 4.0 3.7    107 105   CO2 24 21 26   BUN 40* 32* 24*   CREA 2.14* 2.13* 1.52*   * 105* 155*   CA 7.7* 7.3* 7.8*       No results for input(s): ALT, AP, TBIL, TBILI, TP, ALB, GLOB, GGT, AML, LPSE in the last 72 hours. No lab exists for component: SGOT, GPT, AMYP, HLPSE    No results for input(s): INR, PTP, APTT, INREXT, INREXT in the last 72 hours. No results for input(s): FE, TIBC, PSAT, FERR in the last 72 hours. Lab Results   Component Value Date/Time    Folate 5.8 03/08/2017 06:00 PM        No results for input(s): PH, PCO2, PO2 in the last 72 hours. No results for input(s): CPK, CKNDX, TROIQ in the last 72 hours.     No lab exists for component: CPKMB  Lab Results   Component Value Date/Time    Cholesterol, total 182 03/08/2017 06:00 PM    HDL Cholesterol 64 03/08/2017 06:00 PM    LDL, calculated 91.2 03/08/2017 06:00 PM    Triglyceride 134 03/08/2017 06:00 PM    CHOL/HDL Ratio 2.8 03/08/2017 06:00 PM     Lab Results   Component Value Date/Time    Glucose (POC) 94 11/20/2016 07:00 AM     Lab Results   Component Value Date/Time    Color YELLOW/STRAW 10/31/2018 04:55 AM    Appearance CLOUDY (A) 10/31/2018 04:55 AM    Specific gravity 1.014 10/31/2018 04:55 AM    pH (UA) 5.5 10/31/2018 04:55 AM    Protein TRACE (A) 10/31/2018 04:55 AM    Glucose NEGATIVE 10/31/2018 04:55 AM    Ketone NEGATIVE 10/31/2018 04:55 AM    Bilirubin NEGATIVE 10/31/2018 04:55 AM    Urobilinogen 0.2 10/31/2018 04:55 AM    Nitrites NEGATIVE 10/31/2018 04:55 AM    Leukocyte Esterase LARGE (A) 10/31/2018 04:55 AM    Epithelial cells FEW 10/31/2018 04:55 AM    Bacteria NEGATIVE 10/31/2018 04:55 AM    WBC >100 (H) 10/31/2018 04:55 AM    RBC 0-5 10/31/2018 04:55 AM         Medications Reviewed:     Current Facility-Administered Medications   Medication Dose Route Frequency    pramipexole (MIRAPEX) tablet 0.125 mg  0.125 mg Oral BID    guaiFENesin ER (MUCINEX) tablet 600 mg  600 mg Oral Q12H    levoFLOXacin (LEVAQUIN) tablet 500 mg  500 mg Oral Q48H    0.9% sodium chloride infusion  75 mL/hr IntraVENous CONTINUOUS    acetaminophen (TYLENOL) tablet 650 mg  650 mg Oral Q6H    famotidine (PEPCID) tablet 20 mg  20 mg Oral DAILY    carvediloL (COREG) tablet 3.125 mg  3.125 mg Oral BID WITH MEALS    latanoprost (XALATAN) 0.005 % ophthalmic solution 1 Drop  1 Drop Both Eyes QHS    sodium chloride (NS) flush 5-40 mL  5-40 mL IntraVENous Q8H    sodium chloride (NS) flush 5-40 mL  5-40 mL IntraVENous PRN    0.9% sodium chloride infusion 25 mL  25 mL IntraVENous PRN    oxyCODONE IR (ROXICODONE) tablet 2.5 mg  2.5 mg Oral Q4H PRN    oxyCODONE IR (ROXICODONE) tablet 5 mg  5 mg Oral Q4H PRN    naloxone (NARCAN) injection 0.4 mg  0.4 mg IntraVENous PRN    senna-docusate (PERICOLACE) 8.6-50 mg per tablet 1 Tablet  1 Tablet Oral BID    polyethylene glycol (MIRALAX) packet 17 g  17 g Oral DAILY    bisacodyL (DULCOLAX) suppository 10 mg  10 mg Rectal DAILY PRN    rivaroxaban (XARELTO) tablet 10 mg  10 mg Oral DAILY WITH DINNER    sodium chloride (NS) flush 5-40 mL  5-40 mL IntraVENous Q8H    sodium chloride (NS) flush 5-40 mL  5-40 mL IntraVENous PRN    ondansetron (ZOFRAN ODT) tablet 4 mg  4 mg Oral Q8H PRN    Or    ondansetron (ZOFRAN) injection 4 mg  4 mg IntraVENous Q6H PRN     ______________________________________________________________________  EXPECTED LENGTH OF STAY: 1d 9h  ACTUAL LENGTH OF STAY:          Hao Pettit NP

## 2022-12-05 NOTE — PROGRESS NOTES
Problem: Mobility Impaired (Adult and Pediatric)  Goal: *Acute Goals and Plan of Care (Insert Text)  Description: FUNCTIONAL STATUS PRIOR TO ADMISSION: Patient uses  w/c at baseline and is not ambulatory. Able to stand and transfer to her w/c on her own. Gets meals in her apartment. HOME SUPPORT PRIOR TO ADMISSION: The patient lived alone with family in the area to provide assistance. Physical Therapy  Reassessed after TSA surgery 12/2/2022  1. Patient will move from supine to sit and sit to supine  in bed with moderate assistance of 1 within 7 day(s). 2.  Patient will transfer from bed to chair and chair to bed with moderate assistance of 1 using the least restrictive device within 7 day(s). 3.  Patient will perform sit to stand with moderate assistance of 1 within 7 day(s). Physical Therapy Goals  Initiated 12/1/2022  1. Patient will move from supine to sit and sit to supine  in bed with supervision/set-up within 7 day(s). 2.  Patient will transfer from bed to chair and chair to bed with supervision/set-up using the least restrictive device within 7 day(s). 3.  Patient will perform sit to stand with supervision/set-up within 7 day(s). Outcome: Progressing Towards Goal   PHYSICAL THERAPY TREATMENT  Patient: Seble Palm (5 y.o. female)  Date: 12/5/2022  Diagnosis: Hypoxia [R09.02] <principal problem not specified>  Procedure(s) (LRB):  SHOULDER ARTHROPLASTY/TOTAL (Left) 4 Days Post-Op  Precautions: Fall, WBAT  Chart, physical therapy assessment, plan of care and goals were reviewed. ASSESSMENT  Patient continues with skilled PT services and is not progressing towards goals. This session, pt only tolerating (B) rolling w/ Total Ax2. Pt w/ limited activity tolerance and pain. Noted SOB and fatigue w/ rolling for completion of bath and linen change. SpO2 91%- mid90's on 3L/min. Pt requesting for HOB to be elevated during transfer.  Pt usually sleeps in recliner and did not tolerate low HOB. Rest breaks needed and PLB instructed to ensure both inhalation and exhalation as pt tends to hold her breath. Also noted wet , but non-productive cough. RN made aware. Pt positioned to comfort in supine position w/ HOB elevated and pillows underneath BU/LE. Current Level of Function Impacting Discharge (mobility/balance): Total Ax2 for (B) rolling    Other factors to consider for discharge: w/c at baseline (non-ambulatory per PLOF); mobility below baseline. Lives alone         PLAN :  Patient continues to benefit from skilled intervention to address the above impairments. Continue treatment per established plan of care. to address goals. Recommendation for discharge: (in order for the patient to meet his/her long term goals)  Therapy up to 5 days/week in SNF setting    This discharge recommendation:  Has been made in collaboration with the attending provider and/or case management    IF patient discharges home will need the following DME: patient owns DME required for discharge       SUBJECTIVE:   Patient stated Sit my head up higher.     OBJECTIVE DATA SUMMARY:   Critical Behavior:  Neurologic State: Alert  Orientation Level: Oriented X4  Cognition: Appropriate decision making, Appropriate for age attention/concentration, Appropriate safety awareness  Safety/Judgement: Awareness of environment  Functional Mobility Training:  Bed Mobility:  Rolling: Total assistance;Assist x2         Therapeutic Exercises:   PLB  Pain Rating:  No verbal c/o pain    Activity Tolerance:   Fair and observed SOB with activity    After treatment patient left in no apparent distress:   Supine in bed, Heels elevated for pressure relief, Call bell within reach, Bed / chair alarm activated, and Side rails x 3    COMMUNICATION/COLLABORATION:   The patients plan of care was discussed with: Occupational therapist and Registered nurse.      Raysa CopePTA   Time Calculation: 24 mins

## 2022-12-05 NOTE — PROGRESS NOTES
Progress Note    Status Post: Left reverse total shoulder arthroplasty  Date of Surgery: 12/1/22  Surgeon: Dr. Torrey Davies    Subjective:     No acute events over night. Meli Han states that she is doing well. Pain is controlled. Denies CP, SOB, nausea/vomitting, parasthesias.      Objective:   Visit Vitals  /66 (BP 1 Location: Right upper arm)   Pulse 74   Temp 99 °F (37.2 °C)   Resp 18   Ht 5' (1.524 m)   Wt 65.2 kg (143 lb 11.8 oz)   SpO2 99%   BMI 28.07 kg/m²       Patient Vitals for the past 24 hrs:   Temp Pulse Resp BP SpO2   12/05/22 1414 99 °F (37.2 °C) 74 18 107/66 99 %   12/05/22 1400 -- 81 -- -- --   12/05/22 1200 -- 77 -- -- --   12/05/22 1000 -- 74 -- -- --   12/05/22 0904 97.3 °F (36.3 °C) 75 18 103/62 99 %   12/05/22 0550 -- 75 -- -- --   12/05/22 0357 -- 71 -- -- --   12/05/22 0153 -- 72 -- -- --   12/05/22 0110 98.2 °F (36.8 °C) 73 15 (!) 93/57 98 %   12/04/22 2200 97.9 °F (36.6 °C) 72 19 (!) 98/57 96 %   12/04/22 1953 -- 73 -- -- --   12/04/22 1800 -- 72 -- -- --   12/04/22 1720 -- 74 -- (!) 91/56 --        Physical Exam:  Gen: NAD, AAOx3  Resp: No acute respiratory distress  MSK:  LUE  Sling in place  Dressing is clean, dry, and intact   Motor intact ain/pin/rad/uln  Sensation is intact to light touch m/r/u/ax      Intake/Output Summary (Last 24 hours) at 12/5/2022 1636  Last data filed at 12/5/2022 0536  Gross per 24 hour   Intake --   Output 300 ml   Net -300 ml       LABS :  Recent Results (from the past 24 hour(s))   CBC WITH AUTOMATED DIFF    Collection Time: 12/05/22  1:02 AM   Result Value Ref Range    WBC 11.1 (H) 3.6 - 11.0 K/uL    RBC 2.91 (L) 3.80 - 5.20 M/uL    HGB 8.8 (L) 11.5 - 16.0 g/dL    HCT 28.1 (L) 35.0 - 47.0 %    MCV 96.6 80.0 - 99.0 FL    MCH 30.2 26.0 - 34.0 PG    MCHC 31.3 30.0 - 36.5 g/dL    RDW 13.7 11.5 - 14.5 %    PLATELET 286 761 - 071 K/uL    MPV 9.4 8.9 - 12.9 FL    NRBC 0.0 0  WBC    ABSOLUTE NRBC 0.00 0.00 - 0.01 K/uL    NEUTROPHILS 79 (H) 32 - 75 %    LYMPHOCYTES 10 (L) 12 - 49 %    MONOCYTES 9 5 - 13 %    EOSINOPHILS 1 0 - 7 %    BASOPHILS 0 0 - 1 %    IMMATURE GRANULOCYTES 1 (H) 0.0 - 0.5 %    ABS. NEUTROPHILS 8.8 (H) 1.8 - 8.0 K/UL    ABS. LYMPHOCYTES 1.1 0.8 - 3.5 K/UL    ABS. MONOCYTES 1.0 0.0 - 1.0 K/UL    ABS. EOSINOPHILS 0.1 0.0 - 0.4 K/UL    ABS. BASOPHILS 0.0 0.0 - 0.1 K/UL    ABS. IMM. GRANS. 0.1 (H) 0.00 - 0.04 K/UL    DF AUTOMATED     METABOLIC PANEL, BASIC    Collection Time: 12/05/22  1:02 AM   Result Value Ref Range    Sodium 136 136 - 145 mmol/L    Potassium 3.9 3.5 - 5.1 mmol/L    Chloride 108 97 - 108 mmol/L    CO2 24 21 - 32 mmol/L    Anion gap 4 (L) 5 - 15 mmol/L    Glucose 108 (H) 65 - 100 mg/dL    BUN 40 (H) 6 - 20 MG/DL    Creatinine 2.14 (H) 0.55 - 1.02 MG/DL    BUN/Creatinine ratio 19 12 - 20      eGFR 21 (L) >60 ml/min/1.73m2    Calcium 7.7 (L) 8.5 - 10.1 MG/DL        Assessment:   Marcelina Nugent is a 80y.o. year-old female with Left shoulder dislocation and osteoarthritis status post reverse total shoulder replacement POD #4    Plan:     -Weighbearing: NWB LUE. Use sling at all times. OK to remove at least 3 times daily to do shoulder pendulums/elbow/wrist range of motion exercises. Okay to push using this arm to stand up from her wheelchair.  -Shoulder precautions: No External rotation past neutral. No active internal rotation  -DVT prophylaxis: SCDs, frequent ambulation  -Antibiotics: completed  -Pain control: Continue as needed pain management, ice to operative area  -PT/OT for mobilization and discharge planning  -Incentive Spirometry  -Dispo: Discharge planning to [facility] when ready  Follow up with me in office in 10-14 days        Dixie Walker MD    12/05/22  4:36 PM        Dixie Walker M.D.   [address] [place] [address] Office  Cell: [phone]  Office: [phone]  Office Fax: [phone]  Medical Staff: Kimber Sandifer, MA

## 2022-12-06 LAB
ANION GAP SERPL CALC-SCNC: 5 MMOL/L (ref 5–15)
BASOPHILS # BLD: 0 K/UL (ref 0–0.1)
BASOPHILS NFR BLD: 0 % (ref 0–1)
BUN SERPL-MCNC: 37 MG/DL (ref 6–20)
BUN/CREAT SERPL: 22 (ref 12–20)
CALCIUM SERPL-MCNC: 7.7 MG/DL (ref 8.5–10.1)
CHLORIDE SERPL-SCNC: 111 MMOL/L (ref 97–108)
CO2 SERPL-SCNC: 22 MMOL/L (ref 21–32)
CREAT SERPL-MCNC: 1.69 MG/DL (ref 0.55–1.02)
DIFFERENTIAL METHOD BLD: ABNORMAL
EOSINOPHIL # BLD: 0.2 K/UL (ref 0–0.4)
EOSINOPHIL NFR BLD: 2 % (ref 0–7)
ERYTHROCYTE [DISTWIDTH] IN BLOOD BY AUTOMATED COUNT: 14 % (ref 11.5–14.5)
GLUCOSE SERPL-MCNC: 97 MG/DL (ref 65–100)
HCT VFR BLD AUTO: 29.9 % (ref 35–47)
HGB BLD-MCNC: 9.3 G/DL (ref 11.5–16)
IMM GRANULOCYTES # BLD AUTO: 0.1 K/UL (ref 0–0.04)
IMM GRANULOCYTES NFR BLD AUTO: 1 % (ref 0–0.5)
LYMPHOCYTES # BLD: 1 K/UL (ref 0.8–3.5)
LYMPHOCYTES NFR BLD: 8 % (ref 12–49)
MCH RBC QN AUTO: 30.5 PG (ref 26–34)
MCHC RBC AUTO-ENTMCNC: 31.1 G/DL (ref 30–36.5)
MCV RBC AUTO: 98 FL (ref 80–99)
MONOCYTES # BLD: 1.2 K/UL (ref 0–1)
MONOCYTES NFR BLD: 10 % (ref 5–13)
NEUTS SEG # BLD: 9.6 K/UL (ref 1.8–8)
NEUTS SEG NFR BLD: 79 % (ref 32–75)
NRBC # BLD: 0 K/UL (ref 0–0.01)
NRBC BLD-RTO: 0 PER 100 WBC
PLATELET # BLD AUTO: 293 K/UL (ref 150–400)
PMV BLD AUTO: 9.1 FL (ref 8.9–12.9)
POTASSIUM SERPL-SCNC: 4.3 MMOL/L (ref 3.5–5.1)
RBC # BLD AUTO: 3.05 M/UL (ref 3.8–5.2)
SODIUM SERPL-SCNC: 138 MMOL/L (ref 136–145)
WBC # BLD AUTO: 12.1 K/UL (ref 3.6–11)

## 2022-12-06 PROCEDURE — 74011000250 HC RX REV CODE- 250: Performed by: FAMILY MEDICINE

## 2022-12-06 PROCEDURE — 97530 THERAPEUTIC ACTIVITIES: CPT

## 2022-12-06 PROCEDURE — 65270000046 HC RM TELEMETRY

## 2022-12-06 PROCEDURE — 74011250637 HC RX REV CODE- 250/637: Performed by: NURSE PRACTITIONER

## 2022-12-06 PROCEDURE — 97530 THERAPEUTIC ACTIVITIES: CPT | Performed by: PHYSICAL THERAPIST

## 2022-12-06 PROCEDURE — 74011000250 HC RX REV CODE- 250: Performed by: ORTHOPAEDIC SURGERY

## 2022-12-06 PROCEDURE — 80048 BASIC METABOLIC PNL TOTAL CA: CPT

## 2022-12-06 PROCEDURE — 85025 COMPLETE CBC W/AUTO DIFF WBC: CPT

## 2022-12-06 PROCEDURE — 36415 COLL VENOUS BLD VENIPUNCTURE: CPT

## 2022-12-06 PROCEDURE — 74011250637 HC RX REV CODE- 250/637: Performed by: ORTHOPAEDIC SURGERY

## 2022-12-06 RX ORDER — ACETAMINOPHEN 325 MG/1
650 TABLET ORAL
Status: DISCONTINUED | OUTPATIENT
Start: 2022-12-06 | End: 2022-12-06

## 2022-12-06 RX ORDER — ACETAMINOPHEN 500 MG
1000 TABLET ORAL
Status: DISCONTINUED | OUTPATIENT
Start: 2022-12-06 | End: 2022-12-09 | Stop reason: HOSPADM

## 2022-12-06 RX ADMIN — LATANOPROST 1 DROP: 50 SOLUTION OPHTHALMIC at 02:26

## 2022-12-06 RX ADMIN — SODIUM CHLORIDE, PRESERVATIVE FREE 10 ML: 5 INJECTION INTRAVENOUS at 14:00

## 2022-12-06 RX ADMIN — GUAIFENESIN 600 MG: 600 TABLET, EXTENDED RELEASE ORAL at 09:56

## 2022-12-06 RX ADMIN — SENNOSIDES AND DOCUSATE SODIUM 1 TABLET: 50; 8.6 TABLET ORAL at 09:56

## 2022-12-06 RX ADMIN — SODIUM CHLORIDE, PRESERVATIVE FREE 10 ML: 5 INJECTION INTRAVENOUS at 22:18

## 2022-12-06 RX ADMIN — FAMOTIDINE 20 MG: 20 TABLET, FILM COATED ORAL at 09:56

## 2022-12-06 RX ADMIN — RIVAROXABAN 10 MG: 10 TABLET, FILM COATED ORAL at 18:32

## 2022-12-06 RX ADMIN — Medication 10 ML: at 22:18

## 2022-12-06 RX ADMIN — SENNOSIDES AND DOCUSATE SODIUM 1 TABLET: 50; 8.6 TABLET ORAL at 18:32

## 2022-12-06 RX ADMIN — Medication 10 ML: at 14:00

## 2022-12-06 RX ADMIN — POLYETHYLENE GLYCOL 3350 17 G: 17 POWDER, FOR SOLUTION ORAL at 09:56

## 2022-12-06 RX ADMIN — PRAMIPEXOLE DIHYDROCHLORIDE 0.12 MG: 0.25 TABLET ORAL at 12:09

## 2022-12-06 RX ADMIN — LATANOPROST 1 DROP: 50 SOLUTION OPHTHALMIC at 22:18

## 2022-12-06 RX ADMIN — ACETAMINOPHEN 650 MG: 325 TABLET ORAL at 09:56

## 2022-12-06 RX ADMIN — ACETAMINOPHEN 650 MG: 325 TABLET ORAL at 02:26

## 2022-12-06 RX ADMIN — OXYCODONE 2.5 MG: 5 TABLET ORAL at 19:12

## 2022-12-06 RX ADMIN — OXYCODONE HYDROCHLORIDE 5 MG: 5 TABLET ORAL at 02:25

## 2022-12-06 RX ADMIN — GUAIFENESIN 600 MG: 600 TABLET, EXTENDED RELEASE ORAL at 22:18

## 2022-12-06 RX ADMIN — PRAMIPEXOLE DIHYDROCHLORIDE 0.12 MG: 0.25 TABLET ORAL at 19:12

## 2022-12-06 NOTE — PROGRESS NOTES
Problem: Mobility Impaired (Adult and Pediatric)  Goal: *Acute Goals and Plan of Care (Insert Text)  Description: FUNCTIONAL STATUS PRIOR TO ADMISSION: Patient uses  w/c at baseline and is not ambulatory. Able to stand and transfer to her w/c on her own. Gets meals in her apartment. HOME SUPPORT PRIOR TO ADMISSION: The patient lived alone with family in the area to provide assistance. Physical Therapy  Reassessed after TSA surgery 12/2/2022  1. Patient will move from supine to sit and sit to supine  in bed with moderate assistance of 1 within 7 day(s). 2.  Patient will transfer from bed to chair and chair to bed with moderate assistance of 1 using the least restrictive device within 7 day(s). 3.  Patient will perform sit to stand with moderate assistance of 1 within 7 day(s). Physical Therapy Goals  Initiated 12/1/2022  1. Patient will move from supine to sit and sit to supine  in bed with supervision/set-up within 7 day(s). 2.  Patient will transfer from bed to chair and chair to bed with supervision/set-up using the least restrictive device within 7 day(s). 3.  Patient will perform sit to stand with supervision/set-up within 7 day(s). Outcome: Progressing Towards Goal   PHYSICAL THERAPY TREATMENT  Patient: Lloyd Baez (64 y.o. female)  Date: 12/6/2022  Diagnosis: Hypoxia [R09.02] <principal problem not specified>  Procedure(s) (LRB):  SHOULDER ARTHROPLASTY/TOTAL (Left) 5 Days Post-Op  Precautions: Fall, WBAT  Chart, physical therapy assessment, plan of care and goals were reviewed. ASSESSMENT  Patient continues with skilled PT services and is progressing towards goals. Patient overall appears more confused this session and is requiring maxA x 2 for all bed mobility. Able to sit EOB with supervision once scooted to EOB with maxA. Noted increased edema and bruising under L arm extending into L breast.  Patient without pain with palpation of this area.   TotalA x 2 to return to supine and scoot to [facility]. Patient continues to be well below baseline and recommend SNF rehab to progress to more independent level of function. Other factors to consider for discharge: at risk for falls, well below functional baseline         PLAN :  Patient continues to benefit from skilled intervention to address the above impairments. Continue treatment per established plan of care. to address goals. Recommendation for discharge: (in order for the patient to meet his/her long term goals)  Therapy up to 5 days/week in SNF setting      IF patient discharges home will need the following DME: to be determined (TBD)       SUBJECTIVE:   Patient stated I can try to sit up.     OBJECTIVE DATA SUMMARY:   Critical Behavior:  Neurologic State: Alert  Orientation Level: Oriented X4  Cognition: Follows commands  Safety/Judgement: Awareness of environment  Functional Mobility Training:  Bed Mobility:  Rolling: Maximum assistance;Assist x2  Supine to Sit: Maximum assistance;Assist x2;Bed Modified  Sit to Supine: Maximum assistance;Assist x2  Scooting: Total assistance;Assist x2        Transfers:   Not tested                                Balance:  Sitting: Impaired  Sitting - Static: Fair (occasional)  Sitting - Dynamic: Poor (constant support)  Ambulation/Gait Training:         Pain Rating:  No c/o pain    Activity Tolerance:   Fair and requires rest breaks    After treatment patient left in no apparent distress:   Supine in bed, Call bell within reach, Bed / chair alarm activated, and Side rails x 3    COMMUNICATION/COLLABORATION:   The patients plan of care was discussed with: Physical therapist, Occupational therapist, and Registered nurse.      Yamilka Blood, PT, DPT   Time Calculation: 18 mins

## 2022-12-06 NOTE — PROGRESS NOTES
Problem: Self Care Deficits Care Plan (Adult)  Goal: *Acute Goals and Plan of Care (Insert Text)  Description: FUNCTIONAL STATUS PRIOR TO ADMISSION: Pt reports she is Moderate A for ADLs, seated shower chair or seated W/C level and briefly standing at RW. HOME SUPPORT: The patient lived with detention staffing to provide Moderate ADL assist.      Occupational Therapy Goals  Initiated 12/2/2022   1. Patient will don/doff arm sling at maximal assistance level within 7 days. 2.  Patient will perform [facility] and pendulum exercises per physician order with moderate assistance  within 7 days. 3.  Patient will perform upper body ADLs with moderate assistance   within 7 days. 4. Patient will perform supine <> sit with minimal assistance to participate with ADL tasks within 7 days. 5.  Patient will perform toilet transfers with moderate assistance  using  Walkers, Type: Ion Walker within 7 days. 6.  Patient will verbalize/demonstrate shoulder precautions during ADLs with 100% accuracy within 7 days. Initiated 12/1/2022  1. Patient will perform seated self-feeding with supervision/set-up within 7 day(s). 2.  Patient will perform seated grooming with minimal assistance within 7 day(s). 3.  Patient will perform seated upper body bathing with moderate assistance  within 7 day(s). 4.  Patient will perform toilet transfers, EOB to [facility], with maximal assistance within 7 day(s). Outcome: Progressing Towards Goal    OCCUPATIONAL THERAPY TREATMENT  Patient: Walter Kang (51 y.o. female)  Date: 12/6/2022  Diagnosis: Hypoxia [R09.02] <principal problem not specified>  Procedure(s) (LRB):  SHOULDER ARTHROPLASTY/TOTAL (Left) 5 Days Post-Op  Precautions: Fall, WBAT  Chart, occupational therapy assessment, plan of care, and goals were reviewed. ASSESSMENT  Patient continues with skilled OT services and is slowly progressing towards goals.   Pt noted with progressive bed mobility, functionally evidenced by successful engagement with supine to EOB and back this date; however, low activity tolerance limited further therapeutic engagement. Pt noted with moderate edema and bruising to aspects posterior/distal to L shoulder, extending to L breast with NP and following. Pt continues to benefit from skilled OT during acute hospitalization, with reporting therapist believing pt would benefit from SNF upon discharge. Of note, pt noted with mild confusion this date. Current Level of Function Impacting Discharge (ADLs): Total A    Other factors to consider for discharge: Total A         PLAN :  Patient continues to benefit from skilled intervention to address the above impairments. Continue treatment per established plan of care to address goals. Recommend with staff: Frequent positional changes, bed level ADL engagement    Recommend next OT session: POC progression    Recommendation for discharge: (in order for the patient to meet his/her long term goals)  Therapy up to 5 days/week in SNF setting    This discharge recommendation:  Has been made in collaboration with the attending provider and/or case management    IF patient discharges home will need the following DME: patient owns DME required for discharge       SUBJECTIVE:   Patient stated I'm okay.     OBJECTIVE DATA SUMMARY:   Cognitive/Behavioral Status:  Neurologic State: Alert;Confused  Orientation Level: Oriented to person;Oriented to place  Cognition: Follows commands  Perception: Appears intact  Perseveration: No perseveration noted  Safety/Judgement: Decreased insight into deficits    Functional Mobility and Transfers for ADLs:  Bed Mobility:  Rolling: Maximum assistance;Assist x2  Supine to Sit: Maximum assistance;Assist x2;Bed Modified  Sit to Supine: Maximum assistance;Assist x2  Scooting:  Total assistance;Assist x2    Balance:  Sitting: Impaired  Sitting - Static: Fair (occasional)  Sitting - Dynamic: Poor (constant support)    ADL Intervention:  Upper Body Dressing Assistance  Orthotics(Brace): Total assistance (dependent)    Cognitive Retraining  Safety/Judgement: Decreased insight into deficits    Pain:  No c/o pain    Activity Tolerance:   Poor and requires frequent rest breaks    After treatment patient left in no apparent distress:   Supine in bed, Call bell within reach, Bed / chair alarm activated, and Side rails x 3    COMMUNICATION/COLLABORATION:   The patients plan of care was discussed with: Physical therapist, Registered nurse, and NP .      Cherelle Fitch OT  Time Calculation: 16 mins

## 2022-12-06 NOTE — PROGRESS NOTES
[facility]                                                                                          Hospitalist Progress Note  Felipe Hinton PA-C  Answering service: [phone] from in house phone        Date of Service:  2022  NAME:  Walter Kang  :  10/17/1927  MRN:  [id number]      Admission Summary:   Per H&P, Walter Kang is a 80 y.o. female with apmhx CAD, SDH, and past VTE who presents from [address] with dislocated left shoulder status post fall 2 weeks ago, and hypoxic respiratory failure. She had a mechanical fall two weeks ago, and had resultant shoulder pain that she attempted to treat herself. When the pain did not improve, she let her facility know, and was brought to the ED for further evaluation. In the ED, she was hypoxic to 89% with improvement to 96% on 2Lnc. Other VSS. Labs showed WBC 11.5, CO2 35, BUN 38, and creatinine 1.54 (b/l 0.8-0.9). XR left shoulder shows anterior glenohumeral dislocation, and OA. CXR shows no acute intrathoracic disease. In the ED, she received 1Lns, fentanyl, lidocaine prior to attempted closed reduction by ortho, and percocet. Interval history / Subjective:   Patient seen and examined alongside family. They report she is doing better. She is eating on her own for the first time. Discussed and taught how to use incentive spirometry device. Family is reviewing facilities to hopefully discharge to as early as 12. Orthopedic Surgery evaluated her left arm due to swelling/bruising and they note this is within normal and to be expected from this surgery and being on Xarelto. Assessment & Plan:     Left shoulder dislocation  Glenohumeral dislocation as revealed on imaging  Continuing multimodal pain control, ice.  Increase Tylenol to 1g q6h and decrease Oxycodone to 2.5mg q6h PRN only for severe breakthrough pain  POD 6 TSA  Orthopedics following, appreciate recommendations  Per orthopedics weightbearing, okay to push herself up from wheelchair, nonweightbearing right upper extremity, may use sling as needed. Shoulder pendulums/elbow/wrist range of motion          Hypoxic Respiratory Failure  Oxygen saturations were 89% on room air, improvement to 96% on 2 L  CTA was unremarkable  Incentive spirometry  Weaned to room air however now with cough. Also noted very mild leukocytosis  Radiograph continuing levofloxacin with consolidation on the left  On renally dosed antibiotics (Levaquin)  Check PCT and NT-proBNP     Acute kidney injury  It is unclear what her baseline creatinine is. Last creatinine in our system was in 2019. Creatinine at presentation 1.54, this has improved with IV hydration down to 1.15. Overall improving  Will avoid nephrotoxins  Continuing gentle IV hydration. Stopped IVF 12/6        CAD  Denies chest pain or shortness of breath  She denies having had heart attack in the past  Continuing carvedilol        H/O SDH  Stable     glaucoma  Stable  Continue latanoprost     RLS  Restarting pramipexole           Code status: DNR  Prophylaxis: Rivaroxaban  Care Plan discussed with: Patient, nurse, care manager  Anticipated Disposition: SNF, potentially as early as 12/7     Hospital Problems  Date Reviewed: 3/12/2019            Codes Class Noted POA    Hypoxia ICD-10-CM: R09.02  ICD-9-CM: 799.02  11/30/2022 Unknown             Review of Systems:   A comprehensive review of systems was negative except for that written in the HPI. Vital Signs:    Last 24hrs VS reviewed since prior progress note.  Most recent are:  Visit Vitals  BP (P) 99/63 (BP 1 Location: Right upper arm)   Pulse 79   Temp (P) 98.7 °F (37.1 °C)   Resp (P) 18   Ht 5' (1.524 m)   Wt 65.2 kg (143 lb 11.8 oz)   SpO2 (P) 96%   BMI 28.07 kg/m²         Intake/Output Summary (Last 24 hours) at 12/6/2022 1614  Last data filed at 12/6/2022 0954  Gross per 24 hour   Intake 240 ml   Output --   Net 240 ml        Physical Examination:     I had a face to face encounter with this patient and independently examined them on 12/6/2022 as outlined below:          Constitutional:  No acute distress, cooperative, pleasant. ENT:  Oral mucosa moist, oropharynx benign. Resp:  Poor inspiratory effort. Decreased breath sounds at left base. No diffuse wheezing/crackles. No stridor or increased WOB   CV:  Regular rhythm, normal rate    GI:  Soft, non distended, non tender. Musculoskeletal:  LUE edematous and significant ecchymosis on upper back radiating anteriorly. Neurologic:  Follows commands. Oriented to name, location, year. Data Review:    Review and/or order of clinical lab test  Review and/or order of tests in the radiology section of CPT  Review and/or order of tests in the medicine section of CPT      Labs:     Recent Labs     12/06/22 0247 12/05/22 0102   WBC 12.1* 11.1*   HGB 9.3* 8.8*   HCT 29.9* 28.1*    221     Recent Labs     12/06/22 0247 12/05/22 0102 12/04/22  0156    136 136   K 4.3 3.9 4.0   * 108 107   CO2 22 24 21   BUN 37* 40* 32*   CREA 1.69* 2.14* 2.13*   GLU 97 108* 105*   CA 7.7* 7.7* 7.3*     No results for input(s): ALT, AP, TBIL, TBILI, TP, ALB, GLOB, GGT, AML, LPSE in the last 72 hours. No lab exists for component: SGOT, GPT, AMYP, HLPSE  No results for input(s): INR, PTP, APTT, INREXT in the last 72 hours. No results for input(s): FE, TIBC, PSAT, FERR in the last 72 hours. Lab Results   Component Value Date/Time    Folate 5.8 03/08/2017 06:00 PM      No results for input(s): PH, PCO2, PO2 in the last 72 hours. No results for input(s): CPK, CKNDX, TROIQ in the last 72 hours.     No lab exists for component: CPKMB  Lab Results   Component Value Date/Time    Cholesterol, total 182 03/08/2017 06:00 PM    HDL Cholesterol 64 03/08/2017 06:00 PM    LDL, calculated 91.2 03/08/2017 06:00 PM    Triglyceride 134 03/08/2017 06:00 PM CHOL/HDL Ratio 2.8 03/08/2017 06:00 PM     Lab Results   Component Value Date/Time    Glucose (POC) 94 11/20/2016 07:00 AM     Lab Results   Component Value Date/Time    Color YELLOW/STRAW 10/31/2018 04:55 AM    Appearance CLOUDY (A) 10/31/2018 04:55 AM    Specific gravity 1.014 10/31/2018 04:55 AM    pH (UA) 5.5 10/31/2018 04:55 AM    Protein TRACE (A) 10/31/2018 04:55 AM    Glucose NEGATIVE 10/31/2018 04:55 AM    Ketone NEGATIVE 10/31/2018 04:55 AM    Bilirubin NEGATIVE 10/31/2018 04:55 AM    Urobilinogen 0.2 10/31/2018 04:55 AM    Nitrites NEGATIVE 10/31/2018 04:55 AM    Leukocyte Esterase LARGE (A) 10/31/2018 04:55 AM    Epithelial cells FEW 10/31/2018 04:55 AM    Bacteria NEGATIVE 10/31/2018 04:55 AM    WBC >100 (H) 10/31/2018 04:55 AM    RBC 0-5 10/31/2018 04:55 AM         Medications Reviewed:     Current Facility-Administered Medications   Medication Dose Route Frequency    acetaminophen (TYLENOL) tablet 650 mg  650 mg Oral Q6H PRN    pramipexole (MIRAPEX) tablet 0.125 mg  0.125 mg Oral BID    guaiFENesin ER (MUCINEX) tablet 600 mg  600 mg Oral Q12H    levoFLOXacin (LEVAQUIN) tablet 500 mg  500 mg Oral Q48H    famotidine (PEPCID) tablet 20 mg  20 mg Oral DAILY    carvediloL (COREG) tablet 3.125 mg  3.125 mg Oral BID WITH MEALS    latanoprost (XALATAN) 0.005 % ophthalmic solution 1 Drop  1 Drop Both Eyes QHS    sodium chloride (NS) flush 5-40 mL  5-40 mL IntraVENous Q8H    sodium chloride (NS) flush 5-40 mL  5-40 mL IntraVENous PRN    0.9% sodium chloride infusion 25 mL  25 mL IntraVENous PRN    oxyCODONE IR (ROXICODONE) tablet 2.5 mg  2.5 mg Oral Q4H PRN    naloxone (NARCAN) injection 0.4 mg  0.4 mg IntraVENous PRN    senna-docusate (PERICOLACE) 8.6-50 mg per tablet 1 Tablet  1 Tablet Oral BID    polyethylene glycol (MIRALAX) packet 17 g  17 g Oral DAILY    bisacodyL (DULCOLAX) suppository 10 mg  10 mg Rectal DAILY PRN    rivaroxaban (XARELTO) tablet 10 mg  10 mg Oral DAILY WITH DINNER    sodium chloride (NS) flush 5-40 mL  5-40 mL IntraVENous Q8H    sodium chloride (NS) flush 5-40 mL  5-40 mL IntraVENous PRN    ondansetron (ZOFRAN ODT) tablet 4 mg  4 mg Oral Q8H PRN    Or    ondansetron (ZOFRAN) injection 4 mg  4 mg IntraVENous Q6H PRN     ______________________________________________________________________  EXPECTED LENGTH OF STAY: 1d 9h  ACTUAL LENGTH OF STAY:          6                 Felipe Hinton PA-C

## 2022-12-06 NOTE — PROGRESS NOTES
Received report from Anastacia Daniels. Patient A&O x3, in bed. Pt is demanding and unpleasant. C/o left shoulder pain and medicated with oxycodone. 02 3L NC in place. Pt removed o2 multiple times overnight. Pt incontinent and purewick in place. RAC 20G. Vitals and labs completed. Reported off to [facility].

## 2022-12-06 NOTE — PROGRESS NOTES
Transition of Care: Patient has been accepted at [facility]. No auth required for SNF. Facility requesting rapid covid test for admission. Patient will need BLS transport at discharge. Patient not ready for discharge today per attending. GUSTAVO called the daughter Andreia Stevenson [phone], who stated they plan to visit [facility] and [facility] today and she will call CM once they decide which they prefer. GUSTAVO spoke with attending, patient ready for DC today. CM called the daughter and notified of the above. Daughter will be at the hospital around 12:15.    11:48 AM: CM spoke with attending, again, will hold discharge until tomorrow. CM spoke with the [employer] Sender, they have decided they prefer [facility] .      GUSTAVO updated Meera at the [address] of the above    Nya Waite, BSW/CRM

## 2022-12-06 NOTE — PROGRESS NOTES
Problem: Falls - Risk of  Goal: *Absence of Falls  Description: Document Salt[place] Fall Risk and appropriate interventions in the flowsheet.   Outcome: Progressing Towards Goal  Note: Fall Risk Interventions:  Mobility Interventions: Communicate number of staff needed for ambulation/transfer         Medication Interventions: Bed/chair exit alarm    Elimination Interventions: Call light in reach    History of Falls Interventions: Door open when patient unattended         Problem: Patient Education: Go to Patient Education Activity  Goal: Patient/Family Education  Outcome: Progressing Towards Goal  Note: Pain management  Fall prevention

## 2022-12-06 NOTE — PROGRESS NOTES
Problem: Pressure Injury - Risk of  Goal: *Prevention of pressure injury  Description: Document Judson Scale and appropriate interventions in the flowsheet.   Outcome: Progressing Towards Goal  Note: Pressure Injury Interventions:  Sensory Interventions: Assess changes in LOC    Moisture Interventions: Apply protective barrier, creams and emollients    Activity Interventions: PT/OT evaluation    Mobility Interventions: PT/OT evaluation    Nutrition Interventions: Document food/fluid/supplement intake    Friction and Shear Interventions: Minimize layers                Problem: Patient Education: Go to Patient Education Activity  Goal: Patient/Family Education  Outcome: Progressing Towards Goal

## 2022-12-07 ENCOUNTER — APPOINTMENT (OUTPATIENT)
Dept: GENERAL RADIOLOGY | Age: 87
End: 2022-12-07
Attending: NURSE PRACTITIONER
Payer: MEDICARE

## 2022-12-07 LAB
ANION GAP SERPL CALC-SCNC: 5 MMOL/L (ref 5–15)
BASOPHILS # BLD: 0 K/UL (ref 0–0.1)
BASOPHILS NFR BLD: 0 % (ref 0–1)
BNP SERPL-MCNC: ABNORMAL PG/ML
BUN SERPL-MCNC: 38 MG/DL (ref 6–20)
BUN/CREAT SERPL: 25 (ref 12–20)
CALCIUM SERPL-MCNC: 8 MG/DL (ref 8.5–10.1)
CHLORIDE SERPL-SCNC: 111 MMOL/L (ref 97–108)
CO2 SERPL-SCNC: 24 MMOL/L (ref 21–32)
CREAT SERPL-MCNC: 1.54 MG/DL (ref 0.55–1.02)
DIFFERENTIAL METHOD BLD: ABNORMAL
EOSINOPHIL # BLD: 0.2 K/UL (ref 0–0.4)
EOSINOPHIL NFR BLD: 2 % (ref 0–7)
ERYTHROCYTE [DISTWIDTH] IN BLOOD BY AUTOMATED COUNT: 14.3 % (ref 11.5–14.5)
GLUCOSE SERPL-MCNC: 102 MG/DL (ref 65–100)
HCT VFR BLD AUTO: 35.9 % (ref 35–47)
HGB BLD-MCNC: 10.7 G/DL (ref 11.5–16)
IMM GRANULOCYTES # BLD AUTO: 0.1 K/UL (ref 0–0.04)
IMM GRANULOCYTES NFR BLD AUTO: 1 % (ref 0–0.5)
LYMPHOCYTES # BLD: 0.9 K/UL (ref 0.8–3.5)
LYMPHOCYTES NFR BLD: 8 % (ref 12–49)
MAGNESIUM SERPL-MCNC: 2.7 MG/DL (ref 1.6–2.4)
MCH RBC QN AUTO: 30.8 PG (ref 26–34)
MCHC RBC AUTO-ENTMCNC: 29.8 G/DL (ref 30–36.5)
MCV RBC AUTO: 103.5 FL (ref 80–99)
MONOCYTES # BLD: 1.3 K/UL (ref 0–1)
MONOCYTES NFR BLD: 11 % (ref 5–13)
NEUTS SEG # BLD: 8.7 K/UL (ref 1.8–8)
NEUTS SEG NFR BLD: 77 % (ref 32–75)
NRBC # BLD: 0.02 K/UL (ref 0–0.01)
NRBC BLD-RTO: 0.2 PER 100 WBC
PLATELET # BLD AUTO: 307 K/UL (ref 150–400)
PMV BLD AUTO: 9 FL (ref 8.9–12.9)
POTASSIUM SERPL-SCNC: 4.1 MMOL/L (ref 3.5–5.1)
PROCALCITONIN SERPL-MCNC: 1.54 NG/ML
RBC # BLD AUTO: 3.47 M/UL (ref 3.8–5.2)
SODIUM SERPL-SCNC: 140 MMOL/L (ref 136–145)
WBC # BLD AUTO: 11.2 K/UL (ref 3.6–11)

## 2022-12-07 PROCEDURE — 74011250637 HC RX REV CODE- 250/637: Performed by: ORTHOPAEDIC SURGERY

## 2022-12-07 PROCEDURE — 97530 THERAPEUTIC ACTIVITIES: CPT

## 2022-12-07 PROCEDURE — 71045 X-RAY EXAM CHEST 1 VIEW: CPT

## 2022-12-07 PROCEDURE — 83735 ASSAY OF MAGNESIUM: CPT

## 2022-12-07 PROCEDURE — 85025 COMPLETE CBC W/AUTO DIFF WBC: CPT

## 2022-12-07 PROCEDURE — 74011250636 HC RX REV CODE- 250/636: Performed by: NURSE PRACTITIONER

## 2022-12-07 PROCEDURE — 74011000250 HC RX REV CODE- 250: Performed by: FAMILY MEDICINE

## 2022-12-07 PROCEDURE — 84145 PROCALCITONIN (PCT): CPT

## 2022-12-07 PROCEDURE — 74011250637 HC RX REV CODE- 250/637: Performed by: NURSE PRACTITIONER

## 2022-12-07 PROCEDURE — 65270000046 HC RM TELEMETRY

## 2022-12-07 PROCEDURE — 80048 BASIC METABOLIC PNL TOTAL CA: CPT

## 2022-12-07 PROCEDURE — 83880 ASSAY OF NATRIURETIC PEPTIDE: CPT

## 2022-12-07 PROCEDURE — P9047 ALBUMIN (HUMAN), 25%, 50ML: HCPCS | Performed by: NURSE PRACTITIONER

## 2022-12-07 PROCEDURE — 51798 US URINE CAPACITY MEASURE: CPT

## 2022-12-07 PROCEDURE — 74011000250 HC RX REV CODE- 250: Performed by: ORTHOPAEDIC SURGERY

## 2022-12-07 RX ORDER — FUROSEMIDE 10 MG/ML
40 INJECTION INTRAMUSCULAR; INTRAVENOUS DAILY
Status: DISCONTINUED | OUTPATIENT
Start: 2022-12-08 | End: 2022-12-07

## 2022-12-07 RX ORDER — FUROSEMIDE 10 MG/ML
20 INJECTION INTRAMUSCULAR; INTRAVENOUS ONCE
Status: COMPLETED | OUTPATIENT
Start: 2022-12-07 | End: 2022-12-08

## 2022-12-07 RX ORDER — ALBUMIN HUMAN 250 G/1000ML
12.5 SOLUTION INTRAVENOUS EVERY 6 HOURS
Status: COMPLETED | OUTPATIENT
Start: 2022-12-07 | End: 2022-12-07

## 2022-12-07 RX ADMIN — PRAMIPEXOLE DIHYDROCHLORIDE 0.12 MG: 0.25 TABLET ORAL at 13:23

## 2022-12-07 RX ADMIN — SENNOSIDES AND DOCUSATE SODIUM 1 TABLET: 50; 8.6 TABLET ORAL at 08:21

## 2022-12-07 RX ADMIN — RIVAROXABAN 10 MG: 10 TABLET, FILM COATED ORAL at 17:04

## 2022-12-07 RX ADMIN — SENNOSIDES AND DOCUSATE SODIUM 1 TABLET: 50; 8.6 TABLET ORAL at 17:04

## 2022-12-07 RX ADMIN — ALBUMIN (HUMAN) 12.5 G: 0.25 INJECTION, SOLUTION INTRAVENOUS at 13:23

## 2022-12-07 RX ADMIN — GUAIFENESIN 600 MG: 600 TABLET, EXTENDED RELEASE ORAL at 08:21

## 2022-12-07 RX ADMIN — LEVOFLOXACIN 500 MG: 250 TABLET, FILM COATED ORAL at 17:04

## 2022-12-07 RX ADMIN — Medication 10 ML: at 14:00

## 2022-12-07 RX ADMIN — PRAMIPEXOLE DIHYDROCHLORIDE 0.12 MG: 0.25 TABLET ORAL at 19:12

## 2022-12-07 RX ADMIN — ALBUMIN (HUMAN) 12.5 G: 0.25 INJECTION, SOLUTION INTRAVENOUS at 17:04

## 2022-12-07 RX ADMIN — Medication 10 ML: at 21:06

## 2022-12-07 RX ADMIN — POLYETHYLENE GLYCOL 3350 17 G: 17 POWDER, FOR SOLUTION ORAL at 08:21

## 2022-12-07 RX ADMIN — GUAIFENESIN 600 MG: 600 TABLET, EXTENDED RELEASE ORAL at 21:05

## 2022-12-07 RX ADMIN — OXYCODONE 2.5 MG: 5 TABLET ORAL at 00:45

## 2022-12-07 RX ADMIN — LATANOPROST 1 DROP: 50 SOLUTION OPHTHALMIC at 21:05

## 2022-12-07 RX ADMIN — SODIUM CHLORIDE, PRESERVATIVE FREE 10 ML: 5 INJECTION INTRAVENOUS at 14:00

## 2022-12-07 RX ADMIN — OXYCODONE 2.5 MG: 5 TABLET ORAL at 20:04

## 2022-12-07 RX ADMIN — FAMOTIDINE 20 MG: 20 TABLET, FILM COATED ORAL at 08:21

## 2022-12-07 NOTE — PROGRESS NOTES
Transition of Care: UPDATE 11:20 am DISCHARGE CANCELLED patient is not medically stable for discharge today per attending. Patient has been accepted at [facility]. [facility] was set for 1 PM, now delayed until 3:30 PM. Cm called [facility], their ETA 4 PM. CM called [facility] #[phone], they can do 1 PM.    Discharge folder located on hard chart to include ambulance form. RN to follow with discharge papers, Kardex, [facility] and call report to #[phone]. CM offered screening for Medicaid Long-Term Services & Supports. Family declined screening as patient is over assets. CM spoke with daughter Wolf Pickett #[phone], confirmed family aware of transport time.       11:27 AM: CM cancelled [facility], and notified family and facility that discharge is cancelled      MIKE Wilson/CRM

## 2022-12-07 NOTE — PROGRESS NOTES
2000 Received patient from [facility]. Shift Summary: pts BNP elevated. Notified physician and no orders were received. 0730 Bedside shift change report given to JIMY Cabello (oncoming nurse) by Israel Ruiz RN. Report included the following information SBAR, Kardex, MAR, Recent Results. Problem: Pressure Injury - Risk of  Goal: *Prevention of pressure injury  Description: Document Judson Scale and appropriate interventions in the flowsheet. Outcome: Progressing Towards Goal  Note: Pressure Injury Interventions:  Sensory Interventions: Keep linens dry and wrinkle-free    Moisture Interventions: Absorbent underpads    Activity Interventions: Increase time out of bed, Assess need for specialty bed    Mobility Interventions: HOB 30 degrees or less    Nutrition Interventions: Document food/fluid/supplement intake    Friction and Shear Interventions: Minimize layers                Problem: Falls - Risk of  Goal: *Absence of Falls  Description: Document Marli Fall Risk and appropriate interventions in the flowsheet.   Outcome: Progressing Towards Goal  Note: Fall Risk Interventions:  Mobility Interventions: Communicate number of staff needed for ambulation/transfer, Patient to call before getting OOB         Medication Interventions: Bed/chair exit alarm    Elimination Interventions: Call light in reach    History of Falls Interventions: Door open when patient unattended

## 2022-12-07 NOTE — PROGRESS NOTES
Bedside shift change report given to gwyn (oncoming nurse) by arlette (offgoing nurse). Report included the following information SBAR, Kardex, and MAR.

## 2022-12-08 ENCOUNTER — APPOINTMENT (OUTPATIENT)
Dept: NON INVASIVE DIAGNOSTICS | Age: 87
End: 2022-12-08
Attending: NURSE PRACTITIONER
Payer: MEDICARE

## 2022-12-08 LAB
ANION GAP SERPL CALC-SCNC: 7 MMOL/L (ref 5–15)
BASOPHILS # BLD: 0 K/UL (ref 0–0.1)
BASOPHILS NFR BLD: 0 % (ref 0–1)
BUN SERPL-MCNC: 32 MG/DL (ref 6–20)
BUN/CREAT SERPL: 27 (ref 12–20)
CALCIUM SERPL-MCNC: 8.1 MG/DL (ref 8.5–10.1)
CHLORIDE SERPL-SCNC: 112 MMOL/L (ref 97–108)
CO2 SERPL-SCNC: 24 MMOL/L (ref 21–32)
CREAT SERPL-MCNC: 1.17 MG/DL (ref 0.55–1.02)
DIFFERENTIAL METHOD BLD: ABNORMAL
EOSINOPHIL # BLD: 0.2 K/UL (ref 0–0.4)
EOSINOPHIL NFR BLD: 2 % (ref 0–7)
ERYTHROCYTE [DISTWIDTH] IN BLOOD BY AUTOMATED COUNT: 14.3 % (ref 11.5–14.5)
GLUCOSE SERPL-MCNC: 95 MG/DL (ref 65–100)
HCT VFR BLD AUTO: 29.5 % (ref 35–47)
HGB BLD-MCNC: 9.3 G/DL (ref 11.5–16)
IMM GRANULOCYTES # BLD AUTO: 0.1 K/UL (ref 0–0.04)
IMM GRANULOCYTES NFR BLD AUTO: 1 % (ref 0–0.5)
LYMPHOCYTES # BLD: 0.9 K/UL (ref 0.8–3.5)
LYMPHOCYTES NFR BLD: 12 % (ref 12–49)
MCH RBC QN AUTO: 30.9 PG (ref 26–34)
MCHC RBC AUTO-ENTMCNC: 31.5 G/DL (ref 30–36.5)
MCV RBC AUTO: 98 FL (ref 80–99)
MONOCYTES # BLD: 1 K/UL (ref 0–1)
MONOCYTES NFR BLD: 13 % (ref 5–13)
NEUTS SEG # BLD: 5.5 K/UL (ref 1.8–8)
NEUTS SEG NFR BLD: 72 % (ref 32–75)
NRBC # BLD: 0.02 K/UL (ref 0–0.01)
NRBC BLD-RTO: 0.3 PER 100 WBC
PLATELET # BLD AUTO: 285 K/UL (ref 150–400)
PMV BLD AUTO: 8.8 FL (ref 8.9–12.9)
POTASSIUM SERPL-SCNC: 4.1 MMOL/L (ref 3.5–5.1)
RBC # BLD AUTO: 3.01 M/UL (ref 3.8–5.2)
SODIUM SERPL-SCNC: 143 MMOL/L (ref 136–145)
WBC # BLD AUTO: 7.6 K/UL (ref 3.6–11)

## 2022-12-08 PROCEDURE — 74011250637 HC RX REV CODE- 250/637: Performed by: NURSE PRACTITIONER

## 2022-12-08 PROCEDURE — 85025 COMPLETE CBC W/AUTO DIFF WBC: CPT

## 2022-12-08 PROCEDURE — 74011250637 HC RX REV CODE- 250/637: Performed by: PHYSICIAN ASSISTANT

## 2022-12-08 PROCEDURE — 80048 BASIC METABOLIC PNL TOTAL CA: CPT

## 2022-12-08 PROCEDURE — 74011250637 HC RX REV CODE- 250/637: Performed by: ORTHOPAEDIC SURGERY

## 2022-12-08 PROCEDURE — 74011000250 HC RX REV CODE- 250: Performed by: FAMILY MEDICINE

## 2022-12-08 PROCEDURE — 74011250636 HC RX REV CODE- 250/636: Performed by: NURSE PRACTITIONER

## 2022-12-08 PROCEDURE — 65270000046 HC RM TELEMETRY

## 2022-12-08 PROCEDURE — 36415 COLL VENOUS BLD VENIPUNCTURE: CPT

## 2022-12-08 PROCEDURE — 74011000250 HC RX REV CODE- 250: Performed by: ORTHOPAEDIC SURGERY

## 2022-12-08 PROCEDURE — 93306 TTE W/DOPPLER COMPLETE: CPT

## 2022-12-08 RX ADMIN — OXYCODONE 2.5 MG: 5 TABLET ORAL at 07:00

## 2022-12-08 RX ADMIN — FAMOTIDINE 20 MG: 20 TABLET, FILM COATED ORAL at 10:59

## 2022-12-08 RX ADMIN — PRAMIPEXOLE DIHYDROCHLORIDE 0.12 MG: 0.25 TABLET ORAL at 19:08

## 2022-12-08 RX ADMIN — RIVAROXABAN 10 MG: 10 TABLET, FILM COATED ORAL at 18:13

## 2022-12-08 RX ADMIN — GUAIFENESIN 600 MG: 600 TABLET, EXTENDED RELEASE ORAL at 10:59

## 2022-12-08 RX ADMIN — PRAMIPEXOLE DIHYDROCHLORIDE 0.12 MG: 0.25 TABLET ORAL at 12:59

## 2022-12-08 RX ADMIN — Medication 10 ML: at 06:58

## 2022-12-08 RX ADMIN — OXYCODONE 2.5 MG: 5 TABLET ORAL at 10:59

## 2022-12-08 RX ADMIN — GUAIFENESIN 600 MG: 600 TABLET, EXTENDED RELEASE ORAL at 22:07

## 2022-12-08 RX ADMIN — LATANOPROST 1 DROP: 50 SOLUTION OPHTHALMIC at 22:07

## 2022-12-08 RX ADMIN — ACETAMINOPHEN 1000 MG: 500 TABLET ORAL at 23:57

## 2022-12-08 RX ADMIN — SODIUM CHLORIDE, PRESERVATIVE FREE 10 ML: 5 INJECTION INTRAVENOUS at 14:00

## 2022-12-08 RX ADMIN — FUROSEMIDE 20 MG: 10 INJECTION, SOLUTION INTRAVENOUS at 02:56

## 2022-12-08 RX ADMIN — Medication 10 ML: at 22:00

## 2022-12-08 RX ADMIN — SENNOSIDES AND DOCUSATE SODIUM 1 TABLET: 50; 8.6 TABLET ORAL at 10:59

## 2022-12-08 RX ADMIN — Medication 10 ML: at 14:00

## 2022-12-08 RX ADMIN — POLYETHYLENE GLYCOL 3350 17 G: 17 POWDER, FOR SOLUTION ORAL at 10:59

## 2022-12-08 RX ADMIN — SENNOSIDES AND DOCUSATE SODIUM 1 TABLET: 50; 8.6 TABLET ORAL at 18:13

## 2022-12-08 NOTE — PROGRESS NOTES
Transition of Care: Anticipate return to [facility] with hospice. [facility] plans to meet with family today. BLS transport at discharge. Main contacts are Daughter/mPOA Catherine Contreras, [phone], and granddaughter Gabriele Jacobs #[phone]. The Plan for Transition of Care is related to the following treatment goals: hospice, family prefers [facility]    The Patient and/or patient representative  was provided with a choice of provider and agrees   with the discharge plan. [x] Yes [] No    Freedom of choice list was provided with basic dialogue that supports the patient's individualized plan of care/goals, treatment preferences and shares the quality data associated with the providers. [x] Yes [] No      CM spoke with Heath Caicedo at [facility]. CM faxed updated clinicals to them at F# [phone]. CM spoke with Matilda Porter with [facility], they will reach out to family to coordinate hospice meeting. Arlene Mckeon with [facility] #[phone] met with family today. CM will follow.     Rosmery Lopez, BSW/CRM

## 2022-12-08 NOTE — PROGRESS NOTES
Occupational Therapy    Chart reviewed in prep for skilled OT treatment, with CM reporting POC to include return to [facility] hospice services following. OT to sign off.     Thank you,  Saqib Pederson, OT

## 2022-12-08 NOTE — PROGRESS NOTES
Problem: Falls - Risk of  Goal: *Absence of Falls  Description: Document Kelly Charlesvenicesarah Fall Risk and appropriate interventions in the flowsheet.   Outcome: Progressing Towards Goal  Note: Fall Risk Interventions:  Mobility Interventions: Communicate number of staff needed for ambulation/transfer, Patient to call before getting OOB         Medication Interventions: Bed/chair exit alarm    Elimination Interventions: Call light in reach    History of Falls Interventions: Room close to nurse's station         Problem: Patient Education: Go to Patient Education Activity  Goal: Patient/Family Education  Outcome: Progressing Towards Goal  Note: Fall prevention  Pain management

## 2022-12-08 NOTE — PROGRESS NOTES
Comprehensive Nutrition Assessment    Type and Reason for Visit: Initial    Nutrition Recommendations/Plan:   Continue regular diet  Added ensure enlive BID, magic cup once daily       Malnutrition Assessment:  Malnutrition Status: At risk for malnutrition (specify) (poor po) (12/08/22 1236)           Nutrition Assessment:    Past Medical History:   Diagnosis Date    Arthritis     History of non-ST elevation myocardial infarction (NSTEMI) 11/28/2016    Ill-defined condition     suderal hematoma    Thromboembolus ([address]     multiple     80 y.o. female admitted with L shoulder dislocation- ortho following for pain management, POD6 total shoulder arthroplasty. Pt with hypoxic respiratory failure on 2L O2. Visited pt at bedside where she c/o SOB- I informed RN of this. Pt deferred interview d/t SOB but did tell me she likes chocolate over vanilla. No recent wts in EMR for assessment. Recorded meal intakes consistently <50%- will add ensure enlive BID, trial magic cup once daily d/t poor po intake. Creat trending down. Mg 2.7 yesterday      Recent Labs     12/08/22  0712 12/07/22  0220 12/06/22  0247   GLU 95 102* 97   BUN 32* 38* 37*   CREA 1.17* 1.54* 1.69*    140 138   K 4.1 4.1 4.3   * 111* 111*   CO2 24 24 22   CA 8.1* 8.0* 7.7*   MG  --  2.7*  --          Nutritionally Significant Medications:  Pepcid, mucinex, miralax, mirapex, pericolace, buminate      Estimated Daily Nutrient Needs:  Energy Requirements Based On: Formula  Weight Used for Energy Requirements: Current  Energy (kcal/day): 1354 (MSJx1. 4)  Weight Used for Protein Requirements: Current  Protein (g/day): 78 (1.2 g/kg)     Fluid (ml/day): 1 ml/kcal    Nutrition Related Findings:   Edema: LLE: 1+ (12/8/2022 10:40 AM)  LUE: 3+ (12/8/2022 10:40 AM)  RLE: 2+ (12/8/2022 10:40 AM)  RUE: 1+ (12/8/2022 10:40 AM)    Last BM:  (Patient states that she does not remember),      Wounds: Surgical incision      Current Nutrition Therapies:  Diet: regular  Supplements: none  Meal intake: Patient Vitals for the past 168 hrs:   % Diet Eaten   12/04/22 0743 26 - 50%   12/03/22 1018 26 - 50%   12/02/22 0959 26 - 50%     Supplement intake: No data found. Nutrition Support: none      Anthropometric Measures:  Height: 5' (152.4 cm)  Ideal Body Weight (IBW): 100 lbs (45 kg)     Current Body Wt:  64.9 kg (143 lb 1.3 oz), 143.1 % IBW. Standing scale  Current BMI (kg/m2): 27.9        Weight Adjustment: No adjustment        BMI Category: Overweight (BMI 25.0-29. 9)    Wt Readings from Last 10 Encounters:   12/08/22 64.9 kg (143 lb)   03/12/19 59.9 kg (132 lb)   01/28/19 59.4 kg (131 lb)   11/01/18 59.6 kg (131 lb 4.8 oz)   03/08/18 54.9 kg (121 lb)   10/07/17 54.9 kg (121 lb)   09/13/17 55.2 kg (121 lb 11.1 oz)   07/10/17 58.5 kg (129 lb)   03/10/17 55.4 kg (122 lb 2.2 oz)   11/28/16 60.1 kg (132 lb 6.4 oz)           Nutrition Diagnosis:   Inadequate oral intake related to acute injury/trauma as evidenced by intake 26-50%    Nutrition Interventions:   Food and/or Nutrient Delivery: Continue current diet, Start oral nutrition supplement  Nutrition Education/Counseling: No recommendations at this time  Coordination of Nutrition Care: Continue to monitor while inpatient       Goals:     Goals: PO intake 50% or greater, by next RD assessment       Nutrition Monitoring and Evaluation:   Behavioral-Environmental Outcomes: None identified  Food/Nutrient Intake Outcomes: Food and nutrient intake, Supplement intake  Physical Signs/Symptoms Outcomes: Biochemical data, Meal time behavior, Weight    Discharge Planning:    Continue oral nutrition supplement    Dawna Donnelly RD  Available via [address] [place] [address]

## 2022-12-08 NOTE — PROGRESS NOTES
Problem: Pressure Injury - Risk of  Goal: *Prevention of pressure injury  Description: Document Judson Scale and appropriate interventions in the flowsheet. Outcome: Progressing Towards Goal  Note: Pressure Injury Interventions:  Sensory Interventions: Float heels, Keep linens dry and wrinkle-free, Minimize linen layers, Pressure redistribution bed/mattress (bed type), Turn and reposition approx. every two hours (pillows and wedges if needed)    Moisture Interventions: Internal/External urinary devices, Minimize layers, Absorbent underpads    Activity Interventions: Increase time out of bed, Pressure redistribution bed/mattress(bed type)    Mobility Interventions: Pressure redistribution bed/mattress (bed type), Float heels, Turn and reposition approx. every two hours(pillow and wedges)    Nutrition Interventions: Document food/fluid/supplement intake, Offer support with meals,snacks and hydration    Friction and Shear Interventions: Lift sheet, Lift team/patient mobility team                Problem: Falls - Risk of  Goal: *Absence of Falls  Description: Document Marli Fall Risk and appropriate interventions in the flowsheet.   Outcome: Progressing Towards Goal  Note: Fall Risk Interventions:  Mobility Interventions: Bed/chair exit alarm, Patient to call before getting OOB         Medication Interventions: Bed/chair exit alarm, Patient to call before getting OOB, Teach patient to arise slowly    Elimination Interventions: Bed/chair exit alarm, Call light in reach, Patient to call for help with toileting needs    History of Falls Interventions: Bed/chair exit alarm, Door open when patient unattended, Room close to nurse's station

## 2022-12-08 NOTE — PROGRESS NOTES
[facility]  Hospitalist Group                                                                                          Hospitalist Progress Note  Kaleigh Sanders NP  Answering service: [phone] from in house phone        Date of Service:  2022  NAME:  Charlee Webster  :  10/17/1927  MRN:  [id number]      Admission Summary:   Per H&P, Charlee Webster is a 80 y.o. female with apmhx CAD, SDH, and past VTE who presents from [address] with dislocated left shoulder status post fall 2 weeks ago, and hypoxic respiratory failure. She had a mechanical fall two weeks ago, and had resultant shoulder pain that she attempted to treat herself. When the pain did not improve, she let her facility know, and was brought to the ED for further evaluation. In the ED, she was hypoxic to 89% with improvement to 96% on 2Lnc. Other VSS. Labs showed WBC 11.5, CO2 35, BUN 38, and creatinine 1.54 (b/l 0.8-0.9). XR left shoulder shows anterior glenohumeral dislocation, and OA. CXR shows no acute intrathoracic disease. In the ED, she received 1Lns, fentanyl, lidocaine prior to attempted closed reduction by ortho, and percocet. Interval history / Subjective:   Patient seen and examined this morning. She is lethargic and difficult to arouse. She appears to have worsening peripheral edema. Lungs noted with crackles. BNP ordered last night and is noted to be 18,000. I'm not sure why a pro sydney was ordered last night, but it is found to be elevated at 1.54 indicating sepsis risk. No obvious signs of infection. Chest XR, ECHO, albumin ordered. BP has been on lower side , 90's/50's. Coreg on hold. Assessment & Plan:     Left shoulder dislocation  Glenohumeral dislocation as revealed on imaging  Continuing multimodal pain control, ice.  Increase Tylenol to 1g q6h and decrease Oxycodone to 2.5mg q6h PRN only for severe breakthrough pain  POD 6 TSA  Orthopedics following, appreciate recommendations  Per orthopedics weightbearing, okay to push herself up from wheelchair, nonweightbearing right upper extremity, may use sling as needed. Shoulder pendulums/elbow/wrist range of motion          Hypoxic Respiratory Failure  Oxygen saturations were 89% on room air, improvement to 96% on 2 L  CTA was unremarkable  Incentive spirometry  Weaned to room air however now with cough. Also noted very mild leukocytosis  Radiograph continuing levofloxacin with consolidation on the left  On renally dosed antibiotics (Levaquin)( last dose today)   Pro sydney elevated at 1.54, BNP noted to be 18,000  Chest XR and ECHO ordered 12/7     Acute kidney injury  It is unclear what her baseline creatinine is. Last creatinine in our system was in 2019. Creatinine at presentation 1.54, this has improved with IV hydration down to 1.15. Overall improving  Will avoid nephrotoxins  Continuing gentle IV hydration. Stopped IVF 12/6 as she appeared to be getting overloaded         CAD  Denies chest pain or shortness of breath  She denies having had heart attack in the past  holding carvedilol in the setting of hypotension        H/O SDH  Stable     glaucoma  Stable  Continue latanoprost     RLS  Restarting pramipexole           Code status: DNR  Prophylaxis: Rivaroxaban  Care Plan discussed with: Patient, nurse, care manager  Anticipated Disposition: TBD     Hospital Problems  Date Reviewed: 3/12/2019            Codes Class Noted POA    Hypoxia ICD-10-CM: R09.02  ICD-9-CM: 799.02  11/30/2022 Unknown           Review of Systems:   A comprehensive review of systems was negative except for that written in the HPI. Vital Signs:    Last 24hrs VS reviewed since prior progress note.  Most recent are:  Visit Vitals  BP (!) 97/50 (BP 1 Location: Right upper arm, BP Patient Position: At rest)   Pulse 77   Temp 98.5 °F (36.9 °C)   Resp 18   Ht 5' (1.524 m)   Wt 65.2 kg (143 lb 11.8 oz)   SpO2 97%   BMI 28.07 kg/m² Intake/Output Summary (Last 24 hours) at 12/7/2022 2307  Last data filed at 12/7/2022 2388  Gross per 24 hour   Intake 100 ml   Output 400 ml   Net -300 ml          Physical Examination:     I had a face to face encounter with this patient and independently examined them on 12/7/2022 as outlined below:          Constitutional:  No acute distress, cooperative, pleasant. ENT:  Oral mucosa moist, oropharynx benign. Resp:  Poor inspiratory effort. Decreased breath sounds at left base. No diffuse wheezing/crackles. No stridor or increased WOB   CV:  Regular rhythm, normal rate    GI:  Soft, non distended, non tender. Musculoskeletal:  LUE edematous and significant ecchymosis on upper back radiating anteriorly. Neurologic:  Follows commands. Oriented to name, location, year. Data Review:    Review and/or order of clinical lab test  Review and/or order of tests in the radiology section of CPT  Review and/or order of tests in the medicine section of CPT      Labs:     Recent Labs     12/07/22 0220 12/06/22 0247   WBC 11.2* 12.1*   HGB 10.7* 9.3*   HCT 35.9 29.9*    293       Recent Labs     12/07/22 0220 12/06/22 0247 12/05/22  0102    138 136   K 4.1 4.3 3.9   * 111* 108   CO2 24 22 24   BUN 38* 37* 40*   CREA 1.54* 1.69* 2.14*   * 97 108*   CA 8.0* 7.7* 7.7*   MG 2.7*  --   --        No results for input(s): ALT, AP, TBIL, TBILI, TP, ALB, GLOB, GGT, AML, LPSE in the last 72 hours. No lab exists for component: SGOT, GPT, AMYP, HLPSE  No results for input(s): INR, PTP, APTT, INREXT, INREXT in the last 72 hours. No results for input(s): FE, TIBC, PSAT, FERR in the last 72 hours. Lab Results   Component Value Date/Time    Folate 5.8 03/08/2017 06:00 PM        No results for input(s): PH, PCO2, PO2 in the last 72 hours. No results for input(s): CPK, CKNDX, TROIQ in the last 72 hours.     No lab exists for component: CPKMB  Lab Results   Component Value Date/Time    Cholesterol, total 182 03/08/2017 06:00 PM    HDL Cholesterol 64 03/08/2017 06:00 PM    LDL, calculated 91.2 03/08/2017 06:00 PM    Triglyceride 134 03/08/2017 06:00 PM    CHOL/HDL Ratio 2.8 03/08/2017 06:00 PM     Lab Results   Component Value Date/Time    Glucose (POC) 94 11/20/2016 07:00 AM     Lab Results   Component Value Date/Time    Color YELLOW/STRAW 10/31/2018 04:55 AM    Appearance CLOUDY (A) 10/31/2018 04:55 AM    Specific gravity 1.014 10/31/2018 04:55 AM    pH (UA) 5.5 10/31/2018 04:55 AM    Protein TRACE (A) 10/31/2018 04:55 AM    Glucose NEGATIVE 10/31/2018 04:55 AM    Ketone NEGATIVE 10/31/2018 04:55 AM    Bilirubin NEGATIVE 10/31/2018 04:55 AM    Urobilinogen 0.2 10/31/2018 04:55 AM    Nitrites NEGATIVE 10/31/2018 04:55 AM    Leukocyte Esterase LARGE (A) 10/31/2018 04:55 AM    Epithelial cells FEW 10/31/2018 04:55 AM    Bacteria NEGATIVE 10/31/2018 04:55 AM    WBC >100 (H) 10/31/2018 04:55 AM    RBC 0-5 10/31/2018 04:55 AM         Medications Reviewed:     Current Facility-Administered Medications   Medication Dose Route Frequency    furosemide (LASIX) injection 20 mg  20 mg IntraVENous ONCE    acetaminophen (TYLENOL) tablet 1,000 mg  1,000 mg Oral Q6H PRN    pramipexole (MIRAPEX) tablet 0.125 mg  0.125 mg Oral BID    guaiFENesin ER (MUCINEX) tablet 600 mg  600 mg Oral Q12H    famotidine (PEPCID) tablet 20 mg  20 mg Oral DAILY    [Held by provider] carvediloL (COREG) tablet 3.125 mg  3.125 mg Oral BID WITH MEALS    latanoprost (XALATAN) 0.005 % ophthalmic solution 1 Drop  1 Drop Both Eyes QHS    sodium chloride (NS) flush 5-40 mL  5-40 mL IntraVENous Q8H    sodium chloride (NS) flush 5-40 mL  5-40 mL IntraVENous PRN    0.9% sodium chloride infusion 25 mL  25 mL IntraVENous PRN    oxyCODONE IR (ROXICODONE) tablet 2.5 mg  2.5 mg Oral Q4H PRN    naloxone (NARCAN) injection 0.4 mg  0.4 mg IntraVENous PRN    senna-docusate (PERICOLACE) 8.6-50 mg per tablet 1 Tablet  1 Tablet Oral BID polyethylene glycol (MIRALAX) packet 17 g  17 g Oral DAILY    bisacodyL (DULCOLAX) suppository 10 mg  10 mg Rectal DAILY PRN    rivaroxaban (XARELTO) tablet 10 mg  10 mg Oral DAILY WITH DINNER    sodium chloride (NS) flush 5-40 mL  5-40 mL IntraVENous Q8H    sodium chloride (NS) flush 5-40 mL  5-40 mL IntraVENous PRN    ondansetron (ZOFRAN ODT) tablet 4 mg  4 mg Oral Q8H PRN    Or    ondansetron (ZOFRAN) injection 4 mg  4 mg IntraVENous Q6H PRN     ______________________________________________________________________  EXPECTED LENGTH OF STAY: 1d 9h  ACTUAL LENGTH OF STAY:          7                 Ling Haskins NP

## 2022-12-08 NOTE — PROGRESS NOTES
Bedside and Verbal shift change report given to Rmia Rodarte RN (oncoming nurse) by Lily Marinelli RN (offgoing nurse). Report included the following information SBAR and MAR.

## 2022-12-09 ENCOUNTER — HOSPITAL ENCOUNTER (EMERGENCY)
Age: 87
Discharge: HOME | End: 2022-12-10
Attending: EMERGENCY MEDICINE
Payer: MEDICARE

## 2022-12-09 VITALS
TEMPERATURE: 97.7 F | BODY MASS INDEX: 28.07 KG/M2 | SYSTOLIC BLOOD PRESSURE: 117 MMHG | DIASTOLIC BLOOD PRESSURE: 74 MMHG | RESPIRATION RATE: 18 BRPM | WEIGHT: 143 LBS | HEART RATE: 78 BPM | HEIGHT: 60 IN | OXYGEN SATURATION: 96 %

## 2022-12-09 DIAGNOSIS — R62.7 FTT (FAILURE TO THRIVE) IN ADULT: Primary | ICD-10-CM

## 2022-12-09 LAB
ECHO AO ROOT DIAM: 3 CM
ECHO AO ROOT INDEX: 1.85 CM/M2
ECHO AV AREA PEAK VELOCITY: 1.1 CM2
ECHO AV AREA VTI: 1.1 CM2
ECHO AV AREA/BSA PEAK VELOCITY: 0.7 CM2/M2
ECHO AV AREA/BSA VTI: 0.7 CM2/M2
ECHO AV MEAN GRADIENT: 14 MMHG
ECHO AV MEAN VELOCITY: 1.7 M/S
ECHO AV PEAK GRADIENT: 24 MMHG
ECHO AV PEAK VELOCITY: 2.4 M/S
ECHO AV VELOCITY RATIO: 0.46
ECHO AV VTI: 54.7 CM
ECHO EST RA PRESSURE: 10 MMHG
ECHO LA DIAMETER INDEX: 2.84 CM/M2
ECHO LA DIAMETER: 4.6 CM
ECHO LA TO AORTIC ROOT RATIO: 1.53
ECHO LV FRACTIONAL SHORTENING: 29 % (ref 28–44)
ECHO LV INTERNAL DIMENSION DIASTOLE INDEX: 2.16 CM/M2
ECHO LV INTERNAL DIMENSION DIASTOLIC: 3.5 CM (ref 3.9–5.3)
ECHO LV INTERNAL DIMENSION SYSTOLIC INDEX: 1.54 CM/M2
ECHO LV INTERNAL DIMENSION SYSTOLIC: 2.5 CM
ECHO LV IVSD: 1 CM (ref 0.6–0.9)
ECHO LV MASS 2D: 103.4 G (ref 67–162)
ECHO LV MASS INDEX 2D: 63.8 G/M2 (ref 43–95)
ECHO LV POSTERIOR WALL DIASTOLIC: 1 CM (ref 0.6–0.9)
ECHO LV RELATIVE WALL THICKNESS RATIO: 0.57
ECHO LVOT AREA: 2.5 CM2
ECHO LVOT AV VTI INDEX: 0.45
ECHO LVOT DIAM: 1.8 CM
ECHO LVOT MEAN GRADIENT: 3 MMHG
ECHO LVOT PEAK GRADIENT: 5 MMHG
ECHO LVOT PEAK VELOCITY: 1.1 M/S
ECHO LVOT STROKE VOLUME INDEX: 38.3 ML/M2
ECHO LVOT SV: 62.1 ML
ECHO LVOT VTI: 24.4 CM
ECHO MV A VELOCITY: 1.1 M/S
ECHO MV AREA PHT: 3.9 CM2
ECHO MV E DECELERATION TIME (DT): 192.5 MS
ECHO MV E VELOCITY: 0.97 M/S
ECHO MV E/A RATIO: 0.88
ECHO MV PRESSURE HALF TIME (PHT): 55.8 MS
ECHO PV MAX VELOCITY: 0.6 M/S
ECHO PV PEAK GRADIENT: 2 MMHG
ECHO RIGHT VENTRICULAR SYSTOLIC PRESSURE (RVSP): 47 MMHG
ECHO RV FREE WALL PEAK S': 12 CM/S
ECHO RV TAPSE: 1.7 CM (ref 1.7–?)
ECHO TV REGURGITANT MAX VELOCITY: 3.06 M/S
ECHO TV REGURGITANT PEAK GRADIENT: 37 MMHG

## 2022-12-09 PROCEDURE — 99285 EMERGENCY DEPT VISIT HI MDM: CPT

## 2022-12-09 PROCEDURE — 74011250637 HC RX REV CODE- 250/637: Performed by: ORTHOPAEDIC SURGERY

## 2022-12-09 PROCEDURE — 93306 TTE W/DOPPLER COMPLETE: CPT | Performed by: SPECIALIST

## 2022-12-09 PROCEDURE — 51798 US URINE CAPACITY MEASURE: CPT

## 2022-12-09 PROCEDURE — 74011250637 HC RX REV CODE- 250/637: Performed by: NURSE PRACTITIONER

## 2022-12-09 RX ADMIN — SENNOSIDES AND DOCUSATE SODIUM 1 TABLET: 50; 8.6 TABLET ORAL at 08:36

## 2022-12-09 RX ADMIN — FAMOTIDINE 20 MG: 20 TABLET, FILM COATED ORAL at 08:36

## 2022-12-09 RX ADMIN — GUAIFENESIN 600 MG: 600 TABLET, EXTENDED RELEASE ORAL at 08:36

## 2022-12-09 RX ADMIN — POLYETHYLENE GLYCOL 3350 17 G: 17 POWDER, FOR SOLUTION ORAL at 08:36

## 2022-12-09 NOTE — PROGRESS NOTES
Transition of Care: Plan for discharge to [facility] ([address]) with [facility]. Patient was readmitted as hospital bed had not been delivered to facility, even though CM had received confirmation that DME was delivered prior to discharge. Need to confirm that hospital bed has been delivered to facility prior to discharge. BLS transport. Main contacts are Daughter/mPOA Neeta Jones, [phone], and granddaughter Dexter Palomares #[phone]. CM was notified the patient arrived at [facility] and the hospital bed was not there. CM had received confirmation from hospice agency prior to discharge that the DME was delivered. CM called Jeanette with [facility] #[phone] and left VM message. CM called Yvan Corral with At [address], he is looking into this and will call this CM back. CM called [address] end (#[phone]), and was unable to reach anyone after several attempts. The  stated that no hospital bed has been delivered. GUSTAVO spoke with the daughter Yesenia Uribe, who stated that she was told that the hospital bed will not arrive until tomorrow by noon. CM waiting on call back from Yvan Corral with [facility]. 4:24 PM: GUSTAVO spoke with Yvan Corral with [facility]. Yvan Corral stated that the hospital bed should arrive to facility in the next 2.5 hours.           Amador Stevenson, BSW/CRM

## 2022-12-09 NOTE — PROGRESS NOTES
5:58 PM  ED CM received handoff from following CM. Patient to discharge back to [address] [place] [address] [phone] with [facility] once DME is in place. Call received from [address] [place] [address] with [facility]. CM was informed that all equipment will be in place by 7:00 pm this evening. Barrier at this time centers around when agency would be able to admit into Hospice for services. Family stating they would not be able to meet with agency this evening and would plan for 1:00 pm tomorrow. They have already notified [facility] as well. CM placed call to [facility] to verify if they would be able to receive patient back this evening with Hospice accepting patient for services tomorrow. CM was told that they would not be able to receive patient back for services this evening. Updates provided to RN and escalated CM Management. ED CM to follow-up in the morning.     Celestina Hope, MSW/CRM  Care Management

## 2022-12-09 NOTE — PROGRESS NOTES
Transition of Care: Plan for return to [facility] with hospice. [facility] stretcher transport set for 1 PM.     Main contacts are 280Kyaw Khan, [phone], and granddaughter Jeremy Davison #[phone]. Discharge folder located on hard chart to include ambulance form. RN to follow with discharge papers, Kardex, MAR, and no report needs to be called. CM called [facility] ([phone]), and left  for return call. CM spoke with Nika Dickerson with [facility], confirmed that the DME was delivered to the facility. CM spoke with Constantino Myers at [facility] and confirmed acceptance today and 1 PM transport. Per Constantino Myers, No report needs to be called.      Corrinne Heft, BSW/CRM

## 2022-12-09 NOTE — PROGRESS NOTES
Physical Therapy 12/8/2022    Chart reviewed - aware pt to return to [facility] group home with hospice - PT to sign off.     Tian Ibarra, PT

## 2022-12-09 NOTE — PROGRESS NOTES
I have reviewed discharge instructions with the patient, spouse, and caregiver. The patient, spouse, and caregiver verbalized understanding.

## 2022-12-09 NOTE — PROGRESS NOTES
Problem: Pressure Injury - Risk of  Goal: *Prevention of pressure injury  Description: Document Judson Scale and appropriate interventions in the flowsheet. Outcome: Resolved/Met  Note: Pressure Injury Interventions:  Sensory Interventions: Float heels, Keep linens dry and wrinkle-free, Minimize linen layers, Pressure redistribution bed/mattress (bed type), Turn and reposition approx. every two hours (pillows and wedges if needed)    Moisture Interventions: Internal/External urinary devices    Activity Interventions: Increase time out of bed    Mobility Interventions: Pressure redistribution bed/mattress (bed type)    Nutrition Interventions: Offer support with meals,snacks and hydration    Friction and Shear Interventions: Lift sheet, Lift team/patient mobility team                Problem: Patient Education: Go to Patient Education Activity  Goal: Patient/Family Education  Outcome: Resolved/Met     Problem: Patient Education: Go to Patient Education Activity  Goal: Patient/Family Education  Outcome: Resolved/Met     Problem: Patient Education: Go to Patient Education Activity  Goal: Patient/Family Education  Outcome: Resolved/Met     Problem: Patient Education: Go to Patient Education Activity  Goal: Patient/Family Education  Outcome: Resolved/Met     Problem: Falls - Risk of  Goal: *Absence of Falls  Description: Document Marli Fall Risk and appropriate interventions in the flowsheet. Outcome: Resolved/Met  Note: Fall Risk Interventions:  Mobility Interventions: Bed/chair exit alarm         Medication Interventions: Bed/chair exit alarm    Elimination Interventions: Bed/chair exit alarm    History of Falls Interventions: Bed/chair exit alarm         Problem: Falls - Risk of  Goal: *Absence of Falls  Description: Document Marli Fall Risk and appropriate interventions in the flowsheet.   Outcome: Resolved/Met  Note: Fall Risk Interventions:  Mobility Interventions: Bed/chair exit alarm         Medication Interventions: Bed/chair exit alarm    Elimination Interventions: Bed/chair exit alarm    History of Falls Interventions: Bed/chair exit alarm         Problem: Patient Education: Go to Patient Education Activity  Goal: Patient/Family Education  Outcome: Resolved/Met     Problem: Falls - Risk of  Goal: *Absence of Falls  Description: Document Patiencemairafredy Vinayaknilane Fall Risk and appropriate interventions in the flowsheet.   Outcome: Resolved/Met  Note: Fall Risk Interventions:  Mobility Interventions: Bed/chair exit alarm         Medication Interventions: Bed/chair exit alarm    Elimination Interventions: Bed/chair exit alarm    History of Falls Interventions: Bed/chair exit alarm         Problem: Patient Education: Go to Patient Education Activity  Goal: Patient/Family Education  Outcome: Resolved/Met

## 2022-12-09 NOTE — ED TRIAGE NOTES
Pt arrives to ED via EMS. D/C from hospital today into Hospice. Hospice not in place when pt arrived. Referred back to [facility] in order to get Hospice complete.

## 2022-12-09 NOTE — PROGRESS NOTES
[facility]  Hospitalist Group                                                                                          Hospitalist Progress Note  Saw Renee NP  Answering service: [phone] from in house phone        Date of Service:  2022  NAME:  Sammy Hurst  :  10/17/1927  MRN:  [id number]      Admission Summary:   Per H&P, Sammy Hurst is a 80 y.o. female with apmhx CAD, SDH, and past VTE who presents from [address] with dislocated left shoulder status post fall 2 weeks ago, and hypoxic respiratory failure. She had a mechanical fall two weeks ago, and had resultant shoulder pain that she attempted to treat herself. When the pain did not improve, she let her facility know, and was brought to the ED for further evaluation. In the ED, she was hypoxic to 89% with improvement to 96% on 2Lnc. Other VSS. Labs showed WBC 11.5, CO2 35, BUN 38, and creatinine 1.54 (b/l 0.8-0.9). XR left shoulder shows anterior glenohumeral dislocation, and OA. CXR shows no acute intrathoracic disease. In the ED, she received 1Lns, fentanyl, lidocaine prior to attempted closed reduction by ortho, and percocet. Interval history / Subjective:     Patient seen and examined this morning with daughter and son-in-law present at bedside. Discussed my concerns with possible decompensated heart failure process with cardiomegaly identified on chest x-ray along with pulmonary edema and small bilateral pleural effusions, peripheral extremity swelling, BNP greater than 18,000. Echo results are pending. Patient in significant amount of pain due to chronic musculoskeletal issues and osteoarthritis. She also now has significant amount of pain related to shoulder dislocation and subsequent surgical repair. Goals of care discussion related to patient's inability to likely make full recovery with physical and occupational therapy or rehab.   Family is in agreement with shifting goals of care to comfort and hospice. Discussion regarding what hospice would provide in their assisted living facility and additionally what enrolling in hospice would mean for her medical care moving forward. The family is very much interested in shifting goals of care to hospice, [facility] with [facility] who works in [facility]. Consult placed for case management. Assessment & Plan:     Left shoulder dislocation  Glenohumeral dislocation as revealed on imaging  Continuing multimodal pain control, ice. Increase Tylenol to 1g q6h and decrease Oxycodone to 2.5mg q6h PRN only for severe breakthrough pain  POD 6 TSA  Orthopedics following, appreciate recommendations  Per orthopedics weightbearing, okay to push herself up from wheelchair, nonweightbearing right upper extremity, may use sling as needed. Shoulder pendulums/elbow/wrist range of motion          Hypoxic Respiratory Failure  Oxygen saturations were 89% on room air, improvement to 96% on 2 L  CTA was unremarkable  Incentive spirometry  Weaned to room air however now with cough. Also noted very mild leukocytosis  Radiograph continuing levofloxacin with consolidation on the left  On renally dosed antibiotics (Levaquin)( last dose today)   Pro sydney elevated at 1.54, BNP noted to be 18,000  Chest XR 12/7 shows pulmonary edema and small bilateral pleural effusions as well cardiomegaly   ECHO ordered 12/7-results are pending     Acute kidney injury  It is unclear what her baseline creatinine is. Last creatinine in our system was in 2019. Creatinine at presentation 1.54, this has improved with IV hydration down to 1.15. Overall improving  Will avoid nephrotoxins  Continuing gentle IV hydration.  Stopped IVF 12/6 as she appeared to be getting overloaded         CAD  Denies chest pain or shortness of breath  She denies having had heart attack in the past  holding carvedilol in the setting of hypotension        H/O SDH  Stable     glaucoma  Stable  Continue latanoprost     RLS  Restarting pramipexole           Code status: DNR  Prophylaxis: Rivaroxaban  Care Plan discussed with: Patient, nurse, care manager  Anticipated Disposition: TBD-may discharge as soon as tomorrow back to assisted living with hospice     Hospital Problems  Date Reviewed: 3/12/2019            Codes Class Noted POA    Hypoxia ICD-10-CM: R09.02  ICD-9-CM: 799.02  11/30/2022 Unknown         Review of Systems:   A comprehensive review of systems was negative except for that written in the HPI. Vital Signs:    Last 24hrs VS reviewed since prior progress note. Most recent are:  Visit Vitals  /61 (BP 1 Location: Right upper arm)   Pulse 70   Temp 98.1 °F (36.7 °C)   Resp 18   Ht 5' (1.524 m)   Wt 64.9 kg (143 lb)   SpO2 97%   BMI 27.93 kg/m²         Intake/Output Summary (Last 24 hours) at 12/8/2022 2201  Last data filed at 12/8/2022 1800  Gross per 24 hour   Intake --   Output 650 ml   Net -650 ml          Physical Examination:     I had a face to face encounter with this patient and independently examined them on 12/8/2022 as outlined below:          Constitutional:  No acute distress, cooperative, pleasant. ENT:  Oral mucosa moist, oropharynx benign. Resp:  Poor inspiratory effort. Decreased breath sounds at left base. No diffuse wheezing/crackles. No stridor or increased WOB   CV:  Regular rhythm, normal rate    GI:  Soft, non distended, non tender. Musculoskeletal:  LUE edematous and significant ecchymosis on upper back radiating anteriorly. Neurologic:  Follows commands. Oriented to name, location, year.              Data Review:    Review and/or order of clinical lab test  Review and/or order of tests in the radiology section of CPT  Review and/or order of tests in the medicine section of CPT      Labs:     Recent Labs     12/08/22  0712 12/07/22  0220   WBC 7.6 11.2*   HGB 9.3* 10.7*   HCT 29.5* 35.9    307       Recent Labs     12/08/22  0712 12/07/22  0220 12/06/22  0247    140 138   K 4.1 4.1 4.3   * 111* 111*   CO2 24 24 22   BUN 32* 38* 37*   CREA 1.17* 1.54* 1.69*   GLU 95 102* 97   CA 8.1* 8.0* 7.7*   MG  --  2.7*  --        No results for input(s): ALT, AP, TBIL, TBILI, TP, ALB, GLOB, GGT, AML, LPSE in the last 72 hours. No lab exists for component: SGOT, GPT, AMYP, HLPSE  No results for input(s): INR, PTP, APTT, INREXT, INREXT in the last 72 hours. No results for input(s): FE, TIBC, PSAT, FERR in the last 72 hours. Lab Results   Component Value Date/Time    Folate 5.8 03/08/2017 06:00 PM        No results for input(s): PH, PCO2, PO2 in the last 72 hours. No results for input(s): CPK, CKNDX, TROIQ in the last 72 hours.     No lab exists for component: CPKMB  Lab Results   Component Value Date/Time    Cholesterol, total 182 03/08/2017 06:00 PM    HDL Cholesterol 64 03/08/2017 06:00 PM    LDL, calculated 91.2 03/08/2017 06:00 PM    Triglyceride 134 03/08/2017 06:00 PM    CHOL/HDL Ratio 2.8 03/08/2017 06:00 PM     Lab Results   Component Value Date/Time    Glucose (POC) 94 11/20/2016 07:00 AM     Lab Results   Component Value Date/Time    Color YELLOW/STRAW 10/31/2018 04:55 AM    Appearance CLOUDY (A) 10/31/2018 04:55 AM    Specific gravity 1.014 10/31/2018 04:55 AM    pH (UA) 5.5 10/31/2018 04:55 AM    Protein TRACE (A) 10/31/2018 04:55 AM    Glucose NEGATIVE 10/31/2018 04:55 AM    Ketone NEGATIVE 10/31/2018 04:55 AM    Bilirubin NEGATIVE 10/31/2018 04:55 AM    Urobilinogen 0.2 10/31/2018 04:55 AM    Nitrites NEGATIVE 10/31/2018 04:55 AM    Leukocyte Esterase LARGE (A) 10/31/2018 04:55 AM    Epithelial cells FEW 10/31/2018 04:55 AM    Bacteria NEGATIVE 10/31/2018 04:55 AM    WBC >100 (H) 10/31/2018 04:55 AM    RBC 0-5 10/31/2018 04:55 AM         Medications Reviewed:     Current Facility-Administered Medications   Medication Dose Route Frequency    acetaminophen (TYLENOL) tablet 1,000 mg  1,000 mg Oral Q6H PRN    pramipexole (MIRAPEX) tablet 0.125 mg  0.125 mg Oral BID    guaiFENesin ER (MUCINEX) tablet 600 mg  600 mg Oral Q12H    famotidine (PEPCID) tablet 20 mg  20 mg Oral DAILY    [Held by provider] carvediloL (COREG) tablet 3.125 mg  3.125 mg Oral BID WITH MEALS    latanoprost (XALATAN) 0.005 % ophthalmic solution 1 Drop  1 Drop Both Eyes QHS    sodium chloride (NS) flush 5-40 mL  5-40 mL IntraVENous Q8H    sodium chloride (NS) flush 5-40 mL  5-40 mL IntraVENous PRN    0.9% sodium chloride infusion 25 mL  25 mL IntraVENous PRN    oxyCODONE IR (ROXICODONE) tablet 2.5 mg  2.5 mg Oral Q4H PRN    naloxone (NARCAN) injection 0.4 mg  0.4 mg IntraVENous PRN    senna-docusate (PERICOLACE) 8.6-50 mg per tablet 1 Tablet  1 Tablet Oral BID    polyethylene glycol (MIRALAX) packet 17 g  17 g Oral DAILY    bisacodyL (DULCOLAX) suppository 10 mg  10 mg Rectal DAILY PRN    rivaroxaban (XARELTO) tablet 10 mg  10 mg Oral DAILY WITH DINNER    sodium chloride (NS) flush 5-40 mL  5-40 mL IntraVENous Q8H    sodium chloride (NS) flush 5-40 mL  5-40 mL IntraVENous PRN    ondansetron (ZOFRAN ODT) tablet 4 mg  4 mg Oral Q8H PRN    Or    ondansetron (ZOFRAN) injection 4 mg  4 mg IntraVENous Q6H PRN     ______________________________________________________________________  EXPECTED LENGTH OF STAY: 1d 9h  ACTUAL LENGTH OF STAY:          8                 Seven Carvalho NP

## 2022-12-09 NOTE — ED PROVIDER NOTES
The history is provided by the patient. No  was used. Past Medical History:   Diagnosis Date    Arthritis     History of non-ST elevation myocardial infarction (NSTEMI) 11/28/2016    Ill-defined condition     suderal hematoma    Thromboembolus ([facility] [address]     multiple       Past Surgical History:   Procedure Laterality Date    HX CHOLECYSTECTOMY      hysterectomy, gallbladder    HX ORTHOPAEDIC      hip replacement    VASCULAR SURGERY PROCEDURE UNLIST      ivc filter         Family History:   Problem Relation Age of Onset    Cancer Mother     Cancer Father        Social History     Socioeconomic History    Marital status:      Spouse name: Not on file    Number of children: Not on file    Years of education: Not on file    Highest education level: Not on file   Occupational History    Not on file   Tobacco Use    Smoking status: Never    Smokeless tobacco: Never   Substance and Sexual Activity    Alcohol use: Yes     Comment: a couple of scotch drinks daily. Drug use: No    Sexual activity: Not Currently   Other Topics Concern     Service Not Asked    Blood Transfusions Not Asked    Caffeine Concern Not Asked    Occupational Exposure Not Asked    Hobby Hazards Not Asked    Sleep Concern Not Asked    Stress Concern Not Asked    Weight Concern Not Asked    Special Diet Not Asked    Back Care Not Asked    Exercise Not Asked    Bike Helmet Not Asked    Seat Belt Not Asked    Self-Exams Not Asked   Social History Narrative    Not on file     Social Determinants of Health     Financial Resource Strain: Not on file   Food Insecurity: Not on file   Transportation Needs: Not on file   Physical Activity: Not on file   Stress: Not on file   Social Connections: Not on file   Intimate Partner Violence: Not on file   Housing Stability: Not on file         ALLERGIES: Codeine and Prednisone    Review of Systems   Constitutional:  Negative for activity change, chills and fever.    HENT:  Negative for nosebleeds, sore throat, trouble swallowing and voice change. Eyes:  Negative for visual disturbance. Respiratory:  Negative for shortness of breath. Cardiovascular:  Negative for chest pain and palpitations. Gastrointestinal:  Negative for abdominal pain, constipation, diarrhea and nausea. Genitourinary:  Negative for difficulty urinating, dysuria, hematuria and urgency. Musculoskeletal:  Negative for back pain, neck pain and neck stiffness. Skin:  Negative for color change. Allergic/Immunologic: Negative for immunocompromised state. Neurological:  Negative for dizziness, seizures, syncope, weakness, light-headedness, numbness and headaches. Psychiatric/Behavioral:  Negative for behavioral problems, confusion, hallucinations, self-injury and suicidal ideas. Vitals:    12/09/22 1431   BP: 128/73   Pulse: 95   Resp: 16   Temp: 98.1 °F (36.7 °C)   SpO2: 97%            Physical Exam  Vitals and nursing note reviewed. Constitutional:       General: She is not in acute distress. Appearance: She is well-developed. She is not diaphoretic. Interventions: Nasal cannula in place. HENT:      Head: Atraumatic. Neck:      Trachea: No tracheal deviation. Cardiovascular:      Comments: Warm and well perfused  Pulmonary:      Effort: Pulmonary effort is normal. No respiratory distress. Musculoskeletal:         General: Normal range of motion. Skin:     General: Skin is warm and dry. Neurological:      Mental Status: She is alert. Coordination: Coordination normal.   Psychiatric:         Behavior: Behavior normal.         Thought Content: Thought content normal.         Judgment: Judgment normal.        MDM    This is a 26-year-old female with past medical history, review of systems, physical exam as above, presenting via EMS for complaints of failure to thrive. Per patient report, and chart review, patient was discharged home from the hospital here this morning.   She was recently admitted for left shoulder pain, after a dislocation, experienced after a fall, in the setting of chronic hypoxic respiratory failure. Hospice services had been established and she was discharged home today with the expectation that her equipment will be available when she reached home, however there was a delay in setting up her equipment thus she was returned to the emergency room. Patient denies new complaints upon arrival she is awake and alert in no acute distress noted to be normotensive, afebrile without tachycardia, satting well on her baseline oxygen by nasal cannula. Her left arm is immobilized, otherwise she has reassuring physical exam.  She had lab work obtained yesterday morning that indicates chronic kidney disease, chronic anemia, she had undergone a reverse total shoulder replacement, now is postop day 8. We will consult the hospitalist for admission to continue efforts at safe discharge. Procedures    [facility] Consult for Admission  3:21 PM    ED Room Number: Room/bed info not found  Patient Name and age: Mami Dickens 80 y.o.  female  Working Diagnosis:   1. FTT (failure to thrive) in adult        COVID-19 Suspicion:  no  Sepsis present:  no  Reassessment needed: no  Code Status:  Do Not Resuscitate  Readmission: yes  Isolation Requirements:  no  Recommended Level of Care:  med/surg  Department:[facility] - [facility]   Other:  d/w Dr. Charles Wei    5:03 PM  The hospitalist service was able to contact the family, who states patient was sent back to the emergency department as they were concerned she was requiring oxygen while in the hospital, however she is not requiring oxygen now, and they are able to accept her home. Patient will return via EMS transport.

## 2022-12-09 NOTE — PROGRESS NOTES
Bedside and Verbal shift change report given to Lyndsay AHN (oncoming nurse) by Avila Fisehr (offgoing nurse). Report included the following information SBAR and MAR.

## 2022-12-10 VITALS
TEMPERATURE: 97.7 F | OXYGEN SATURATION: 99 % | DIASTOLIC BLOOD PRESSURE: 64 MMHG | SYSTOLIC BLOOD PRESSURE: 117 MMHG | HEART RATE: 99 BPM | RESPIRATION RATE: 20 BRPM

## 2022-12-10 NOTE — PROGRESS NOTES
GUSTAVO noted that this pt is still in the ED. Pt is to be opened to hospice services at [facility] today. CM called [facility] ([phone]) and spoke with Gilbert Camejo Med Tech, and she stated that pt's DME still has not been delivered to her room. GUSTAVO called Una Metzger with [facility] ([phone]) Hospice. He said that he will check on the DME and get back with this CM. Pt is supposed to be opened to hospice at 1pm today. Will await call from Una Metzger.  Georgette Montano

## 2022-12-10 NOTE — DISCHARGE SUMMARY
Discharge Summary       PATIENT ID: Joyce Segura  MRN: [id number]   YOB: 1927    DATE OF ADMISSION: 11/30/2022 12:48 PM    DATE OF DISCHARGE: 12/9/2022  PRIMARY CARE Cuauhtemoc Chen NP    ATTENDING PHYSICIAN: Dr. Madai Arnett  DISCHARGING PROVIDER: Subhash Singh NP    To contact this individual call [phone] and ask the  to page. If unavailable ask to be transferred the Adult Hospitalist Department. CONSULTATIONS: IP CONSULT TO ORTHOPEDIC SURGERY    PROCEDURES/SURGERIES: Procedure(s):  SHOULDER ARTHROPLASTY/TOTAL    ADMITTING DIAGNOSES & HOSPITAL COURSE:     Per H&P, Joyce Segura is a 80 y.o. female with apmhx CAD, SDH, and past VTE who presents from [address] with dislocated left shoulder status post fall 2 weeks ago, and hypoxic respiratory failure. She had a mechanical fall two weeks ago, and had resultant shoulder pain that she attempted to treat herself. When the pain did not improve, she let her facility know, and was brought to the ED for further evaluation. In the ED, she was hypoxic to 89% with improvement to 96% on 2Lnc. Other VSS. Labs showed WBC 11.5, CO2 35, BUN 38, and creatinine 1.54 (b/l 0.8-0.9). XR left shoulder shows anterior glenohumeral dislocation, and OA. CXR shows no acute intrathoracic disease. Excerpt from 12/8 :    Patient seen and examined this morning with daughter and son-in-law present at bedside. Discussed my concerns with possible decompensated heart failure process with cardiomegaly identified on chest x-ray along with pulmonary edema and small bilateral pleural effusions, peripheral extremity swelling, BNP greater than 18,000. Echo results are pending. Patient in significant amount of pain due to chronic musculoskeletal issues and osteoarthritis. She also now has significant amount of pain related to shoulder dislocation and subsequent surgical repair.   Goals of care discussion related to patient's inability to likely make full recovery with physical and occupational therapy or rehab. Family is in agreement with shifting goals of care to comfort and hospice. Discussion regarding what hospice would provide in their assisted living facility and additionally what enrolling in hospice would mean for her medical care moving forward. The family is very much interested in shifting goals of care to hospice, informational session with [facility] who works in [facility]. Consult placed for case management and patient was accepted to [facility]. She will be discharged back to [facility] with Hospice. DISCHARGE DIAGNOSES / PLAN:      Left shoulder dislocation  Glenohumeral dislocation as revealed on imaging  Continuing multimodal pain control, ice. Increase Tylenol to 1g q6h and decrease Oxycodone to 2.5mg q6h PRN only for severe breakthrough pain  POD 6 TSA  Orthopedics following, appreciate recommendations  Per orthopedics weightbearing, okay to push herself up from wheelchair, nonweightbearing right upper extremity, may use sling as needed. Shoulder pendulums/elbow/wrist range of motion          Hypoxic Respiratory Failure  Oxygen saturations were 89% on room air, improvement to 96% on 2 L  CTA was unremarkable  Incentive spirometry  Weaned to room air however now with cough. Also noted very mild leukocytosis  Radiograph continuing levofloxacin with consolidation on the left  On renally dosed antibiotics (Levaquin)( last dose today)   Pro sydney elevated at 1.54, BNP noted to be 18,000  Chest XR 12/7 shows pulmonary edema and small bilateral pleural effusions as well cardiomegaly   ECHO ordered 12/7-results are pending     Acute kidney injury  It is unclear what her baseline creatinine is. Last creatinine in our system was in 2019. Creatinine at presentation 1.54, this has improved with IV hydration down to 1.15.  Overall improving  Will avoid nephrotoxins  Continuing gentle IV hydration. Stopped IVF 12/6 as she appeared to be getting overloaded         CAD  Denies chest pain or shortness of breath  She denies having had heart attack in the past  holding carvedilol in the setting of hypotension        H/O SDH  Stable     glaucoma  Stable  Continue latanoprost     RLS  Restarting pramipexole          PENDING TEST RESULTS:   At the time of discharge the following test results are still pending: none    FOLLOW UP APPOINTMENTS:    Follow-up Information       Follow up With Specialties Details Why Contact Info    Sissy Irwin MD Orthopedic Surgery Call in 2 week(s) For Postop follow up [address] [facility]  [address]  [address] [place] [address]  [phone]      At [address] Real Collier MD Family Medicine   [place] [address] [place], [place] [id number]  [phone]               ADDITIONAL CARE RECOMMENDATIONS:     Dr. Kirk Garcia total shoulder replacement Postoperative Instructions    Follow-Up Appointment:  Follow up in the office 2 weeks after surgery. If this appointment is not already scheduled, please call our office at [phone]. Activity: It is okay to push up from the wheelchair using this arm. You can use the operative-sided hand. You can feed yourself. You should not carry objects more than 2 pounds. Ambulate every hour. Use the incentive spirometer every half hour when resting. A sling will be used for the first 2 weeks when sleeping or ambulating. You can transition out of the sling as you can tolerate. Remove the sling three times daily to do range of motion exercises of the elbow and wrist. It is ok to do pendulum exercises to the shoulder. Do Not externally rotate the shoulder past straight forward or active inward-rotation towards the belly for 6 weeks after surgery.    Application of the ice packs will prevent and treat inflammation and reduce pain and swelling. You should ice the incisional area at least 3 times a day, 20-30 minutes at a time. Prevent any falls. Clear your living environment of rugs or floor objects that may be tripping hazards. Dressings / Wound Care:  Keep dressing in place until the follow-up visit. Dermabond (skin glue) may be used to help close the incision, which appears as a thin, transparent covering over the incision. Do not pick or pull at this covering, this will fall off naturally within 2-3 weeks. Do not apply antibiotic ointment, creams or lotions to your incision. Once your waterproof dressing has been removed, you may change bandages daily if you like or leave them open to air. These can be purchased at your local pharmacy. Most incisions are closed with absorbable sutures, but if not, the sutures or staples will be removed at your 2 week follow up. Venkat Kennel / Bathing: If your incision is dry without drainage, you may shower following your discharge home. The dressing is waterproof if well affixed to skin. You may shower with the waterproof dressing in place, but please be sure it is adhered to the skin and does not allow water to get underneath. It is fine to have water run over the incision after the waterproof dressing has been removed. Do not vigorously scrub your incision. Do not soak or submerge in a bath, pool, jacuzzi, ocean, river or lake for 6 weeks after surgery. If there is continued drainage or you are concerned contact Dr. Greer Dickinson office prior to showering [phone]  Diet:  You may advance to your regular diet as tolerated. Proper nutrition is crucial to healing. Make sure you are eating as healthily as possible and following a balanced. protein-rich diet after your surgery. Avoid processed foods and eat fruits and vegetables. Medications: It is our goal to keep you as comfortable as possible after your surgery.   Please take your medications as prescribed without exceeding the recommended dosage. It is also important to understand that pain has a cycle. It begins and increases until medication interrupts it. The aim of good pain control is to stop the pain before it becomes intolerable. The key is to stay ahead of the pain. We encourage patients to discontinue opioid pain medications as soon as possible after surgery. The side effects of these medications can be substantial, and the narcotic medications are not mandatory. You may substitute a prescribed narcotic with over-the-counter Tylenol. Tylenol should not exceed 4000mg in a 24 hour period, including if it is in the pain medication. Pain medications may cause constipation. Over-the-counter Colace twice daily and Miralax while taking the narcotic medication should help prevent constipation. Other side effects of pain medication include dizziness, headache, nausea, vomiting, and urinary retention. Discontinue the pain medication if you develop itching, rash, shortness of breath, or difficulties swallowing. If these symptoms become severe or are not relieved by discontinuing the medication, you should seek immediate medical attention. Refills of pain medication are authorized during office hours only (8 AM- 4 PM Monday through Friday). Our office will not prescribe narcotics beyond 6 weeks from the date of your surgery. Narcotics will not be called into the pharmacy, and, in most cases, you must be seen clinically. Driving: You should not return to driving until you are off all narcotic pain medications and able to safely control and steer a motor vehicle. This may take 6 weeks or more because of the limited shoulder motion and activity. Airports and metal detectors:  ID cards are no longer given after joint replacement, as they are not accepted by [employer]. Most joint replacements do not set off metal detectors.  If the detector is set off, just tell the  you have a joint replacement. Signs/Symptoms of Concern:   Contact Dr. Rosa Davis office if any of the following signs or symptoms develop. Please be advised if a problem arises which you feel requires immediate medical attention you should seek medical attention at the closest ER. Temperature greater than 101.5 for more than 8 hrs  Fever and/or chills that persist for greater than 8 hr. A sudden increase in pain/swelling/ or tenderness in the back of your calf or thigh that is not relieved with ice/elevation and pain medication. Increased drainage from your incision, increased redness or warmth at the area of your incision or a sudden increase in swelling or redness that persists despite ice and elevation        DIET: Regular Diet      ACTIVITY: Activity as tolerated    WOUND CARE: none    EQUIPMENT needed: none       DISCHARGE MEDICATIONS:  Discharge Medication List as of 12/9/2022 12:27 PM        CONTINUE these medications which have NOT CHANGED    Details   !! acetaminophen (TYLENOL) 325 mg tablet Take 650 mg by mouth nightly. Acetaminophen Maximum 3 Grams/24 Hours, Historical Med      !! acetaminophen (TYLENOL) 325 mg tablet Take 650 mg by mouth two (2) times a day. 1200, 1800  -  Acetaminophen Maximum 3 Grams/24 Hours, Historical Med      OTHER,NON-FORMULARY, Alcohol Beverage 6oz. Daily at 1600, Historical Med      menthol (Biofreeze, menthol,) 4 % gel by Apply Externally route four (4) times daily. APPLY TO Neck for pain    Nursing, document site in comments, Historical Med      bumetanide (BUMEX) 1 mg tablet Take 1 mg by mouth daily. , Historical Med      mineral oil-isopropyl myristat (EUCERIN) lotion Apply  to affected area daily. APPLY TO Left Lower Extremities    Nursing, document site in comments, Historical Med      famotidine (PEPCID) 20 mg tablet Take 20 mg by mouth daily. , Historical Med      loratadine (CLARITIN) 10 mg tablet Take 10 mg by mouth nightly.  Indications: PRURITIS, Historical Med      magnesium oxide (MAG-OX) 400 mg tablet Take 800 mg by mouth nightly. 1200 mg in the AM (see additional order), 800 mg at night, Historical Med      guaiFENesin ER (MUCINEX) 600 mg ER tablet Take 600 mg by mouth two (2) times a day. For 10 days - Start 11/22, Historical Med      nystatin (MYCOSTATIN) topical cream Apply  to affected area daily. APPLY TO Groin every day for prevention    Nursing, document site in comments, Historical Med      pramipexole (MIRAPEX) 0.125 mg tablet Take 0.125 mg by mouth two (2) times a day. 1200, 2000, Historical Med      predniSONE (DELTASONE) 10 mg tablet Take 10 mg by mouth daily. For 7 days starting 11/28/2022, Historical Med      torsemide (DEMADEX) 5 mg tablet Take 5 mg by mouth every Monday and Thursday., Historical Med      rivaroxaban (Xarelto) 10 mg tablet Take 10 mg by mouth daily (with dinner). Indications: blood clot in a deep vein of the extremities, Historical Med      !! acetaminophen (TYLENOL) 325 mg tablet Take 325 mg by mouth two (2) times daily as needed for Pain. Okay to give 4 hours apart from scheduled doses, Historical Med      diphenhydrAMINE (BENADRYL) 25 mg capsule Take 25 mg by mouth every six (6) hours as needed for Itching., Historical Med      fluticasone propionate (Flonase Allergy Relief) 50 mcg/actuation nasal spray 2 Sprays by Both Nostrils route daily as needed (Sneezing). , Historical Med      triamcinolone acetonide (KENALOG) 0.1 % topical cream Apply  to affected area two (2) times daily as needed for Itching. use thin layer  APPLY TO rash on forearms and legs twice daily as needed    Nursing, document site in comments, Historical Med      calcium carbonate (OS-DAVIAN) 500 mg calcium (1,250 mg) tablet Take 2 Tabs by mouth three (3) times daily (with meals). , Print, Disp-30 Tab, R-0      cetirizine (ZYRTEC) 10 mg tablet Take 10 mg by mouth daily. , Historical Med      folic acid (FOLVITE) 1 mg tablet Take 1 Tab by mouth daily. , No Print, Disp-10 Tab, R-0 diclofenac (VOLTAREN) 1 % gel Apply  to affected area three (3) times daily. , Historical Med      melatonin tab tablet Take 5 mg by mouth nightly., Historical Med      mirtazapine (REMERON) 30 mg tablet Take 30 mg by mouth nightly., Historical Med      cholecalciferol (VITAMIN D3) 1,000 unit tablet Take 1,000 Units by mouth daily. , Historical Med      latanoprost (XALATAN) 0.005 % ophthalmic solution Administer 1 Drop to both eyes nightly., Historical Med       !! - Potential duplicate medications found. Please discuss with provider. STOP taking these medications       colchicine 0.6 mg tablet Comments:   Reason for Stopping:         carvedilol (COREG) 3.125 mg tablet Comments:   Reason for Stopping:         thiamine (B-1) 100 mg tablet Comments:   Reason for Stopping:                 NOTIFY YOUR PHYSICIAN FOR ANY OF THE FOLLOWING:   Fever over 101 degrees for 24 hours. Chest pain, shortness of breath, fever, chills, nausea, vomiting, diarrhea, change in mentation, falling, weakness, bleeding. Severe pain or pain not relieved by medications. Or, any other signs or symptoms that you may have questions about. DISPOSITION:    Home With:   OT  PT  HH  RN       Long term SNF/Inpatient Rehab    Independent/assisted living   X Hospice    Other:       PATIENT CONDITION AT DISCHARGE:     Functional status   X Poor     Deconditioned     Independent      Cognition     Lucid    X Forgetful     Dementia      Catheters/lines (plus indication)    Burris     PICC     PEG    X None      Code status     Full code    X DNR      PHYSICAL EXAMINATION AT DISCHARGE:     Constitutional:  No acute distress, cooperative, pleasant. ENT:  Oral mucosa moist, oropharynx benign. Resp:  Poor inspiratory effort. Decreased breath sounds at left base. No diffuse wheezing/crackles. No stridor or increased WOB   CV:  Regular rhythm, normal rate    GI:  Soft, non distended, non tender.      Musculoskeletal:  LUE edematous and significant ecchymosis on upper back radiating anteriorly. Neurologic:  Follows commands. Oriented to name, location, year.                                 CHRONIC MEDICAL DIAGNOSES:  Problem List as of 12/9/2022 Date Reviewed: 3/12/2019            Codes Class Noted - Resolved    Hypoxia ICD-10-CM: R09.02  ICD-9-CM: 799.02  11/30/2022 - Present        Electrolyte abnormality ICD-10-CM: E87.8  ICD-9-CM: 276.9  3/12/2019 - Present        Chest pain ICD-10-CM: R07.9  ICD-9-CM: 786.50  9/12/2017 - Present        TIA (transient ischemic attack) ICD-10-CM: G45.9  ICD-9-CM: 435.9  3/8/2017 - Present        GI bleed ICD-10-CM: K92.2  ICD-9-CM: 578.9  3/8/2017 - Present        Numbness and tingling ICD-10-CM: R20.0, R20.2  ICD-9-CM: 782.0  3/8/2017 - Present        CAD in native artery ICD-10-CM: I25.10  ICD-9-CM: 414.01  11/28/2016 - Present        History of non-ST elevation myocardial infarction (NSTEMI) ICD-10-CM: I25.2  ICD-9-CM: 412  11/28/2016 - Present        NSTEMI (non-ST elevated myocardial infarction) ([address] ICD-10-CM: I21.4  ICD-9-CM: 410.70  11/22/2016 - Present        Diarrhea ICD-10-CM: R19.7  ICD-9-CM: 787.91  11/22/2016 - Present        Hypocalcemia ICD-10-CM: E83.51  ICD-9-CM: 275.41  11/22/2016 - Present        Hypomagnesemia ICD-10-CM: E83.42  ICD-9-CM: 275.2  11/22/2016 - Present        Hypokalemia ICD-10-CM: E87.6  ICD-9-CM: 276.8  11/20/2016 - Present        RESOLVED: Pneumonia ICD-10-CM: J18.9  ICD-9-CM: 486  10/31/2018 - 11/6/2018        RESOLVED: Other fracture of right femur, initial encounter for closed fracture ([facility] [address] ICD-10-CM: L60.6N9N  ICD-9-CM: 821.00  10/31/2018 - 11/6/2018           Greater than 60 minutes were spent with the patient on counseling and coordination of care    Signed:   Lito Montalvo NP  12/9/2022  7:24 PM

## 2022-12-10 NOTE — PROGRESS NOTES
MARTHA- Dc to [facility] with [facility]. CM received a call from Shweta, the charge nurse at [facility] and she confirmed that she can accept this pt back there today with hospice. She also confirmed that pt's DME has been delivered. CM set up transportation with [facility] for 12:30pm. CM spoke with pt's daughter, Jack Larson and she also confirmed that she will meet with hospice today at 1pm. Pt's nurse in the ED was updated.  Jose Antonio Bradley

## 2023-01-04 NOTE — PROGRESS NOTES
Problem: Self Care Deficits Care Plan (Adult)  Goal: *Acute Goals and Plan of Care (Insert Text)  Description: FUNCTIONAL STATUS PRIOR TO ADMISSION: Pt reports she is Moderate A for ADLs, seated shower chair or seated W/C level and briefly standing at RW. HOME SUPPORT: The patient lived with skilled nursing staffing to provide Moderate ADL assist.      Occupational Therapy Goals  Initiated 12/2/2022   1. Patient will don/doff arm sling at maximal assistance level within 7 days. 2.  Patient will perform [facility] and pendulum exercises per physician order with moderate assistance  within 7 days. 3.  Patient will perform upper body ADLs with moderate assistance   within 7 days. 4. Patient will perform supine <> sit with minimal assistance to participate with ADL tasks within 7 days. 5.  Patient will perform toilet transfers with moderate assistance  using  Walkers, Type: Ion Walker within 7 days. 6.  Patient will verbalize/demonstrate shoulder precautions during ADLs with 100% accuracy within 7 days. Initiated 12/1/2022  1. Patient will perform seated self-feeding with supervision/set-up within 7 day(s). 2.  Patient will perform seated grooming with minimal assistance within 7 day(s). 3.  Patient will perform seated upper body bathing with moderate assistance  within 7 day(s). 4.  Patient will perform toilet transfers, EOB to [facility], with maximal assistance within 7 day(s). Outcome: Not Progressing Towards Goal    OCCUPATIONAL THERAPY TREATMENT  Patient: Roby Castaneda (23 y.o. female)  Date: 12/7/2022  Diagnosis: Hypoxia [R09.02] <principal problem not specified>  Procedure(s) (LRB):  SHOULDER ARTHROPLASTY/TOTAL (Left) 6 Days Post-Op  Precautions: Fall, WBAT  Chart, occupational therapy assessment, plan of care, and goals were reviewed. ASSESSMENT  Patient continues with skilled OT services and is not progressing towards goals.   Pt noted with high confusion this date, yelling out \"help me, help me, help me,\" Working on nutrition - fruits/veggies as half your plate, healthy oils (olive oil, avocados, fatty fish, nuts)  Making sure to get exercise - 30 min 5 days a week or equivalent (2.5 hrs/wk)    Aneurysm screen - Call [phone] to set up your imaging testing in [place]    Flu, covid, pneumonia shot today   Get tetanus shot sometime - see nurse with any small cut, etc  Talk to pharmacy about shingles vaccine    Patient Education   Personalized Prevention Plan  You are due for the preventive services outlined below.  Your care team is available to assist you in scheduling these services.  If you have already completed any of these items, please share that information with your care team to update in your medical record.  Health Maintenance Due   Topic Date Due    Colorectal Cancer Screening  Never done    Hepatitis C Screening  Never done    Zoster (Shingles) Vaccine (2 of 3) 07/22/2014    Pneumococcal Vaccine (2 - PCV) 08/31/2018    AORTIC ANEURYSM SCREENING (SYSTEM ASSIGNED)  Never done       Understanding [employer] MyPlate  The USDA has guidelines to help you make healthy food choices. These are called MyPlate. MyPlate shows the food groups that make up healthy meals using the image of a place setting. Before you eat, think about the healthiest choices for what to put on your plate or in your cup or bowl. To learn more about building a healthy plate, visit www.choosemyplate.gov.    The food groups  Fruits. Any fruit or 100% fruit juice counts as part of the Fruit Group. Fruits may be fresh, canned, frozen, or dried, and may be whole, cut-up, or pureed. Make 1/2 of your plate fruits and vegetables.  Vegetables. Any vegetable or 100% vegetable juice counts as a member of the Vegetable Group. Vegetables may be fresh, frozen, canned, or dried. They can be served raw or cooked and may be whole, cut-up, or mashed. Make 1/2 of your plate fruits and vegetables.  Grains. All foods made from grains are part of the Grains  Group. These include wheat, rice, oats, cornmeal, and barley. Grains are often used to make foods such as bread, pasta, oatmeal, cereal, tortillas, and grits. Grains should be no more than 1/4 of your plate. At least half of your grains should be whole grains.  Protein. This group includes meat, poultry, seafood, beans and peas, eggs, processed soy products (such as tofu), nuts (including nut butters), and seeds. Make protein choices no more than 1/4 of your plate. Meat and poultry choices should be lean or low fat.  Dairy. The Dairy Group includes all fluid milk products and foods made from milk that contain calcium, such as yogurt and cheese. (Foods that have little calcium, such as cream, butter, and cream cheese, are not part of this group.) Most dairy choices should be low-fat or fat-free.  Oils. Oils aren't a food group, but they do contain essential nutrients. However it's important to watch your intake of oils. These are fats that are liquid at room temperature. They include canola, corn, olive, soybean, vegetable, and sunflower oil. Foods that are mainly oil include mayonnaise, certain salad dressings, and soft margarines. You likely already get your daily oil allowance from the foods you eat.  Things to limit  Eating healthy also means limiting these things in your diet:     Salt (sodium). Many processed foods have a lot of sodium. To keep sodium intake down, eat fresh vegetables, meats, poultry, and seafood when possible. Purchase low-sodium, reduced-sodium, or no-salt-added food products at the store. And don't add salt to your meals at home. Instead, season them with herbs and spices such as dill, oregano, cumin, and paprika. Or try adding flavor with lemon or lime zest and juice.  Saturated fat. Saturated fats are most often found in animal products such as beef, pork, and chicken. They are often solid at room temperature, such as butter. To reduce your saturated fat intake, choose leaner cuts of meat  asking to locate call bell that was in her hand. Pt required Total A and extended time to be repositioned in bed and was noted to drift of to sleep as OT was leaving room. Acute OT to decrease frequency to 3x weekly, with recommendation to discharge to SNF with LTC services. Acute OT to follow during acute hospitalization. Current Level of Function Impacting Discharge (ADLs): Total A    Other factors to consider for discharge: Total A         PLAN :  Patient continues to benefit from skilled intervention to address the above impairments. Continue treatment per established plan of care to address goals. Recommend with staff: Frequent positional changes    Recommend next OT session: POC    Recommendation for discharge: (in order for the patient to meet his/her long term goals)  To be determined: SNF to LTC    This discharge recommendation:  Has been made in collaboration with the attending provider and/or case management    IF patient discharges home will need the following DME: DME fulfilled       SUBJECTIVE:   Patient stated Help me, help me, help me.     OBJECTIVE DATA SUMMARY:   Cognitive/Behavioral Status:  Neurologic State: Alert;Confused  Orientation Level: Oriented to person;Disoriented to place; Disoriented to situation;Disoriented to time  Cognition: Impaired decision making  Perception: Appears intact  Perseveration: No perseveration noted  Safety/Judgement: Decreased awareness of environment;Decreased awareness of need for safety;Decreased awareness of need for assistance;Decreased insight into deficits    Functional Mobility and Transfers for ADLs:  Bed Mobility:  Rolling: Total assistance  Scooting: Total assistance    ADL Intervention:  Upper Body Dressing Assistance  Orthotics(Brace):  Total assistance (dependent)    Cognitive Retraining  Safety/Judgement: Decreased awareness of environment;Decreased awareness of need for safety;Decreased awareness of need for assistance;Decreased insight into and poultry. And try healthier cooking methods such as grilling, broiling, roasting, or baking. For a simple lower-fat swap, use plain nonfat yogurt instead of mayonnaise when making potato salad or macaroni salad.  Added sugars. These are sugars added to foods. They are in foods such as ice cream, candy, soda, fruit drinks, sports drinks, energy drinks, cookies, pastries, jams, and syrups. Cut down on added sugars by sharing sweet treats with a family member or friend. You can also choose fruit for dessert, and drink water or other unsweetened beverages.     [employer] last reviewed this educational content on 6/1/2020 2000-2021 [employer]. All rights reserved. This information is not intended as a substitute for professional medical care. Always follow your healthcare professional's instructions.          Signs of Hearing Loss      Hearing much better with one ear can be a sign of hearing loss.   Hearing loss is a problem shared by many people. In fact, it is one of the most common health problems, particularly as people age. Most people age 65 and older have some hearing loss. By age 80, almost everyone does. Hearing loss often occurs slowly over the years. So you may not realize your hearing has gotten worse.  Have your hearing checked  Call your healthcare provider if you:  Have to strain to hear normal conversation  Have to watch other people s faces very carefully to follow what they re saying  Need to ask people to repeat what they ve said  Often misunderstand what people are saying  Turn the volume of the television or radio up so high that others complain  Feel that people are mumbling when they re talking to you  Find that the effort to hear leaves you feeling tired and irritated  Notice, when using the phone, that you hear better with one ear than the other  [employer] last reviewed this educational content on 1/1/2020 2000-2021 [employer]. All rights reserved. This  deficits    Pain:  Complaint discomfort with bed positioning    Activity Tolerance:   Poor, desaturates with exertion and requires oxygen, and requires frequent rest breaks    After treatment patient left in no apparent distress:   Supine in bed, Call bell within reach, Bed / chair alarm activated, Side rails x 3, and PCT present    COMMUNICATION/COLLABORATION:   The patients plan of care was discussed with: Registered nurse and Case management.      Catarina Sorto, OT  Time Calculation: 10 mins information is not intended as a substitute for professional medical care. Always follow your healthcare professional's instructions.

## 2023-05-02 RX ORDER — ACETAMINOPHEN 325 MG/1
325 TABLET ORAL 2 TIMES DAILY PRN
COMMUNITY

## (undated) DEVICE — SST TWIST DRILL, STANDARD, 2MM DIA. X 127MM: Brand: MICROAIRE®

## (undated) DEVICE — DRAPE,REIN 53X77,STERILE: Brand: MEDLINE

## (undated) DEVICE — SLIM BODY SKIN STAPLER: Brand: APPOSE ULC

## (undated) DEVICE — STERILE POLYISOPRENE POWDER-FREE SURGICAL GLOVES WITH EMOLLIENT COATING: Brand: PROTEXIS

## (undated) DEVICE — SUTURE VCRL 0 L27IN ABSRB UD CT L40MM 1/2 CIR TAPERPOINT J280H

## (undated) DEVICE — SUPPORT ORTHOT FT CUST INSRT MOLD TO PT MODEL SPENCO

## (undated) DEVICE — TOTAL JOINT - SMH: Brand: MEDLINE INDUSTRIES, INC.

## (undated) DEVICE — 3M™ IOBAN™ 2 ANTIMICROBIAL INCISE DRAPE 6651EZ: Brand: IOBAN™ 2

## (undated) DEVICE — CATHETER F BLLN 5CC 16FR 2 W HYDRGEL COAT LESS TRAUM LUB

## (undated) DEVICE — SOLUTION IRRIG 1000ML H2O STRL BLT

## (undated) DEVICE — DRAPE,U/ SHT,SPLIT,PLAS,STERIL: Brand: MEDLINE

## (undated) DEVICE — 4-PORT MANIFOLD: Brand: NEPTUNE 2

## (undated) DEVICE — SOLUTION IRRIG 3000ML 0.9% SOD CHL FLX CONT 0797208] ICU MEDICAL INC]

## (undated) DEVICE — HEWSON SUTURE RETRIEVER: Brand: HEWSON SUTURE RETRIEVER

## (undated) DEVICE — (D)STRIP SKN CLSR 0.5X4IN WHT --

## (undated) DEVICE — ABDUCTION PILLOW FOAM POSITIONER: Brand: CARDINAL HEALTH

## (undated) DEVICE — SUTURE MCRYL SZ 3-0 L27IN ABSRB UD L19MM PS-2 3/8 CIR PRIM Y427H

## (undated) DEVICE — BRUSH SCRB DRY NL CLN LF GRN --

## (undated) DEVICE — REM POLYHESIVE ADULT PATIENT RETURN ELECTRODE: Brand: VALLEYLAB

## (undated) DEVICE — STAPLER SKN FIX HD COUNT STRL -- SUB STAPLER35WA  6/CA $58.16

## (undated) DEVICE — SOLUTION IRRIG 1000ML STRL H2O USP PLAS POUR BTL

## (undated) DEVICE — SUTURE FIBERWIRE SZ 5 L38IN NONABSORBABLE BLU L48MM 1/2 AR7211

## (undated) DEVICE — BLADE SAW W073XL276IN THK0031IN CUT THK0036IN REPL SAG

## (undated) DEVICE — SUTURE VCRL SZ 2-0 L27IN ABSRB UD L36MM CP-1 1/2 CIR REV J266H

## (undated) DEVICE — SOLUTION IRRIG 3000ML 0.9% SOD CHL ARTHROMATIC PLAS CONT

## (undated) DEVICE — Z DISCONTINUED USE 2744636  DRESSING AQUACEL 14 IN ALG W3.5XL14IN POLYUR FLM CVR W/ HYDRCOLL

## (undated) DEVICE — HANDPIECE SET WITH BONE CLEANING TIP AND SUCTION TUBE: Brand: INTERPULSE

## (undated) DEVICE — GARMENT,MEDLINE,DVT,INT,CALF,MED, GEN2: Brand: MEDLINE

## (undated) DEVICE — GLOVE SURG SZ 8 L12IN FNGR THK79MIL GRN LTX FREE

## (undated) DEVICE — PREP SKN PREVAIL 40ML APPL --

## (undated) DEVICE — Device

## (undated) DEVICE — SUTURE ETHBND EXCEL SZ 2 L30IN NONABSORBABLE GRN L40MM V-37 MX69G

## (undated) DEVICE — DRESSING HYDROCOLLOID BORDER 35X10 IN ALUM PRIMASEAL

## (undated) DEVICE — TUBING SUCT 12FR MAL ALUM SHFT FN CAP VENT UNIV CONN W/ OBT

## (undated) DEVICE — HEADLESS TROCHAR PIN 75MM: Brand: ZUK

## (undated) DEVICE — DERMABOND SKIN ADH 0.7ML -- DERMABOND ADVANCED 12/BX

## (undated) DEVICE — SUTURE VCRL SZ 2 L54IN ABSRB UD L65MM TP-1 1/2 CIR J880T

## (undated) DEVICE — DEVON™ KNEE AND BODY STRAP 60" X 3" (1.5 M X 7.6 CM): Brand: DEVON

## (undated) DEVICE — COVER,MAYO STAND,STERILE: Brand: MEDLINE

## (undated) DEVICE — SUT VCRL 2-0 36IN CT1 UD --

## (undated) DEVICE — BLADE RMFG SAG LG 19.1X70X1MM --

## (undated) DEVICE — HYPODERMIC SAFETY NEEDLE: Brand: MAGELLAN

## (undated) DEVICE — BLADE ELECTRODE: Brand: EDGE

## (undated) DEVICE — NEEDLE SUTURE TAPR PT 5 0.5 CIR MAYO

## (undated) DEVICE — PIN STEINMANN RVRS STL THRD TP --

## (undated) DEVICE — SCRUB DRY SURG EZ SCRUB BRUSH PREOPERATIVE GRN

## (undated) DEVICE — SUTURE STRATAFIX SPRL SZ 1 L14IN ABSRB VLT L48CM CTX 1/2 SXPD2B405

## (undated) DEVICE — INFECTION CONTROL KIT SYS

## (undated) DEVICE — SUTURE ETHBND EXCEL SZ 5 L30IN NONABSORBABLE GRN L40MM V-37 MB66G

## (undated) DEVICE — SYR 10ML LUER LOK 1/5ML GRAD --

## (undated) DEVICE — TIP IRRIG FEM CNL BRSH W SUCT 9IN PULSAVAC

## (undated) DEVICE — PAD 05IN BASE 3IN PEAK M DENS CONVOLUTED FOAM

## (undated) DEVICE — BIT DRL SCR 3.2MM CNTRL DISP --

## (undated) DEVICE — GLOVE ORTHO 7 1/2   MSG9475

## (undated) DEVICE — IMMOBILIZER ORTH LIGHTWEIGHT LG UNIV 9X7 IN PREMIERPRO

## (undated) DEVICE — BIT DRL SCR PERIPH 2.7MM DISP --

## (undated) DEVICE — STERILE POLYISOPRENE POWDER-FREE SURGICAL GLOVES: Brand: PROTEXIS

## (undated) DEVICE — SOLUTION SURG PREP 26 CC PURPREP